# Patient Record
Sex: FEMALE | Race: WHITE | URBAN - METROPOLITAN AREA
[De-identification: names, ages, dates, MRNs, and addresses within clinical notes are randomized per-mention and may not be internally consistent; named-entity substitution may affect disease eponyms.]

---

## 2017-01-03 ENCOUNTER — ONCOLOGY VISIT (OUTPATIENT)
Dept: ONCOLOGY | Facility: CLINIC | Age: 72
End: 2017-01-03
Payer: COMMERCIAL

## 2017-01-03 ENCOUNTER — INFUSION THERAPY VISIT (OUTPATIENT)
Dept: INFUSION THERAPY | Facility: CLINIC | Age: 72
End: 2017-01-03
Payer: COMMERCIAL

## 2017-01-03 VITALS
RESPIRATION RATE: 15 BRPM | TEMPERATURE: 98 F | SYSTOLIC BLOOD PRESSURE: 132 MMHG | WEIGHT: 218 LBS | HEART RATE: 84 BPM | BODY MASS INDEX: 35.03 KG/M2 | DIASTOLIC BLOOD PRESSURE: 78 MMHG | OXYGEN SATURATION: 98 % | HEIGHT: 66 IN

## 2017-01-03 DIAGNOSIS — C18.9 METASTASIS FROM COLON CANCER (H): Primary | ICD-10-CM

## 2017-01-03 DIAGNOSIS — F41.9 ANXIETY: ICD-10-CM

## 2017-01-03 DIAGNOSIS — C79.9 METASTASIS FROM COLON CANCER (H): Primary | ICD-10-CM

## 2017-01-03 DIAGNOSIS — D69.6 THROMBOCYTOPENIA (H): ICD-10-CM

## 2017-01-03 DIAGNOSIS — R11.0 NAUSEA: ICD-10-CM

## 2017-01-03 DIAGNOSIS — R19.5 LOOSE STOOLS: ICD-10-CM

## 2017-01-03 LAB
BASOPHILS # BLD AUTO: 0 10E9/L (ref 0–0.2)
BASOPHILS NFR BLD AUTO: 0.5 %
BILIRUB SERPL-MCNC: 0.4 MG/DL (ref 0.2–1.3)
CEA SERPL-MCNC: 7 UG/L (ref 0–2.5)
DIFFERENTIAL METHOD BLD: ABNORMAL
EOSINOPHIL # BLD AUTO: 0.2 10E9/L (ref 0–0.7)
EOSINOPHIL NFR BLD AUTO: 4 %
ERYTHROCYTE [DISTWIDTH] IN BLOOD BY AUTOMATED COUNT: 14.2 % (ref 10–15)
HCT VFR BLD AUTO: 43.6 % (ref 35–47)
HGB BLD-MCNC: 14.8 G/DL (ref 11.7–15.7)
LYMPHOCYTES # BLD AUTO: 2.6 10E9/L (ref 0.8–5.3)
LYMPHOCYTES NFR BLD AUTO: 47 %
MCH RBC QN AUTO: 30.2 PG (ref 26.5–33)
MCHC RBC AUTO-ENTMCNC: 33.9 G/DL (ref 31.5–36.5)
MCV RBC AUTO: 89 FL (ref 78–100)
MONOCYTES # BLD AUTO: 0.4 10E9/L (ref 0–1.3)
MONOCYTES NFR BLD AUTO: 7.8 %
NEUTROPHILS # BLD AUTO: 2.3 10E9/L (ref 1.6–8.3)
NEUTROPHILS NFR BLD AUTO: 40.7 %
PLATELET # BLD AUTO: 130 10E9/L (ref 150–450)
RBC # BLD AUTO: 4.9 10E12/L (ref 3.8–5.2)
WBC # BLD AUTO: 5.5 10E9/L (ref 4–11)

## 2017-01-03 PROCEDURE — 96413 CHEMO IV INFUSION 1 HR: CPT | Performed by: INTERNAL MEDICINE

## 2017-01-03 PROCEDURE — 96417 CHEMO IV INFUS EACH ADDL SEQ: CPT | Performed by: INTERNAL MEDICINE

## 2017-01-03 PROCEDURE — 85025 COMPLETE CBC W/AUTO DIFF WBC: CPT | Performed by: INTERNAL MEDICINE

## 2017-01-03 PROCEDURE — 99000 SPECIMEN HANDLING OFFICE-LAB: CPT | Performed by: INTERNAL MEDICINE

## 2017-01-03 PROCEDURE — 82378 CARCINOEMBRYONIC ANTIGEN: CPT | Mod: 90 | Performed by: INTERNAL MEDICINE

## 2017-01-03 PROCEDURE — 82247 BILIRUBIN TOTAL: CPT | Performed by: INTERNAL MEDICINE

## 2017-01-03 PROCEDURE — 99214 OFFICE O/P EST MOD 30 MIN: CPT | Mod: 25 | Performed by: NURSE PRACTITIONER

## 2017-01-03 PROCEDURE — 81002 URINALYSIS NONAUTO W/O SCOPE: CPT | Performed by: INTERNAL MEDICINE

## 2017-01-03 PROCEDURE — 97802 MEDICAL NUTRITION INDIV IN: CPT | Performed by: DIETITIAN, REGISTERED

## 2017-01-03 PROCEDURE — 96416 CHEMO PROLONG INFUSE W/PUMP: CPT | Performed by: INTERNAL MEDICINE

## 2017-01-03 PROCEDURE — 96375 TX/PRO/DX INJ NEW DRUG ADDON: CPT | Performed by: INTERNAL MEDICINE

## 2017-01-03 PROCEDURE — 99207 ZZC NO CHARGE NURSE ONLY: CPT

## 2017-01-03 RX ORDER — ONDANSETRON 8 MG/1
8 TABLET, FILM COATED ORAL EVERY 8 HOURS PRN
Qty: 10 TABLET | Refills: 2 | Status: SHIPPED | OUTPATIENT
Start: 2017-01-03 | End: 2017-01-31

## 2017-01-03 RX ORDER — PROCHLORPERAZINE MALEATE 10 MG
10 TABLET ORAL EVERY 6 HOURS PRN
Qty: 30 TABLET | Refills: 2 | Status: SHIPPED | OUTPATIENT
Start: 2017-01-03 | End: 2017-05-30

## 2017-01-03 RX ORDER — LOPERAMIDE HCL 2 MG
CAPSULE ORAL
Qty: 30 CAPSULE | Refills: 0 | Status: SHIPPED | OUTPATIENT
Start: 2017-01-03 | End: 2017-02-13

## 2017-01-03 RX ORDER — HEPARIN SODIUM (PORCINE) LOCK FLUSH IV SOLN 100 UNIT/ML 100 UNIT/ML
5 SOLUTION INTRAVENOUS
Status: DISCONTINUED | OUTPATIENT
Start: 2017-01-03 | End: 2017-01-03 | Stop reason: HOSPADM

## 2017-01-03 RX ADMIN — Medication 250 ML: at 09:10

## 2017-01-03 RX ADMIN — HEPARIN SODIUM (PORCINE) LOCK FLUSH IV SOLN 100 UNIT/ML 5 ML: 100 SOLUTION at 08:25

## 2017-01-03 ASSESSMENT — PAIN SCALES - GENERAL: PAINLEVEL: NO PAIN (0)

## 2017-01-03 NOTE — PROGRESS NOTES
Infusion Nursing Note:  Damari Bryant presents today for C1D1 avastin/folfiri.    Patient seen by provider today: Yes: Chrystal Morel NP    Note: Irinotecan stopped at 10:45 for cramping. Atropine given with good relief and irinotecan restarted. Dose completed without further incident.    Intravenous Access:  Implanted Port - see IV flowsheet    Treatment Conditions:  HGB     14.8   1/3/2017  WBC      5.5   1/3/2017   ANEU      2.3   1/3/2017  PLT      130   1/3/2017     Results reviewed, labs MET treatment parameters, ok to proceed with treatment.  TBili wnl.      Post Infusion Assessment:  Patient tolerated infusion without incident.  Blood return noted pre and post infusion.  Site patent and intact, free from redness, edema or discomfort.  No evidence of extravasations.  IVAD left accessed for 46 hours of chemotherapy.    Discharge Plan:   Patient discharged in stable condition accompanied by:  and daughter.  Departure Mode: Ambulatory.  Schedulers working on return appointments for 1/17/17. Patient to return on 1/5 for pump disconnect.    Kailee Stallworth RN

## 2017-01-03 NOTE — PROGRESS NOTES
"CLINICAL NUTRITION SERVICES - ASSESSMENT NOTE    REASON FOR ASSESSMENT  Damari Bryant is a 71 year old female seen by the dietitian for Medical Nutrition Therapy related to colon cancer referred by Chrystal Morel CNP  Pt accompanied by her , Ross and daughter, Katie.       NUTRITION HISTORY  Factors affecting nutrition intake include:decreased appetite    Damari reports that her appetite and intake have been slightly diminished d/t anxiety anticipating chemotherapy.  She starts her chemotherapy today - Folfiri D1C1.  She reports that she had treatment in the past (~3 years ago) and had minimal side effects to treatment.  She reports that with adequate anti-emetic medications, she did not have any nausea.    She reports that she currently eats 3 meals/day including a snack.  She admits that her protein intake is low as she is not a big meat eater and prefers carbs.     Diet recall:  Breakfast:  Bagel with butter    Lunch:  2 Activa yogurts    Dinner:  3-4 oz lean pork, beef, chicken or salmon, 1/2-1 cup pasta or rice, vegetables or 1 cup casserole     Snack:  Cottage cheese with lettuce, fruit    Beverages:   Flavored water (Crystal light)     ANTHROPOMETRICS  Height: 66\"  Weight: 218 lbs/98kg  BMI: 35.1  Weight Status:  Obesity Grade II BMI 35-39.9  IBW: 130 lbs  % IBW: 154%  Weight History: down 7 lbs x past 6 weeks (4% wt loss)  Wt Readings from Last 10 Encounters:   01/03/17 98.884 kg (218 lb)   12/01/16 100.245 kg (221 lb)   11/21/16 102.15 kg (225 lb 3.2 oz)   11/08/16 100.109 kg (220 lb 11.2 oz)   09/06/16 99.791 kg (220 lb)   03/01/16 96.253 kg (212 lb 3.2 oz)   09/02/15 94.484 kg (208 lb 4.8 oz)   03/03/15 92.035 kg (202 lb 14.4 oz)   12/03/14 89.676 kg (197 lb 11.2 oz)   08/28/14 85.911 kg (189 lb 6.4 oz)       Dosing Weight: 152 lbs/69kg    LABS  Labs reviewed    MEDICATIONS/VITAMINS/MINERALS  Medications reviewed  Probiotic ([facility])  Imodium prn (not currently taking)    PROCEDURES WITH " NUTRITIONAL IMPLICATIONS  Chemotherapy - Folfiri C1D1    Radiation - no     ASSESSED NUTRITION NEEDS:  Estimated Energy Needs: 0644-3708 kcals (25-30 Kcal/Kg)  Justification: maintenance  Estimated Protein Needs: 70-85 grams protein (1-1.2 g pro/Kg)  Justification: maintenance  Estimated Fluid Needs: 2000  mL (1 mL/Kcal)  Justification: maintenance    MALNUTRITION:  % Weight Loss:  Weight loss does not meet criteria for malnutrition   % Intake:  Decreased intake does not meet criteria for malnutrition   Subcutaneous Fat Loss:  None observed  Muscle Loss:  None observed  Fluid Retention:  None noted    Malnutrition Diagnosis: Patient does not meet two of the above criteria necessary for diagnosing malnutrition    NUTRITION DIAGNOSIS:  Inadequate oral intake related to decreased appetite with anxiety as evidenced by 7 lb wt loss x past 6 weeks.     INTERVENTIONS  Implementation/Nutrition education provided:  -Discussed ways to maximize kcal and protein intake. Discussed calorie and protein needs for maintenance of weight and nutrition status.  Advised pt to aim for 70-85g protein via 5-6 small frequent meals.  Suggested she try 1/2 cup Kefir in addition to her probiotic supplement to provide additional nutrition (protein, calcium).   -Discussed that as chemotherapy progresses, eating may become more difficult and discussed ways to cope with this.      Provided pt with corresponding education materials -  Tips to Increase the Protein in Your Diet and Protein Sources.    Suggested ONS (Ensure Plus/Boost Plus, CIB, Benecalorie, Scandi shake etc) to have daily. Suggested ways to incorporate these supplements to avoid flavor fatigue. Encouraged pt to start consuming 2 high protein snacks daily.     Pt verbalize understanding of materials provided during consult.   Patient Understanding: Excellent  Expected Compliance: Excellent      Goals  1.  Aim for 5-6 small frequent meals, including protein source with each meal.   2.   Aim for 1700-2000kcal and 70-85g protein/day  3. Weight maintenance through chemo treatment    Follow-Up Plans: Pt has RD contact information for questions.    Pt encouraged to follow up with RD in 2 weeks at the time of infusion or radiation treatment.     MONITORING AND EVALUATION:  -Food intake, adequacy   -Weight trends    Time spent with patient: 30 minutes.  Tejal Guzman RD, LD

## 2017-01-03 NOTE — PROGRESS NOTES
Oncology Follow Up Visit: January 3, 2017    Oncologist: Dr Esther Greene  PCP: Samuel Cobian    Diagnosis: Mestastatic colon cancer  Damari Bryant is a 70 yo  female diagnosed July 2013 with Stage IIIB pT3N1b MX colon cancer.  Final pathology showed a mildly differentiated colonic adenocarcinoma, tumor extended through the muscularis propria into the pericolonic adipose tissue, lymphovascular invasion present.  Margins negative.  Three out of 30 lymph nodes were positive with 0.5 cm in greatest dimension and extracapsular extension identified.  DNA mismatch repair enzymes were intact.  Two pulmonary nodules noted on staging CT, indeterminate.   Treatment:  7/21/2013.  Sigmoid colectomy.   8/2013 to 1/2014  Adjuvant FOLFOX x 12 cycles.   2/2014 to 12/2016 observation  1/3/2017 begin FOLFIRI with new site to lung    INTERVAL HISTORY:  Ms Bryant comes to clinic with , Ross, and daughter for review prior to start of FOLFIRI to treat her colon cancer now found in lung. Pt is aware of new site to the lung and 12/30/2016 PET scan results were reviewed:   IMPRESSION:    1. In this patient with biopsy-proven metastatic colon adenocarcinoma,  there is progressive disease as evidenced by enlarging hypermetabolic  pulmonary nodules bilaterally and a hypermetabolic retroperitoneal  lymph node although similar size to the previous CT is  metastatic  disease until proven otherwise.  2. Diffuse FDG uptake in the thyroid gland is consistent with  nonspecific thyroiditis.  Pt shares she is feeling well with just some mild weakness with exertion. She has been eating well and not loosing weight. She has not been ill and denies SOB, cough or chest pain. She shares she was sent to [facility] with exhaustion on jess stewart after tooth extraction and several outings - was found to be weak and noting changes in consciousness but no specific findings were noted even after CT imaging of the brain completed( see  "results abstracted to chart).  She was released and after rest feels well. She had port placed 12/28/2016 with some brusing noted but working well today. 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 Consitues with insomnia and is aware this is affected by her anxiety- using lexapro and buspar. Denies any blood in stools.   Remainder of her comprehensive review of systems is negative.   Current Outpatient Prescriptions   Medication     Omega-3 Fatty Acids (OMEGA-3 FISH OIL PO)     tetrahydrozoline 0.05 % ophthalmic solution     UNABLE TO FIND     VITAMIN D, CHOLECALCIFEROL, PO     triamcinolone (KENALOG) 0.1 % cream     busPIRone (BUSPAR) 15 MG tablet     amLODIPine (NORVASC) 5 MG tablet     aspirin 81 MG tablet     escitalopram (LEXAPRO) 20 MG tablet     levothyroxine (SYNTHROID, LEVOTHROID) 150 MCG tablet     topiramate (TOPAMAX) 50 MG tablet     triamterene-hydrochlorothiazide (MAXZIDE-25) 37.5-25 MG per tablet     No current facility-administered medications for this visit.     Facility-Administered Medications Ordered in Other Visits   Medication     heparin 100 UNIT/ML injection 5 mL     sodium chloride (PF) 0.9% PF flush 10-20 mL        Allergies   Allergen Reactions     Ativan Other (See Comments)     hallucinations     Avocado Nausea and Vomiting     Hmg-Coa-R Inhibitors Nausea     Iodine-131 Nausea and Vomiting     Shellfish Allergy GI Disturbance     Sulfa Drugs Nausea and Vomiting     Tegaderm Transparent Dressing (Informational Only) Rash      FAMILY HISTORY:  Father had colon cancer at age 70, brother had lung cancer who was a smoker.      PHYSICAL EXAMINATION:   VITAL SIGNS: /78 mmHg  Pulse 84  Temp(Src) 98  F (36.7  C)  Resp 15  Ht 1.676 m (5' 5.98\")  Wt 98.884 kg (218 lb)  BMI 35.20 kg/m2  SpO2 98%   Constitutional: Alert, overweight, and in no distress.   ENT: Eyes bright- glasses , No mouth " sores  Neck: Supple, No adenopathy.Thyroid symmetric- non tender to palpation.   Right port site with colorful bruising but no redness or sign of infection. Patent.  Cardiac: Heart rate and rhythm is regular and strong without murmur  Respiratory: Breathing easy. Lung sounds clear to auscultation  Abdomen: Soft, non-tender, BS normal.  No organomegaly  MS: Muscle tone normal- able step up to exam table without problems. Extremities normal with no edema.   Skin: No suspicious lesions or rashes  Neuro: Sensory grossly WNL, gait normal.   Lymph: Normal ant/post cervical, axillary, supraclavicular nodes  Psych: Mentation appears normal and affect normal/bright and smiling- joking with staff.      LABORATORY DATA:  Results for orders placed or performed in visit on 01/03/17   CBC with platelets differential   Result Value Ref Range    WBC 5.5 4.0 - 11.0 10e9/L    RBC Count 4.90 3.8 - 5.2 10e12/L    Hemoglobin 14.8 11.7 - 15.7 g/dL    Hematocrit 43.6 35.0 - 47.0 %    MCV 89 78 - 100 fl    MCH 30.2 26.5 - 33.0 pg    MCHC 33.9 31.5 - 36.5 g/dL    RDW 14.2 10.0 - 15.0 %    Platelet Count 130 (L) 150 - 450 10e9/L    Diff Method Automated Method     % Neutrophils 40.7 %    % Lymphocytes 47.0 %    % Monocytes 7.8 %    % Eosinophils 4.0 %    % Basophils 0.5 %    Absolute Neutrophil 2.3 1.6 - 8.3 10e9/L    Absolute Lymphocytes 2.6 0.8 - 5.3 10e9/L    Absolute Monocytes 0.4 0.0 - 1.3 10e9/L    Absolute Eosinophils 0.2 0.0 - 0.7 10e9/L    Absolute Basophils 0.0 0.0 - 0.2 10e9/L   Bilirubin  total   Result Value Ref Range    Bilirubin Total 0.4 0.2 - 1.3 mg/dL   CEA   Result Value Ref Range    CEA 7.0 (H) 0 - 2.5 ug/L     ASSESSMENT AND PLAN:   Metastatic colon cancer- Pt is to begin treatment with FOLFIRI/Avastin. Reviewed administration and side effects of the plan which are somewhat similar to previous treatment.   Reminded her to get permission prior to any further surgery or dental procedure( has cancelled January knee  replacement and had tooth pulled just before jess).   Pt will continue with plan today and return for review in 2 weeks for review of symptoms prior to next cycle of treatment with FOLFIRI/Avastin.  Nausea- Pt very worried about nausea. Pharmacy to review compazine and zofran for use at first sign of nausea, Will have premed with zofran today with treatment. Allergic to Ativan- caused hallucinations  Thrombocytopenia-ongoing from previous with mild thrombocytopenia seen prior to start of this treatment.   Loose stools- Suggested to trial of daily fiber to help with bulking. Can continue imodium as needed.   Anxiety- pt using lexapro and buspar presently but known to have increased stress with change. Family is very supportive. Encouraged lite exercise to help with ths and improved stamina as well.   At end of visit Damari and  state understanding and agreement with plan.  TT=30 min with >50% in education and coordination of cares.    Chrystal Morel,CNP

## 2017-01-03 NOTE — MR AVS SNAPSHOT
After Visit Summary   1/3/2017    Damari Bryant    MRN: [id number]           Patient Information     Date Of Birth          1945        Visit Information        Provider Department      1/3/2017 8:15 AM Chrystal Morel APRN CNP [facility]        Today's Diagnoses     Metastasis from colon cancer (H)    -  1     Nausea         Anxiety         Loose stools         Thrombocytopenia (H)            Follow-ups after your visit        Your next 10 appointments already scheduled     Jan 17, 2017  7:30 AM   [facility]   [facility]    [address] [facility] [address]   [phone]            Jan 17, 2017  7:45 AM   Return Visit with SANJEEV Camarillo CNP   [facility]    [address] [facility] [address]   [phone]            Jan 17, 2017  8:30 AM   Level 5 with BAY 4 INFUSION   [facility]    [address] [facility] [address]   [phone]            Jan 19, 2017  9:00 AM   Level O with BAY 9 INFUSION   [facility]    [address] [facility] [address]   [phone]            Jan 31, 2017  9:15 AM   Return Visit with [facility]   [facility]    [address] [facility] [address]   [phone]            Jan 31, 2017 10:00 AM   Return Visit with Esther Greene MD   [facility]    [address] [facility] [address]   [phone]            Jan 31, 2017 10:30 AM   Level 5 with BAY 6 INFUSION   [facility]    [address] [facility] [address]   [phone]            Feb 02, 2017 11:00 AM   Level O  with [facility] INFUSION   [facility] ([facility])    [address] [place] [address] [phone]              Who to contact     If you have questions or need follow up information about today's clinic visit or your schedule please contact [facility] directly at [phone].  Normal or non-critical lab and imaging results will be communicated to you by MyChart, letter or phone within 4 business days after the clinic has received the results. If you do not hear from us within 7 days, please contact the clinic through MyChart or phone. If you have a critical or abnormal lab result, we will notify you by phone as soon as possible.  Submit refill requests through [employer] or call your pharmacy and they will forward the refill request to us. Please allow 3 business days for your refill to be completed.          Additional Information About Your Visit        [employer] Information     [employer] is an electronic gateway that provides easy, online access to your medical records. With [employer], you can request a clinic appointment, read your test results, renew a prescription or communicate with your care team.     To sign up for [employer] visit the website at [email or web address]   You will be asked to enter the access code listed below, as well as some personal information. Please follow the directions to create your username and password.     Your access code is: [email or web address]  Expires: 2017  9:27 AM     Your access code will  in 90 days. If you need help or a new code, please contact your [facility] or call [phone] for assistance.        Care EveryWhere ID     This is your Care EveryWhere ID. This could be used by other organizations to access your [facility] medical records  [id number]        Your Vitals Were     Pulse Temperature Respirations Height BMI (Body Mass Index) Pulse Oximetry    84 98  F (36.7  C)  "15 1.676 m (5' 5.98\") 35.20 kg/m2 98%       Blood Pressure from Last 3 Encounters:   01/03/17 132/78   12/01/16 146/86   11/28/16 135/67    Weight from Last 3 Encounters:   01/03/17 98.884 kg (218 lb)   12/01/16 100.245 kg (221 lb)   11/21/16 102.15 kg (225 lb 3.2 oz)              Today, you had the following     No orders found for display         Today's Medication Changes          These changes are accurate as of: 1/3/17 11:59 PM.  If you have any questions, ask your nurse or doctor.               Start taking these medicines.        Dose/Directions    loperamide 2 MG capsule   Commonly known as:  IMODIUM   Used for:  Metastasis from colon cancer (H)   Started by:  Tara Long RPH        Start with 2 caps (4 mg), then take one cap (2 mg) after each diarrheal stool as needed. Do not use more than 8 caps (16 mg) per day.   Quantity:  30 capsule   Refills:  0       ondansetron 8 MG tablet   Commonly known as:  ZOFRAN   Used for:  Metastasis from colon cancer (H)   Started by:  Tara Long RPH        Dose:  8 mg   Take 1 tablet (8 mg) by mouth every 8 hours as needed (Nausea/Vomiting)   Quantity:  10 tablet   Refills:  2       prochlorperazine 10 MG tablet   Commonly known as:  COMPAZINE   Used for:  Metastasis from colon cancer (H)   Started by:  Tara Long RPH        Dose:  10 mg   Take 1 tablet (10 mg) by mouth every 6 hours as needed (Nausea/Vomiting)   Quantity:  30 tablet   Refills:  2            Where to get your medicines      These medications were sent to [facility] - [place], [place] - [address]  [address], [facility] [address]     Phone:  [phone]    - loperamide 2 MG capsule  - ondansetron 8 MG tablet  - prochlorperazine 10 MG tablet             Primary Care Provider Office Phone # Fax #    Samuel ELZBIETA Cobian [phone]       [facility] [address] [facility] [place] [address]        Thank you!     Thank you for " [facility]  for your care. Our goal is always to provide you with excellent care. Hearing back from our patients is one way we can continue to improve our services. Please take a few minutes to complete the written survey that you may receive in the mail after your visit with us. Thank you!             Your Updated Medication List - Protect others around you: Learn how to safely use, store and throw away your medicines at www.disposemymeds.org.          This list is accurate as of: 1/3/17 11:59 PM.  Always use your most recent med list.                   Brand Name Dispense Instructions for use    aspirin 81 MG tablet      Take  by mouth daily.       busPIRone 15 MG tablet    BUSPAR     Take 15 mg by mouth 2 times daily       levothyroxine 150 MCG tablet    SYNTHROID/LEVOTHROID     Take 150 mcg by mouth daily       LEXAPRO 20 MG tablet   Generic drug:  escitalopram      Take 20 mg by mouth daily.       loperamide 2 MG capsule    IMODIUM    30 capsule    Start with 2 caps (4 mg), then take one cap (2 mg) after each diarrheal stool as needed. Do not use more than 8 caps (16 mg) per day.       NORVASC 5 MG tablet   Generic drug:  amLODIPine      Take 5 mg by mouth daily       OMEGA-3 FISH OIL PO          ondansetron 8 MG tablet    ZOFRAN    10 tablet    Take 1 tablet (8 mg) by mouth every 8 hours as needed (Nausea/Vomiting)       prochlorperazine 10 MG tablet    COMPAZINE    30 tablet    Take 1 tablet (10 mg) by mouth every 6 hours as needed (Nausea/Vomiting)       tetrahydrozoline 0.05 % ophthalmic solution      1 drop 3 times daily       TOPAMAX 50 MG tablet   Generic drug:  topiramate      Take  by mouth 2 times daily. 1 tab q am 2 tabs q PM       triamcinolone 0.1 % cream    KENALOG     Apply topically 2 times daily Vaginal       triamterene-hydrochlorothiazide 37.5-25 MG per tablet    MAXZIDE-25     Take 1 tablet by mouth daily.       UNABLE TO FIND      MEDICATION NAME: Nature made  digestive probiotics       VITAMIN D (CHOLECALCIFEROL) PO      Take 2,000 Units by mouth daily

## 2017-01-03 NOTE — NURSING NOTE
"Damari Bryant is a 71 year old female who presents for:  Chief Complaint   Patient presents with     Oncology Clinic Visit     follow up        Initial Vitals:  /78 mmHg  Pulse 84  Temp(Src) 98  F (36.7  C)  Resp 15  Ht 1.676 m (5' 5.98\")  Wt 98.884 kg (218 lb)  BMI 35.20 kg/m2  SpO2 98% Estimated body mass index is 35.2 kg/(m^2) as calculated from the following:    Height as of this encounter: 1.676 m (5' 5.98\").    Weight as of this encounter: 98.884 kg (218 lb).. Body surface area is 2.15 meters squared. BP completed using cuff size: regular  No Pain (0) No LMP recorded. Patient is postmenopausal. Allergies and medications reviewed.     Medications: Medication refills not needed today.  Pharmacy name entered into [employer]:    [id number] - [place], [place] - [address] [facility] [address] [facility] [address] [facility] [address] [facility]    Comments:     5 minutes for nursing intake (face to face time)   Elmira Gerardo LPN          "

## 2017-01-04 ENCOUNTER — TELEPHONE (OUTPATIENT)
Dept: ONCOLOGY | Facility: CLINIC | Age: 72
End: 2017-01-04

## 2017-01-04 DIAGNOSIS — K59.00 CONSTIPATION: Primary | ICD-10-CM

## 2017-01-04 RX ORDER — AMOXICILLIN 250 MG
CAPSULE ORAL
Qty: 60 TABLET | Refills: 1 | Status: SHIPPED | OUTPATIENT
Start: 2017-01-04

## 2017-01-04 NOTE — TELEPHONE ENCOUNTER
Pt calling and reporting constipation.  She has loose stools daily, but now has not had BM x 3 days.  She would like an Rx for constipation.

## 2017-01-05 ENCOUNTER — INFUSION THERAPY VISIT (OUTPATIENT)
Dept: INFUSION THERAPY | Facility: CLINIC | Age: 72
End: 2017-01-05
Payer: COMMERCIAL

## 2017-01-05 ENCOUNTER — CARE COORDINATION (OUTPATIENT)
Dept: ONCOLOGY | Facility: CLINIC | Age: 72
End: 2017-01-05

## 2017-01-05 VITALS
OXYGEN SATURATION: 95 % | SYSTOLIC BLOOD PRESSURE: 141 MMHG | HEART RATE: 92 BPM | DIASTOLIC BLOOD PRESSURE: 89 MMHG | RESPIRATION RATE: 20 BRPM | TEMPERATURE: 96.7 F

## 2017-01-05 DIAGNOSIS — C18.9 METASTASIS FROM COLON CANCER (H): Primary | ICD-10-CM

## 2017-01-05 DIAGNOSIS — C79.9 METASTASIS FROM COLON CANCER (H): Primary | ICD-10-CM

## 2017-01-05 PROCEDURE — 99207 ZZC NO CHARGE LOS: CPT

## 2017-01-05 PROCEDURE — 99207 ZZC NO CHARGE LOS: CPT | Performed by: NURSE PRACTITIONER

## 2017-01-05 RX ORDER — HEPARIN SODIUM (PORCINE) LOCK FLUSH IV SOLN 100 UNIT/ML 100 UNIT/ML
5 SOLUTION INTRAVENOUS
Status: DISCONTINUED | OUTPATIENT
Start: 2017-01-05 | End: 2017-01-05 | Stop reason: HOSPADM

## 2017-01-05 RX ADMIN — HEPARIN SODIUM (PORCINE) LOCK FLUSH IV SOLN 100 UNIT/ML 5 ML: 100 SOLUTION at 12:02

## 2017-01-05 NOTE — PROGRESS NOTES
"RN spoke with patient's spouse, Ross, via telephone this afternoon to see how patient has been doing and feeling since receiving chemotherapy earlier this week.  Spouse states that patient had a \"rough morning\" this morning, with a few bouts of diarrhea and some nausea/dry heaves.  She has been taking Imodium for her loose stools, and has taken 4 tablets so far today.  Reviewed high-dose Imodium protocol with spouse.  Patient has not had any vomiting, but has experienced dry heaves this morning.  She took Zofran, which didn't seem to help much.  She then took a Compazine tablet, and her nausea improved.  No dry heaves or vomiting this afternoon.  Per spouse, patient had been doing excellent with drinking oral fluids until today.  Her appetite is decreased today.  She is currently napping, but when she wakes up, her spouse plans to encourage her to push fluids.  Instructed spouse to aim at having patient drink at least 64 ounces of oral fluids per day, excluding caffeine-containing beverages.  Also educated spouse to call if patient develops any signs/symptoms of dehydration, including dizziness/lightheadedness, dark urine, decreased urination, or mental status changes.  Reviewed after-hours contact information with spouse.  Spouse will continue encouraging patient to push oral fluids, continue using high-dose Imodium protocol, and continue with antiemetics prn.  RN will plan to follow-up with patient early next week to see how she is doing.  Encouraged spouse to call into clinic anytime with any questions/concerns that he may have.    Evans Archer RN, BSN, OCN  Care Coordinator  [facility]  [phone]    "

## 2017-01-05 NOTE — PROGRESS NOTES
Infusion Nursing Note:  Damari Bryant presents today for pump disconnect.    Patient seen by provider today: No    Note: Pt reported diarrhea and nausea. Concerns of dehydration. VSS and nausea is controlled this am. Pt encouraged to call if she is not able to keep fluids down and diarrhea continues. Chrystal Morel NP notified of concerns.    Intravenous Access:  Implanted Port.    Treatment Conditions:  Not Applicable.      Post Infusion Assessment:  Access discontinued per protocol.    Discharge Plan:   AVS to patient via MYCHART.  Patient will return 1/17/17 for next appointment.   Patient discharged in stable condition accompanied by: self and .  Departure Mode: Ambulatory.    Nga Olmos RN

## 2017-01-11 ENCOUNTER — CARE COORDINATION (OUTPATIENT)
Dept: ONCOLOGY | Facility: CLINIC | Age: 72
End: 2017-01-11

## 2017-01-11 NOTE — PROGRESS NOTES
"Call placed to spouse today to see how patient is doing and feeling this week.  Per spouse, patient is doing a little better each day.  She has had no dry heaves this week, and no vomiting.  She had some mild nausea last evening, took a Zofran pill, and the nausea improved.  Her appetite seems better this week.  She has been able to eat a wide variety of foods, including ice cream, scrambled eggs with cheese, cottage cheese, fruit, tater tot hot dish, Malt O Meal, oatmeal, toast with jelly, string, cheese, yogurt, and chicken.  Ice water seems to really appeal to patient, and she has been drinking a lot of that this week.  Spouse has been pushing her to drink plenty of water and to eat high-protein foods to keep her strength up.  Patient was able to go grocery shopping with her spouse yesterday, and it put her in really good spirits to get out of the house and get some fresh air.  Patient's diarrhea is under better control as well - she is actually constipated at times this week, and has been taking Senna prn, which works well.  She does continue to feel fatigued this week, and has been getting 12 hours of sleep at night plus a short nap during the day.  Patient has not complained of any abdominal pain or chills, and has not been febrile at all.      Overall, spouse states that he feels patient's symptoms are \"under control\" this week, and are getting better each day.  RN offered praise to spouse with the great care that he has been providing to patient, and encouraged him to continue with current cares.  Patient is scheduled to follow-up in clinic on Tuesday next week 1/17/17.  RN encouraged spouse to call into clinic in the interim with any questions or concerns.      Evans Archer RN, BSN, OCN  Care Coordinator  [facility]  [phone]    "

## 2017-01-17 ENCOUNTER — ONCOLOGY VISIT (OUTPATIENT)
Dept: ONCOLOGY | Facility: CLINIC | Age: 72
End: 2017-01-17
Payer: COMMERCIAL

## 2017-01-17 ENCOUNTER — INFUSION THERAPY VISIT (OUTPATIENT)
Dept: INFUSION THERAPY | Facility: CLINIC | Age: 72
End: 2017-01-17
Payer: COMMERCIAL

## 2017-01-17 VITALS
HEART RATE: 94 BPM | SYSTOLIC BLOOD PRESSURE: 137 MMHG | BODY MASS INDEX: 35.67 KG/M2 | TEMPERATURE: 97.1 F | DIASTOLIC BLOOD PRESSURE: 94 MMHG | RESPIRATION RATE: 22 BRPM | WEIGHT: 220.9 LBS

## 2017-01-17 VITALS
OXYGEN SATURATION: 96 % | DIASTOLIC BLOOD PRESSURE: 79 MMHG | HEART RATE: 95 BPM | TEMPERATURE: 97.4 F | RESPIRATION RATE: 16 BRPM | SYSTOLIC BLOOD PRESSURE: 132 MMHG

## 2017-01-17 DIAGNOSIS — C18.9 METASTASIS FROM COLON CANCER (H): Primary | ICD-10-CM

## 2017-01-17 DIAGNOSIS — C79.9 METASTASIS FROM COLON CANCER (H): Primary | ICD-10-CM

## 2017-01-17 DIAGNOSIS — F41.9 ANXIETY: ICD-10-CM

## 2017-01-17 DIAGNOSIS — D69.6 THROMBOCYTOPENIA (H): ICD-10-CM

## 2017-01-17 DIAGNOSIS — C79.9 METASTASIS FROM COLON CANCER (H): ICD-10-CM

## 2017-01-17 DIAGNOSIS — K59.00 CONSTIPATION, UNSPECIFIED CONSTIPATION TYPE: ICD-10-CM

## 2017-01-17 DIAGNOSIS — B37.0 THRUSH: Primary | ICD-10-CM

## 2017-01-17 DIAGNOSIS — C18.9 METASTASIS FROM COLON CANCER (H): ICD-10-CM

## 2017-01-17 DIAGNOSIS — M79.10 MUSCLE ACHE: ICD-10-CM

## 2017-01-17 LAB
BASOPHILS # BLD AUTO: 0 10E9/L (ref 0–0.2)
BASOPHILS NFR BLD AUTO: 0.4 %
BILIRUB SERPL-MCNC: 0.3 MG/DL (ref 0.2–1.3)
DIFFERENTIAL METHOD BLD: ABNORMAL
EOSINOPHIL # BLD AUTO: 0.2 10E9/L (ref 0–0.7)
EOSINOPHIL NFR BLD AUTO: 4.9 %
ERYTHROCYTE [DISTWIDTH] IN BLOOD BY AUTOMATED COUNT: 14.3 % (ref 10–15)
HCT VFR BLD AUTO: 42.2 % (ref 35–47)
HGB BLD-MCNC: 14.5 G/DL (ref 11.7–15.7)
LYMPHOCYTES # BLD AUTO: 2 10E9/L (ref 0.8–5.3)
LYMPHOCYTES NFR BLD AUTO: 45.6 %
MCH RBC QN AUTO: 30.4 PG (ref 26.5–33)
MCHC RBC AUTO-ENTMCNC: 34.4 G/DL (ref 31.5–36.5)
MCV RBC AUTO: 89 FL (ref 78–100)
MONOCYTES # BLD AUTO: 0.3 10E9/L (ref 0–1.3)
MONOCYTES NFR BLD AUTO: 6.5 %
NEUTROPHILS # BLD AUTO: 1.9 10E9/L (ref 1.6–8.3)
NEUTROPHILS NFR BLD AUTO: 42.6 %
PLATELET # BLD AUTO: 126 10E9/L (ref 150–450)
RBC # BLD AUTO: 4.77 10E12/L (ref 3.8–5.2)
WBC # BLD AUTO: 4.5 10E9/L (ref 4–11)

## 2017-01-17 PROCEDURE — 99207 ZZC NO CHARGE LOS: CPT | Performed by: DIETITIAN, REGISTERED

## 2017-01-17 PROCEDURE — 96415 CHEMO IV INFUSION ADDL HR: CPT | Performed by: NURSE PRACTITIONER

## 2017-01-17 PROCEDURE — 85025 COMPLETE CBC W/AUTO DIFF WBC: CPT | Performed by: INTERNAL MEDICINE

## 2017-01-17 PROCEDURE — 96375 TX/PRO/DX INJ NEW DRUG ADDON: CPT | Performed by: NURSE PRACTITIONER

## 2017-01-17 PROCEDURE — 82247 BILIRUBIN TOTAL: CPT | Performed by: INTERNAL MEDICINE

## 2017-01-17 PROCEDURE — 96416 CHEMO PROLONG INFUSE W/PUMP: CPT | Performed by: NURSE PRACTITIONER

## 2017-01-17 PROCEDURE — 96417 CHEMO IV INFUS EACH ADDL SEQ: CPT | Performed by: NURSE PRACTITIONER

## 2017-01-17 PROCEDURE — 96367 TX/PROPH/DG ADDL SEQ IV INF: CPT | Performed by: NURSE PRACTITIONER

## 2017-01-17 PROCEDURE — 99214 OFFICE O/P EST MOD 30 MIN: CPT | Mod: 25 | Performed by: NURSE PRACTITIONER

## 2017-01-17 PROCEDURE — 99207 ZZC NO CHARGE NURSE ONLY: CPT

## 2017-01-17 PROCEDURE — 96413 CHEMO IV INFUSION 1 HR: CPT | Performed by: NURSE PRACTITIONER

## 2017-01-17 RX ORDER — NYSTATIN 100000/ML
500000 SUSPENSION, ORAL (FINAL DOSE FORM) ORAL 4 TIMES DAILY
Qty: 280 ML | Refills: 1 | Status: SHIPPED | OUTPATIENT
Start: 2017-01-17 | End: 2017-02-07

## 2017-01-17 RX ORDER — HEPARIN SODIUM (PORCINE) LOCK FLUSH IV SOLN 100 UNIT/ML 100 UNIT/ML
5 SOLUTION INTRAVENOUS
Status: DISCONTINUED | OUTPATIENT
Start: 2017-01-17 | End: 2017-01-17 | Stop reason: HOSPADM

## 2017-01-17 RX ADMIN — Medication 250 ML: at 08:48

## 2017-01-17 RX ADMIN — HEPARIN SODIUM (PORCINE) LOCK FLUSH IV SOLN 100 UNIT/ML 5 ML: 100 SOLUTION at 07:52

## 2017-01-17 NOTE — Clinical Note
Had muscle cramping again with treatment- first abdominal and then chest- use atropine x 2 in plan and heat was found to be very helpful. SG

## 2017-01-17 NOTE — PROGRESS NOTES
"Patient's name and  were verified.  See Doc Flowsheet - IV assess for details.  IVAD accessed with 20G 3/4\" ashby gripper plus needle  blood return positive: YES  Site without redness, tenderness or swelling: YES  flushed with 30cc NS and 5cc 100u/ml heparin  Needle: Left accessed for infusion  Comments: Labs drawn.  Patient tolerated procedure without incident.    Carlota Johns RN  BSN    "

## 2017-01-17 NOTE — PROGRESS NOTES
Nutrition Services:    Met with Damari today to check in on her intake post C1D1 Folfiri.  She reports that her intake was poor for 2 days post infusion, however, the 3rd day she was able to resume normal intake.  She reports that she has been cognizant of taking a protein source with each meal to ensure adequate protein as she was previously not consuming much protein.  She feels as though she has more energy and less fatigue with increased protein.      She has also been taking Kefir and really likes it as it's 'different'.   Her weight has been stable.  She denies any barriers to eating at this time.      RD will check in with patient during infusion in the next 2 weeks.      Tejal Guzman RD, LD

## 2017-01-17 NOTE — PROGRESS NOTES
Oncology Follow Up Visit: January 17, 2017    Oncologist: Dr Esther Greene  PCP: Samuel Cobian    Diagnosis: Mestastatic colon cancer  Damari Bryant is a 70 yo  female diagnosed July 2013 with Stage IIIB pT3N1b MX colon cancer.  Final pathology showed a mildly differentiated colonic adenocarcinoma, tumor extended through the muscularis propria into the pericolonic adipose tissue, lymphovascular invasion present.  Margins negative.  Three out of 30 lymph nodes were positive with 0.5 cm in greatest dimension and extracapsular extension identified.  DNA mismatch repair enzymes were intact.  Two pulmonary nodules noted on staging CT, indeterminate.   Treatment:  7/21/2013.  Sigmoid colectomy.   8/2013 to 1/2014  Adjuvant FOLFOX x 12 cycles.   2/2014 to 12/2016 observation  1/3/2017 began FOLFIRI/Avastin with new site to lung    INTERVAL HISTORY:  Ms Bryant comes to clinic with , Ross And daughter for review prior to cycle 2 of FOLFIRI/ Avastin. Patient reports cycle 1 went quite well other than bowel issues noting to swing her from loose stools to constipation- She is currently trying to decide how to treat the constipation. Patient also reports that she had some nausea right after treatment for the first few days- she did learn to use the Compazine which did work for her and she did not lose any weight is her nutritious intake was good and adequate oral fluids. Patient reports being weak  And feels this is an ongoing issue however and is started to do some exercises in the home. She also reports dry skin but no open wounds. She feels she has less concentration in the week after treatment but also now she is very anxious in the health concerns related to treatment. She is also mentioning she feel she has thrush is her taste is change in her throat is sore. Using Tylenol PM nightly if needed for sleep.Denies any mouth sores shortness of breath fevers or neuropathy. Patient did have muscle cramping  especially in the abdomen during first cycle and was given atropine- this is still in her plan for today.  Remainder of her comprehensive review of systems is negative.   Current Outpatient Prescriptions   Medication     senna-docusate (SENNA S) 8.6-50 MG per tablet     prochlorperazine (COMPAZINE) 10 MG tablet     ondansetron (ZOFRAN) 8 MG tablet     loperamide (IMODIUM) 2 MG capsule     Omega-3 Fatty Acids (OMEGA-3 FISH OIL PO)     tetrahydrozoline 0.05 % ophthalmic solution     UNABLE TO FIND     VITAMIN D, CHOLECALCIFEROL, PO     triamcinolone (KENALOG) 0.1 % cream     busPIRone (BUSPAR) 15 MG tablet     amLODIPine (NORVASC) 5 MG tablet     aspirin 81 MG tablet     escitalopram (LEXAPRO) 20 MG tablet     levothyroxine (SYNTHROID, LEVOTHROID) 150 MCG tablet     topiramate (TOPAMAX) 50 MG tablet     triamterene-hydrochlorothiazide (MAXZIDE-25) 37.5-25 MG per tablet     No current facility-administered medications for this visit.        Allergies   Allergen Reactions     Ativan Other (See Comments)     hallucinations     Avocado Nausea and Vomiting     Hmg-Coa-R Inhibitors Nausea     Iodine-131 Nausea and Vomiting     Shellfish Allergy GI Disturbance     Sulfa Drugs Nausea and Vomiting     Tegaderm Transparent Dressing (Informational Only) Rash      FAMILY HISTORY:  Father had colon cancer at age 70, brother had lung cancer who was a smoker.      PHYSICAL EXAMINATION:   VITAL SIGNS: /94 mmHg  Pulse 94  Temp(Src) 97.1  F (36.2  C) (Oral)  Resp 22  Wt 100.2 kg (220 lb 14.4 oz)   Constitutional: Alert, overweight, in good mood but appears anxious   ENT: Eyes bright- glasses , No mouth sores- does have patchy whiteness to tongue consistent with thrush  Neck: Supple, No adenopathy.Thyroid symmetric   Right port site without redness or swelling  Cardiac: Heart rate and rhythm is regular and strong without murmur  Respiratory: Breathing easy. Lung sounds clear to auscultation  Abdomen: Soft, non-tender, BS  normal.  No organomegaly  MS: Muscle tone normal- able step up to exam table without problems. Extremities normal with no edema.   Skin: No suspicious lesions or rashes  Neuro: Sensory grossly WNL, gait normal.   Lymph: Normal ant/post cervical, axillary, supraclavicular nodes  Psych: Mentation appears normal and affect normal/bright with easy smile.     LABORATORY DATA:  Results for orders placed or performed in visit on 01/17/17   CBC with platelets differential   Result Value Ref Range    WBC 4.5 4.0 - 11.0 10e9/L    RBC Count 4.77 3.8 - 5.2 10e12/L    Hemoglobin 14.5 11.7 - 15.7 g/dL    Hematocrit 42.2 35.0 - 47.0 %    MCV 89 78 - 100 fl    MCH 30.4 26.5 - 33.0 pg    MCHC 34.4 31.5 - 36.5 g/dL    RDW 14.3 10.0 - 15.0 %    Platelet Count 126 (L) 150 - 450 10e9/L    Diff Method Automated Method     % Neutrophils 42.6 %    % Lymphocytes 45.6 %    % Monocytes 6.5 %    % Eosinophils 4.9 %    % Basophils 0.4 %    Absolute Neutrophil 1.9 1.6 - 8.3 10e9/L    Absolute Lymphocytes 2.0 0.8 - 5.3 10e9/L    Absolute Monocytes 0.3 0.0 - 1.3 10e9/L    Absolute Eosinophils 0.2 0.0 - 0.7 10e9/L    Absolute Basophils 0.0 0.0 - 0.2 10e9/L   Bilirubin  total   Result Value Ref Range    Bilirubin Total 0.3 0.2 - 1.3 mg/dL       ASSESSMENT AND PLAN:   Metastatic colon cancer- Pt is eligible to start cycle 2 FOLFIRI/Avastin today after reporting mild abdominal cramping with first cycle- we will now have aptropine inplan for use as needed. She also mentions nausea shortly after treatment and constipation.   Pt will continue with plan today and return for review in 2 weeks for review of symptoms prior to next cycle of treatment with FOLFIRI/Avastin.  Nausea- Pt Was able to start using the Compazine with good results- Suggested she could start tomorrow morning with the Compazine when she gets up to prevent any further problems with nausea early in cycle. Also has Zofran available to her if needed.  Thrombocytopenia-ongoing from previous  with mild thrombocytopenia seen prior to start of this treatment.Dxwo=902,000.  Constipation-change from loose stools to constipation with this treatment is noted by patient. Would like her to try some fiber product would be helpful or possibly a senna 1-2 tabs in the morning especially early in the cycle to see if this would be helpful.  Reminded adequate fluids is also very important for constipation treatment.   Thrush- will order nystatin swish and swallow to be used 4 times daily until healed  Anxiety- pt using lexapro and buspar presently.  Muscle aches with treatment- patient was given age. Today prior to the infusion did have recurrence of the muscle cramping only it was in her chest before and if treatment she was given a repeat of the atropine which appear to make her dizzy but did not help the chest pain-she had no arm pain no shortness of breath but increased anxiety with the pain.Heart rate and sounds of the heart were normal as well as stable vital signs. Patient was given heat to the chest and then had some movement and states she felt much better. Will ask next visit we did not give the atropine until she starts having some muscle cramping to prevent needing to have secondary dose.  TT=30 min with >50% in education and coordination of cares.    Chrystal Morel,CNP

## 2017-01-17 NOTE — NURSING NOTE
"Dmaari Bryant is a 71 year old female who presents for:  Chief Complaint   Patient presents with     Oncology Clinic Visit     Prior to tx.        Initial Vitals:  /94 mmHg  Pulse 94  Temp(Src) 97.1  F (36.2  C) (Oral)  Resp 22  Wt 100.2 kg (220 lb 14.4 oz) Estimated body mass index is 35.67 kg/(m^2) as calculated from the following:    Height as of 1/3/17: 1.676 m (5' 5.98\").    Weight as of this encounter: 100.2 kg (220 lb 14.4 oz).. There is no height on file to calculate BSA. BP completed using cuff size: small regular; wrist  Data Unavailable No LMP recorded. Patient is postmenopausal. Allergies and medications reviewed.     Medications: MEDICATION REFILLS NEEDED TODAY. Compazine  Pharmacy name entered into [facility]:    [facility], [place]    Comments: thinks she has thrush    10 minutes for nursing intake (face to face time)   Leeann Lopez LPN          "

## 2017-01-17 NOTE — MR AVS SNAPSHOT
After Visit Summary   1/17/2017    Damari Bryant    MRN: [id number]           Patient Information     Date Of Birth          1945        Visit Information        Provider Department      1/17/2017 7:45 AM Chrystal Morel APRN CNP [facility]        Today's Diagnoses     Thrush    -  1     Metastasis from colon cancer (H)         Thrombocytopenia (H)         Anxiety         Constipation, unspecified constipation type         Muscle ache            Follow-ups after your visit        Your next 10 appointments already scheduled     Jan 31, 2017  9:15 AM   [facility]   [facility]    [address] [facility] [address]   [phone]            Jan 31, 2017 10:00 AM   [facility] Visit with Esther Greene MD   [facility]    [address] [facility] [address]   [phone]            Jan 31, 2017 10:30 AM   Level 5 with BAY 6 INFUSION   [facility])    [address] [facility] [address]   [phone]            Feb 02, 2017 11:00 AM   Level O with BAY 7 INFUSION   [facility]    [address] [facility] [address]   [phone]            Feb 14, 2017  8:45 AM   [facility]   [facility]    [address] [facility] [address]   [phone]            Feb 14, 2017  9:15 AM   Return Visit with SANJEEV Camarillo CNP   [facility]    [address] [facility] [address]   [phone]            Feb 14, 2017 10:30 AM   Level 5 with BAY 8 INFUSION   [facility]    [address] [facility] [address]    [phone]            2017 11:00 AM   Level O with BAY 1 INFUSION   [facility]    [address] [place] [address] [phone]              Who to contact     If you have questions or need follow up information about today's clinic visit or your schedule please contact [facility] directly at [phone].  Normal or non-critical lab and imaging results will be communicated to you by [employer], letter or phone within 4 business days after the clinic has received the results. If you do not hear from us within 7 days, please contact the clinic through [employer] or phone. If you have a critical or abnormal lab result, we will notify you by phone as soon as possible.  Submit refill requests through [employer] or call your pharmacy and they will forward the refill request to us. Please allow 3 business days for your refill to be completed.          Additional Information About Your Visit        [employer] Information     [employer] is an electronic gateway that provides easy, online access to your medical records. With [employer], you can request a clinic appointment, read your test results, renew a prescription or communicate with your care team.     To sign up for [employer] visit the website at [email or web address]   You will be asked to enter the access code listed below, as well as some personal information. Please follow the directions to create your username and password.     Your access code is: [email or web address]  Expires: 2017  9:27 AM     Your access code will  in 90 days. If you need help or a new code, please contact your [facility] or call [phone] for assistance.        Care EveryWhere ID     This is your Care EveryWhere ID. This could be used by other organizations to access your [place] medical records  [id number]        Your Vitals Were     Pulse Temperature Respirations              94 97.1  F (36.2  C) (Oral) 22          Blood Pressure from Last 3 Encounters:   01/17/17 132/79   01/17/17 137/94   01/05/17 141/89    Weight from Last 3 Encounters:   01/17/17 100.2 kg (220 lb 14.4 oz)   01/03/17 98.884 kg (218 lb)   12/01/16 100.245 kg (221 lb)              Today, you had the following     No orders found for display         Today's Medication Changes          These changes are accurate as of: 1/17/17 11:59 PM.  If you have any questions, ask your nurse or doctor.               Start taking these medicines.        Dose/Directions    nystatin 233474 UNIT/ML suspension   Commonly known as:  MYCOSTATIN   Used for:  Thrush   Started by:  Chrystal Morel APRN CNP        Dose:  370520 Units   Take 5 mLs (500,000 Units) by mouth 4 times daily   Quantity:  280 mL   Refills:  1            Where to get your medicines      These medications were sent to [facility] - [place], [place] - [address]  [address], [facility] [address]     Phone:  [phone]    - nystatin [id number] UNIT/ML suspension             Primary Care Provider Office Phone # Fax #    Samuel RUFF Aranza [phone]       [facility] [address] [facility] [address]        Thank you!     Thank you for choosing [facility]  for your care. Our goal is always to provide you with excellent care. Hearing back from our patients is one way we can continue to improve our services. Please take a few minutes to complete the written survey that you may receive in the mail after your visit with us. Thank you!             Your Updated Medication List - Protect others around you: Learn how to safely use, store and throw away your medicines at www.disposemymeds.org.          This list is accurate as of: 1/17/17 11:59 PM.  Always use your most recent med list.                   Brand Name Dispense Instructions for use    aspirin 81 MG tablet      Take  by  mouth daily.       busPIRone 15 MG tablet    BUSPAR     Take 15 mg by mouth 2 times daily       levothyroxine 150 MCG tablet    SYNTHROID/LEVOTHROID     Take 150 mcg by mouth daily       LEXAPRO 20 MG tablet   Generic drug:  escitalopram      Take 20 mg by mouth daily.       loperamide 2 MG capsule    IMODIUM    30 capsule    Start with 2 caps (4 mg), then take one cap (2 mg) after each diarrheal stool as needed. Do not use more than 8 caps (16 mg) per day.       NORVASC 5 MG tablet   Generic drug:  amLODIPine      Take 5 mg by mouth daily       nystatin [id number] UNIT/ML suspension    MYCOSTATIN    280 mL    Take 5 mLs (500,000 Units) by mouth 4 times daily       OMEGA-3 FISH OIL PO          ondansetron 8 MG tablet    ZOFRAN    10 tablet    Take 1 tablet (8 mg) by mouth every 8 hours as needed (Nausea/Vomiting)       prochlorperazine 10 MG tablet    COMPAZINE    30 tablet    Take 1 tablet (10 mg) by mouth every 6 hours as needed (Nausea/Vomiting)       senna-docusate 8.6-50 MG per tablet    SENNA S    60 tablet    1-2 tabs daily for constipation       tetrahydrozoline 0.05 % ophthalmic solution      1 drop 3 times daily       TOPAMAX 50 MG tablet   Generic drug:  topiramate      Take  by mouth 2 times daily. 1 tab q am 2 tabs q PM       triamcinolone 0.1 % cream    KENALOG     Apply topically 2 times daily Vaginal       triamterene-hydrochlorothiazide 37.5-25 MG per tablet    MAXZIDE-25     Take 1 tablet by mouth daily.       UNABLE TO FIND      MEDICATION NAME: Nature made digestive probiotics       VITAMIN D (CHOLECALCIFEROL) PO      Take 2,000 Units by mouth daily

## 2017-01-17 NOTE — PROGRESS NOTES
Infusion Nursing Note:  Damari Bryant presents today for C2 folfiri.    Patient seen by provider today: Yes: Chrystal Morel NP    Note: 10:56 patient reporting cramping & nausea. VSS. No other symptoms. Irinotecan stopped.  11:02 atropine dose repeated.  11:20 patient reporting some chest discomfort. VSS with blood pressure increased a bit but wnl for her. Symptom discussed with Chrystal Morel NP. Heat applied and VS to be monitored.  11:25 Chest discomfort less, but patient reporting lightheadedness. Lying in chair in stable position.  11:45 Chest discomfort is gone. Patient still a bit lightheaded & nausea. Irinotecan restarted with Chrystal Morel NP OK. Patient took home dose zofran. Irinotecan completed without further incident. At discharge all symptoms resolved except the lightheadness which had lessened.    Intravenous Access:  Implanted Port - see IV flowsheet    Treatment Conditions:  HGB     14.5   1/17/2017  WBC      4.5   1/17/2017   ANEU      1.9   1/17/2017  PLT      126   1/17/2017     Results reviewed, labs MET treatment parameters, ok to proceed with treatment.        Post Infusion Assessment:  Patient tolerated infusion - see note above  Blood return noted pre infusion.  Site patent and intact, free from redness, edema or discomfort.  No evidence of extravasations.  Access discontinued per protocol.    Discharge Plan:   Copy of AVS reviewed with patient and/or family.  Patient will return 1/19  for pump disconnect appointment.  Patient discharged in stable condition accompanied by:  and daughter.  Departure Mode: Wheelchair.    Kailee Stallworth RN

## 2017-01-18 NOTE — PROGRESS NOTES
Recommendation for future doses after checking with Chrystal Morel, NP is to give atropine prn only.    Kailee Stallworth RN

## 2017-01-19 ENCOUNTER — INFUSION THERAPY VISIT (OUTPATIENT)
Dept: INFUSION THERAPY | Facility: CLINIC | Age: 72
End: 2017-01-19
Payer: COMMERCIAL

## 2017-01-19 VITALS
HEART RATE: 87 BPM | TEMPERATURE: 97.9 F | OXYGEN SATURATION: 96 % | SYSTOLIC BLOOD PRESSURE: 130 MMHG | DIASTOLIC BLOOD PRESSURE: 79 MMHG | RESPIRATION RATE: 20 BRPM

## 2017-01-19 DIAGNOSIS — C18.9 METASTASIS FROM COLON CANCER (H): Primary | ICD-10-CM

## 2017-01-19 DIAGNOSIS — C79.9 METASTASIS FROM COLON CANCER (H): Primary | ICD-10-CM

## 2017-01-19 PROCEDURE — 99207 ZZC NO CHARGE LOS: CPT | Performed by: NURSE PRACTITIONER

## 2017-01-19 PROCEDURE — 99207 ZZC NO CHARGE LOS: CPT

## 2017-01-19 RX ORDER — HEPARIN SODIUM (PORCINE) LOCK FLUSH IV SOLN 100 UNIT/ML 100 UNIT/ML
5 SOLUTION INTRAVENOUS ONCE
Status: COMPLETED | OUTPATIENT
Start: 2017-01-19 | End: 2017-01-19

## 2017-01-19 RX ADMIN — HEPARIN SODIUM (PORCINE) LOCK FLUSH IV SOLN 100 UNIT/ML 5 ML: 100 SOLUTION at 10:26

## 2017-01-19 ASSESSMENT — PAIN SCALES - GENERAL: PAINLEVEL: NO PAIN (0)

## 2017-01-19 NOTE — PROGRESS NOTES
Infusion Nursing Note:  Damari Bryant presents today for Fluorouracil CADD pump disconnect.    Patient seen by provider today: No   present during visit today: Not Applicable.    Note: Pt reports she work up this morning with dry heaves (no emesis) and she took zofran with relief. Pt was able to eat breakfast and drank fluids. She is able to drink 8-9 glasses of fluids per day (which is mostly water). Pt will take zofran PRN today. Reviewed that she can take #2 anti-nausea medication if no relief in 45 mins. Pt is using imodium for diarrhea prevention.     Intravenous Access:  Implanted Port.    Treatment Conditions:  Not Applicable.      Post Infusion Assessment:  Patient tolerated infusion without incident.    Discharge Plan:   Patient and/or family verbalized understanding of discharge instructions and all questions answered.  Copy of AVS reviewed with patient and/or family.  Patient will return 1/31/17 for next appointment.  Patient discharged in stable condition accompanied by: self and .  Departure Mode: Ambulatory.    Ramona Torres RN

## 2017-01-26 ENCOUNTER — CARE COORDINATION (OUTPATIENT)
Dept: ONCOLOGY | Facility: CLINIC | Age: 72
End: 2017-01-26

## 2017-01-26 DIAGNOSIS — C18.9 METASTASIS FROM COLON CANCER (H): Primary | ICD-10-CM

## 2017-01-26 DIAGNOSIS — Z95.828 PORT CATHETER IN PLACE: ICD-10-CM

## 2017-01-26 DIAGNOSIS — C79.9 METASTASIS FROM COLON CANCER (H): Primary | ICD-10-CM

## 2017-01-26 NOTE — PROGRESS NOTES
"Patient calling into clinic this morning stating \"I'm not feeling so good.\"  RN returned call to patient to assess symptoms.  Patient states that about 4 days ago, she developed an achy pain in her port site, especially when pressing on the area.  The pain spreads upward into her right neck and right jaw (the same side as the port).  Patient also reports that 2 days ago, she developed an achy pain in the right side of her head, and the back of her neck.  Her neck does not feel stiff.  Patient states that her family members feel that her right neck and jaw are swollen.  She has not noticed any redness or increased warmth in these areas, and has not noticed any redness, swelling, increased warmth, or drainage/bleeding from her port site.  She reports feeling \"chilled\" yesterday, but did not have any shaking chills.  Her temperature today has been near 98.2 degrees, and she has not had any fevers that she knows of.  She denies complaints of chest pain, but does endorse some shortness of breath with exertion.  She does not feel short of breath at rest.  She does report having a sore throat as well.    RN spoke with provider Chrystal Morel CNP, regarding patient's status and symptoms.  Chrystal recommends having patient come into clinic tomorrow with CBC/p/d and CXR prior to appointment    Patient was notified via telephone regarding above recommendations and plan.  Appointments scheduled for Friday 1/27/17: CXR at 8:15 am, port lab (CBC/p/d) at 8:45 am, and visit with Chrystal at 9:15 am.  Appointment details were communicated to patient.  RN also educated patient to seek urgent medical attention in her closest ER if she develops any fevers of 100.4 degrees or above, shaking chills, severe pain, chest pain, or shortness of breath with rest.  Patient verbalizes understanding of all recommendations and instructions/education, and is in agreement with this plan.  She will call into clinic or on-call Oncology MD in the interim " for any additional questions or concerns.    Evans Archer, RN, BSN, OCN  Care Coordinator  [facility]

## 2017-01-27 ENCOUNTER — RADIANT APPOINTMENT (OUTPATIENT)
Dept: GENERAL RADIOLOGY | Facility: CLINIC | Age: 72
End: 2017-01-27
Attending: NURSE PRACTITIONER
Payer: COMMERCIAL

## 2017-01-27 ENCOUNTER — ONCOLOGY VISIT (OUTPATIENT)
Dept: ONCOLOGY | Facility: CLINIC | Age: 72
End: 2017-01-27
Payer: COMMERCIAL

## 2017-01-27 VITALS
DIASTOLIC BLOOD PRESSURE: 65 MMHG | HEART RATE: 91 BPM | OXYGEN SATURATION: 95 % | BODY MASS INDEX: 34.87 KG/M2 | RESPIRATION RATE: 15 BRPM | HEIGHT: 66 IN | SYSTOLIC BLOOD PRESSURE: 128 MMHG | TEMPERATURE: 97.6 F | WEIGHT: 217 LBS

## 2017-01-27 DIAGNOSIS — C18.9 METASTASIS FROM COLON CANCER (H): ICD-10-CM

## 2017-01-27 DIAGNOSIS — Z95.828 PORT CATHETER IN PLACE: ICD-10-CM

## 2017-01-27 DIAGNOSIS — C79.9 METASTASIS FROM COLON CANCER (H): ICD-10-CM

## 2017-01-27 DIAGNOSIS — R59.1 LA (LYMPHADENOPATHY): Primary | ICD-10-CM

## 2017-01-27 DIAGNOSIS — K04.7 DENTAL INFECTION: ICD-10-CM

## 2017-01-27 LAB
BASOPHILS # BLD AUTO: 0 10E9/L (ref 0–0.2)
BASOPHILS NFR BLD AUTO: 0.1 %
DIFFERENTIAL METHOD BLD: ABNORMAL
EOSINOPHIL # BLD AUTO: 0.1 10E9/L (ref 0–0.7)
EOSINOPHIL NFR BLD AUTO: 2.1 %
ERYTHROCYTE [DISTWIDTH] IN BLOOD BY AUTOMATED COUNT: 14.6 % (ref 10–15)
HCT VFR BLD AUTO: 41 % (ref 35–47)
HGB BLD-MCNC: 14.2 G/DL (ref 11.7–15.7)
LYMPHOCYTES # BLD AUTO: 2.2 10E9/L (ref 0.8–5.3)
LYMPHOCYTES NFR BLD AUTO: 32.3 %
MCH RBC QN AUTO: 30.7 PG (ref 26.5–33)
MCHC RBC AUTO-ENTMCNC: 34.6 G/DL (ref 31.5–36.5)
MCV RBC AUTO: 89 FL (ref 78–100)
MONOCYTES # BLD AUTO: 0.6 10E9/L (ref 0–1.3)
MONOCYTES NFR BLD AUTO: 9.4 %
NEUTROPHILS # BLD AUTO: 3.8 10E9/L (ref 1.6–8.3)
NEUTROPHILS NFR BLD AUTO: 56.1 %
PLATELET # BLD AUTO: 115 10E9/L (ref 150–450)
RBC # BLD AUTO: 4.62 10E12/L (ref 3.8–5.2)
WBC # BLD AUTO: 6.7 10E9/L (ref 4–11)

## 2017-01-27 PROCEDURE — 99207 ZZC NO CHARGE NURSE ONLY: CPT

## 2017-01-27 PROCEDURE — 85025 COMPLETE CBC W/AUTO DIFF WBC: CPT | Performed by: NURSE PRACTITIONER

## 2017-01-27 PROCEDURE — 71020 XR CHEST 2 VW: CPT | Performed by: RADIOLOGY

## 2017-01-27 PROCEDURE — 99214 OFFICE O/P EST MOD 30 MIN: CPT | Performed by: NURSE PRACTITIONER

## 2017-01-27 RX ORDER — HEPARIN SODIUM (PORCINE) LOCK FLUSH IV SOLN 100 UNIT/ML 100 UNIT/ML
5 SOLUTION INTRAVENOUS
Status: DISCONTINUED | OUTPATIENT
Start: 2017-01-27 | End: 2017-01-27 | Stop reason: HOSPADM

## 2017-01-27 RX ORDER — AMOXICILLIN 500 MG/1
500 CAPSULE ORAL 3 TIMES DAILY
Qty: 30 CAPSULE | Refills: 0 | Status: SHIPPED | OUTPATIENT
Start: 2017-01-27 | End: 2017-02-06

## 2017-01-27 RX ADMIN — HEPARIN SODIUM (PORCINE) LOCK FLUSH IV SOLN 100 UNIT/ML 5 ML: 100 SOLUTION at 08:52

## 2017-01-27 ASSESSMENT — PAIN SCALES - GENERAL: PAINLEVEL: EXTREME PAIN (8)

## 2017-01-27 NOTE — NURSING NOTE
"Damari Bryant is a 71 year old female who presents for:  Chief Complaint   Patient presents with     Oncology Clinic Visit     symptom assessment        Initial Vitals:  /65 mmHg  Pulse 91  Temp(Src) 97.6  F (36.4  C)  Resp 15  Ht 1.676 m (5' 5.98\")  Wt 98.431 kg (217 lb)  BMI 35.04 kg/m2  SpO2 95% Estimated body mass index is 35.04 kg/(m^2) as calculated from the following:    Height as of this encounter: 1.676 m (5' 5.98\").    Weight as of this encounter: 98.431 kg (217 lb).. Body surface area is 2.14 meters squared. BP completed using cuff size: regular  Extreme Pain (8) No LMP recorded. Patient is postmenopausal. Allergies and medications reviewed.     Medications: Medication refills not needed today.  Pharmacy name entered into Hologic:    [id number] - [place], [place] - [address] [facility] [address]  [facility] [address] [facility] [address] [facility] [place]  [place] - [address] [facility]    Comments:     4 minutes for nursing intake (face to face time)   Elmira Gerardo LPN          "

## 2017-01-27 NOTE — PROGRESS NOTES
"January 27, 2017    S:Damari Bryant is a 71 year old female who presents with   Chief Complaint   Patient presents with     Oncology Clinic Visit     symptom assessment   Patient called clinic yesterday and shared that she is noticing new swelling to the right side of her neck that is concerning to her and feels it may be related to the port which is never been a problem for her previously. This is more painful when she moves her head from side to side or brings her chin down. She denies any fevers but does relate that she has a history of tooth concerns to the lower jaw to the right side as well from prior to starting of her recent treatment- this this was put off to complete some of the treatment prior to getting this repaired since it appeared stable though needing repair. Patient shares he is having a harder time sleeping, harder pillow tends to be healthy for her to support her and so doesn't move her neck she has been using some Tylenol to help with this which has been helpful.Tthis has been going on for approximately 5 days.  Patient is known to be anxious person but did just start treatment after recurrence of her colon cancer and is now taking FOLFOX Avastin with last infusion on 1/17/17.    is with her today and feels that this is been a problem for at least the last 2-3 days for her but she continues to eat and has not lost any weight. She is also currently being treated with thrush with nystatin swish and swallow since her last infusion date.        Allergies   Allergen Reactions     Ativan Other (See Comments)     hallucinations     Avocado Nausea and Vomiting     Hmg-Coa-R Inhibitors Nausea     Iodine-131 Nausea and Vomiting     Shellfish Allergy GI Disturbance     Sulfa Drugs Nausea and Vomiting     Tegaderm Transparent Dressing (Informational Only) Rash     EXAM:  /65 mmHg  Pulse 91  Temp(Src) 97.6  F (36.4  C)  Resp 15  Ht 1.676 m (5' 5.98\")  Wt 98.431 kg (217 lb)  BMI 35.04 kg/m2 "  SpO2 95%  Patient is alert and oriented and helpful with any questions.  TMs clearof any cold. No sign of sinus pressure with palpation.  Throat has mild redness but she is being treated for the thrush. Gumline to the lower right jaw does not show any redness and it is mildly tender with palpation and noting she's having more pain when she puts her chin down to her chest she does get pain up into the jaw and into the joint of the jaw.   Fullness is noted to right anterior area of the neck with much tenderness With palpation and patient is not willing to move the neck around due to the pain. But her rotation when she is not thinking about is a little bit better. Parotid glands are not swollen.  Port site without any signs of warmth swelling. Port is patent and working well.  Skin is not warm reddened or diaphoretic.  Heart rate and rhythm is regular and she is breathing easy at this time with lung sounds clear to auscultation.  Results for orders placed or performed in visit on 01/27/17   *CBC with platelets differential   Result Value Ref Range    WBC 6.7 4.0 - 11.0 10e9/L    RBC Count 4.62 3.8 - 5.2 10e12/L    Hemoglobin 14.2 11.7 - 15.7 g/dL    Hematocrit 41.0 35.0 - 47.0 %    MCV 89 78 - 100 fl    MCH 30.7 26.5 - 33.0 pg    MCHC 34.6 31.5 - 36.5 g/dL    RDW 14.6 10.0 - 15.0 %    Platelet Count 115 (L) 150 - 450 10e9/L    Diff Method Automated Method     % Neutrophils 56.1 %    % Lymphocytes 32.3 %    % Monocytes 9.4 %    % Eosinophils 2.1 %    % Basophils 0.1 %    Absolute Neutrophil 3.8 1.6 - 8.3 10e9/L    Absolute Lymphocytes 2.2 0.8 - 5.3 10e9/L    Absolute Monocytes 0.6 0.0 - 1.3 10e9/L    Absolute Eosinophils 0.1 0.0 - 0.7 10e9/L    Absolute Basophils 0.0 0.0 - 0.2 10e9/L       A:Right neck adenopathy with known(K04.7) Dental infection  (primary encounter diagnosis)  Comment: Really ruled out that this had anything to do with the port today since that is functioning well and no signs of infection seen with  the port itself also no pain at site of the clavicle where it goes down interiorly. This far is another infection or concern to the neck since she has a known dental issue to that side admitted make sense that this would be a recurrence or drainage from that area.   Plan:  I will agree with antibiotics to be started and asked that she see her dentist within the next few days for follow-up today of possible. CBC was taken and results shows that she is eligible for dental work if necessary with an adequate white count as well as ANC- type event was sent with her for the dental visit.  amoxicillin (AMOXIL) 500 MG capsule        Patient has known metastatic colon cancer and is in current treatment with FOLFOX Avastin- She will be returning next week for next cycle of chemotherapy with this prior for review.  Medication administration and side effects discussed with patient.   TT=20 min. With >50% in education and management of concerns.   Chrystal Morel,Cnp

## 2017-01-27 NOTE — Clinical Note
New adenopathy - very tender but pt had known dental issue prior to start of tx. - antibiotic and sent to dentist.

## 2017-01-31 ENCOUNTER — ONCOLOGY VISIT (OUTPATIENT)
Dept: ONCOLOGY | Facility: CLINIC | Age: 72
End: 2017-01-31
Payer: COMMERCIAL

## 2017-01-31 ENCOUNTER — CARE COORDINATION (OUTPATIENT)
Dept: ONCOLOGY | Facility: CLINIC | Age: 72
End: 2017-01-31

## 2017-01-31 ENCOUNTER — INFUSION THERAPY VISIT (OUTPATIENT)
Dept: INFUSION THERAPY | Facility: CLINIC | Age: 72
End: 2017-01-31
Payer: COMMERCIAL

## 2017-01-31 ENCOUNTER — RADIANT APPOINTMENT (OUTPATIENT)
Dept: ULTRASOUND IMAGING | Facility: CLINIC | Age: 72
End: 2017-01-31
Attending: INTERNAL MEDICINE
Payer: COMMERCIAL

## 2017-01-31 ENCOUNTER — TELEPHONE (OUTPATIENT)
Dept: ONCOLOGY | Facility: CLINIC | Age: 72
End: 2017-01-31

## 2017-01-31 VITALS — OXYGEN SATURATION: 94 % | SYSTOLIC BLOOD PRESSURE: 103 MMHG | HEART RATE: 71 BPM | DIASTOLIC BLOOD PRESSURE: 66 MMHG

## 2017-01-31 VITALS
SYSTOLIC BLOOD PRESSURE: 131 MMHG | BODY MASS INDEX: 34.87 KG/M2 | HEART RATE: 100 BPM | WEIGHT: 217 LBS | RESPIRATION RATE: 22 BRPM | TEMPERATURE: 99.3 F | DIASTOLIC BLOOD PRESSURE: 99 MMHG | HEIGHT: 66 IN | OXYGEN SATURATION: 95 %

## 2017-01-31 DIAGNOSIS — C18.9 METASTASIS FROM COLON CANCER (H): Primary | ICD-10-CM

## 2017-01-31 DIAGNOSIS — M54.2 NECK PAIN: ICD-10-CM

## 2017-01-31 DIAGNOSIS — I82.90 OCCLUSIVE THROMBUS: ICD-10-CM

## 2017-01-31 DIAGNOSIS — C79.9 METASTASIS FROM COLON CANCER (H): Primary | ICD-10-CM

## 2017-01-31 DIAGNOSIS — I82.90 OCCLUSIVE THROMBUS: Primary | ICD-10-CM

## 2017-01-31 DIAGNOSIS — C18.9 COLON CANCER (H): Primary | ICD-10-CM

## 2017-01-31 PROBLEM — R42 DIZZINESS: Status: ACTIVE | Noted: 2017-01-31

## 2017-01-31 PROBLEM — R63.8 POOR FLUID INTAKE: Status: ACTIVE | Noted: 2017-01-31

## 2017-01-31 LAB
BASOPHILS # BLD AUTO: 0 10E9/L (ref 0–0.2)
BASOPHILS NFR BLD AUTO: 0.4 %
BILIRUB SERPL-MCNC: 0.4 MG/DL (ref 0.2–1.3)
CEA SERPL-MCNC: 4.2 UG/L (ref 0–2.5)
DIFFERENTIAL METHOD BLD: ABNORMAL
EOSINOPHIL # BLD AUTO: 0.2 10E9/L (ref 0–0.7)
EOSINOPHIL NFR BLD AUTO: 3.2 %
ERYTHROCYTE [DISTWIDTH] IN BLOOD BY AUTOMATED COUNT: 15.2 % (ref 10–15)
HCT VFR BLD AUTO: 39.7 % (ref 35–47)
HGB BLD-MCNC: 13.6 G/DL (ref 11.7–15.7)
LYMPHOCYTES # BLD AUTO: 2 10E9/L (ref 0.8–5.3)
LYMPHOCYTES NFR BLD AUTO: 37 %
MCH RBC QN AUTO: 30.9 PG (ref 26.5–33)
MCHC RBC AUTO-ENTMCNC: 34.3 G/DL (ref 31.5–36.5)
MCV RBC AUTO: 90 FL (ref 78–100)
MONOCYTES # BLD AUTO: 0.5 10E9/L (ref 0–1.3)
MONOCYTES NFR BLD AUTO: 8.4 %
NEUTROPHILS # BLD AUTO: 2.7 10E9/L (ref 1.6–8.3)
NEUTROPHILS NFR BLD AUTO: 51 %
PLATELET # BLD AUTO: 129 10E9/L (ref 150–450)
RADIOLOGIST FLAGS: ABNORMAL
RBC # BLD AUTO: 4.4 10E12/L (ref 3.8–5.2)
WBC # BLD AUTO: 5.4 10E9/L (ref 4–11)

## 2017-01-31 PROCEDURE — 96360 HYDRATION IV INFUSION INIT: CPT | Performed by: NURSE PRACTITIONER

## 2017-01-31 PROCEDURE — 82378 CARCINOEMBRYONIC ANTIGEN: CPT | Performed by: INTERNAL MEDICINE

## 2017-01-31 PROCEDURE — 85025 COMPLETE CBC W/AUTO DIFF WBC: CPT | Performed by: INTERNAL MEDICINE

## 2017-01-31 PROCEDURE — 99207 ZZC NO CHARGE NURSE ONLY: CPT

## 2017-01-31 PROCEDURE — 82247 BILIRUBIN TOTAL: CPT | Performed by: INTERNAL MEDICINE

## 2017-01-31 PROCEDURE — 93971 EXTREMITY STUDY: CPT | Mod: RT | Performed by: RADIOLOGY

## 2017-01-31 PROCEDURE — 87040 BLOOD CULTURE FOR BACTERIA: CPT | Mod: 59 | Performed by: INTERNAL MEDICINE

## 2017-01-31 PROCEDURE — 99215 OFFICE O/P EST HI 40 MIN: CPT | Mod: 25 | Performed by: INTERNAL MEDICINE

## 2017-01-31 RX ORDER — HEPARIN SODIUM (PORCINE) LOCK FLUSH IV SOLN 100 UNIT/ML 100 UNIT/ML
5 SOLUTION INTRAVENOUS
Status: DISCONTINUED | OUTPATIENT
Start: 2017-01-31 | End: 2017-01-31 | Stop reason: HOSPADM

## 2017-01-31 RX ORDER — ONDANSETRON 8 MG/1
8 TABLET, FILM COATED ORAL EVERY 8 HOURS PRN
Qty: 10 TABLET | Refills: 2 | Status: SHIPPED | OUTPATIENT
Start: 2017-01-31 | End: 2017-04-04

## 2017-01-31 RX ORDER — SODIUM CHLORIDE 9 MG/ML
1000 INJECTION, SOLUTION INTRAVENOUS CONTINUOUS
Status: DISCONTINUED | OUTPATIENT
Start: 2017-01-31 | End: 2017-01-31 | Stop reason: HOSPADM

## 2017-01-31 RX ADMIN — HEPARIN SODIUM (PORCINE) LOCK FLUSH IV SOLN 100 UNIT/ML 5 ML: 100 SOLUTION at 09:42

## 2017-01-31 RX ADMIN — SODIUM CHLORIDE 1000 ML: 9 INJECTION, SOLUTION INTRAVENOUS at 11:38

## 2017-01-31 ASSESSMENT — PAIN SCALES - GENERAL: PAINLEVEL: SEVERE PAIN (6)

## 2017-01-31 NOTE — PROGRESS NOTES
Infusion Nursing Note:  Damari Bryant presents today for IVF.    Patient seen by provider today: Yes: Dr Greene   present during visit today: Not Applicable.    Note: Reschedule chemotherapy to 2/9 with pump disconnect at [facility].    Intravenous Access:  Peripheral IV placed.    Treatment Conditions:  HGB     13.6   1/31/2017  WBC      5.4   1/31/2017   ANEU      2.7   1/31/2017  PLT      129   1/31/2017     NA      139   1/14/2014                POTASSIUM      3.1   5/20/2014        MAG      2.1   8/23/2013         CR     1.01   11/8/2016                KADIE      9.6   1/14/2014             BILITOTAL      0.4   1/31/2017        ALBUMIN      3.8   11/8/2016                 ALT       25   11/8/2016        AST       21   11/8/2016  Chemotherapy held and IVF given.      Post Infusion Assessment:  Patient tolerated infusion without incident.  Blood return noted pre and post infusion.  Site patent and intact, free from redness, edema or discomfort.  No evidence of extravasations.  Access discontinued per protocol.    Discharge Plan:   Copy of AVS reviewed with patient and/or family.  Patient will return 2/9/17 for next appointment.  Patient discharged in stable condition accompanied by:  and daughter.  Departure Mode: Ambulatory.    Kailee Stallworth RN

## 2017-01-31 NOTE — MR AVS SNAPSHOT
After Visit Summary   1/31/2017    Damari Bryant    MRN: [id number]           Patient Information     Date Of Birth          1945        Visit Information        Provider Department      1/31/2017 10:00 AM Esther Greene MD [facility]        Today's Diagnoses     Metastasis from colon cancer (H)    -  1     Neck pain            Follow-ups after your visit        Your next 10 appointments already scheduled     Jan 31, 2017  4:00 PM   US HEAD NECK SOFT TISSUE with MGUS1, MG US TECH   [facility])    [address] [place] [id number]   [phone]           Please bring a list of your medicines (including vitamins, minerals and over-the-counter drugs). Also, tell your doctor about any allergies you may have. Wear comfortable clothes and leave your valuables at home.  You do not need to do anything special to prepare for your exam.  Please call the Imaging Department at your exam site with any questions.            Feb 09, 2017  8:30 AM   Return Visit with NURSE ONLY CANCER CENTER   [facility])    [address] [place] [id number]   [phone]            Feb 09, 2017  9:00 AM   Return Visit with Esther Greene MD   [facility])    [address] [place] [id number]   [phone]            Feb 09, 2017  9:30 AM   Level 5 with BAY 9 INFUSION   [facility])    [address] [place] [id number] [phone]              Future tests that were ordered for you today     Open Future Orders        Priority Expected Expires Ordered    US Head Neck Soft Tissue Routine  1/31/2018 1/31/2017            Who to contact     If you have questions or need follow up information about today's clinic visit or your schedule please contact [facility] directly at [phone].  Normal or non-critical lab and imaging results will be communicated to you by MyChart, letter or phone within 4 business days after the clinic has received the results. If you do not hear from us within 7 days, please contact the clinic through [employer] or phone. If you have a critical or abnormal lab result, we will notify you by phone as soon as possible.  Submit refill requests through [employer] or call your pharmacy and they will forward the refill request to us. Please allow 3 business days for your refill to be completed.          Additional Information About Your Visit        [employer] Information     [employer] is an electronic gateway that provides easy, online access to your medical records. With [employer], you can request a clinic appointment, read your test results, renew a prescription or communicate with your care team.     To sign up for [employer] visit the website at [email or web address]   You will be asked to enter the access code listed below, as well as some personal information. Please follow the directions to create your username and password.     Your access code is: [email or web address]  Expires: 2017  9:27 AM     Your access code will  in 90 days. If you need help or a new code, please contact your [facility] or call [phone] for assistance.        Care EveryWhere ID     This is your Care EveryWhere ID. This could be used by other organizations to access your [facility] medical records  [id number]        Your Vitals Were     Pulse Temperature Respirations Height BMI (Body Mass Index) Pulse Oximetry    100 99.3  F (37.4  C) (Tympanic) 22 1.676 m (5' 5.98\") 35.04 kg/m2 95%       Blood Pressure from Last 3 Encounters:   17 131/99   17 128/65   17 130/79    Weight from Last 3 Encounters:   17 98.431 kg (217 lb)   17 98.431 kg (217 lb)   17 100.2 kg (220 lb 14.4 oz)              We " Performed the Following     Blood culture     Blood culture          Where to get your medicines      These medications were sent to [facility] - [place], [place] - [address], [address]  [address], [facility] [address]     Phone:  [phone]    - ondansetron 8 MG tablet       Primary Care Provider Office Phone # Fax #    Samuel Cobian [phone]       [facility] [address] [facility] [address]        Thank you!     Thank you for choosing [facility]  for your care. Our goal is always to provide you with excellent care. Hearing back from our patients is one way we can continue to improve our services. Please take a few minutes to complete the written survey that you may receive in the mail after your visit with us. Thank you!             Your Updated Medication List - Protect others around you: Learn how to safely use, store and throw away your medicines at www.disposemymeds.org.          This list is accurate as of: 1/31/17 12:06 PM.  Always use your most recent med list.                   Brand Name Dispense Instructions for use    amoxicillin 500 MG capsule    AMOXIL    30 capsule    Take 1 capsule (500 mg) by mouth 3 times daily for 10 days       aspirin 81 MG tablet      Take  by mouth daily.       busPIRone 15 MG tablet    BUSPAR     Take 15 mg by mouth 2 times daily       levothyroxine 150 MCG tablet    SYNTHROID/LEVOTHROID     Take 150 mcg by mouth daily       LEXAPRO 20 MG tablet   Generic drug:  escitalopram      Take 20 mg by mouth daily.       loperamide 2 MG capsule    IMODIUM    30 capsule    Start with 2 caps (4 mg), then take one cap (2 mg) after each diarrheal stool as needed. Do not use more than 8 caps (16 mg) per day.       NORVASC 5 MG tablet   Generic drug:  amLODIPine      Take 5 mg by mouth daily       nystatin 309386 UNIT/ML suspension    MYCOSTATIN    280 mL    Take 5 mLs (500,000 Units) by mouth  4 times daily       OMEGA-3 FISH OIL PO          ondansetron 8 MG tablet    ZOFRAN    10 tablet    Take 1 tablet (8 mg) by mouth every 8 hours as needed (Nausea/Vomiting)       prochlorperazine 10 MG tablet    COMPAZINE    30 tablet    Take 1 tablet (10 mg) by mouth every 6 hours as needed (Nausea/Vomiting)       senna-docusate 8.6-50 MG per tablet    SENNA S    60 tablet    1-2 tabs daily for constipation       tetrahydrozoline 0.05 % ophthalmic solution      1 drop 3 times daily       TOPAMAX 50 MG tablet   Generic drug:  topiramate      Take  by mouth 2 times daily. 1 tab q am 2 tabs q PM       triamcinolone 0.1 % cream    KENALOG     Apply topically 2 times daily Vaginal       triamterene-hydrochlorothiazide 37.5-25 MG per tablet    MAXZIDE-25     Take 1 tablet by mouth daily.       UNABLE TO FIND      MEDICATION NAME: Nature made digestive probiotics       VITAMIN D (CHOLECALCIFEROL) PO      Take 2,000 Units by mouth daily

## 2017-01-31 NOTE — NURSING NOTE
"Damari Bryant is a 71 year old female who presents for:  Chief Complaint   Patient presents with     Oncology Clinic Visit     f/u prior to tx        Initial Vitals:  /86 mmHg  Pulse 86  Resp 20  Ht 1.676 m (5' 5.98\")  Wt 98.431 kg (217 lb)  BMI 35.04 kg/m2  SpO2 95% Estimated body mass index is 35.04 kg/(m^2) as calculated from the following:    Height as of this encounter: 1.676 m (5' 5.98\").    Weight as of this encounter: 98.431 kg (217 lb).. Body surface area is 2.14 meters squared. BP completed using cuff size: regular  Severe Pain (6) No LMP recorded. Patient is postmenopausal. Allergies and medications reviewed.     Medications: MEDICATION REFILLS NEEDED TODAY.  Pharmacy name entered into GoNog[employer]:    [id number] - [place], [place] - [address] [facility] [address] [facility] [address] [facility] [address] [facility] [place], [place] - [address] [facility]    Comments:     8 minutes for nursing intake (face to face time)   JULES DIAL LPN          "

## 2017-01-31 NOTE — PROGRESS NOTES
RN spoke with provider Chrystal Morel CNP, regarding patient recent finding of occlusive thrombus in the right internal jugular vein.  Per Chrystal, since patient will be starting on Lovenox injections, her Aspirin should be held.  Chrystal and Dr. Greene discussed that patient's port-a-cath should not be replaced at this time, as it may cause more harm than good.  Per Dr. Greene, since patient's port seems to be functioning well with good blood return, and does not show any signs of infection, she feels that we can still use her current port.  Dr. Greene would also like to see if we can get patient's chemotherapy infusion moved to either Monday or Tuesday of next week with labs prior, instead of having her wait until Thursday 2/9/17.  Scheduling staff tasked to reschedule chemotherapy infusion and notify patient/spouse of new appointment details.  Patient's spouse, Ross, was given these updates via telephone and is very happy to hear this.  He will await a phone call from our scheduling staff.    Evans Archer RN, BSN, OCN  Care Coordinator  [facility]  [phone]

## 2017-01-31 NOTE — PROGRESS NOTES
"Met with patient and spouse in clinic for Lovenox teaching.  Discussed reasons for using Lovenox injections, dosing, frequency, and methods/steps for injecting.  Demonstrated subcutaneous injections with both patient and spouse.  Reviewed with patient and spouse that the subcutaneous tissue in the abdomen is the preferred site for injections, rotating sites at least 2 inches away from the navel.  Reviewed common side effects including minor bruising and tenderness at the injection sites.  Discussed signs/symptoms that need to be reported to care team or on-call Oncology MD immediately, including blood in urine/stool, hemoptysis, unexplained bruising, uncontrolled bleeding, pale skin, shortness of breath, chest pain, black stools, or sudden onset of dizziness or severe headache.  RN counseled patient to avoid NSAID use while taking the Lovenox injections, including ibuprofen and Aleve.  Will discuss Aspirin use with provider Chrystal Morel CNP, and get back to patient with recommendations (patient reports taking one baby Aspirin daily, and did take a dose this morning).  Patient and spouse verbalize understanding of all instructions and education provided.  Provided patient with handout \"Enoxaparin Sodium Solution for injection\" from Epic Reference Guide.    Total Face to Face time spent with patient: 25 minutes  Evans Archer RN, BSN, OCN      "

## 2017-01-31 NOTE — TELEPHONE ENCOUNTER
Hello,    Please start PA ASAP for this patient. Insurance is only allowing 1.3ml per day and dosage is 1ml BID    Prior Authorization Retail Medication Request  Medication/Dose: PA Lovenox  Diagnosis and ICD code:   New/Renewal/Insurance Change PA: NEW      Insurance ID (if provided): [id number]  Insurance Phone (if provided): [phone]  Any additional info from fax request:     If you received a fax notification from an outside Pharmacy:  Pharmacy Name:[facility]  Pharmacy #:[phone]  Pharmacy Fax:[phone]    Thank you,    Shayy Luna-Torin  [facility]   [phone]

## 2017-01-31 NOTE — NURSING NOTE
Received notification from lab staff that patient became very dizzy after having her blood culture drawn.  RN presented to exam room with patient and family.  Patient was given juice to drink and an ice pack for the back of her neck.  She was assisted to lie down on the exam table with nurses at exam table bedside.  Vital signs were checked: P - 82, BP - 155/85, O2 Sat - 96% on Room Air (taken while lying at 11:00 am); P - 100, BP - 131/99 (taken while standing at 11:07 am).  After several minutes of resting on exam table, patient states that she was starting to feel better.  She states that she had some toast and coffee for breakfast, but admits that she probably has not been drinking as much oral fluids as she should be.  Noted patient's mouth and lips are dry-appearing.  Patient's status was discussed with Dr. Greene, who recommends giving patient NS IV x 1 liter now and reassessing.  Patient was brought to the infusion center via wheelchair for her IV fluids, family with patient.  Patient is alert and oriented.    Evans Archer RN, BSN, OCN  Care Coordinator  [facility]  [phone]

## 2017-01-31 NOTE — PROGRESS NOTES
After treatment US results were shared:   Impression: Occlusive deep venous thrombus in the right internal  jugular and right innominate veins. No extension of thrombus into the  right upper extremity.     [Urgent Result: Occlusive thrombus in the right internal jugular and  right innominate veins]    Pt was ordered to start Lovenox. Pt was okay with bid dosing herself since gave injections to son when younger. Pharmacy shared that pt needed prior authorization. This provider called [employer] to expedite PA and was told that determination was made that quickest they could respond was within 19 hours. Asked  to ask supervisor but was told they have to take them in order received. Pt education was completed for Lovenox injections by RN. Options are to pay for up to 4 syringes of medication and wait for response from insurance company or wait for insurance to approve. Do not want her to start on coumadin until decided if needs port changed to left chest. Pt will hold ASA however in plan if needs tohave port changed. SGermscheid,CNP

## 2017-01-31 NOTE — PROGRESS NOTES
Patient's name and  were verified.  See Doc Flowsheet - IV assess for details.  Leeann Lopez LPN

## 2017-01-31 NOTE — PROGRESS NOTES
ONCOLOGY DIAGNOSES:   1. July 2013: Diagnosed with stage IIIB, [id number] colon cancer. Final pathology showed a mildly differentiated colonic adenocarcinoma. Tumor extended through the muscularis propria into the pericolonic adipose tissue. Lymphovascular invasion present. Margins negative. Three out of 30 lymph nodes were positive, 0.5 cm in greatest dimension. Extracapsular extension identified. DNA mismatch repair enzymes were intact.   2. November 2016: Metastatic colorectal cancer to the lung confirmed by a lung biopsy.     THERAPY TO DATE:   1. July 21, 2013: Sigmoid colectomy.   2. August 2013 to January 2014: FOLFOX x12 cycles.   3. February 2014 to November 2016: Observation.   4. January 2017 to Present: FOLFIRI/Avastin.       INTERVAL HISTORY:  Damari Bryant is a 71-year-old female diagnosed with stage IIIB, pT3 N1 MX colon cancer in 2013 after presenting with worsening diarrhea.  Unfortunately, in 11/2016 she was found to have growing lung lesions, which were biopsy proven to be metastatic colon cancer.  The patient presents to the clinic for toxicity evaluation prior to cycle #3 of FOLFIRI  Avastin.  With the patient's second cycle, she had issues with the second dose of atropine, having chest discomfort.  She also came to the clinic last Friday reporting right neck swelling.  She was seen by our nurse practitioner.  There is question whether this may be related to dental issue versus the port.  The patient was started on amoxicillin.  She did also follow up with her dentist, who said there were no concerns for dental infection.  She does tell me the right neck swelling has gone down, though she has minimal tenderness.  The pain has improved.  Previously with just touching her right neck she was having discomfort.  Nausea is controlled with antiemetic regimen, and she is requesting a refill of her Zofran.  No mouth sores.  Her dyspnea is stable.  No fevers or chills.  She does have constipation for which  she uses laxatives.  She is not having any problems with diarrhea.  She has generalized weakness, but this has been long-standing.  No current neuropathy.  The remainder of comprehensive review of systems is negative.      PMH: Reviewed in EPIC.    MEDICATIONS:  Reviewed in Epic.      ALLERGIES:     1. Ativan causes hallucinations.   2. Avocado causes nausea and vomiting.     3. Iodine-131 causes nausea and vomiting.   4. Milk protein causes diarrhea.   5. Shellfish causes GI disturbance.   6. HMG-CoA reductase inhibitor causes nausea.   7. Sulfa drugs cause nausea and vomiting.   8. Tegaderm causes a rash.      SOCIAL HISTORY:  Damari Bryant comes in with her  and daughter today.  She is currently living in [place].  No current tobacco use.      PHYSICAL EXAMINATION:   VITAL SIGNS:  Blood pressure is 130/86, pulse 86, respirations 20, temperature 99.3 and pulse ox 95% on room air.  Weight 217 pounds.   GENERAL:  No acute distress.  She is uncomfortable moving her neck.   HEENT:  Atraumatic and normocephalic.  Pupils are equal, round and reactive.  Sclerae are anicteric.  Oropharynx is clear.   NECK:  Right side swelling with tenderness on deep palpation.  On the left side, there is no adenopathy or swelling.   HEART:  Regular rate and rhythm.  Normal S1 and S2.  No murmurs or gallops.  No edema.   LUNGS:  Clear to auscultation bilaterally.  No crackles or wheezes.  Normal respiratory effort.   GASTROINTESTINAL:  Positive bowel sounds.  Abdomen is soft, nontender and nondistended.  No hepatosplenomegaly.   EXTREMITIES:  No cyanosis.  Extremities are warm.   MUSCULOSKELETAL:  No point tenderness.   LYMPHATICS:  No supraclavicular nodes.  Axillary nodes are palpable on right side.  Cervical is difficult to palpate since the patient has had quite a bit of tenderness.   SKIN:  No petechiae or rashes.   NEUROLOGIC:  Alert and oriented.      LABORATORY DATA:  WBC is 5.4, hemoglobin 13.6, hematocrit 39.7 and  platelets 129.  ANC is 2.7.  From 01/03/2017, CEA is 7.0.      IMAGING:  X-ray from 01/27/2017 showed no acute airspace opacities.  Port-A-Cath was positioned normally.    PET scan from 12/30/2016 showed enlarging hypermetabolic pulmonary nodules bilaterally and hypermetabolic retroperitoneal lymph nodes, although similar size compared to prior CT.  Diffuse FTG was obtained of the thyroid gland consistent with nonspecific thyroiditis.      ASSESSMENT/PLAN:   1.  Metastatic colorectal cancer, currently on FOLFIRI Avastin.  The patient appears to be tolerating therapy, though I do have concerns regarding her port.  Therefore, we will delay treatment for 1 week until this issue has resolved.   2.  Right-sided neck pain.  The patient was evaluated by her dentist, who did not feel this was a dental etiology.  There is still some concern this be related to her port.  We will check a right neck ultrasound and get blood cultures from port and periphery.  Continue course of amoxicillin.   3.  History of diarrhea.  This was not an issue with this last cycle.   4.  Constipation.  Continue laxatives as needed.   5.  Neuropathy.  Per the patient's review of systems, this is nonexistent at today's visit.        SUSANA MENARD MD       Addendum: UE Ultrasound (1/31/17):  Occlusive deep venous thrombus in the right internal jugular and right innominate veins. No extension of thrombus into the right upper extremity.    Discussed case with Radiology. Since port is functioning and no evidence of infection, may keep port in place. May be more traumatic to take this port out and place new one.   Started on anticoagulation with lovenox with plans to switch over to coumadin. Cost of lovenox to patient prohibitive to continue on lovenox alone. Referral to INR clinic to follow.  Results and recommendations conveyed to patient.        D: 01/31/2017 10:38   T: 01/31/2017 14:40   MT: [id number]      Name:     MARY SHI   MRN:       [id number]        Account:      [id number]   :      1945           Visit Date:   2017      Document: [id number]       cc: Samuel Cobian MD

## 2017-02-01 ENCOUNTER — TELEPHONE (OUTPATIENT)
Dept: ONCOLOGY | Facility: CLINIC | Age: 72
End: 2017-02-01

## 2017-02-01 RX ORDER — WARFARIN SODIUM 5 MG/1
5 TABLET ORAL DAILY
Qty: 30 TABLET | Refills: 0 | Status: SHIPPED | OUTPATIENT
Start: 2017-02-01 | End: 2017-03-01

## 2017-02-01 NOTE — PROGRESS NOTES
Call placed to patient's spouse, Ross, with anticoagulation recommendations from Dr. Greene.  Dr. Greene would like to give patient a 2-week supply of the Lovenox (prescription has been sent to her local pharmacy).  Per Dr. Greene, patient should begin the Lovenox injections today, and should start the Coumdin (at a dose of 5 mg daily) 2-3 days after starting the Lovenox.  Patient should then get scheduled to establish care in the INR clinic early next week, who will then manage/adjust her Coumadin dosing going forward.  Spouse verbalizes understanding of plan and recommendations.  Scheduling staff tasked to schedule appointment for patient with the INR clinic early next week, and to notify spouse via telephone of appointment details.      Evans Archer, RN, BSN, OCN  Care Coordinator  [facility]  [phone]

## 2017-02-01 NOTE — PROGRESS NOTES
Received Prior Authorization approval for patient's Lovenox injections (quantity #28 syringes/14 days).  Prescription sent to patient's local pharmacy ([facility] in [place], [place]) for spouse to .  Spouse, Ross, notified via telephone.    RN then spoke with the above pharmacy to make sure that the prior authorization does approve when they run the prescription through patient's insurance.  Per pharmacy staff, the prior authorization is approved - however, even with the prior authorization approval, the prescription will cost patient ~$400 for a 14-day supply (~$800 for a month supply).  Message was sent to Dr. Greene with this update.    Evans Archer RN, BSN, OCN  Care Coordinator  [facility]  [phone]

## 2017-02-01 NOTE — TELEPHONE ENCOUNTER
Two incoming INR referrals. Please review and contact patient if needs to be scheduled.  Order for Damari Bryant [[id number]] Order #: [id number] Procedure: INR CLINIC REFERRAL Order Date: 2/1/2017 Proc Category: Referral Priority: Routine Class: INR - referral Standing Status: Normal Status: Sent [2] Ordering User: SUSANA GREENE MD [[email or web address]] Department:  Med Onc Auth Provider: SUSANA GREENE Provider: Susana Greene MD Diagnosis: Occlusive thrombus  AND  Order for JatinderZach enriqueidi [[id number]] Order #: [id number] Procedure: INR CLINIC REFERRAL Order Date: 2/1/2017 Proc Category: Referral Priority: ASAP Class: INR - referral Standing Status: Normal Status: Sent [2] Ordering User: SALMA CHOW, RN [[email or web address]] Department:  Med Onc Auth Provider: SUSANA GREENE Provider: Salma Chow, RN Diagnosis: Occlusive thrombus    Routing to INR P [id number] to schedule  Sera H  Referral Scheduling

## 2017-02-01 NOTE — TELEPHONE ENCOUNTER
Noted INR referral:  Indication for Anticoagulation: DVT (first time)  If nonstandard INR is desired, indicate goal range and explanation:   Expected Duration of Therapy: 6 Months    Chrystal Morel APRN CNP at 1/31/2017  4:12 PM      Status: Sign at close encounter           After treatment US results were shared:   Impression: Occlusive deep venous thrombus in the right internal  jugular and right innominate veins. No extension of thrombus into the  right upper extremity.     [Urgent Result: Occlusive thrombus in the right internal jugular and  right innominate veins]    Pt was ordered to start Lovenox. Pt was okay with bid dosing herself since gave injections to son when younger. Pharmacy shared that pt needed prior authorization. This provider called [employer] to expedite PA and was told that determination was made that quickest they could respond was within 19 hours. Asked  to ask supervisor but was told they have to take them in order received. Pt education was completed for Lovenox injections by RN. Options are to pay for up to 4 syringes of medication and wait for response from insurance company or wait for insurance to approve. Do not want her to start on coumadin until decided if needs port changed to left chest. Pt will hold ASA however in plan if needs tohave port changed. PALMA Gee          INR clinic will hold off on scheduling patient for INR consult until it is determined when patient is starting coumadin.    Mary Berry RN,   [facility]

## 2017-02-02 ENCOUNTER — CARE COORDINATION (OUTPATIENT)
Dept: ONCOLOGY | Facility: CLINIC | Age: 72
End: 2017-02-02

## 2017-02-02 NOTE — PROGRESS NOTES
Patient calls into clinic with an update on how her Lovenox injections have been going.  Patient states that so far, her injections have been going well.  She does have some slight bruising at injection sites, but no explained bruising or bleeding symptoms.  Patient was able to  her Coumadin prescription and is planning to start taking that on Saturday this week, in conjunction with her Lovenox.  She is then scheduled to establish care with our INR clinic on Tuesday next week 2/3/17.  Patient verbalizes concerns regarding the Coumadin, stating that she has had friends in the past who have had bad experiences with it.  She asks if she will be on it for the rest of her life, or if it will be temporary.  RN advised this would be for Dr. Greene to decide, and encouraged patient to discuss this with Dr. Greene at her next visit.  Patient verbalizes understanding, and states she has trust in Dr. Greene and her care team, and is agreeable with taking the Coumadin as recommended by Dr. Greene.  She does not have any further questions or concerns at this time.  RN encouraged patient to call with any further questions or concerns.  Patient has direct phone number for this RN if needed.      Evans Archer, RN, BSN, OCN  Care Coordinator  [facility]  [phone]

## 2017-02-06 LAB
BACTERIA SPEC CULT: NO GROWTH
BACTERIA SPEC CULT: NO GROWTH
Lab: NORMAL
Lab: NORMAL
MICRO REPORT STATUS: NORMAL
MICRO REPORT STATUS: NORMAL
SPECIMEN SOURCE: NORMAL
SPECIMEN SOURCE: NORMAL

## 2017-02-07 ENCOUNTER — TELEPHONE (OUTPATIENT)
Dept: PHARMACY | Facility: CLINIC | Age: 72
End: 2017-02-07

## 2017-02-07 ENCOUNTER — INFUSION THERAPY VISIT (OUTPATIENT)
Dept: INFUSION THERAPY | Facility: CLINIC | Age: 72
End: 2017-02-07
Payer: COMMERCIAL

## 2017-02-07 ENCOUNTER — ANTICOAGULATION THERAPY VISIT (OUTPATIENT)
Dept: PHARMACY | Facility: CLINIC | Age: 72
End: 2017-02-07
Attending: INTERNAL MEDICINE
Payer: COMMERCIAL

## 2017-02-07 ENCOUNTER — ONCOLOGY VISIT (OUTPATIENT)
Dept: ONCOLOGY | Facility: CLINIC | Age: 72
End: 2017-02-07
Payer: COMMERCIAL

## 2017-02-07 VITALS
DIASTOLIC BLOOD PRESSURE: 82 MMHG | SYSTOLIC BLOOD PRESSURE: 136 MMHG | BODY MASS INDEX: 35.41 KG/M2 | TEMPERATURE: 98 F | OXYGEN SATURATION: 95 % | WEIGHT: 219.3 LBS | HEART RATE: 78 BPM | RESPIRATION RATE: 20 BRPM

## 2017-02-07 DIAGNOSIS — C79.9 METASTASIS FROM COLON CANCER (H): Primary | ICD-10-CM

## 2017-02-07 DIAGNOSIS — I82.409 DEEP VEIN THROMBOSIS (DVT) (H): Primary | ICD-10-CM

## 2017-02-07 DIAGNOSIS — C18.9 METASTASIS FROM COLON CANCER (H): Primary | ICD-10-CM

## 2017-02-07 DIAGNOSIS — Z79.01 LONG-TERM (CURRENT) USE OF ANTICOAGULANTS: ICD-10-CM

## 2017-02-07 LAB
BASOPHILS # BLD AUTO: 0.1 10E9/L (ref 0–0.2)
BASOPHILS NFR BLD AUTO: 1 %
BILIRUB SERPL-MCNC: 0.2 MG/DL (ref 0.2–1.3)
CEA SERPL-MCNC: 3.6 UG/L (ref 0–2.5)
DIFFERENTIAL METHOD BLD: ABNORMAL
EOSINOPHIL # BLD AUTO: 0.2 10E9/L (ref 0–0.7)
EOSINOPHIL NFR BLD AUTO: 3 %
ERYTHROCYTE [DISTWIDTH] IN BLOOD BY AUTOMATED COUNT: 15.4 % (ref 10–15)
HCT VFR BLD AUTO: 41.3 % (ref 35–47)
HGB BLD-MCNC: 13.7 G/DL (ref 11.7–15.7)
INR POINT OF CARE: 2 (ref 0.86–1.14)
LYMPHOCYTES # BLD AUTO: 2.7 10E9/L (ref 0.8–5.3)
LYMPHOCYTES NFR BLD AUTO: 53 %
MCH RBC QN AUTO: 30.2 PG (ref 26.5–33)
MCHC RBC AUTO-ENTMCNC: 33.2 G/DL (ref 31.5–36.5)
MCV RBC AUTO: 91 FL (ref 78–100)
MONOCYTES # BLD AUTO: 0.4 10E9/L (ref 0–1.3)
MONOCYTES NFR BLD AUTO: 8 %
NEUTROPHILS # BLD AUTO: 1.8 10E9/L (ref 1.6–8.3)
NEUTROPHILS NFR BLD AUTO: 35 %
PLATELET # BLD AUTO: 208 10E9/L (ref 150–450)
PLATELET # BLD EST: NORMAL 10*3/UL
RBC # BLD AUTO: 4.54 10E12/L (ref 3.8–5.2)
RBC MORPH BLD: NORMAL
WBC # BLD AUTO: 5.2 10E9/L (ref 4–11)

## 2017-02-07 PROCEDURE — 82247 BILIRUBIN TOTAL: CPT | Performed by: INTERNAL MEDICINE

## 2017-02-07 PROCEDURE — 96413 CHEMO IV INFUSION 1 HR: CPT | Performed by: INTERNAL MEDICINE

## 2017-02-07 PROCEDURE — 96375 TX/PRO/DX INJ NEW DRUG ADDON: CPT | Performed by: INTERNAL MEDICINE

## 2017-02-07 PROCEDURE — 99207 ZZC NO CHARGE NURSE ONLY: CPT

## 2017-02-07 PROCEDURE — 36416 COLLJ CAPILLARY BLOOD SPEC: CPT

## 2017-02-07 PROCEDURE — 96417 CHEMO IV INFUS EACH ADDL SEQ: CPT | Performed by: INTERNAL MEDICINE

## 2017-02-07 PROCEDURE — 85610 PROTHROMBIN TIME: CPT | Mod: QW

## 2017-02-07 PROCEDURE — 96415 CHEMO IV INFUSION ADDL HR: CPT | Performed by: INTERNAL MEDICINE

## 2017-02-07 PROCEDURE — 96416 CHEMO PROLONG INFUSE W/PUMP: CPT | Performed by: INTERNAL MEDICINE

## 2017-02-07 PROCEDURE — 85025 COMPLETE CBC W/AUTO DIFF WBC: CPT | Performed by: INTERNAL MEDICINE

## 2017-02-07 PROCEDURE — 82378 CARCINOEMBRYONIC ANTIGEN: CPT | Performed by: INTERNAL MEDICINE

## 2017-02-07 RX ORDER — HEPARIN SODIUM (PORCINE) LOCK FLUSH IV SOLN 100 UNIT/ML 100 UNIT/ML
5 SOLUTION INTRAVENOUS
Status: DISCONTINUED | OUTPATIENT
Start: 2017-02-07 | End: 2017-02-07 | Stop reason: HOSPADM

## 2017-02-07 RX ADMIN — HEPARIN SODIUM (PORCINE) LOCK FLUSH IV SOLN 100 UNIT/ML 5 ML: 100 SOLUTION at 08:25

## 2017-02-07 RX ADMIN — Medication 250 ML: at 09:02

## 2017-02-07 ASSESSMENT — PAIN SCALES - GENERAL: PAINLEVEL: MILD PAIN (2)

## 2017-02-07 NOTE — PROGRESS NOTES
Infusion Nursing Note:  Damari Bryant presents today for C3 avastin/folfiri.    Patient seen by provider today: No   present during visit today: Not Applicable.    Intravenous Access:  Implanted Port.    Treatment Conditions:  HGB     13.7   2/7/2017  WBC      5.2   2/7/2017   ANEU      1.8   2/7/2017  PLT      208   2/7/2017     Results reviewed, labs MET treatment parameters, ok to proceed with treatment.  TBili WNL.      Post Infusion Assessment:  Patient tolerated infusion - atropine given once during infusion with good relief of abdominal cramping   Blood return noted pre and post infusion.  Site patent and intact, free from redness, edema or discomfort.  No evidence of extravasations.  IVAD needle intact for 46 hours of chemotherapy.    Discharge Plan:   Copy of AVS reviewed with patient and/or family.  Patient will return 2/9 for chemo pump disconnect for next appointment.  Patient discharged in stable condition accompanied by: .  Departure Mode: Ambulatory.    Kailee Stallworth RN

## 2017-02-07 NOTE — PROGRESS NOTES
ANTICOAGULATION INITIAL CLINIC VISIT    Patient Name:  Damari Bryant  Date:  2/7/2017  Referred by: Dr. Esther Greene  Contact Type:  Face to Face    SUBJECTIVE:  Coumadin education was completed today.  Topics covered include:  -Introduction to coumadin  -Proper Administration  -INR Testing  -Sign/Symptoms of Bleeding  -Signs/Symptoms of Clot Formation or Stroke  -Dietary Intake of Vitamin K  -Drug Interactions  -Anticoagulation Identification (bracelet, necklace or wallet card)  -Future Surgery  -Effects of Alcohol, Tobacco, and Exercise on Coumadin    Coumadin Education Booklet and Coumadin Identification Wallet Card were given to the patient.       Patient Findings     Positives Initiation of therapy          OBJECTIVE    INR PROTIME   Date Value Ref Range Status   02/07/2017 2.0* 0.86 - 1.14 Final       ASSESSMENT / PLAN  No question data found.  Anticoagulation Summary as of 2/7/2017     INR goal 2.0-3.0   Selected INR 2.0 (2/7/2017)   Maintenance plan No maintenance plan   Full instructions 2/7: 5 mg; Otherwise No maintenance plan   Next INR check 2/8/2017   Target end date     Indications   Deep vein thrombosis (DVT) (H) [I82.409] [I82.409]  Long-term (current) use of anticoagulants [Z79.01] [Z79.01]         Anticoagulation Episode Summary     INR check location     Preferred lab     Send INR reminders to [facility] INR    Comments             See the Encounter Report to view Anticoagulation Flowsheet and Dosing Calendar (Go to Encounters tab in chart review, and find the Anticoagulation Therapy Visit)    INR Therapeutic  2.0    Nina Silva RN

## 2017-02-07 NOTE — MR AVS SNAPSHOT
After Visit Summary   2/7/2017    Damari Bryant    MRN: [id number]           Patient Information     Date Of Birth          1945        Visit Information        Provider Department      2/7/2017 2:00 PM INR MG [facility]        Today's Diagnoses     Deep vein thrombosis (DVT) (H)    -  1     Long-term (current) use of anticoagulants            Follow-ups after your visit        Your next 10 appointments already scheduled     Feb 07, 2017  2:00 PM   Anticoagulation Visit with INR MG   [facility]    [address] [address] [place] [address]   [phone]            Feb 09, 2017 10:00 AM   Level O with BAY 1 INFUSION   [facility]    [address] [address] [place] [address] [phone]            Feb 21, 2017  8:15 AM   Return Visit with NURSE [facility]   [facility]    [address] [facility] [address] [phone]            Feb 21, 2017  9:00 AM   Return Visit with Esther Greene MD   [facility]    [address] [facility] [address] [phone]            Feb 21, 2017  9:30 AM   Level 5 with BAY 3 INFUSION   [facility]    [address] [place] [address] [phone]            Feb 23, 2017 11:00 AM   Level O with BAY 6 INFUSION   [facility]    [address] [place] [address]   [phone]              Who to contact     If you have questions or need follow up information about today's clinic visit or your schedule please contact [facility] directly at [phone].  Normal or non-critical lab and imaging results will be communicated to you by MyChart, letter or phone within 4 business  days after the clinic has received the results. If you do not hear from us within 7 days, please contact the clinic through "[employer] or phone. If you have a critical or abnormal lab result, we will notify you by phone as soon as possible.  Submit refill requests through "[employer] or call your pharmacy and they will forward the refill request to us. Please allow 3 business days for your refill to be completed.          Additional Information About Your Visit        "[employer] Information     "[employer] is an electronic gateway that provides easy, online access to your medical records. With "[employer] you can request a clinic appointment, read your test results, renew a prescription or communicate with your care team.     To sign up for "[employer] visit the website at [email or web address]   You will be asked to enter the access code listed below, as well as some personal information. Please follow the directions to create your username and password.     Your access code is: [email or web address]  Expires: 2017  1:26 PM     Your access code will  in 90 days. If you need help or a new code, please contact your [facility] or call [phone] for assistance.        Care EveryWhere ID     This is your Care EveryWhere ID. This could be used by other organizations to access your [place] medical records  [id number]         Blood Pressure from Last 3 Encounters:   17 136/82   17 103/66   17 131/99    Weight from Last 3 Encounters:   17 219 lb 4.8 oz (99.474 kg)   17 217 lb (98.431 kg)   17 217 lb (98.431 kg)              We Performed the Following     INR point of care        Primary Care Provider Office Phone # Fax #    Samuel Cobian [phone]       [facility] [address] [facility] [address]        Thank you!     Thank you for choosing [facility]  for your care. Our goal is always to provide you with excellent  care. Hearing back from our patients is one way we can continue to improve our services. Please take a few minutes to complete the written survey that you may receive in the mail after your visit with us. Thank you!             Your Updated Medication List - Protect others around you: Learn how to safely use, store and throw away your medicines at www.disposemymeds.org.          This list is accurate as of: 2/7/17  1:26 PM.  Always use your most recent med list.                   Brand Name Dispense Instructions for use    busPIRone 15 MG tablet    BUSPAR     Take 15 mg by mouth 2 times daily       enoxaparin 100 MG/ML injection    LOVENOX    28 mL    Inject 1 mL (100 mg) Subcutaneous 2 times daily for 14 days       levothyroxine 150 MCG tablet    SYNTHROID/LEVOTHROID     Take 150 mcg by mouth daily       LEXAPRO 20 MG tablet   Generic drug:  escitalopram      Take 20 mg by mouth daily.       loperamide 2 MG capsule    IMODIUM    30 capsule    Start with 2 caps (4 mg), then take one cap (2 mg) after each diarrheal stool as needed. Do not use more than 8 caps (16 mg) per day.       NORVASC 5 MG tablet   Generic drug:  amLODIPine      Take 5 mg by mouth daily       OMEGA-3 FISH OIL PO          ondansetron 8 MG tablet    ZOFRAN    10 tablet    Take 1 tablet (8 mg) by mouth every 8 hours as needed (Nausea/Vomiting)       prochlorperazine 10 MG tablet    COMPAZINE    30 tablet    Take 1 tablet (10 mg) by mouth every 6 hours as needed (Nausea/Vomiting)       senna-docusate 8.6-50 MG per tablet    SENNA S    60 tablet    1-2 tabs daily for constipation       tetrahydrozoline 0.05 % ophthalmic solution      1 drop 3 times daily       TOPAMAX 50 MG tablet   Generic drug:  topiramate      Take  by mouth 2 times daily. 1 tab q am 2 tabs q PM       triamterene-hydrochlorothiazide 37.5-25 MG per tablet    MAXZIDE-25     Take 1 tablet by mouth daily.       UNABLE TO FIND      MEDICATION NAME: Nature made digestive probiotics        VITAMIN D (CHOLECALCIFEROL) PO      Take 2,000 Units by mouth daily       warfarin 5 MG tablet    COUMADIN    30 tablet    Take 1 tablet (5 mg) by mouth daily

## 2017-02-07 NOTE — NURSING NOTE
"RN met with patient and spouse in clinic this morning following patient's lab draw.  Patient wanted to report that she has been experiencing a mild, intermittent, \"pinching\" discomfort in the LLQ of her abdomen since last week.  She denies having any changes with her stools, and is still having some loose stools.  No issues with constipation, and she is passing gas without difficulty.  Patient also reports that she has been eating and drinking without difficulty.  Her abdomen has been soft without any firmness or distention.  Noted several small, scattered bruises across her abdomen from her Lovenox injections.  Patient has not noticed any unexplained bruising, bleeding symptoms, or black stools.  RN encouraged patient to continue monitoring this discomfort, and to let us know right away if the discomfort worsens or begins to become more continuous, or if she develops any other new/concenring symptoms.  Patient verbalizes understanding, and is in agreement with this plan.  Will update Dr. Greene.    Evans Archer RN, BSN, OCN  Care Coordinator  [facility]  [phone]    "

## 2017-02-07 NOTE — TELEPHONE ENCOUNTER
Routing to provider to please advise on INR goal range for this patient.      Nina Silva RN, [facility]

## 2017-02-08 ENCOUNTER — ANTICOAGULATION THERAPY VISIT (OUTPATIENT)
Dept: PHARMACY | Facility: CLINIC | Age: 72
End: 2017-02-08
Payer: COMMERCIAL

## 2017-02-08 DIAGNOSIS — Z79.01 LONG-TERM (CURRENT) USE OF ANTICOAGULANTS: ICD-10-CM

## 2017-02-08 DIAGNOSIS — I82.409 DEEP VEIN THROMBOSIS (DVT) (H): Primary | ICD-10-CM

## 2017-02-08 LAB — INR POINT OF CARE: 2.3 (ref 2–3)

## 2017-02-08 PROCEDURE — 99207 ZZC NO CHARGE NURSE ONLY: CPT

## 2017-02-08 PROCEDURE — 36416 COLLJ CAPILLARY BLOOD SPEC: CPT

## 2017-02-08 PROCEDURE — 85610 PROTHROMBIN TIME: CPT | Mod: QW

## 2017-02-08 NOTE — MR AVS SNAPSHOT
Damari Bryant   2/8/2017 10:20 AM   Anticoagulation Therapy Visit    Description:  71 year old female   Provider:  INR MG   Department:  Mg Med Monitoring           INR as of 2/8/2017     Selected INR 2.3 (2/8/2017)      Anticoagulation Summary as of 2/8/2017     INR goal 2.0-3.0   Selected INR 2.3 (2/8/2017)   Full instructions 2/8: 2.5 mg; Otherwise No maintenance plan   Next INR check 2/9/2017    Indications   Deep vein thrombosis (DVT) (H) [I82.409] [I82.409]  Long-term (current) use of anticoagulants [Z79.01] [Z79.01]         Your next Anticoagulation Clinic appointment(s)     Feb 09, 2017  9:40 AM   Anticoagulation Visit with INR MG   [facility]    [address] [place] [address] [phone]              Contact Numbers     [facility]  Please call the anticoagulation nurse at [phone] to cancel and/or reschedule your appointment, or with any problems or questions regarding your therapy.  You may call the main clinic number at [phone] if the nurse is not available.           February 2017 Details    Sun Mon Tue Wed Thu Fri Sat        1               2               3               4                 5               6               7               8      2.5 mg   See details      9            10               11                 12               13               14               15               16               17               18                 19               20               21               22               23               24               25                 26               27               28                    Date Details   02/08 This INR check       Date of next INR:  2/9/2017         How to take your warfarin dose     To take:  2.5 mg Take 0.5 of a 5 mg tablet.

## 2017-02-08 NOTE — PROGRESS NOTES
ANTICOAGULATION FOLLOW-UP CLINIC VISIT    Patient Name:  Damari Bryant  Date:  2/8/2017  Contact Type:  Face to Face    SUBJECTIVE:     Patient Findings     Positives Initiation of therapy, No Problem Findings    Comments Patient therapeutic INR 2.3.  Stopped Lovenox injections.  Since patient will be back in clinic tomorrow will see her at 0940 prior to her port removal in the cancer center at 10am.  This way no need to come back until next Monday.  She states her cough is coming back has had it for 2-3 days, thinks its from all the drainage.  She will mention it tomorrow at her oncology visit.  Her PCP -Dr. Interiano is from the [email or web address] in [place] and if worsens will contact them.           OBJECTIVE    INR PROTIME   Date Value Ref Range Status   02/08/2017 2.3 2.0 - 3.0 Final       ASSESSMENT / PLAN  INR assessment THER    Recheck INR In: 1 DAY    INR Location Clinic      Anticoagulation Summary as of 2/8/2017     INR goal 2.0-3.0   Selected INR 2.3 (2/8/2017)   Maintenance plan No maintenance plan   Full instructions 2/8: 2.5 mg; Otherwise No maintenance plan   Next INR check 2/9/2017   Priority INR   Target end date 8/14/2017    Indications   Deep vein thrombosis (DVT) (H) [I82.409] [I82.409]  Long-term (current) use of anticoagulants [Z79.01] [Z79.01]         Anticoagulation Episode Summary     INR check location     Preferred lab     Send INR reminders to MG  INR    Comments       Anticoagulation Care Providers     Provider Role Specialty Phone number    Esther Greene MD Responsible Hematology [phone]            See the Encounter Report to view Anticoagulation Flowsheet and Dosing Calendar (Go to Encounters tab in chart review, and find the Anticoagulation Therapy Visit)    Patient therapeutic INR 2.3.  Stopped Lovenox injections.  Since patient will be back in clinic tomorrow will see her at 0940 prior to her port removal in the cancer center at 10am.  This way no need to come back  until next Monday.  She states her cough is coming back has had it for 2-3 days, thinks its from all the drainage.  She will mention it tomorrow at her oncology visit.  Her PCP -Dr. Interiano is from the [facility] in [place] and if worsens will contact them.    Mary Berry RN

## 2017-02-09 ENCOUNTER — INFUSION THERAPY VISIT (OUTPATIENT)
Dept: INFUSION THERAPY | Facility: CLINIC | Age: 72
End: 2017-02-09
Payer: COMMERCIAL

## 2017-02-09 ENCOUNTER — ANTICOAGULATION THERAPY VISIT (OUTPATIENT)
Dept: PHARMACY | Facility: CLINIC | Age: 72
End: 2017-02-09
Payer: COMMERCIAL

## 2017-02-09 VITALS
RESPIRATION RATE: 16 BRPM | SYSTOLIC BLOOD PRESSURE: 122 MMHG | OXYGEN SATURATION: 94 % | HEART RATE: 91 BPM | DIASTOLIC BLOOD PRESSURE: 74 MMHG | TEMPERATURE: 96.9 F

## 2017-02-09 DIAGNOSIS — I82.409 DEEP VEIN THROMBOSIS (DVT) (H): Primary | ICD-10-CM

## 2017-02-09 DIAGNOSIS — Z79.01 LONG-TERM (CURRENT) USE OF ANTICOAGULANTS: ICD-10-CM

## 2017-02-09 DIAGNOSIS — C18.9 METASTASIS FROM COLON CANCER (H): Primary | ICD-10-CM

## 2017-02-09 DIAGNOSIS — C79.9 METASTASIS FROM COLON CANCER (H): Primary | ICD-10-CM

## 2017-02-09 LAB — INR POINT OF CARE: 1.7 (ref 0.86–1.14)

## 2017-02-09 PROCEDURE — 36416 COLLJ CAPILLARY BLOOD SPEC: CPT

## 2017-02-09 PROCEDURE — 99207 ZZC NO CHARGE LOS: CPT

## 2017-02-09 PROCEDURE — 85610 PROTHROMBIN TIME: CPT | Mod: QW

## 2017-02-09 PROCEDURE — 99207 ZZC NO CHARGE NURSE ONLY: CPT

## 2017-02-09 NOTE — PROGRESS NOTES
Infusion Nursing Note:  Damari Bryant presents today for pump disconnect.    Patient seen by provider today: No   present during visit today: Not Applicable.    Note: N/A.    Intravenous Access:  Implanted Port.    Treatment Conditions:  Not Applicable.      Post Infusion Assessment:  Site patent and intact, free from redness, edema or discomfort.  No evidence of extravasations.  Access discontinued per protocol.    Discharge Plan:   Discharge instructions reviewed with: Patient and Family.  Patient and/or family verbalized understanding of discharge instructions and all questions answered.  Copy of AVS reviewed with patient and/or family.  Patient will return 2/21/2017 for next appointment.  Patient discharged in stable condition accompanied by: .  Departure Mode: Ambulatory.    Nga Cotton RN

## 2017-02-09 NOTE — PROGRESS NOTES
ANTICOAGULATION FOLLOW-UP CLINIC VISIT    Patient Name:  Damair Bryant  Date:  2/9/2017  Contact Type:  Face to Face    SUBJECTIVE:     Patient Findings     Positives Medication Changes    Comments Patient has been having chem infusion for past 48 hours.  This pump is being removed this morning.  She will get her chemo infusion in another 2 weeks, or 2/21/17.   Message from Dr. Greene regarding restarting Lovenox today and tomorrow.  Called patient and informed.  Esther Greene MD Pelant, Jennifer W, RN            Yes, that sounds like a good plan. Hopefully, this was just a one time and she will be able to go off lovenox tomorrow.             Previous Messages      ----- Message -----      From: Nina Silva RN      Sent: 2/9/2017  10:12 AM        To: Esther Greene MD   Subject: INR sub therapeutic                               Hi Dr. Greene,     This patient stopped Lovenox yesterday because her INR was therapeutic on Tuesday, 2.0 and Wednesday 2.3.     Today, her INR is 1.7.  I will have her take 5 mg today of coumadin she will come back tomorrow to recheck but I am wondering if you think she should go back on the Lovenox tonight and tomorrow morning, perhaps through the weekend if not therapeutic tomorrow.     Thank you,   Nina Silva RN                   OBJECTIVE    INR PROTIME   Date Value Ref Range Status   02/09/2017 1.7* 0.86 - 1.14 Final       ASSESSMENT / PLAN  INR assessment SUB    Recheck INR In: 1 DAY    INR Location Clinic      Anticoagulation Summary as of 2/9/2017     INR goal 2.0-3.0   Selected INR 1.7! (2/9/2017)   Maintenance plan No maintenance plan   Full instructions 2/9: 5 mg; Otherwise No maintenance plan   Next INR check 2/10/2017   Priority INR   Target end date 8/14/2017    Indications   Deep vein thrombosis (DVT) (H) [I82.409] [I82.409]  Long-term (current) use of anticoagulants [Z79.01] [Z79.01]         Anticoagulation Episode Summary     INR check location      Preferred lab     Send INR reminders to [facility] INR    Comments Pateint has chemo infusion pump for 48 hours every 2 weeks.  Her INR lowers during this infusion. Her next scheduled infusion is scheduled 2/21/17 and 3/7/17.       Anticoagulation Care Providers     Provider Role Specialty Phone number    Esther Greene MD Responsible Hematology [phone]            See the Encounter Report to view Anticoagulation Flowsheet and Dosing Calendar (Go to Encounters tab in chart review, and find the Anticoagulation Therapy Visit)    INR Sub therapeutic  1.7    Nina Silva RN

## 2017-02-09 NOTE — MR AVS SNAPSHOT
Damari Bryant   2/9/2017 9:40 AM   Anticoagulation Therapy Visit    Description:  71 year old female   Provider:  INR MG   Department:  Mg Med Monitoring           INR as of 2/9/2017     Selected INR 1.7! (2/9/2017)      Anticoagulation Summary as of 2/9/2017     INR goal 2.0-3.0   Selected INR 1.7! (2/9/2017)   Full instructions 2/9: 5 mg; Otherwise No maintenance plan   Next INR check 2/10/2017    Indications   Deep vein thrombosis (DVT) (H) [I82.409] [I82.409]  Long-term (current) use of anticoagulants [Z79.01] [Z79.01]         Your next Anticoagulation Clinic appointment(s)     Feb 10, 2017 12:40 PM   Anticoagulation Visit with INR [facility]   [facility]    [address] [place] [id number] [phone]              Contact Numbers     [facility]  Please call the anticoagulation nurse at [phone] to cancel and/or reschedule your appointment, or with any problems or questions regarding your therapy.  You may call the main clinic number at [phone] if the nurse is not available.           February 2017 Details    Sun Mon Tue Wed Thu Fri Sat        1               2               3               4                 5               6               7               8               9      5 mg   See details      10            11                 12               13               14               15               16               17               18                 19               20               21               22               23               24               25                 26               27               28                    Date Details   02/09 This INR check       Date of next INR:  2/10/2017         How to take your warfarin dose     To take:  5 mg Take 1 of the 5 mg tablets.

## 2017-02-10 ENCOUNTER — ANTICOAGULATION THERAPY VISIT (OUTPATIENT)
Dept: PHARMACY | Facility: CLINIC | Age: 72
End: 2017-02-10
Payer: COMMERCIAL

## 2017-02-10 DIAGNOSIS — Z79.01 LONG-TERM (CURRENT) USE OF ANTICOAGULANTS: ICD-10-CM

## 2017-02-10 DIAGNOSIS — I82.409 DEEP VEIN THROMBOSIS (DVT) (H): Primary | ICD-10-CM

## 2017-02-10 LAB — INR POINT OF CARE: 1.8 (ref 0.86–1.14)

## 2017-02-10 PROCEDURE — 99207 ZZC NO CHARGE NURSE ONLY: CPT

## 2017-02-10 PROCEDURE — 36416 COLLJ CAPILLARY BLOOD SPEC: CPT

## 2017-02-10 PROCEDURE — 85610 PROTHROMBIN TIME: CPT | Mod: QW

## 2017-02-10 NOTE — PROGRESS NOTES
ANTICOAGULATION FOLLOW-UP CLINIC VISIT    Patient Name:  Damari Bryant  Date:  2/10/2017  Contact Type:  Face to Face    SUBJECTIVE:     Patient Findings     Positives Nose Bleeds    Comments Diarrhea last couple nights, patient states this is not new.  Also had bloody nose a couple times this morning.  She states it did stop within 10 min or so.   She is advised to continue bridging with Lovenox through the weekend due to sub therapeutic today.  Patient asked about alternative to coumadin.  Discussed this and also sent  message to Dr. Greene regarding this question and information.  She is encouraged to go to the ED for any nose bleed that lasts over 30 min. Patient agrees to plan.              OBJECTIVE    INR PROTIME   Date Value Ref Range Status   02/10/2017 1.8* 0.86 - 1.14 Final       ASSESSMENT / PLAN  INR assessment SUB    Recheck INR In: 3 DAYS    INR Location Clinic      Anticoagulation Summary as of 2/10/2017     INR goal 2.0-3.0   Selected INR 1.8! (2/10/2017)   Maintenance plan No maintenance plan   Full instructions 2/10: 5 mg; 2/11: 5 mg; 2/12: 5 mg; Otherwise No maintenance plan   Next INR check 2/13/2017   Priority INR   Target end date 8/14/2017    Indications   Deep vein thrombosis (DVT) (H) [I82.409] [I82.409]  Long-term (current) use of anticoagulants [Z79.01] [Z79.01]         Anticoagulation Episode Summary     INR check location     Preferred lab     Send INR reminders to [facility] INR    Comments Pateint has chemo infusion pump for 48 hours every 2 weeks.  Her INR lowers during this infusion. Her next scheduled infusion is scheduled 2/21/17 and 3/7/17.       Anticoagulation Care Providers     Provider Role Specialty Phone number    Esther Greene MD Responsible Hematology [phone]            See the Encounter Report to view Anticoagulation Flowsheet and Dosing Calendar (Go to Encounters tab in chart review, and find the Anticoagulation Therapy Visit)    INR Sub therapeutic   1.8    Nina Silva RN

## 2017-02-10 NOTE — MR AVS SNAPSHOT
Damari Bryant   2/10/2017 12:40 PM   Anticoagulation Therapy Visit    Description:  71 year old female   Provider:  INR MG   Department:  Mg Med Monitoring           INR as of 2/10/2017     Selected INR 1.8! (2/10/2017)      Anticoagulation Summary as of 2/10/2017     INR goal 2.0-3.0   Selected INR 1.8! (2/10/2017)   Full instructions 2/10: 5 mg; 2/11: 5 mg; 2/12: 5 mg; Otherwise No maintenance plan   Next INR check 2/13/2017    Indications   Deep vein thrombosis (DVT) (H) [I82.409] [I82.409]  Long-term (current) use of anticoagulants [Z79.01] [Z79.01]         Your next Anticoagulation Clinic appointment(s)     Feb 13, 2017 10:20 AM   Anticoagulation Visit with INR MG   [facility]    [address] [place] [id number] [phone]              Contact Numbers     [facility]  Please call the anticoagulation nurse at [phone] to cancel and/or reschedule your appointment, or with any problems or questions regarding your therapy.  You may call the main clinic number at [phone] if the nurse is not available.           February 2017 Details    Sun Mon Tue Wed Thu Fri Sat        1               2               3               4                 5               6               7               8               9               10      5 mg   See details      11      5 mg           12      5 mg         13            14               15               16               17               18                 19               20               21               22               23               24               25                 26               27               28                    Date Details   02/10 This INR check       Date of next INR:  2/13/2017         How to take your warfarin dose     To take:  5 mg Take 1 of the 5 mg tablets.

## 2017-02-13 ENCOUNTER — CARE COORDINATION (OUTPATIENT)
Dept: ONCOLOGY | Facility: CLINIC | Age: 72
End: 2017-02-13

## 2017-02-13 ENCOUNTER — ANTICOAGULATION THERAPY VISIT (OUTPATIENT)
Dept: PHARMACY | Facility: CLINIC | Age: 72
End: 2017-02-13
Payer: COMMERCIAL

## 2017-02-13 DIAGNOSIS — C79.9 METASTASIS FROM COLON CANCER (H): ICD-10-CM

## 2017-02-13 DIAGNOSIS — C18.9 METASTASIS FROM COLON CANCER (H): Primary | ICD-10-CM

## 2017-02-13 DIAGNOSIS — Z79.01 LONG-TERM (CURRENT) USE OF ANTICOAGULANTS: ICD-10-CM

## 2017-02-13 DIAGNOSIS — R06.02 SHORTNESS OF BREATH: Primary | ICD-10-CM

## 2017-02-13 DIAGNOSIS — C18.9 METASTASIS FROM COLON CANCER (H): ICD-10-CM

## 2017-02-13 DIAGNOSIS — I82.409 DEEP VEIN THROMBOSIS (DVT) (H): ICD-10-CM

## 2017-02-13 DIAGNOSIS — C79.9 METASTASIS FROM COLON CANCER (H): Primary | ICD-10-CM

## 2017-02-13 LAB — INR POINT OF CARE: 1.3 (ref 0.86–1.14)

## 2017-02-13 PROCEDURE — 85610 PROTHROMBIN TIME: CPT | Mod: QW

## 2017-02-13 PROCEDURE — 36416 COLLJ CAPILLARY BLOOD SPEC: CPT

## 2017-02-13 PROCEDURE — 99207 ZZC NO CHARGE NURSE ONLY: CPT

## 2017-02-13 RX ORDER — LOPERAMIDE HCL 2 MG
CAPSULE ORAL
Qty: 30 CAPSULE | Refills: 1 | Status: SHIPPED | OUTPATIENT
Start: 2017-02-13 | End: 2017-02-15

## 2017-02-13 NOTE — MR AVS SNAPSHOT
Damari Bryant   2/13/2017 10:20 AM   Anticoagulation Therapy Visit    Description:  71 year old female   Provider:  INR MG   Department:  Mg Med Monitoring           INR as of 2/13/2017     Today's INR 1.3!      Anticoagulation Summary as of 2/13/2017     INR goal 2.0-3.0   Today's INR 1.3!   Full instructions 2/13: 10 mg; 2/14: 5 mg; 2/15: 5 mg; Otherwise No maintenance plan   Next INR check 2/16/2017    Indications   Deep vein thrombosis (DVT) (H) [I82.409] [I82.409]  Long-term (current) use of anticoagulants [Z79.01] [Z79.01]         Your next Anticoagulation Clinic appointment(s)     Feb 16, 2017 10:20 AM CST   Anticoagulation Visit with INR MG   [facility]    [address] [place] [address] [phone]              Contact Numbers     [facility]  Please call the anticoagulation nurse at [phone] to cancel and/or reschedule your appointment, or with any problems or questions regarding your therapy.  You may call the main clinic number at [phone] if the nurse is not available.           February 2017 Details    Sun Mon Tue Wed Thu Fri Sat        1               2               3               4                 5               6               7               8               9               10               11                 12               13      10 mg   See details      14      5 mg         15      5 mg         16            17               18                 19               20               21               22               23               24               25                 26               27               28                    Date Details   02/13 This INR check       Date of next INR:  2/16/2017         How to take your warfarin dose     To take:  5 mg Take 1 of the 5 mg tablets.    To take:  10 mg Take 2 of the 5 mg tablets.

## 2017-02-13 NOTE — PROGRESS NOTES
ANTICOAGULATION FOLLOW-UP CLINIC VISIT    Patient Name:  Damari Bryant  Date:  2/13/2017  Contact Type:  Face to Face    SUBJECTIVE:     Patient Findings     Positives Bruising    Comments 1.5 inch bruise under R breast from elastic band.  Bruising on her abdomen from Lovenox injections.  She feels more exhausted, prefers to sit in recliner for meals verses sit up at the table, she is asking for homecare services, possibly home health aid.  Routed message to Dr. Greene.  She tearfully states she is unsure she will be willing to purchase more Lovenox when her shots run out Thursday morning.           OBJECTIVE    INR Protime   Date Value Ref Range Status   02/10/2017 1.8 (A) 0.86 - 1.14 Final       ASSESSMENT / PLAN  INR assessment SUB    Recheck INR In: 3 DAYS    INR Location Clinic      Anticoagulation Summary as of 2/13/2017     INR goal 2.0-3.0   Today's INR    Next INR check    Target end date 8/14/2017    Indications   Deep vein thrombosis (DVT) (H) [I82.409] [I82.409]  Long-term (current) use of anticoagulants [Z79.01] [Z79.01]         Anticoagulation Episode Summary     INR check location     Preferred lab     Send INR reminders to [facility] INR    Comments Pateint has chemo infusion pump for 48 hours every 2 weeks.  Her INR lowers during this infusion. Her next scheduled infusion is scheduled 2/21/17 and 3/7/17.       Anticoagulation Care Providers     Provider Role Specialty Phone number    Esther Greene MD Responsible Hematology [phone]            See the Encounter Report to view Anticoagulation Flowsheet and Dosing Calendar (Go to Encounters tab in chart review, and find the Anticoagulation Therapy Visit)    INR Sub therapeutic 1.3       Nina Silva RN

## 2017-02-13 NOTE — PROGRESS NOTES
"Received notification from scheduling staff that patient's spouse, Ross, had called to request an appointment with Dr. Greene this week for patient.  Noted the appointment has already been scheduled by scheduling staff.      RN contacted spouse via telephone to obtain more information regarding the appointment request.  Spouse states that patient is feeling very fatigued and has low energy.  She is also feeling short of breath with activity on occasion, but is not short of breath at rest.  Per spouse, patient has been eating well and pushing oral fluids.  She did have several loose stools this past Saturday, but they are now well-controlled with the use of her Imodium.  She felt \"queasy\" yesterday but her antiemetics are helping, and she has not had any vomiting.  Spouse voices concern with these symptoms, and states that he and patient would also like to meet with Dr. Greene in person to discuss her plan for continued anticoagulation.  Spouse voices concern that they are \"struggling\" to keep patient's INR levels in a therapeutic range, and also that the Lovenox injections are causing patient to feel very \"worn out.\"      Message was sent to Dr. Greene with update and above information pertaining to patient.  Dr. Greene would like to obtain repeat labs on patient (CBC/p/d and CMP) prior to her appointment in clinic later this week.  Per Dr. Greene, if patient is truly feeling short of breath, she would recommend having patient undergo CT angio prior to her appointment - this could be done locally.  Voicemail message was left with spouse, Ross, with Dr. Greene' recommendations.  Requested that Ross call back to clinic to discuss.    Evans Archer, RN, BSN, OCN  Care Coordinator  [facility]  [phone]    "

## 2017-02-13 NOTE — TELEPHONE ENCOUNTER
Hello,  last fill date:01-  Last quantity:30    Thank You,  Gianna Mcdaniel  Pharmacy Technician  [facility]  [phone]

## 2017-02-14 ENCOUNTER — TRANSFERRED RECORDS (OUTPATIENT)
Dept: HEALTH INFORMATION MANAGEMENT | Facility: CLINIC | Age: 72
End: 2017-02-14

## 2017-02-14 NOTE — PROGRESS NOTES
Received callback from patient's spouse, Ross.  Ross states that they would like to proceed with the CT angio, as recommended by Dr. Greene.  They are also agreeable with arriving early on Thursday 2/16 for lab draw.  CT angio scheduled at the [facility] today at 3:15 pm.  CT order faxed to [facility] Scheduling at [phone].  Will watch for CT report to arrive.  Spouse was notified via telephone of CT appointment details and prep/check-in instructions.    Evans Archer, RN, BSN, OCN  Care Coordinator  [facility]  [phone]

## 2017-02-15 ENCOUNTER — CARE COORDINATION (OUTPATIENT)
Dept: ONCOLOGY | Facility: CLINIC | Age: 72
End: 2017-02-15

## 2017-02-15 DIAGNOSIS — C18.9 METASTASIS FROM COLON CANCER (H): ICD-10-CM

## 2017-02-15 DIAGNOSIS — C79.9 METASTASIS FROM COLON CANCER (H): ICD-10-CM

## 2017-02-15 RX ORDER — LOPERAMIDE HCL 2 MG
CAPSULE ORAL
Qty: 30 CAPSULE | Refills: 1 | Status: SHIPPED | OUTPATIENT
Start: 2017-02-15 | End: 2017-04-04

## 2017-02-15 NOTE — PROGRESS NOTES
Received CT Chest Angiogram report via fax from [facility].  Images pushed to PACS.  Faxed archiving request to [facility].  Report labeled for EMR scanning, copy given to Dr. Greene.  Patient scheduled in clinic tomorrow 2/16/17 for repeat labs, visit with Dr. Greene, and to review CT results.    Evans Archer, RN, BSN, OCN  Care Coordinator  [facility]  [phone]

## 2017-02-15 NOTE — TELEPHONE ENCOUNTER
Pending Prescriptions:                       Disp   Refills    loperamide (IMODIUM) 2 MG capsule         30 cap*1            Sig: Start with 2 caps (4 mg), then take one cap (2           mg) after each diarrheal stool as needed. Do not           use more than 8 caps (16 mg) per day.    Last filled 1/3/17    Elmira Gerardo LPN

## 2017-02-16 ENCOUNTER — ONCOLOGY VISIT (OUTPATIENT)
Dept: ONCOLOGY | Facility: CLINIC | Age: 72
End: 2017-02-16
Payer: COMMERCIAL

## 2017-02-16 ENCOUNTER — ANTICOAGULATION THERAPY VISIT (OUTPATIENT)
Dept: PHARMACY | Facility: CLINIC | Age: 72
End: 2017-02-16
Payer: COMMERCIAL

## 2017-02-16 VITALS
RESPIRATION RATE: 20 BRPM | HEIGHT: 66 IN | DIASTOLIC BLOOD PRESSURE: 93 MMHG | WEIGHT: 215 LBS | BODY MASS INDEX: 34.55 KG/M2 | HEART RATE: 100 BPM | SYSTOLIC BLOOD PRESSURE: 144 MMHG | OXYGEN SATURATION: 95 %

## 2017-02-16 DIAGNOSIS — Z79.01 LONG TERM CURRENT USE OF ANTICOAGULANT THERAPY: ICD-10-CM

## 2017-02-16 DIAGNOSIS — I82.409 DEEP VEIN THROMBOSIS (DVT) (H): ICD-10-CM

## 2017-02-16 DIAGNOSIS — Z79.01 LONG-TERM (CURRENT) USE OF ANTICOAGULANTS: ICD-10-CM

## 2017-02-16 DIAGNOSIS — I82.90 ACUTE DEEP VEIN THROMBOSIS (DVT) OF NON-EXTREMITY VEIN: Primary | ICD-10-CM

## 2017-02-16 DIAGNOSIS — C79.9 METASTASIS FROM COLON CANCER (H): ICD-10-CM

## 2017-02-16 DIAGNOSIS — I82.90 OCCLUSIVE THROMBUS: ICD-10-CM

## 2017-02-16 DIAGNOSIS — C18.9 METASTASIS FROM COLON CANCER (H): ICD-10-CM

## 2017-02-16 LAB
ALBUMIN SERPL-MCNC: 3.7 G/DL (ref 3.4–5)
ALP SERPL-CCNC: 102 U/L (ref 40–150)
ALT SERPL W P-5'-P-CCNC: 38 U/L (ref 0–50)
ANION GAP SERPL CALCULATED.3IONS-SCNC: 13 MMOL/L (ref 3–14)
AST SERPL W P-5'-P-CCNC: 29 U/L (ref 0–45)
BASOPHILS # BLD AUTO: 0 10E9/L (ref 0–0.2)
BASOPHILS NFR BLD AUTO: 0.2 %
BILIRUB SERPL-MCNC: 0.2 MG/DL (ref 0.2–1.3)
BUN SERPL-MCNC: 16 MG/DL (ref 7–30)
CALCIUM SERPL-MCNC: 9.3 MG/DL (ref 8.5–10.1)
CHLORIDE SERPL-SCNC: 100 MMOL/L (ref 94–109)
CO2 SERPL-SCNC: 23 MMOL/L (ref 20–32)
CREAT SERPL-MCNC: 0.92 MG/DL (ref 0.52–1.04)
DIFFERENTIAL METHOD BLD: ABNORMAL
EOSINOPHIL # BLD AUTO: 0.1 10E9/L (ref 0–0.7)
EOSINOPHIL NFR BLD AUTO: 2.4 %
ERYTHROCYTE [DISTWIDTH] IN BLOOD BY AUTOMATED COUNT: 15.7 % (ref 10–15)
GFR SERPL CREATININE-BSD FRML MDRD: 59 ML/MIN/1.7M2
GLUCOSE SERPL-MCNC: 251 MG/DL (ref 70–99)
HCT VFR BLD AUTO: 41.5 % (ref 35–47)
HGB BLD-MCNC: 14.1 G/DL (ref 11.7–15.7)
INR POINT OF CARE: 1.9 (ref 0.86–1.14)
LYMPHOCYTES # BLD AUTO: 2.8 10E9/L (ref 0.8–5.3)
LYMPHOCYTES NFR BLD AUTO: 46.9 %
MCH RBC QN AUTO: 30.5 PG (ref 26.5–33)
MCHC RBC AUTO-ENTMCNC: 34 G/DL (ref 31.5–36.5)
MCV RBC AUTO: 90 FL (ref 78–100)
MONOCYTES # BLD AUTO: 0.4 10E9/L (ref 0–1.3)
MONOCYTES NFR BLD AUTO: 7.5 %
NEUTROPHILS # BLD AUTO: 2.5 10E9/L (ref 1.6–8.3)
NEUTROPHILS NFR BLD AUTO: 43 %
PLATELET # BLD AUTO: 210 10E9/L (ref 150–450)
POTASSIUM SERPL-SCNC: 3.1 MMOL/L (ref 3.4–5.3)
PROT SERPL-MCNC: 8 G/DL (ref 6.8–8.8)
RBC # BLD AUTO: 4.62 10E12/L (ref 3.8–5.2)
SODIUM SERPL-SCNC: 136 MMOL/L (ref 133–144)
WBC # BLD AUTO: 5.9 10E9/L (ref 4–11)

## 2017-02-16 PROCEDURE — 36416 COLLJ CAPILLARY BLOOD SPEC: CPT

## 2017-02-16 PROCEDURE — 99207 ZZC NO CHARGE NURSE ONLY: CPT

## 2017-02-16 PROCEDURE — 80053 COMPREHEN METABOLIC PANEL: CPT | Performed by: INTERNAL MEDICINE

## 2017-02-16 PROCEDURE — 36415 COLL VENOUS BLD VENIPUNCTURE: CPT | Performed by: INTERNAL MEDICINE

## 2017-02-16 PROCEDURE — 99214 OFFICE O/P EST MOD 30 MIN: CPT | Performed by: INTERNAL MEDICINE

## 2017-02-16 PROCEDURE — 85610 PROTHROMBIN TIME: CPT | Mod: QW

## 2017-02-16 PROCEDURE — 85025 COMPLETE CBC W/AUTO DIFF WBC: CPT | Performed by: INTERNAL MEDICINE

## 2017-02-16 RX ORDER — HEPARIN SODIUM (PORCINE) LOCK FLUSH IV SOLN 100 UNIT/ML 100 UNIT/ML
5 SOLUTION INTRAVENOUS
Status: DISCONTINUED | OUTPATIENT
Start: 2017-02-16 | End: 2017-02-16 | Stop reason: HOSPADM

## 2017-02-16 ASSESSMENT — PAIN SCALES - GENERAL: PAINLEVEL: NO PAIN (0)

## 2017-02-16 NOTE — MR AVS SNAPSHOT
After Visit Summary   2/16/2017    Damari Bryant    MRN: [id number]           Patient Information     Date Of Birth          1945        Visit Information        Provider Department      2/16/2017 9:00 AM NURSE ONLY CANCER CENTER [facility]        Today's Diagnoses     Metastasis from colon cancer (H)           Follow-ups after your visit        Your next 10 appointments already scheduled     Feb 16, 2017  9:30 AM CST   [facility] Visit with Esther Greene MD   [facility]    [address] [facility] [address]   [phone]            Feb 16, 2017 10:20 AM CST   Anticoagulation Visit with INR MG   [facility] ([facility])    [address] [facility] [address] [phone]            Feb 21, 2017  8:15 AM CST   Return Visit with NURSE ONLY CANCER CENTER   [facility] ([facility])    [address] [facility] [address] [phone]            Feb 21, 2017  9:00 AM CST   Return Visit with Esther Greene MD   [facility] ([facility])    [address] [facility] [address] [phone]            Feb 21, 2017  9:30 AM CST   Level 5 with BAY 3 INFUSION   [facility]    [address] [facility] [address] [phone]            Feb 23, 2017 11:00 AM CST   Level O with BAY 6 INFUSION   [facility]    [id number] [place] [address] [phone]              Who to contact     If you have questions or need follow up information about today's clinic visit or your schedule please contact [facility] directly at [phone].  Normal or non-critical lab and imaging results will be communicated to you by MyChart, letter or phone within 4  business days after the clinic has received the results. If you do not hear from us within 7 days, please contact the clinic through ISC8 or phone. If you have a critical or abnormal lab result, we will notify you by phone as soon as possible.  Submit refill requests through ISC8 or call your pharmacy and they will forward the refill request to us. Please allow 3 business days for your refill to be completed.          Additional Information About Your Visit        ISC8 Information     ISC8 is an electronic gateway that provides easy, online access to your medical records. With ISC8, you can request a clinic appointment, read your test results, renew a prescription or communicate with your care team.     To sign up for ISC8 visit the website at [email or web address]   You will be asked to enter the access code listed below, as well as some personal information. Please follow the directions to create your username and password.     Your access code is: [email or web address]  Expires: 2017  1:26 PM     Your access code will  in 90 days. If you need help or a new code, please contact your [facility] or call [phone] for assistance.        Care EveryWhere ID     This is your Care EveryWhere ID. This could be used by other organizations to access your [facility] medical records  [id number]         Blood Pressure from Last 3 Encounters:   17 122/74   17 136/82   17 103/66    Weight from Last 3 Encounters:   17 99.5 kg (219 lb 4.8 oz)   17 98.4 kg (217 lb)   17 98.4 kg (217 lb)              We Performed the Following     *CBC with platelets differential     Central Venous Catheter: implanted port (Port-A-Cath, Power Port)     Comprehensive metabolic panel        Primary Care Provider Office Phone # Fax #    Samuel Cobian [phone]       [facility] [address] [facility] [place] [address]        Thank you!      Thank you for choosing [facility]  for your care. Our goal is always to provide you with excellent care. Hearing back from our patients is one way we can continue to improve our services. Please take a few minutes to complete the written survey that you may receive in the mail after your visit with us. Thank you!             Your Updated Medication List - Protect others around you: Learn how to safely use, store and throw away your medicines at www.disposemymeds.org.          This list is accurate as of: 2/16/17  9:01 AM.  Always use your most recent med list.                   Brand Name Dispense Instructions for use    busPIRone 15 MG tablet    BUSPAR     Take 15 mg by mouth 2 times daily       levothyroxine 150 MCG tablet    SYNTHROID/LEVOTHROID     Take 150 mcg by mouth daily       LEXAPRO 20 MG tablet   Generic drug:  escitalopram      Take 20 mg by mouth daily.       loperamide 2 MG capsule    IMODIUM    30 capsule    Start with 2 caps (4 mg), then take one cap (2 mg) after each diarrheal stool as needed. Do not use more than 8 caps (16 mg) per day.       NORVASC 5 MG tablet   Generic drug:  amLODIPine      Take 5 mg by mouth daily       OMEGA-3 FISH OIL PO          ondansetron 8 MG tablet    ZOFRAN    10 tablet    Take 1 tablet (8 mg) by mouth every 8 hours as needed (Nausea/Vomiting)       prochlorperazine 10 MG tablet    COMPAZINE    30 tablet    Take 1 tablet (10 mg) by mouth every 6 hours as needed (Nausea/Vomiting)       senna-docusate 8.6-50 MG per tablet    SENNA S    60 tablet    1-2 tabs daily for constipation       tetrahydrozoline 0.05 % ophthalmic solution      1 drop 3 times daily       TOPAMAX 50 MG tablet   Generic drug:  topiramate      Take  by mouth 2 times daily. 1 tab q am 2 tabs q PM       triamterene-hydrochlorothiazide 37.5-25 MG per tablet    MAXZIDE-25     Take 1 tablet by mouth daily.       UNABLE TO FIND      MEDICATION NAME: Nature made digestive probiotics        VITAMIN D (CHOLECALCIFEROL) PO      Take 2,000 Units by mouth daily       warfarin 5 MG tablet    COUMADIN    30 tablet    Take 1 tablet (5 mg) by mouth daily

## 2017-02-16 NOTE — PROGRESS NOTES
"  ANTICOAGULATION FOLLOW-UP CLINIC VISIT    Patient Name:  Damari Bryant  Date:  2/16/2017  Contact Type:  Face to Face, here with her  and her daughter    SUBJECTIVE:     Patient Findings     Comments Patient complains of a runny nose, she had a bloody nose yesterday that lasted under 1 minute.   Discussed she is taking a break from chemo according to office visit today with Dr. Greene.  Her next date for Chemo to start is 3/7/17.    She is in a wheelchair today due to weakness.  She has no problem sitting up.  Her advisement from Dr. Greene is to stay on Lovenox per protocol until therapeutic 2 consecutive days, but did say \"let's see how it is today\"  Will have patient continue Lovenox as per protocol.   2 more syringes are now ordered and will have patient take her injection tonight and tomorrow morning.   Rechecking her INR tomorrow as she is in range today.    They are wondering if they can go to the [facility] in [place] for the INR lab.  Called [facility] in [place], [phone] and left a message on the triage line asking if they do Point of care INR or do they do lab draw.  Patient is not interested in arm draw, she would rather drive to [facility] here to do the finger stick.      Patient wants to start Zinc tablets, she will wait until she is therapeutic and in range.             OBJECTIVE    INR Protime   Date Value Ref Range Status   02/16/2017 1.9 (A) 0.86 - 1.14 Final       ASSESSMENT / PLAN  INR assessment THER    Recheck INR In: 1 DAY    INR Location Clinic      Anticoagulation Summary as of 2/16/2017     INR goal 2.0-3.0   Today's INR 1.9!   Maintenance plan No maintenance plan   Full instructions 2/16: 10 mg; Otherwise No maintenance plan   Next INR check 2/17/2017   Priority INR   Target end date 8/14/2017    Indications   Deep vein thrombosis (DVT) (H) [I82.409] [I82.409]  Long-term (current) use of anticoagulants [Z79.01] [Z79.01]         Anticoagulation Episode Summary  "    INR check location     Preferred lab     Send INR reminders to [facility] INR    Comments Pateint has chemo infusion pump for 48 hours every 2 weeks.  Her INR rises during this infusion. Her next scheduled infusion is scheduled 2/21/17 and 3/7/17.       Anticoagulation Care Providers     Provider Role Specialty Phone number    Esther Greene MD Responsible Hematology [phone]            See the Encounter Report to view Anticoagulation Flowsheet and Dosing Calendar (Go to Encounters tab in chart review, and find the Anticoagulation Therapy Visit)    INR Therapeutic  1.9    Nina Silva RN

## 2017-02-16 NOTE — NURSING NOTE
"Damari Bryant is a 71 year old female who presents for:  Chief Complaint   Patient presents with     Oncology Clinic Visit     f/u CT results        Initial Vitals:  BP (!) 144/93 (BP Location: Right arm, Patient Position: Chair, Cuff Size: Adult Large)  Pulse 100  Resp 20  Ht 1.676 m (5' 5.98\")  Wt 97.5 kg (215 lb)  SpO2 95%  BMI 34.72 kg/m2 Estimated body mass index is 34.72 kg/(m^2) as calculated from the following:    Height as of this encounter: 1.676 m (5' 5.98\").    Weight as of this encounter: 97.5 kg (215 lb).. Body surface area is 2.13 meters squared. BP completed using cuff size: regular  No Pain (0) No LMP recorded. Patient is postmenopausal. Allergies and medications reviewed.     Medications: Medication refills not needed today.  Pharmacy name entered into Inspherion:    [id number] - [place], [place] - [address] [facility] [address]  [id number] - [facility] [address] [facility] [address]  [place], [place] - [address] [facility]    Comments:     8 minutes for nursing intake (face to face time)   JULES DIAL LPN        "

## 2017-02-16 NOTE — PROGRESS NOTES
ONCOLOGY DIAGNOSES:   1. July 2013: Diagnosed with stage IIIB, [id number] colon cancer. Final pathology showed a mildly differentiated colonic adenocarcinoma. Tumor extended through the muscularis propria into the pericolonic adipose tissue. Lymphovascular invasion present. Margins negative. Three out of 30 lymph nodes were positive, 0.5 cm in greatest dimension. Extracapsular extension identified. DNA mismatch repair enzymes were intact.   2. November 2016: Metastatic colorectal cancer to the lung confirmed by a lung biopsy.   3. 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 No extension of thrombus into the right upper extremity.     THERAPY TO DATE:   1. July 21, 2013: Sigmoid colectomy.   2. August 2013 to January 2014: FOLFOX x12 cycles.   3. February 2014 to November 2016: Observation.   4. January 2017 to Present: FOLFIRI/Avastin.  5. January 2017 to Present: Anticoagulation.       INTERVAL HISTORY:  Damari Bryant is a 71-year-old female diagnosed with stage IIB, pT3 N1 MX colon cancer in 2013 after presenting with worsening diarrhea.  Unfortunately, in 11/2016 she was found to have growing lung lesion which were biopsy-proven metastatic colon cancer.  She was initiated on FOLFIRI and Avastin with her cycle 3 being 01/31.  Unfortunately, at that time she had reported neck swelling.  Ultrasound was performed which showed venous thrombosis.  She was initiated on Lovenox and Coumadin.  The patient has been calling the clinic reporting increasing fatigue and tired.  She does not like Lovenox injections.  Her  was quite concerned with how poorly the patient was doing.  She also reports she was having shortness of breath.  The patient was brought into clinic.  She also had an outside CT angiogram to rule out pulmonary embolus.  At today's visit the patient stated that she is extremely fatigued and has absolutely no energy, just walking across the exam  room she states she will become short of breath and has difficulty standing.   states she is mostly in a recliner chair, does report having increasing diarrhea.  She fluctuates between diarrhea and constipation but the diarrhea has been worse and used up to 8 Imodium a few days ago.  Overall, decrease in performance status.  Patient was also somewhat teary during the visit.  Denies any pain.  No mouth sores.  She denies neuropathy and does have anxiety, depression, using Tylenol PM for sleep.  The remainder of comprehensive review of systems is negative.      PMH: Reviewed in EPIC.     MEDICATIONS: Reviewed in Epic.       ALLERGIES:   1. Ativan causes hallucinations.   2. Avocado causes nausea and vomiting.   3. Iodine-131 causes nausea and vomiting.   4. Milk protein causes diarrhea.   5. Shellfish causes GI disturbance.   6. HMG-CoA reductase inhibitor causes nausea.   7. Sulfa drugs cause nausea and vomiting.   8. Tegaderm causes a rash.       SOCIAL HISTORY: Damari Bryant comes in with her  and daughter today. She is currently living in [place]. No current tobacco use. The patient sees Dr. Cobian in the [facility], they live in [place].  It is quite difficult for them to come all the way down to [place] and states that this may be part of her exhaustion.      PHYSICAL EXAMINATION:   VITAL SIGNS:  Blood pressure 144/93, pulse 100, respirations 20, temperature 95% on room air, weight 215 pounds.   GENERAL:  Tearful, tired appearing.   HEENT:  Atraumatic, normocephalic.  Pupils equal.      LABORATORY DATA:  Alkaline phosphatase 102, ALT 38, AST 29, WBC 5.9, hemoglobin 14.1, hematocrit 41.5, platelet 210,000.  ANC is 2.5.  From 2/13/2017 INR is 1.3.      IMAGING:    PET scan from 12/30/2016 showed enlarging hypermetabolic pulmonary nodules bilaterally and hypermetabolic retroperitoneal lymph nodes, although similar size compared to prior CT. Diffuse FTG was obtained of the thyroid gland  consistent with nonspecific thyroiditis.   CT angiogram from 2/14/2017 showed no CT evidence for pulmonary emboli.  Bilateral pulmonary nodules are either stable or decreased in size.  There are also at least 3 pulmonary nodules which have resolved.  This could be due to response to treatment for metastatic disease versus resolving infectious/inflammatory process.      ASSESSMENT AND PLAN:   1.  Metastatic colorectal cancer, currently on FOLFIRI, Avastin.  The patient's performance status has declined.  We had a long and lengthy discussion about this.  Again reviewed with the patient that she has stage IV disease which is not curable and the goal of therapy is to prolong her life with a good quality of life.  The patient's  and daughter repeatedly stated that her quality of life is significantly decreased, she is not as active in playing with her grandchildren, she mostly sits in a recliner.  Explained to the patient my concerns that if the chemotherapy is worsening her quality of life we are not helping her, especially knowing we cannot cure her disease.  This difficulty here is that she has already shown a beautiful response to just 3 cycles of treatment.  This was noted on imaging as well as declining CEA from 7.0 down to 3.6.  If we were to stop treatment the other option is to consider regorafenib but my concern is bleeding especially being on anticoagulation which has been difficult to optimize. Other options is Erbitux but the patient states that she has significant problems with rash and sensitive skin and is not interested in this option.  Lonsurf is also a possibility. At this point, I will have the patient return to see us in 3 weeks and reassess.  2.  Right internal jugular/innominate deep venous thrombosis.  Currently on Lovenox and Coumadin, difficulty obtaining therapeutic INR.  Discussed other options such as oral anticoagulation; however, the concern is cost as well as we would not be able to  monitor.  The patient tells me she is fatigued, tired, and has the potential to fall. Would not be able to reverse the effective agent such as Eliquis or Xarelto compared to Coumadin.  The patient at this point is agreeable to continue with the Lovenox and Coumadin.  I explained to the patient that ultimately it is her wishes.  My concern being that if she were to stop anticoagulation her risk of potentially fatal event.  Patient verbalized understanding of my concerns.   3.  Diarrhea.  Continue Imodium and Lomotil as needed.   4.  Neuropathy.  Stable.  Not an issue on this visit.   5.  Fatigue.  In part related to chemotherapy as well as the toll in driving all of the way down to [place].  We discussed about receiving care closer to home.  At least her INR checks can possibly be performed at her primary office at [facility], I do not think this should be an issue.  Another thing to consider is receiving chemotherapy closer to home at the [facility].  The patient is concerned about rumors of [facility].  I do not know the oncologists that are at [facility] currently but I have explained in the past [facility] did deliver cancer care in the [facility].  The patient will think about this and we can review again at her next visit.   6. Psychology. Patient came into clinic stating she was experiencing increasing fatigue, teary-eyed and difficulty with treatment. However, once she was informed imaging showed positive response she stated the treatment was not that difficulty, diarrhea was not problematic. However, her  and daughter still had concerns regarding the treatment. Emotionally patient appears to be struggling. PS may have been declining because patient felt her disease was growing but once she heard the good news, her attitude changed. May be helpful for her to meet with out Cancer Psychologist in the future.     TOTAL TIME SPENT:  Total time spent 30 minutes,  greater than 50% spent in counseling and coordinating care.         SUSANA MENARD MD             D: 2017 10:23   T: 2017 16:01   MT: TEOFILO#[id number]      Name:     MARY SHI   MRN:      [id number]        Account:      [id number]   :      1945           Visit Date:   2017      Document: [id number]       cc: Samuel Cobian MD

## 2017-02-16 NOTE — MR AVS SNAPSHOT
Damari Bryant   2/16/2017 10:20 AM   Anticoagulation Therapy Visit    Description:  71 year old female   Provider:  INR MG   Department:  Mg Med Monitoring           INR as of 2/16/2017     Today's INR 1.9!      Anticoagulation Summary as of 2/16/2017     INR goal 2.0-3.0   Today's INR 1.9!   Full instructions 2/16: 10 mg; Otherwise No maintenance plan   Next INR check 2/17/2017    Indications   Deep vein thrombosis (DVT) (H) [I82.409] [I82.409]  Long-term (current) use of anticoagulants [Z79.01] [Z79.01]         Your next Anticoagulation Clinic appointment(s)     Feb 17, 2017 10:40 AM CST   Anticoagulation Visit with INR MG   [facility]    [address] [place] [address] [phone]              Contact Numbers     [facility]  Please call the anticoagulation nurse at [phone] to cancel and/or reschedule your appointment, or with any problems or questions regarding your therapy.  You may call the main clinic number at [phone] if the nurse is not available.           February 2017 Details    Sun Mon Tue Wed Thu Fri Sat        1               2               3               4                 5               6               7               8               9               10               11                 12               13               14               15               16      10 mg   See details      17            18                 19               20               21               22               23               24               25                 26               27               28                    Date Details   02/16 This INR check       Date of next INR:  2/17/2017         How to take your warfarin dose     To take:  10 mg Take 2 of the 5 mg tablets.

## 2017-02-16 NOTE — MR AVS SNAPSHOT
After Visit Summary   2/16/2017    Damari Bryant    MRN: [id number]           Patient Information     Date Of Birth          1945        Visit Information        Provider Department      2/16/2017 9:30 AM Esther Greene MD [facility]         Follow-ups after your visit        Your next 10 appointments already scheduled     Feb 17, 2017 10:40 AM CST   Anticoagulation Visit with INR MG   [facility])    [id number] [place] [id number] [phone]            Mar 08, 2017 10:15 AM CST   Return Visit with NURSE ONLY CANCER CENTER   [facility] ([facility])    [address] [place] [id number] [phone]            Mar 08, 2017 11:00 AM CST   Return Visit with Esther Greene MD   [facility])    [id number] [place] [id number] [phone]            Mar 08, 2017 11:30 AM CST   Level 5 with BAY 4 INFUSION   [facility])    [id number] [place] [id number] [phone]              Who to contact     If you have questions or need follow up information about today's clinic visit or your schedule please contact [facility] directly at [phone].  Normal or non-critical lab and imaging results will be communicated to you by MyChart, letter or phone within 4 business days after the clinic has received the results. If you do not hear from us within 7 days, please contact the clinic through MyChart or phone. If you have a critical or abnormal lab result, we will notify you by phone as soon as possible.  Submit refill requests through "[employer] or call your pharmacy and they will forward the refill request to us. Please allow 3 business days for your refill to be completed.          Additional Information About Your Visit    "     MyChart Information     [employer] is an electronic gateway that provides easy, online access to your medical records. With [employer], you can request a clinic appointment, read your test results, renew a prescription or communicate with your care team.     To sign up for [employer] visit the website at [email or web address]   You will be asked to enter the access code listed below, as well as some personal information. Please follow the directions to create your username and password.     Your access code is: [email or web address]  Expires: 2017  1:26 PM     Your access code will  in 90 days. If you need help or a new code, please contact your [facility] or call [phone] for assistance.        Care EveryWhere ID     This is your Care EveryWhere ID. This could be used by other organizations to access your [place] medical records  [id number]        Your Vitals Were     Pulse Respirations Height Pulse Oximetry BMI (Body Mass Index)       100 20 1.676 m (5' 5.98\") 95% 34.72 kg/m2        Blood Pressure from Last 3 Encounters:   17 (!) 144/93   17 122/74   17 136/82    Weight from Last 3 Encounters:   17 97.5 kg (215 lb)   17 99.5 kg (219 lb 4.8 oz)   17 98.4 kg (217 lb)              Today, you had the following     No orders found for display       Primary Care Provider Office Phone # Fax #    Samuel Cobian [phone]       [facility] [address] [facility] [address]        Thank you!     Thank you for choosing [facility]  for your care. Our goal is always to provide you with excellent care. Hearing back from our patients is one way we can continue to improve our services. Please take a few minutes to complete the written survey that you may receive in the mail after your visit with us. Thank you!             Your Updated Medication List - Protect others around you: Learn how to safely use, store " and throw away your medicines at www.disposemymeds.org.          This list is accurate as of: 2/16/17 11:02 AM.  Always use your most recent med list.                   Brand Name Dispense Instructions for use    busPIRone 15 MG tablet    BUSPAR     Take 15 mg by mouth 2 times daily       levothyroxine 150 MCG tablet    SYNTHROID/LEVOTHROID     Take 150 mcg by mouth daily       LEXAPRO 20 MG tablet   Generic drug:  escitalopram      Take 20 mg by mouth daily.       loperamide 2 MG capsule    IMODIUM    30 capsule    Start with 2 caps (4 mg), then take one cap (2 mg) after each diarrheal stool as needed. Do not use more than 8 caps (16 mg) per day.       NORVASC 5 MG tablet   Generic drug:  amLODIPine      Take 5 mg by mouth daily       OMEGA-3 FISH OIL PO          ondansetron 8 MG tablet    ZOFRAN    10 tablet    Take 1 tablet (8 mg) by mouth every 8 hours as needed (Nausea/Vomiting)       prochlorperazine 10 MG tablet    COMPAZINE    30 tablet    Take 1 tablet (10 mg) by mouth every 6 hours as needed (Nausea/Vomiting)       senna-docusate 8.6-50 MG per tablet    SENNA S    60 tablet    1-2 tabs daily for constipation       tetrahydrozoline 0.05 % ophthalmic solution      1 drop 3 times daily       TOPAMAX 50 MG tablet   Generic drug:  topiramate      Take  by mouth 2 times daily. 1 tab q am 2 tabs q PM       triamterene-hydrochlorothiazide 37.5-25 MG per tablet    MAXZIDE-25     Take 1 tablet by mouth daily.       UNABLE TO FIND      MEDICATION NAME: [employer] made digestive probiotics       VITAMIN D (CHOLECALCIFEROL) PO      Take 2,000 Units by mouth daily       warfarin 5 MG tablet    COUMADIN    30 tablet    Take 1 tablet (5 mg) by mouth daily

## 2017-02-16 NOTE — PROGRESS NOTES
Patient refused to LPN access port. Lab was called for a peripheral arm draw.    Elmira Gerardo LPN

## 2017-02-16 NOTE — NURSING NOTE
Received message from Nina Silva RN with  Anticoagulation Clinic, regarding patient's INR checks.  Patient has requested to have her INR checks done locally going forward.  Standing order for INR faxed to the [facility] at fax number [phone].    Evans Archer RN

## 2017-02-17 ENCOUNTER — ANTICOAGULATION THERAPY VISIT (OUTPATIENT)
Dept: PHARMACY | Facility: CLINIC | Age: 72
End: 2017-02-17
Payer: COMMERCIAL

## 2017-02-17 DIAGNOSIS — Z79.01 LONG-TERM (CURRENT) USE OF ANTICOAGULANTS: ICD-10-CM

## 2017-02-17 DIAGNOSIS — I82.409 DEEP VEIN THROMBOSIS (DVT) (H): ICD-10-CM

## 2017-02-17 LAB — INR POINT OF CARE: 2.6 (ref 2–3)

## 2017-02-17 PROCEDURE — 36416 COLLJ CAPILLARY BLOOD SPEC: CPT

## 2017-02-17 PROCEDURE — 85610 PROTHROMBIN TIME: CPT | Mod: QW

## 2017-02-17 PROCEDURE — 99207 ZZC NO CHARGE NURSE ONLY: CPT

## 2017-02-17 NOTE — MR AVS SNAPSHOT
Damari Bryant   2/17/2017 10:40 AM   Anticoagulation Therapy Visit    Description:  71 year old female   Provider:  INR MG   Department:  Mg Med Monitoring           INR as of 2/17/2017     Today's INR 2.6      Anticoagulation Summary as of 2/17/2017     INR goal 2.0-3.0   Today's INR 2.6   Full instructions 10 mg on Mon, Thu; 5 mg all other days   Next INR check 2/21/2017    Indications   Deep vein thrombosis (DVT) (H) [I82.409] [I82.409]  Long-term (current) use of anticoagulants [Z79.01] [Z79.01]         Contact Numbers     [facility]  Please call the anticoagulation nurse at [phone] to cancel and/or reschedule your appointment, or with any problems or questions regarding your therapy.  You may call the main clinic number at [phone] if the nurse is not available.           February 2017 Details    Sun Mon Tue Wed Thu Fri Sat        1               2               3               4                 5               6               7               8               9               10               11                 12               13               14               15               16               17      5 mg   See details      18      5 mg           19      5 mg         20      10 mg         21            22               23               24               25                 26               27               28                    Date Details   02/17 This INR check       Date of next INR:  2/21/2017         How to take your warfarin dose     To take:  5 mg Take 1 of the 5 mg tablets.    To take:  10 mg Take 2 of the 5 mg tablets.

## 2017-02-17 NOTE — PROGRESS NOTES
ANTICOAGULATION FOLLOW-UP CLINIC VISIT    Patient Name:  Damari Bryant  Date:  2/17/2017  Contact Type:  Face to Face    SUBJECTIVE:     Patient Findings     Positives No Problem Findings    Comments Patient will stop Lovenox injections.  Patient feeling good today, in good spirits, walking looking well.  Patient will have her INR checked Tuesday at the [facility] lab.  Patient will call us with results and will have them fax results to us.  I gave them our INR card with fax and phone number to give to the lab.             OBJECTIVE    INR Protime   Date Value Ref Range Status   02/17/2017 2.6 2.0 - 3.0 Final       ASSESSMENT / PLAN  INR assessment THER    Recheck INR In: 4 DAYS    INR Location Clinic      Anticoagulation Summary as of 2/17/2017     INR goal 2.0-3.0   Today's INR 2.6   Maintenance plan 10 mg (5 mg x 2) on Mon, Thu; 5 mg (5 mg x 1) all other days   Full instructions 10 mg on Mon, Thu; 5 mg all other days   Weekly total 45 mg   Plan last modified Mary Berry RN (2/17/2017)   Next INR check 2/21/2017   Priority INR   Target end date 8/14/2017    Indications   Deep vein thrombosis (DVT) (H) [I82.409] [I82.409]  Long-term (current) use of anticoagulants [Z79.01] [Z79.01]         Anticoagulation Episode Summary     INR check location     Preferred lab     Send INR reminders to [facility] INR    Comments Pateint has chemo infusion pump for 48 hours every 2 weeks.  Her INR rises during this infusion. Her next scheduled infusion is scheduled 2/21/17 and 3/7/17.   Per Esther Greene MD- Goal of 2-3.      Anticoagulation Care Providers     Provider Role Specialty Phone number    Esther Greene MD Responsible Hematology [phone]            See the Encounter Report to view Anticoagulation Flowsheet and Dosing Calendar (Go to Encounters tab in chart review, and find the Anticoagulation Therapy Visit)    Patient will stop Lovenox injections.  Patient feeling good today, in good spirits, walking  looking well.  Patient will have her INR checked Tuesday at the [facility] lab.  Patient will call us with results and will have them fax results to us.  I gave them our INR card with fax and phone number to give to the lab.    Mary Berry RN

## 2017-02-21 ENCOUNTER — ANTICOAGULATION THERAPY VISIT (OUTPATIENT)
Dept: PHARMACY | Facility: CLINIC | Age: 72
End: 2017-02-21
Payer: COMMERCIAL

## 2017-02-21 ENCOUNTER — TRANSFERRED RECORDS (OUTPATIENT)
Dept: HEALTH INFORMATION MANAGEMENT | Facility: CLINIC | Age: 72
End: 2017-02-21

## 2017-02-21 DIAGNOSIS — I82.90 ACUTE DEEP VEIN THROMBOSIS (DVT) OF NON-EXTREMITY VEIN: ICD-10-CM

## 2017-02-21 DIAGNOSIS — Z79.01 LONG-TERM (CURRENT) USE OF ANTICOAGULANTS: ICD-10-CM

## 2017-02-21 LAB — INR PPP: 3.2

## 2017-02-21 PROCEDURE — 99207 ZZC NO CHARGE NURSE ONLY: CPT | Performed by: FAMILY MEDICINE

## 2017-02-21 NOTE — PROGRESS NOTES
ANTICOAGULATION FOLLOW-UP CLINIC VISIT    Patient Name:  Damari Bryant  Date:  2/21/2017  Contact Type:  Telephone/ Ross, patient's     SUBJECTIVE:     Patient Findings     Positives No Problem Findings    Comments Sleeps a lot according to patient's , Ross.  She gets winded with activity,  thinks more deconditioning.  He will call the cancer center if any changes.             OBJECTIVE    INR   Date Value Ref Range Status   02/21/2017 3.2  Final       ASSESSMENT / PLAN  INR assessment THER    Recheck INR In: 2 DAYS    INR Location Outside lab      Anticoagulation Summary as of 2/21/2017     INR goal 2.0-3.0   Today's INR 3.2!   Maintenance plan 10 mg (5 mg x 2) on Mon, Thu; 5 mg (5 mg x 1) all other days   Full instructions 2/21: 2.5 mg; Otherwise 10 mg on Mon, Thu; 5 mg all other days   Weekly total 45 mg   Plan last modified Mary Berry RN (2/17/2017)   Next INR check 2/23/2017   Priority INR   Target end date 8/14/2017    Indications   Deep vein thrombosis (DVT) (H) [I82.409] [I82.409]  Long-term (current) use of anticoagulants [Z79.01] [Z79.01]         Anticoagulation Episode Summary     INR check location     Preferred lab [facility]    Send INR reminders to [facility] INR    Comments Pateint has chemo infusion pump for 48 hours every 2 weeks.  Her INR rises during this infusion. Her next scheduled infusion is scheduled 2/21/17 and 3/7/17.   Per Esther Greene MD- Goal of 2-3.      Anticoagulation Care Providers     Provider Role Specialty Phone number    Esther Greene MD Responsible Hematology [phone]            See the Encounter Report to view Anticoagulation Flowsheet and Dosing Calendar (Go to Encounters tab in chart review, and find the Anticoagulation Therapy Visit)        Nina Silva, MELONIE

## 2017-02-21 NOTE — MR AVS SNAPSHOT
Damari Manny   2/21/2017   Anticoagulation Therapy Visit    Description:  71 year old female   Provider:  Esther Greene MD   Department:  Mg Med Monitoring           INR as of 2/21/2017     Today's INR 3.2!      Anticoagulation Summary as of 2/21/2017     INR goal 2.0-3.0   Today's INR 3.2!   Full instructions 2/21: 2.5 mg; Otherwise 10 mg on Mon, Thu; 5 mg all other days   Next INR check 2/23/2017    Indications   Deep vein thrombosis (DVT) (H) [I82.409] [I82.409]  Long-term (current) use of anticoagulants [Z79.01] [Z79.01]         Contact Numbers     [facility]  Please call the anticoagulation nurse at [phone] to cancel and/or reschedule your appointment, or with any problems or questions regarding your therapy.  You may call the main clinic number at [phone] if the nurse is not available.           February 2017 Details    Sun Mon Tue Wed Thu Fri Sat        1               2               3               4                 5               6               7               8               9               10               11                 12               13               14               15               16               17               18                 19               20               21      2.5 mg   See details      22      5 mg         23            24               25                 26               27               28                    Date Details   02/21 This INR check       Date of next INR:  2/23/2017         How to take your warfarin dose     To take:  2.5 mg Take 0.5 of a 5 mg tablet.    To take:  5 mg Take 1 of the 5 mg tablets.    To take:  10 mg Take 2 of the 5 mg tablets.

## 2017-02-23 ENCOUNTER — TRANSFERRED RECORDS (OUTPATIENT)
Dept: HEALTH INFORMATION MANAGEMENT | Facility: CLINIC | Age: 72
End: 2017-02-23

## 2017-02-23 ENCOUNTER — TELEPHONE (OUTPATIENT)
Dept: PHARMACY | Facility: CLINIC | Age: 72
End: 2017-02-23

## 2017-02-23 LAB
INR PPP: 3.3
INR PPP: 3.3 (ref 0.9–1.1)

## 2017-02-23 NOTE — TELEPHONE ENCOUNTER
----- Message -----      From: Evans Archer, RN      Sent: 2/23/2017  11:43 AM        To: Nina Silva RN   Subject: INR                                               Hi Nina,     We received patient's INR results via fax this morning from [facility].  Are you guys still managing, or are they managing now?     Her INR today was 3.3.     Thank you,   Evans Archer, RN, BSN, OCN   Care Coordinator   [facility]   [phone]

## 2017-02-23 NOTE — TELEPHONE ENCOUNTER
INR Today:   3.3   Current Warfarin Dose:  10mg M,TH, 2.5mg Tue, 5mg all other days.  Range: Supratherapeutic INR Goal: 2-3          INR tues 3.2  Dose decreased tues from 5mg to 2.5mg       Left message for patient or  to return call to clinic and ask to speak with RN.    Mary Berry RN,   [facility], [facility]

## 2017-02-24 ENCOUNTER — ANTICOAGULATION THERAPY VISIT (OUTPATIENT)
Dept: PHARMACY | Facility: CLINIC | Age: 72
End: 2017-02-24
Payer: COMMERCIAL

## 2017-02-24 DIAGNOSIS — I82.90 ACUTE DEEP VEIN THROMBOSIS (DVT) OF NON-EXTREMITY VEIN: ICD-10-CM

## 2017-02-24 DIAGNOSIS — Z79.01 LONG-TERM (CURRENT) USE OF ANTICOAGULANTS: ICD-10-CM

## 2017-02-24 PROCEDURE — 99207 ZZC NO CHARGE NURSE ONLY: CPT | Performed by: INTERNAL MEDICINE

## 2017-02-24 NOTE — MR AVS SNAPSHOT
Damaribashir Bryant   2/24/2017   Anticoagulation Therapy Visit    Description:  71 year old female   Provider:  Esther Greene MD   Department:  Mg Med Monitoring           INR as of 2/24/2017     Today's INR 3.3! (2/23/2017)      Anticoagulation Summary as of 2/24/2017     INR goal 2.0-3.0   Today's INR 3.3! (2/23/2017)   Full instructions 2/24: 2.5 mg; Otherwise 10 mg on Mon, Thu; 5 mg all other days   Next INR check 2/27/2017    Indications   Deep vein thrombosis (DVT) (H) [I82.409] [I82.409]  Long-term (current) use of anticoagulants [Z79.01] [Z79.01]         Description     Patient has sinus infection.  Started ceftin yesterday for 10 days.      Contact Numbers     [facility]  Please call the anticoagulation nurse at [phone] to cancel and/or reschedule your appointment, or with any problems or questions regarding your therapy.  You may call the main clinic number at [phone] if the nurse is not available.           February 2017 Details    Sun Mon Tue Wed Thu Fri Sat        1               2               3               4                 5               6               7               8               9               10               11                 12               13               14               15               16               17               18                 19               20               21               22               23               24      2.5 mg   See details      25      5 mg           26      5 mg         27            28                    Date Details   02/24 This INR check       Date of next INR:  2/27/2017         How to take your warfarin dose     To take:  2.5 mg Take 0.5 of a 5 mg tablet.    To take:  5 mg Take 1 of the 5 mg tablets.    To take:  10 mg Take 2 of the 5 mg tablets.

## 2017-02-24 NOTE — TELEPHONE ENCOUNTER
Patient has sinus infection.  Started ceftin yesterday for 10 days.    PLAN:  New Warfarin Dose: 2.5mg fri, 5mg sat, 5mg sun   Next INR: Monday 2/27/17      See anticoag encounter.2/24/2017      Mary Berry RN,   [facility], [facility]

## 2017-02-24 NOTE — PROGRESS NOTES
ANTICOAGULATION FOLLOW-UP CLINIC VISIT    Patient Name:  Damari Bryant  Date:  2/24/2017  Contact Type:  Telephone/ with walter Hawkins    SUBJECTIVE:     Patient Findings     Positives Antibiotic use or infection    Comments Patient has sinus infection.  Started ceftin yesterday for 10 days.           OBJECTIVE    INR   Date Value Ref Range Status   02/23/2017 3.3  Final       ASSESSMENT / PLAN  INR assessment SUPRA    Recheck INR In: 3 DAYS    INR Location Clinic      Anticoagulation Summary as of 2/24/2017     INR goal 2.0-3.0   Today's INR 3.3! (2/23/2017)   Maintenance plan 10 mg (5 mg x 2) on Mon, Thu; 5 mg (5 mg x 1) all other days   Full instructions 2/24: 2.5 mg; Otherwise 10 mg on Mon, Thu; 5 mg all other days   Weekly total 45 mg   Plan last modified Mary Berry RN (2/17/2017)   Next INR check 2/27/2017   Priority INR   Target end date 8/14/2017    Indications   Deep vein thrombosis (DVT) (H) [I82.409] [I82.409]  Long-term (current) use of anticoagulants [Z79.01] [Z79.01]         Anticoagulation Episode Summary     INR check location     Preferred lab [facility]    Send INR reminders to [facility] INR    Comments Pateint has chemo infusion pump for 48 hours every 2 weeks.  Her INR rises during this infusion. Her next scheduled infusion is scheduled 2/21/17 and 3/7/17.   Per Esther Greene MD- Goal of 2-3.      Anticoagulation Care Providers     Provider Role Specialty Phone number    Esther Greene MD Responsible Hematology [phone]            See the Encounter Report to view Anticoagulation Flowsheet and Dosing Calendar (Go to Encounters tab in chart review, and find the Anticoagulation Therapy Visit)    Patient has sinus infection.  Started ceftin yesterday for 10 days.    Mary Berry, MELONIE

## 2017-02-24 NOTE — TELEPHONE ENCOUNTER
Ross is returning call, read not regarding INR, no dose change indicatted,  She  Usually has an  Increase during her infusion days .

## 2017-02-27 ENCOUNTER — TELEPHONE (OUTPATIENT)
Dept: NURSING | Facility: CLINIC | Age: 72
End: 2017-02-27

## 2017-02-27 ENCOUNTER — ANTICOAGULATION THERAPY VISIT (OUTPATIENT)
Dept: PHARMACY | Facility: CLINIC | Age: 72
End: 2017-02-27
Payer: COMMERCIAL

## 2017-02-27 ENCOUNTER — TRANSFERRED RECORDS (OUTPATIENT)
Dept: HEALTH INFORMATION MANAGEMENT | Facility: CLINIC | Age: 72
End: 2017-02-27

## 2017-02-27 DIAGNOSIS — Z79.01 LONG-TERM (CURRENT) USE OF ANTICOAGULANTS: ICD-10-CM

## 2017-02-27 DIAGNOSIS — I82.90 ACUTE DEEP VEIN THROMBOSIS (DVT) OF NON-EXTREMITY VEIN: ICD-10-CM

## 2017-02-27 LAB — INR PPP: 3.8

## 2017-02-27 PROCEDURE — 99207 ZZC NO CHARGE NURSE ONLY: CPT | Performed by: INTERNAL MEDICINE

## 2017-02-27 NOTE — MR AVS SNAPSHOT
Damari Manny   2/27/2017   Anticoagulation Therapy Visit    Description:  71 year old female   Provider:  Esther Greene MD   Department:  Mg Med Monitoring           INR as of 2/27/2017     Today's INR 3.8!      Anticoagulation Summary as of 2/27/2017     INR goal 2.0-3.0   Today's INR 3.8!   Full instructions 2/27: 2.5 mg; 2/28: 2.5 mg; Otherwise 10 mg on Mon, Thu; 5 mg all other days   Next INR check 3/2/2017    Indications   Deep vein thrombosis (DVT) (H) [I82.409] [I82.409]  Long-term (current) use of anticoagulants [Z79.01] [Z79.01]         Description     Sinus infection started ceftin on 2/23/17 for 10 days (3/5/17).        Contact Numbers     [facility]  Please call the anticoagulation nurse at [phone] to cancel and/or reschedule your appointment, or with any problems or questions regarding your therapy.  You may call the main clinic number at [phone] if the nurse is not available.           February 2017 Details    Sun Mon Tue Wed Thu Fri Sat        1               2               3               4                 5               6               7               8               9               10               11                 12               13               14               15               16               17               18                 19               20               21               22               23               24               25                 26               27      2.5 mg   See details      28      2.5 mg              Date Details   02/27 This INR check               How to take your warfarin dose     To take:  2.5 mg Take 0.5 of a 5 mg tablet.           March 2017 Details    Sun Mon Tue Wed Thu Fri Sat        1      5 mg         2            3               4                 5               6               7               8               9               10               11                 12               13               14               15                16               17               18                 19               20               21               22               23               24               25                 26               27               28               29               30               31                 Date Details   No additional details    Date of next INR:  3/2/2017         How to take your warfarin dose     To take:  5 mg Take 1 of the 5 mg tablets.    To take:  10 mg Take 2 of the 5 mg tablets.

## 2017-02-28 NOTE — TELEPHONE ENCOUNTER
"Call Type: Triage Call    Presenting Problem: Caller is requesting new  coumadin order basedon  todays's INR order, stating he missed a phone call;  EPIC EMR  reveals : Instructions for dosage in  Progress note  of 2017;  \"full instructions:    : 2.5 mg; : 2.5 mg; Otherwise 10 mg  on Mon, Thu; 5 mg all other days\"    Results given to  and  repeated correctly with good understanding; will have reapeat INR on  17 as instructed.  Triage Note:  Guideline Title: Medication Questions - Adult  Recommended Disposition: Provide Health Information  Original Inclination: Would have called clinic  Override Disposition:  Intended Action: Follow advice given  Physician Contacted: No  Caller has medication question(s) that was answered with available resources ?  YES  Pregnant and has medication questions regarding prescribed and/or nonprescribed  medication(s) not covered by available resources ? NO  Breastfeeding and has medication questions regarding prescribed and/or  nonprescribed medication(s) not covered by available resources ? NO  Requested information on how to safely dispose of  or unused medications ?  NO  Sign(s) or symptom(s) associated with a diagnosed condition or with a new illness  ? NO  Prescription ordered today and not available at pharmacy putting patient at  clinical risk ? NO  Recurrence of a symptom(s) or illness post prescribed medication treatment AND  provider instructed patient to call if symptom(s) returned. ? NO  Unable to obtain prescribed medication related to available resources AND  situation poses immediate clinical risk ? NO  Pharmacy calling to clarify prescription order. ? NO  Requests refill of prescribed medication that does NOT have a valid refill; lack  of medication may cause clinical risk to patient if not available. ? NO  Has questions about prescribed and/or nonprescribed medications not covered by  available resources ? NO  Pharmacy calling with " prescription question; answered per department policy. ? NO  Requests refill of prescribed medication without valid refills OR requests refill  of prescribed medication with valid refills but does not have prescription number  (no RX container); lack of medication does not put patient at clinical risk ? NO  Physician Instructions:  Care Advice:

## 2017-03-01 DIAGNOSIS — I82.90 OCCLUSIVE THROMBUS: ICD-10-CM

## 2017-03-01 RX ORDER — WARFARIN SODIUM 5 MG/1
5 TABLET ORAL DAILY
Qty: 60 TABLET | Refills: 1 | Status: SHIPPED | OUTPATIENT
Start: 2017-03-01 | End: 2017-06-26

## 2017-03-01 NOTE — TELEPHONE ENCOUNTER
warfarin    Last Written Prescription Date: 2/1/17  Last Fill Qty: 30, # refills: 0  Last Office Visit with G, P or [facility] prescribing provider: 2/27/17  Next 5 appointments (look out 90 days)     Mar 08, 2017 10:15 AM CST   Return Visit with [facility] ONLY [facility]CER CENTER   [facility]    [address] [place] [address]   [phone]            Mar 08, 2017 11:00 AM CST   Return Visit with Esther Greene MD   [facility])    [address] [place] [address] [phone]                   Date and Result of Last PT/INR:   Lab Results   Component Value Date    INR 3.8 02/27/2017    INR 3.3 02/23/2017    INR 2.6 02/17/2017    INR 1.9 02/16/2017          Medication filled for 3 months per protocol.      Mary Berry RN, [facility]

## 2017-03-02 ENCOUNTER — TRANSFERRED RECORDS (OUTPATIENT)
Dept: HEALTH INFORMATION MANAGEMENT | Facility: CLINIC | Age: 72
End: 2017-03-02

## 2017-03-02 ENCOUNTER — ANTICOAGULATION THERAPY VISIT (OUTPATIENT)
Dept: PHARMACY | Facility: CLINIC | Age: 72
End: 2017-03-02
Payer: COMMERCIAL

## 2017-03-02 DIAGNOSIS — Z79.01 LONG-TERM (CURRENT) USE OF ANTICOAGULANTS: ICD-10-CM

## 2017-03-02 DIAGNOSIS — I82.90 ACUTE DEEP VEIN THROMBOSIS (DVT) OF NON-EXTREMITY VEIN: ICD-10-CM

## 2017-03-02 LAB — INR PPP: 2.1

## 2017-03-02 PROCEDURE — 99207 ZZC NO CHARGE NURSE ONLY: CPT | Performed by: INTERNAL MEDICINE

## 2017-03-02 NOTE — PROGRESS NOTES
ANTICOAGULATION FOLLOW-UP CLINIC VISIT    Patient Name:  Damari Bryant  Date:  3/2/2017  Contact Type:  Telephone/ with  Ross    SUBJECTIVE:     Patient Findings     Positives No Problem Findings           OBJECTIVE    INR   Date Value Ref Range Status   03/02/2017 2.1  Final       ASSESSMENT / PLAN  INR assessment THER    Recheck INR In: 6 DAYS    INR Location Clinic      Anticoagulation Summary as of 3/2/2017     INR goal 2.0-3.0   Today's INR 2.1   Maintenance plan 10 mg (5 mg x 2) on Mon, Thu; 5 mg (5 mg x 1) all other days   Full instructions 3/2: 5 mg; 3/6: 2.5 mg; Otherwise 10 mg on Mon, Thu; 5 mg all other days   Weekly total 45 mg   Plan last modified Mary Berry RN (2/17/2017)   Next INR check 3/8/2017   Priority INR   Target end date 8/14/2017    Indications   Deep vein thrombosis (DVT) (H) [I82.409] [I82.409]  Long-term (current) use of anticoagulants [Z79.01] [Z79.01]         Anticoagulation Episode Summary     INR check location     Preferred lab [facility]    Send INR reminders to [facility] INR    Comments Pateint has chemo infusion pump for 48 hours every 2 weeks.  Her INR rises during this infusion. Her next scheduled infusion is scheduled 2/21/17 and 3/7/17. Call [employer] cell phone for INR results [phone]  Per Esther Greene MD- Goal of 2-3.      Anticoagulation Care Providers     Provider Role Specialty Phone number    Esther Greene MD Responsible Hematology [phone]            See the Encounter Report to view Anticoagulation Flowsheet and Dosing Calendar (Go to Encounters tab in chart review, and find the Anticoagulation Therapy Visit)        Mary Berry, RN

## 2017-03-08 ENCOUNTER — INFUSION THERAPY VISIT (OUTPATIENT)
Dept: INFUSION THERAPY | Facility: CLINIC | Age: 72
End: 2017-03-08
Payer: COMMERCIAL

## 2017-03-08 ENCOUNTER — ONCOLOGY VISIT (OUTPATIENT)
Dept: ONCOLOGY | Facility: CLINIC | Age: 72
End: 2017-03-08
Payer: COMMERCIAL

## 2017-03-08 ENCOUNTER — ANTICOAGULATION THERAPY VISIT (OUTPATIENT)
Dept: PHARMACY | Facility: CLINIC | Age: 72
End: 2017-03-08
Payer: COMMERCIAL

## 2017-03-08 ENCOUNTER — ONCOLOGY VISIT (OUTPATIENT)
Dept: INFUSION THERAPY | Facility: CLINIC | Age: 72
End: 2017-03-08

## 2017-03-08 VITALS
OXYGEN SATURATION: 97 % | HEART RATE: 92 BPM | WEIGHT: 218 LBS | SYSTOLIC BLOOD PRESSURE: 158 MMHG | HEIGHT: 66 IN | BODY MASS INDEX: 35.03 KG/M2 | RESPIRATION RATE: 20 BRPM | TEMPERATURE: 97.7 F | DIASTOLIC BLOOD PRESSURE: 102 MMHG

## 2017-03-08 VITALS — DIASTOLIC BLOOD PRESSURE: 76 MMHG | SYSTOLIC BLOOD PRESSURE: 140 MMHG | HEART RATE: 72 BPM

## 2017-03-08 DIAGNOSIS — C18.9 METASTASIS FROM COLON CANCER (H): Primary | ICD-10-CM

## 2017-03-08 DIAGNOSIS — C79.9 METASTASIS FROM COLON CANCER (H): Primary | ICD-10-CM

## 2017-03-08 DIAGNOSIS — C79.9 METASTASIS FROM COLON CANCER (H): ICD-10-CM

## 2017-03-08 DIAGNOSIS — Z79.01 LONG-TERM (CURRENT) USE OF ANTICOAGULANTS: ICD-10-CM

## 2017-03-08 DIAGNOSIS — Z71.81 SPIRITUAL OR RELIGIOUS COUNSELING: Primary | ICD-10-CM

## 2017-03-08 DIAGNOSIS — I82.90 ACUTE DEEP VEIN THROMBOSIS (DVT) OF NON-EXTREMITY VEIN: ICD-10-CM

## 2017-03-08 DIAGNOSIS — C18.9 METASTASIS FROM COLON CANCER (H): ICD-10-CM

## 2017-03-08 LAB
ALBUMIN UR-MCNC: NEGATIVE MG/DL
BASOPHILS # BLD AUTO: 0.1 10E9/L (ref 0–0.2)
BASOPHILS NFR BLD AUTO: 0.8 %
BILIRUB SERPL-MCNC: 0.4 MG/DL (ref 0.2–1.3)
CEA SERPL-MCNC: 3.8 UG/L (ref 0–2.5)
DIFFERENTIAL METHOD BLD: ABNORMAL
EOSINOPHIL # BLD AUTO: 0.3 10E9/L (ref 0–0.7)
EOSINOPHIL NFR BLD AUTO: 4.7 %
ERYTHROCYTE [DISTWIDTH] IN BLOOD BY AUTOMATED COUNT: 16.6 % (ref 10–15)
HCT VFR BLD AUTO: 42.3 % (ref 35–47)
HGB BLD-MCNC: 14.1 G/DL (ref 11.7–15.7)
INR POINT OF CARE: 1.7 (ref 2–3)
LYMPHOCYTES # BLD AUTO: 2.3 10E9/L (ref 0.8–5.3)
LYMPHOCYTES NFR BLD AUTO: 35.2 %
MCH RBC QN AUTO: 30.5 PG (ref 26.5–33)
MCHC RBC AUTO-ENTMCNC: 33.3 G/DL (ref 31.5–36.5)
MCV RBC AUTO: 91 FL (ref 78–100)
MONOCYTES # BLD AUTO: 0.6 10E9/L (ref 0–1.3)
MONOCYTES NFR BLD AUTO: 8.4 %
NEUTROPHILS # BLD AUTO: 3.3 10E9/L (ref 1.6–8.3)
NEUTROPHILS NFR BLD AUTO: 50.9 %
PLATELET # BLD AUTO: 132 10E9/L (ref 150–450)
RBC # BLD AUTO: 4.63 10E12/L (ref 3.8–5.2)
WBC # BLD AUTO: 6.5 10E9/L (ref 4–11)

## 2017-03-08 PROCEDURE — 99214 OFFICE O/P EST MOD 30 MIN: CPT | Mod: 25 | Performed by: INTERNAL MEDICINE

## 2017-03-08 PROCEDURE — 82378 CARCINOEMBRYONIC ANTIGEN: CPT | Performed by: INTERNAL MEDICINE

## 2017-03-08 PROCEDURE — 96416 CHEMO PROLONG INFUSE W/PUMP: CPT

## 2017-03-08 PROCEDURE — 99207 ZZC NO CHARGE NURSE ONLY: CPT

## 2017-03-08 PROCEDURE — 96375 TX/PRO/DX INJ NEW DRUG ADDON: CPT | Performed by: INTERNAL MEDICINE

## 2017-03-08 PROCEDURE — 85610 PROTHROMBIN TIME: CPT | Mod: QW

## 2017-03-08 PROCEDURE — 36416 COLLJ CAPILLARY BLOOD SPEC: CPT

## 2017-03-08 PROCEDURE — 82247 BILIRUBIN TOTAL: CPT | Performed by: INTERNAL MEDICINE

## 2017-03-08 PROCEDURE — 96415 CHEMO IV INFUSION ADDL HR: CPT | Performed by: INTERNAL MEDICINE

## 2017-03-08 PROCEDURE — 81003 URINALYSIS AUTO W/O SCOPE: CPT | Performed by: INTERNAL MEDICINE

## 2017-03-08 PROCEDURE — 85025 COMPLETE CBC W/AUTO DIFF WBC: CPT | Performed by: INTERNAL MEDICINE

## 2017-03-08 PROCEDURE — 96417 CHEMO IV INFUS EACH ADDL SEQ: CPT | Performed by: INTERNAL MEDICINE

## 2017-03-08 PROCEDURE — 96413 CHEMO IV INFUSION 1 HR: CPT | Performed by: INTERNAL MEDICINE

## 2017-03-08 RX ORDER — MEPERIDINE HYDROCHLORIDE 50 MG/ML
25 INJECTION INTRAMUSCULAR; INTRAVENOUS; SUBCUTANEOUS EVERY 30 MIN PRN
Status: CANCELLED | OUTPATIENT
Start: 2017-03-08

## 2017-03-08 RX ORDER — EPINEPHRINE 0.3 MG/.3ML
0.3 INJECTION SUBCUTANEOUS EVERY 5 MIN PRN
Status: CANCELLED | OUTPATIENT
Start: 2017-03-08

## 2017-03-08 RX ORDER — SODIUM CHLORIDE 9 MG/ML
1000 INJECTION, SOLUTION INTRAVENOUS CONTINUOUS PRN
Status: CANCELLED
Start: 2017-03-08

## 2017-03-08 RX ORDER — HEPARIN SODIUM (PORCINE) LOCK FLUSH IV SOLN 100 UNIT/ML 100 UNIT/ML
5 SOLUTION INTRAVENOUS
Status: DISCONTINUED | OUTPATIENT
Start: 2017-03-08 | End: 2017-03-08 | Stop reason: HOSPADM

## 2017-03-08 RX ORDER — EPINEPHRINE 1 MG/ML
0.3 INJECTION INTRAMUSCULAR; INTRAVENOUS; SUBCUTANEOUS EVERY 5 MIN PRN
Status: CANCELLED | OUTPATIENT
Start: 2017-03-08

## 2017-03-08 RX ORDER — LORAZEPAM 2 MG/ML
0.5 INJECTION INTRAMUSCULAR EVERY 4 HOURS PRN
Status: CANCELLED
Start: 2017-03-08

## 2017-03-08 RX ORDER — ALBUTEROL SULFATE 0.83 MG/ML
2.5 SOLUTION RESPIRATORY (INHALATION)
Status: CANCELLED | OUTPATIENT
Start: 2017-03-08

## 2017-03-08 RX ORDER — METHYLPREDNISOLONE SODIUM SUCCINATE 125 MG/2ML
125 INJECTION, POWDER, LYOPHILIZED, FOR SOLUTION INTRAMUSCULAR; INTRAVENOUS
Status: CANCELLED
Start: 2017-03-08

## 2017-03-08 RX ORDER — DIPHENHYDRAMINE HYDROCHLORIDE 50 MG/ML
50 INJECTION INTRAMUSCULAR; INTRAVENOUS
Status: CANCELLED
Start: 2017-03-08

## 2017-03-08 RX ORDER — ALBUTEROL SULFATE 90 UG/1
1-2 AEROSOL, METERED RESPIRATORY (INHALATION)
Status: CANCELLED
Start: 2017-03-08

## 2017-03-08 RX ADMIN — HEPARIN SODIUM (PORCINE) LOCK FLUSH IV SOLN 100 UNIT/ML 5 ML: 100 SOLUTION at 10:26

## 2017-03-08 RX ADMIN — Medication 250 ML: at 11:56

## 2017-03-08 ASSESSMENT — PAIN SCALES - GENERAL: PAINLEVEL: NO PAIN (0)

## 2017-03-08 NOTE — MR AVS SNAPSHOT
After Visit Summary   3/8/2017    Damari Bryant    MRN: [id number]           Patient Information     Date Of Birth          1945        Visit Information        Provider Department      3/8/2017 11:00 AM Esther Greene MD [facility]        Today's Diagnoses     Metastasis from colon cancer (H)    -  1       Follow-ups after your visit        Your next 10 appointments already scheduled     Mar 10, 2017 12:00 PM CST   Anticoagulation Visit with INR MG   [facility] ([facility])    [address] [facility] [address] [phone]            Mar 10, 2017 12:30 PM CST   Level O with BAY 8 INFUSION   [facility] ([facility])    [address] [place] [address] [phone]            Mar 21, 2017  9:45 AM CDT   Return Visit with [facility]URSE ONLY CANCER CENTER   [facility] ([facility])    [address] [facility] [id number] [phone]            Mar 21, 2017 10:15 AM CDT   Return Visit with SANJEEV Camarillo CNP   [facility] ([facility])    [address] [facility] [address] [phone]            Mar 21, 2017 11:00 AM CDT   Level 3 with BAY 6 INFUSION   [facility] ([facility])    [address] [facility] [id number] [phone]            Mar 31, 2017 12:00 PM CDT   Return Visit with Bernardino Rodriguez PsyD   [facility])    [address] [facility] [address] [phone]              Who to contact     If you have questions or need follow up information about today's clinic visit or your schedule please contact [facility] directly at [phone].  Normal or non-critical lab and imaging results will be communicated to you by MyChart, letter or phone  "within 4 business days after the clinic has received the results. If you do not hear from us within 7 days, please contact the clinic through Pred[employer] or phone. If you have a critical or abnormal lab result, we will notify you by phone as soon as possible.  Submit refill requests through Prediculous or call your pharmacy and they will forward the refill request to us. Please allow 3 business days for your refill to be completed.          Additional Information About Your Visit        Prediculous Information     Prediculous is an electronic gateway that provides easy, online access to your medical records. With Prediculous, you can request a clinic appointment, read your test results, renew a prescription or communicate with your care team.     To sign up for Prediculous visit the website at [email or web address]   You will be asked to enter the access code listed below, as well as some personal information. Please follow the directions to create your username and password.     Your access code is: [email or web address]  Expires: 2017  1:26 PM     Your access code will  in 90 days. If you need help or a new code, please contact your [facility] or call [phone] for assistance.        Care EveryWhere ID     This is your Care EveryWhere ID. This could be used by other organizations to access your [place] medical records  [id number]        Your Vitals Were     Pulse Temperature Respirations Height Pulse Oximetry BMI (Body Mass Index)    92 97.7  F (36.5  C) (Oral) 20 1.676 m (5' 5.98\") 97% 35.2 kg/m2       Blood Pressure from Last 3 Encounters:   17 140/76   17 (!) 158/102   17 (!) 144/93    Weight from Last 3 Encounters:   17 98.9 kg (218 lb)   17 97.5 kg (215 lb)   17 99.5 kg (219 lb 4.8 oz)               Primary Care Provider Office Phone # Fax #    Samuel ELZBIETA Cobian [phone]       [facility] [place] [address] [facility] [place] [address]      "   Thank you!     Thank you for choosing [facility]  for your care. Our goal is always to provide you with excellent care. Hearing back from our patients is one way we can continue to improve our services. Please take a few minutes to complete the written survey that you may receive in the mail after your visit with us. Thank you!             Your Updated Medication List - Protect others around you: Learn how to safely use, store and throw away your medicines at www.disposemymeds.org.          This list is accurate as of: 3/8/17 11:59 PM.  Always use your most recent med list.                   Brand Name Dispense Instructions for use    busPIRone 15 MG tablet    BUSPAR     Take 15 mg by mouth 2 times daily       enoxaparin 100 MG/ML injection    LOVENOX    2 mL    Inject 1 mL (100 mg) Subcutaneous 2 times daily       levothyroxine 150 MCG tablet    SYNTHROID/LEVOTHROID     Take 150 mcg by mouth daily       LEXAPRO 20 MG tablet   Generic drug:  escitalopram      Take 20 mg by mouth daily.       loperamide 2 MG capsule    IMODIUM    30 capsule    Start with 2 caps (4 mg), then take one cap (2 mg) after each diarrheal stool as needed. Do not use more than 8 caps (16 mg) per day.       NORVASC 5 MG tablet   Generic drug:  amLODIPine      Take 5 mg by mouth daily       OMEGA-3 FISH OIL PO          ondansetron 8 MG tablet    ZOFRAN    10 tablet    Take 1 tablet (8 mg) by mouth every 8 hours as needed (Nausea/Vomiting)       prochlorperazine 10 MG tablet    COMPAZINE    30 tablet    Take 1 tablet (10 mg) by mouth every 6 hours as needed (Nausea/Vomiting)       senna-docusate 8.6-50 MG per tablet    SENNA S    60 tablet    1-2 tabs daily for constipation       tetrahydrozoline 0.05 % ophthalmic solution      1 drop 3 times daily       TOPAMAX 50 MG tablet   Generic drug:  topiramate      Take  by mouth 2 times daily. 1 tab q am 2 tabs q PM       triamterene-hydrochlorothiazide 37.5-25 MG per tablet     MAXZIDE-25     Take 1 tablet by mouth daily.       UNABLE TO FIND      MEDICATION NAME: Nature made digestive probiotics       VITAMIN D (CHOLECALCIFEROL) PO      Take 2,000 Units by mouth daily       warfarin 5 MG tablet    COUMADIN    60 tablet    Take 1 tablet (5 mg) by mouth daily

## 2017-03-08 NOTE — MR AVS SNAPSHOT
After Visit Summary   3/8/2017    Damari Bryant    MRN: [id number]           Patient Information     Date Of Birth          1945        Visit Information        Provider Department      3/8/2017 11:30 AM BAY 4 INFUSION [facility]        Today's Diagnoses     Metastasis from colon cancer (H)    -  1       Follow-ups after your visit        Your next 10 appointments already scheduled     Mar 10, 2017 12:00 PM CST   [facility]agulation [facility] with INR [facility]   [facility]    [address] [facility] [address] [phone]            Mar 10, 2017 12:30 PM CST   Level O with BAY 8 INFUSION   [facility]    [address] [place] [address] [phone]            Mar 21, 2017  9:15 AM CDT   Return Visit with SANJEEV Camarillo CNP   [facility]    [address] [place] [address] [phone]            Mar 21, 2017  9:30 AM CDT   Level 5 with BAY 6 INFUSION   [facility]    [address] [place] [address] [phone]              Who to contact     If you have questions or need follow up information about today's clinic visit or your schedule please contact [facility] directly at [phone].  Normal or non-critical lab and imaging results will be communicated to you by MyChart, letter or phone within 4 business days after the clinic has received the results. If you do not hear from us within 7 days, please contact the clinic through MyChart or phone. If you have a critical or abnormal lab result, we will notify you by phone as soon as possible.  Submit refill requests through Inktd or call your pharmacy and they will forward the refill request to us. Please allow 3 business days for your refill to be completed.           Additional Information About Your Visit        Lumiata Information     Lumiata is an electronic gateway that provides easy, online access to your medical records. With Lumiata, you can request a clinic appointment, read your test results, renew a prescription or communicate with your care team.     To sign up for Lumiata visit the website at [email or web address]   You will be asked to enter the access code listed below, as well as some personal information. Please follow the directions to create your username and password.     Your access code is: [email or web address]  Expires: 2017  1:26 PM     Your access code will  in 90 days. If you need help or a new code, please contact your [facility] or call [phone] for assistance.        Care EveryWhere ID     This is your Care EveryWhere ID. This could be used by other organizations to access your [place] medical records  [id number]        Your Vitals Were     Pulse                   72            Blood Pressure from Last 3 Encounters:   17 140/76   17 (!) 158/102   17 (!) 144/93    Weight from Last 3 Encounters:   17 98.9 kg (218 lb)   17 97.5 kg (215 lb)   17 99.5 kg (219 lb 4.8 oz)              We Performed the Following     MD Instruction for Protocol     Protein qualitative urine     Road Map Alert     Treatment Conditions        Primary Care Provider Office Phone # Fax #    Samuel Cobian [phone]       [facility] [address] [facility] [address]        Thank you!     Thank you for choosing [facility]  for your care. Our goal is always to provide you with excellent care. Hearing back from our patients is one way we can continue to improve our services. Please take a few minutes to complete the written survey that you may receive in the mail after your visit with us. Thank you!             Your Updated Medication List - Protect  others around you: Learn how to safely use, store and throw away your medicines at www.disposemymeds.org.          This list is accurate as of: 3/8/17  4:32 PM.  Always use your most recent med list.                   Brand Name Dispense Instructions for use    busPIRone 15 MG tablet    BUSPAR     Take 15 mg by mouth 2 times daily       enoxaparin 100 MG/ML injection    LOVENOX    2 mL    Inject 1 mL (100 mg) Subcutaneous 2 times daily       levothyroxine 150 MCG tablet    SYNTHROID/LEVOTHROID     Take 150 mcg by mouth daily       LEXAPRO 20 MG tablet   Generic drug:  escitalopram      Take 20 mg by mouth daily.       loperamide 2 MG capsule    IMODIUM    30 capsule    Start with 2 caps (4 mg), then take one cap (2 mg) after each diarrheal stool as needed. Do not use more than 8 caps (16 mg) per day.       NORVASC 5 MG tablet   Generic drug:  amLODIPine      Take 5 mg by mouth daily       OMEGA-3 FISH OIL PO          ondansetron 8 MG tablet    ZOFRAN    10 tablet    Take 1 tablet (8 mg) by mouth every 8 hours as needed (Nausea/Vomiting)       prochlorperazine 10 MG tablet    COMPAZINE    30 tablet    Take 1 tablet (10 mg) by mouth every 6 hours as needed (Nausea/Vomiting)       senna-docusate 8.6-50 MG per tablet    SENNA S    60 tablet    1-2 tabs daily for constipation       tetrahydrozoline 0.05 % ophthalmic solution      1 drop 3 times daily       TOPAMAX 50 MG tablet   Generic drug:  topiramate      Take  by mouth 2 times daily. 1 tab q am 2 tabs q PM       triamterene-hydrochlorothiazide 37.5-25 MG per tablet    MAXZIDE-25     Take 1 tablet by mouth daily.       UNABLE TO FIND      MEDICATION NAME: Nature made digestive probiotics       VITAMIN D (CHOLECALCIFEROL) PO      Take 2,000 Units by mouth daily       warfarin 5 MG tablet    COUMADIN    60 tablet    Take 1 tablet (5 mg) by mouth daily

## 2017-03-08 NOTE — MR AVS SNAPSHOT
After Visit Summary   3/8/2017    Damari Bryant    MRN: [id number]           Patient Information     Date Of Birth          1945        Visit Information        Provider Department      3/8/2017 10:15 AM NURSE ONLY CANCER CENTER [facility]        Today's Diagnoses     Metastasis from colon cancer (H)    -  1       Follow-ups after your visit        Your next 10 appointments already scheduled     Mar 08, 2017 11:00 AM CST   Return Visit with Esther Greene MD   [facility])    [address] [place] [address] [phone]            Mar 08, 2017 11:30 AM CST   Level 5 with BAY 4 INFUSION   [facility])    [address] [place] [address] [phone]              Who to contact     If you have questions or need follow up information about today's clinic visit or your schedule please contact [facility] directly at [phone].  Normal or non-critical lab and imaging results will be communicated to you by MyChart, letter or phone within 4 business days after the clinic has received the results. If you do not hear from us within 7 days, please contact the clinic through [employer]hart or phone. If you have a critical or abnormal lab result, we will notify you by phone as soon as possible.  Submit refill requests through Stylistpick or call your pharmacy and they will forward the refill request to us. Please allow 3 business days for your refill to be completed.          Additional Information About Your Visit        [employer] [facility] ([employer] Information     Stylistpick is an electronic gateway that provides easy, online access to your medical records. With Stylistpick, you can request a clinic appointment, read your test results, renew a prescription or communicate with your care team.     To sign up for Stylistpick visit the website at [email or web address]   You will  be asked to enter the access code listed below, as well as some personal information. Please follow the directions to create your username and password.     Your access code is: [email or web address]  Expires: 2017  1:26 PM     Your access code will  in 90 days. If you need help or a new code, please contact your [facility] or call [phone] for assistance.        Care EveryWhere ID     This is your Care EveryWhere ID. This could be used by other organizations to access your [place] medical records  [id number]         Blood Pressure from Last 3 Encounters:   17 (!) 144/93   17 122/74   17 136/82    Weight from Last 3 Encounters:   17 97.5 kg (215 lb)   17 99.5 kg (219 lb 4.8 oz)   17 98.4 kg (217 lb)              We Performed the Following     Bilirubin  total     CBC with platelets differential        Primary Care Provider Office Phone # Fax #    Samuel Cobian [phone]       [facility] [address] [facility] [address]        Thank you!     Thank you for choosing [facility]  for your care. Our goal is always to provide you with excellent care. Hearing back from our patients is one way we can continue to improve our services. Please take a few minutes to complete the written survey that you may receive in the mail after your visit with us. Thank you!             Your Updated Medication List - Protect others around you: Learn how to safely use, store and throw away your medicines at www.disposemymeds.org.          This list is accurate as of: 3/8/17 10:37 AM.  Always use your most recent med list.                   Brand Name Dispense Instructions for use    busPIRone 15 MG tablet    BUSPAR     Take 15 mg by mouth 2 times daily       enoxaparin 100 MG/ML injection    LOVENOX    2 mL    Inject 1 mL (100 mg) Subcutaneous 2 times daily       levothyroxine 150 MCG tablet    SYNTHROID/LEVOTHROID      Take 150 mcg by mouth daily       LEXAPRO 20 MG tablet   Generic drug:  escitalopram      Take 20 mg by mouth daily.       loperamide 2 MG capsule    IMODIUM    30 capsule    Start with 2 caps (4 mg), then take one cap (2 mg) after each diarrheal stool as needed. Do not use more than 8 caps (16 mg) per day.       NORVASC 5 MG tablet   Generic drug:  amLODIPine      Take 5 mg by mouth daily       OMEGA-3 FISH OIL PO          ondansetron 8 MG tablet    ZOFRAN    10 tablet    Take 1 tablet (8 mg) by mouth every 8 hours as needed (Nausea/Vomiting)       prochlorperazine 10 MG tablet    COMPAZINE    30 tablet    Take 1 tablet (10 mg) by mouth every 6 hours as needed (Nausea/Vomiting)       senna-docusate 8.6-50 MG per tablet    SENNA S    60 tablet    1-2 tabs daily for constipation       tetrahydrozoline 0.05 % ophthalmic solution      1 drop 3 times daily       TOPAMAX 50 MG tablet   Generic drug:  topiramate      Take  by mouth 2 times daily. 1 tab q am 2 tabs q PM       triamterene-hydrochlorothiazide 37.5-25 MG per tablet    MAXZIDE-25     Take 1 tablet by mouth daily.       UNABLE TO FIND      MEDICATION NAME: Nature made digestive probiotics       VITAMIN D (CHOLECALCIFEROL) PO      Take 2,000 Units by mouth daily       warfarin 5 MG tablet    COUMADIN    60 tablet    Take 1 tablet (5 mg) by mouth daily

## 2017-03-08 NOTE — MR AVS SNAPSHOT
After Visit Summary   3/8/2017    Damari Bryant    MRN: [id number]           Patient Information     Date Of Birth          1945        Visit Information        Provider Department      3/8/2017 3:04 PM Zhou Feldman [facility]        Today's Diagnoses     Spiritual or Caodaism counseling    -  1       Follow-ups after your visit        Your next 10 appointments already scheduled     Mar 10, 2017 12:00 PM CST   Anticoagulation Visit with INR MG   [facility] ([facility])    [address] [place] [id number] [phone]            Mar 10, 2017 12:30 PM CST   Level O with BAY 8 INFUSION   [facility])    [id number] [place] [id number] [phone]            Mar 21, 2017  9:15 AM CDT   Return Visit with SANJEEV Camarillo CNP   [facility] ([facility])    [address] [place] [id number] [phone]            Mar 21, 2017  9:30 AM CDT   Level 5 with BAY 6 INFUSION   [facility])    [address] [place] [id number] [phone]              Who to contact     If you have questions or need follow up information about today's clinic visit or your schedule please contact [facility] directly at [phone].  Normal or non-critical lab and imaging results will be communicated to you by MyChart, letter or phone within 4 business days after the clinic has received the results. If you do not hear from us within 7 days, please contact the clinic through MyChart or phone. If you have a critical or abnormal lab result, we will notify you by phone as soon as possible.  Submit refill requests through [employer] or call your pharmacy and they will forward the refill request to us. Please allow 3 business days for your refill to be completed.           Additional Information About Your Visit        [employer] Information     [employer] is an electronic gateway that provides easy, online access to your medical records. With [employer], you can request a clinic appointment, read your test results, renew a prescription or communicate with your care team.     To sign up for [employer] visit the website at [email or web address]   You will be asked to enter the access code listed below, as well as some personal information. Please follow the directions to create your username and password.     Your access code is: [email or web address]  Expires: 2017  1:26 PM     Your access code will  in 90 days. If you need help or a new code, please contact your [facility] or call [phone] for assistance.        Care EveryWhere ID     This is your Care EveryWhere ID. This could be used by other organizations to access your [place] medical records  [id number]         Blood Pressure from Last 3 Encounters:   17 140/76   17 (!) 158/102   17 (!) 144/93    Weight from Last 3 Encounters:   17 98.9 kg (218 lb)   17 97.5 kg (215 lb)   17 99.5 kg (219 lb 4.8 oz)              Today, you had the following     No orders found for display       Primary Care Provider Office Phone # Fax #    Samuel RUFF Aranza [phone]       [facility] [address] [facility] [address]        Thank you!     Thank you for choosing [facility]  for your care. Our goal is always to provide you with excellent care. Hearing back from our patients is one way we can continue to improve our services. Please take a few minutes to complete the written survey that you may receive in the mail after your visit with us. Thank you!             Your Updated Medication List - Protect others around you: Learn how to safely use, store and throw away your medicines at www.disposemymeds.org.          This list is  accurate as of: 3/8/17  3:07 PM.  Always use your most recent med list.                   Brand Name Dispense Instructions for use    busPIRone 15 MG tablet    BUSPAR     Take 15 mg by mouth 2 times daily       enoxaparin 100 MG/ML injection    LOVENOX    2 mL    Inject 1 mL (100 mg) Subcutaneous 2 times daily       levothyroxine 150 MCG tablet    SYNTHROID/LEVOTHROID     Take 150 mcg by mouth daily       LEXAPRO 20 MG tablet   Generic drug:  escitalopram      Take 20 mg by mouth daily.       loperamide 2 MG capsule    IMODIUM    30 capsule    Start with 2 caps (4 mg), then take one cap (2 mg) after each diarrheal stool as needed. Do not use more than 8 caps (16 mg) per day.       NORVASC 5 MG tablet   Generic drug:  amLODIPine      Take 5 mg by mouth daily       OMEGA-3 FISH OIL PO          ondansetron 8 MG tablet    ZOFRAN    10 tablet    Take 1 tablet (8 mg) by mouth every 8 hours as needed (Nausea/Vomiting)       prochlorperazine 10 MG tablet    COMPAZINE    30 tablet    Take 1 tablet (10 mg) by mouth every 6 hours as needed (Nausea/Vomiting)       senna-docusate 8.6-50 MG per tablet    SENNA S    60 tablet    1-2 tabs daily for constipation       tetrahydrozoline 0.05 % ophthalmic solution      1 drop 3 times daily       TOPAMAX 50 MG tablet   Generic drug:  topiramate      Take  by mouth 2 times daily. 1 tab q am 2 tabs q PM       triamterene-hydrochlorothiazide 37.5-25 MG per tablet    MAXZIDE-25     Take 1 tablet by mouth daily.       UNABLE TO FIND      MEDICATION NAME: Nature made digestive probiotics       VITAMIN D (CHOLECALCIFEROL) PO      Take 2,000 Units by mouth daily       warfarin 5 MG tablet    COUMADIN    60 tablet    Take 1 tablet (5 mg) by mouth daily

## 2017-03-08 NOTE — PROGRESS NOTES
SPIRITUAL HEALTH SERVICES  SPIRITUAL ASSESSMENT Progress Note  [facility]    (Follow up)  Pt informed  her treatments were only for quality of life.  Her , Ross, and daughter, Katie, were present.   provided a prayer.   is available for pt/family needs.    Zhou Feldman M.Div., [facility]  Staff   Office tel: [phone]

## 2017-03-08 NOTE — PROGRESS NOTES
ANTICOAGULATION FOLLOW-UP CLINIC VISIT    Patient Name:  Damari Bryant  Date:  3/8/2017  Contact Type:  Face to Face    SUBJECTIVE:     Patient Findings     Positives Change in diet/appetite, Antibiotic use or infection    Comments Patient is starting to eat salads, actually feels like eating.  Informed patient that is great and reminded her to keep it consistent from week to week.  Patient finished her antbiotics on Sunday from a sinus infection.  Patient is going to meet with Dr. Greene for her cancer infusion which may or may not happen today.  Will recheck her INR Friday either here in clinic or patient will go to [facility] for labs.           OBJECTIVE    INR Protime   Date Value Ref Range Status   03/08/2017 1.7 (A) 2.0 - 3.0 Final       ASSESSMENT / PLAN  INR assessment SUB    Recheck INR In: 2 DAYS    INR Location Clinic      Anticoagulation Summary as of 3/8/2017     INR goal 2.0-3.0   Today's INR 1.7!   Maintenance plan 10 mg (5 mg x 2) on Mon, Thu; 5 mg (5 mg x 1) all other days   Full instructions 3/8: 7.5 mg; Otherwise 10 mg on Mon, Thu; 5 mg all other days   Weekly total 45 mg   Plan last modified Mary Berry, RN (2/17/2017)   Next INR check 3/10/2017   Priority INR   Target end date 8/14/2017    Indications   Deep vein thrombosis (DVT) (H) [I82.409] [I82.409]  Long-term (current) use of anticoagulants [Z79.01] [Z79.01]         Anticoagulation Episode Summary     INR check location     Preferred lab [facility]    Send INR reminders to [facility] INR    Comments Pateint has chemo infusion pump for 48 hours every 2 weeks.  Her INR rises during this infusion. Her next scheduled infusion is scheduled 2/21/17 and 3/7/17. Call [facility] cell phone for INR results [phone]  Per Esther Greene MD- Goal of 2-3.      Anticoagulation Care Providers     Provider Role Specialty Phone number    Esther Greene MD Responsible Hematology [phone]            See the Encounter Report to  view Anticoagulation Flowsheet and Dosing Calendar (Go to Encounters tab in chart review, and find the Anticoagulation Therapy Visit)    Patient is starting to eat salads, actually feels like eating.  Informed patient that is great and reminded her to keep it consistent from week to week.  Patient finished her antbiotics on Sunday from a sinus infection.  Patient is going to meet with Dr. Greene for her cancer infusion which may or may not happen today.  Will recheck her INR Friday either here in clinic or patient will go to [facility] for labs.    Mary Berry RN

## 2017-03-08 NOTE — PROGRESS NOTES
"Patient's name and  were verified.  See Doc Flowsheet - IV assess for details.  IVAD accessed with 20G 3/4\" ashby gripper plus needle  blood return positive: YES  Site without redness, tenderness or swelling: YES  flushed with 30cc NS and 5cc 100u/ml heparin  Needle: left accessed for infusion  Comments: Labs drawn.  Patient tolerated procedure without incident.    Carlota Johns  RN, BSN, OCN      "

## 2017-03-08 NOTE — MR AVS SNAPSHOT
Damaribashir Bryant   3/8/2017 10:00 AM   Anticoagulation Therapy Visit    Description:  71 year old female   Provider:  INR MG   Department:  Mg Med Monitoring           INR as of 3/8/2017     Today's INR 1.7!      Anticoagulation Summary as of 3/8/2017     INR goal 2.0-3.0   Today's INR 1.7!   Full instructions 3/8: 7.5 mg; Otherwise 10 mg on Mon, Thu; 5 mg all other days   Next INR check 3/10/2017    Indications   Deep vein thrombosis (DVT) (H) [I82.409] [I82.409]  Long-term (current) use of anticoagulants [Z79.01] [Z79.01]         Description     Patient is starting to eat salads, actually feels like eating.  Informed patient that is great and reminded her to keep it consistent from week to week.  Patient finished her antbiotics on Sunday from a sinus infection.  Patient is going to meet with Dr. Greene for her cancer infusion which may or may not happen today.  Will recheck her INR Friday either here in clinic or patient will go to [facility] for labs.      Contact Numbers     [facility]  Please call the anticoagulation nurse at [phone] to cancel and/or reschedule your appointment, or with any problems or questions regarding your therapy.  You may call the main clinic number at [phone] if the nurse is not available.           March 2017 Details    Sun Mon Tue Wed Thu Fri Sat        1               2               3               4                 5               6               7               8      7.5 mg   See details      9      10 mg         10            11                 12               13               14               15               16               17               18                 19               20               21               22               23               24               25                 26               27               28               29               30               31                 Date Details   03/08 This INR check       Date of next INR:  3/10/2017          How to take your warfarin dose     To take:  5 mg Take 1 of the 5 mg tablets.    To take:  7.5 mg Take 1.5 of the 5 mg tablets.    To take:  10 mg Take 2 of the 5 mg tablets.

## 2017-03-08 NOTE — NURSING NOTE
"Damari Bryant is a 71 year old female who presents for:  Chief Complaint   Patient presents with     Oncology Clinic Visit     f/u prior to tx        Initial Vitals:  BP (!) 158/102 (BP Location: Left arm, Patient Position: Chair, Cuff Size: Adult Large)  Pulse 92  Temp 97.7  F (36.5  C) (Oral)  Resp 20  Ht 1.676 m (5' 5.98\")  Wt 98.9 kg (218 lb)  SpO2 97%  BMI 35.2 kg/m2 Estimated body mass index is 35.2 kg/(m^2) as calculated from the following:    Height as of this encounter: 1.676 m (5' 5.98\").    Weight as of this encounter: 98.9 kg (218 lb).. Body surface area is 2.15 meters squared. BP completed using cuff size: large  No Pain (0) No LMP recorded. Patient is postmenopausal. Allergies and medications reviewed.     Medications: Medication refills not needed today.  Pharmacy name entered into [facility]:    [id number] - [place], [place] - [address]  [id number] - [place], [place] - [address] [place], [place] - [address] [address]    Comments:     8 minutes for nursing intake (face to face time)   JULES DIAL LPN        "

## 2017-03-08 NOTE — PROGRESS NOTES
Infusion Nursing Note:  Damari Bryant presents today for C4 avastin/folfiri.    Patient seen by provider today: Yes: Dr Greene   present during visit today: Not Applicable.    Note: Irinotecan stopped at 13:20 for cramping. Atropine given with good relief of stymptoms. Irinotecan restarted.  14:00 irinotecan stopped again for cramping. Patient has had poor tolerence of two doses of atropine. Patient encouraged to get up and use the bathroom. She was able to pass gas and cramping resolved. Irinotecan restarted and completed without further incident.    Intravenous Access:  Implanted Port.    Treatment Conditions:  Lab Results   Component Value Date    HGB 14.1 03/08/2017     Lab Results   Component Value Date    WBC 6.5 03/08/2017      Lab Results   Component Value Date    ANEU 3.3 03/08/2017     Lab Results   Component Value Date     03/08/2017      Results reviewed, labs MET treatment parameters, ok to proceed with treatment.  tbili 0.4 and cea pending.      Post Infusion Assessment:  Patient tolerated infusion without incident.  Blood return noted pre and post infusion.  Site patent and intact, free from redness, edema or discomfort.  No evidence of extravasations.  Port left accessed for 46 hours chemotherapy.    Discharge Plan:   Copy of AVS reviewed with patient and/or family.  Patient will return 3/10 for next appointment.  Patient discharged in stable condition accompanied by:  and daughter.  Departure Mode: Ambulatory.    Kailee Stallworth RN

## 2017-03-09 NOTE — PROGRESS NOTES
ONCOLOGY DIAGNOSES:   1. July 2013: Diagnosed with stage IIIB, [id number] colon cancer. Final pathology showed a mildly differentiated colonic adenocarcinoma. Tumor extended through the muscularis propria into the pericolonic adipose tissue. Lymphovascular invasion present. Margins negative. Three out of 30 lymph nodes were positive, 0.5 cm in greatest dimension. Extracapsular extension identified. DNA mismatch repair enzymes were intact.   2. November 2016: Metastatic colorectal cancer to the lung confirmed by a lung biopsy.   3. 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 No extension of thrombus into the right upper extremity.      THERAPY TO DATE:   1. July 21, 2013: Sigmoid colectomy.   2. August 2013 to January 2014: FOLFOX x12 cycles.   3. February 2014 to November 2016: Observation.   4. January 2017 to Present: FOLFIRI/Avastin.  5. January 2017 to Present: Anticoagulation.        INTERVAL HISTORY:  Damari Bryant is a 71-year-old female diagnosed with stage IIB, oM3G8Qs, colon cancer in 2013 after presenting with worsening diarrhea.  In November 2016, the patient was found to have a growing lung lesion, and it was biopsy-proven as metastatic colon cancer.  She was initiated on FOLFIRI and Avastin.  The patient presents to clinic to discuss continuing therapy and next steps.  I last saw the patient in February 2017.  At that time, she had reported feeling terrible.  She had multiple issues and complaints, increasing fatigue, weakness, difficulty walking.  We decided to give the patient a few weeks off and reassess her.  Interestingly, the patient's imaging has shown a dramatic response.  On today's visit, the patient tells me that she is doing significantly better.  Denies any pain.  She states that she is up and about walking, and is able to have coffee with her friends, she is more engaged with the people around her.   She feels that one of the  biggest reasons is that she is now on Coumadin, and no longer on Lovenox.  She has her blood tests done close to home, and the results sent here to [place].  She feels that going back and forth was quite tiring for her, as well as being on the Lovenox.  Her diarrhea, she feels, is controllable, and uses Imodium if needed.  Her neuropathy has improved, and overall she feels she has done well with having a few weeks off.  The remainder of the comprehensive review of systems is negative.     ALLERGIES:   1. Ativan causes hallucinations.   2. Avocado causes nausea and vomiting.   3. Iodine-131 causes nausea and vomiting.   4. Milk protein causes diarrhea.   5. Shellfish causes GI disturbance.   6. HMG-CoA reductase inhibitor causes nausea.   7. Sulfa drugs cause nausea and vomiting.   8. Tegaderm causes a rash.      SOCIAL HISTORY:  .  Comes in with her  and daughter today.  No current tobacco use.  Lives in [place].      PHYSICAL EXAM:   VITAL SIGNS:  Blood pressure 158/102, pulse 92, respirations 20, sats 97% on room air.  Weight is 218 pounds.   GENERAL:  Comfortable, in no acute distress, pleasant.   HEENT:  Atraumatic, normocephalic.  Pupils equal.  Sclerae anicteric.  Oropharynx clear.   NECK:  Supple.   HEART:  Regular rate and rhythm, normal S1-S2.  No murmurs or gallop.  No edema.   LUNGS:  Clear to auscultation bilaterally.  No crackles or wheezes.  Normal respiratory effort.   ABDOMEN:  Positive bowel sounds.  Soft and nontender.   EXTREMITIES:  No cyanosis.  Warm.   MUSCULOSKELETAL:  No point tenderness.   LYMPH:  No cervical, supraclavicular, or axillary nodes palpable.   SKIN:  No petechiae or rashes.   NEURO:  Alert and oriented.   PSYCH: Teary-eyed at times, anxious     LABS:  WBC 6.5, hemoglobin 14.1, hematocrit 42.3, platelet 132,000; ANC 3.3.      IMAGING:   PET scan from 12/30/2016 showed enlarging hypermetabolic pulmonary nodules bilaterally and hypermetabolic retroperitoneal lymph  nodes, although similar size compared to prior CT. Diffuse FTG was obtained of the thyroid gland consistent with nonspecific thyroiditis.   CT angiogram from 2/14/2017 showed no CT evidence for pulmonary emboli. Bilateral pulmonary nodules are either stable or decreased in size. There are also at least 3 pulmonary nodules which have resolved. This could be due to response to treatment for metastatic disease versus resolving infectious/inflammatory process.      ASSESSMENT AND PLAN:   1.  Metastatic colorectal cancer:  Currently on FOLFIRI and Avastin.  Most recent imaging showed a nice response to therapy.  On today's visit, the patient's performance status appears to have improved and she is feeling much better with a few weeks off.  We had a lengthy discussion about how to continue with therapy.  Obviously, this current regimen is working, and ideally we would like to continue it.  However, if it affects her quality of life, we would have to reconsider.  I again reviewed with the patient that her cancer is not curable, and the goal of therapy is to prolong her life with a good quality of life.  If our treatment is hurting her, or taking away from the quality of life, it is not in her benefit to continue with this treatment.  Damari was very clear that she is feeling much better and would like to continue with this current therapy.  There are no contraindications by labs.  Return to clinic in two weeks to see NP and have treatment.   I have also asked my care coordinator, Evans Archer to follow up with the patient later this week.   2.  Right internal jugular/innominate deep vein thrombosis:  Currently on Coumadin, and followed by INR Clinic.   3.  Diarrhea:  Per patient, controlled on Imodium.   4.  Neuropathy:  Stable and improving.  No intervention at this time.   5.  Psychosocial:  The patient during our visit at times became extremely teary-eyed inappropriately.  She clearly has quite a bit of anxiety and  depression.  On our last visit, it was interesting that the patient was feeling awful when she first came in, but once she heard that her imaging was improving, her affect changed.  I discussed having her meet with our cancer psychologist, which the patient and family members thought would be helpful.         SUSANA MENARD MD             D: 2017 13:05   T: 2017 06:22   MT: EM#[id number]      Name:     MARY SHI   MRN:      [id number]        Account:      [id number]   :      1945           Visit Date:   2017      Document: [id number]       cc: Samuel Cobian MD

## 2017-03-10 ENCOUNTER — INFUSION THERAPY VISIT (OUTPATIENT)
Dept: INFUSION THERAPY | Facility: CLINIC | Age: 72
End: 2017-03-10
Payer: COMMERCIAL

## 2017-03-10 ENCOUNTER — ANTICOAGULATION THERAPY VISIT (OUTPATIENT)
Dept: PHARMACY | Facility: CLINIC | Age: 72
End: 2017-03-10
Payer: COMMERCIAL

## 2017-03-10 VITALS
SYSTOLIC BLOOD PRESSURE: 128 MMHG | RESPIRATION RATE: 20 BRPM | TEMPERATURE: 97 F | HEART RATE: 92 BPM | DIASTOLIC BLOOD PRESSURE: 87 MMHG | OXYGEN SATURATION: 96 %

## 2017-03-10 DIAGNOSIS — I82.90 ACUTE DEEP VEIN THROMBOSIS (DVT) OF NON-EXTREMITY VEIN: ICD-10-CM

## 2017-03-10 DIAGNOSIS — Z79.01 LONG-TERM (CURRENT) USE OF ANTICOAGULANTS: ICD-10-CM

## 2017-03-10 DIAGNOSIS — C18.9 COLON CANCER (H): Primary | ICD-10-CM

## 2017-03-10 LAB — INR POINT OF CARE: 2.5 (ref 2–3)

## 2017-03-10 PROCEDURE — 85610 PROTHROMBIN TIME: CPT | Mod: QW

## 2017-03-10 PROCEDURE — 99207 ZZC NO CHARGE LOS: CPT

## 2017-03-10 PROCEDURE — 36416 COLLJ CAPILLARY BLOOD SPEC: CPT

## 2017-03-10 PROCEDURE — 99207 ZZC NO CHARGE LOS: CPT | Performed by: INTERNAL MEDICINE

## 2017-03-10 PROCEDURE — 99207 ZZC NO CHARGE NURSE ONLY: CPT

## 2017-03-10 RX ORDER — SODIUM CHLORIDE 9 MG/ML
1000 INJECTION, SOLUTION INTRAVENOUS CONTINUOUS
Status: CANCELLED
Start: 2017-03-10

## 2017-03-10 RX ORDER — HEPARIN SODIUM (PORCINE) LOCK FLUSH IV SOLN 100 UNIT/ML 100 UNIT/ML
5 SOLUTION INTRAVENOUS ONCE
Status: COMPLETED | OUTPATIENT
Start: 2017-03-10 | End: 2017-03-10

## 2017-03-10 RX ADMIN — HEPARIN SODIUM (PORCINE) LOCK FLUSH IV SOLN 100 UNIT/ML 5 ML: 100 SOLUTION at 13:08

## 2017-03-10 ASSESSMENT — PAIN SCALES - GENERAL: PAINLEVEL: NO PAIN (0)

## 2017-03-10 NOTE — MR AVS SNAPSHOT
Damari Bryant   3/10/2017 12:00 PM   Anticoagulation Therapy Visit    Description:  71 year old female   Provider:  INR MG   Department:  Mg Med Monitoring           INR as of 3/10/2017     Today's INR 2.5      Anticoagulation Summary as of 3/10/2017     INR goal 2.0-3.0   Today's INR 2.5   Full instructions 3/13: 2.5 mg; Otherwise 10 mg on Mon, Thu; 5 mg all other days   Next INR check 3/14/2017    Indications   Deep vein thrombosis (DVT) (H) [I82.409] [I82.409]  Long-term (current) use of anticoagulants [Z79.01] [Z79.01]         Description     Patient is getting her chemo pumped removed today.  Last round of chemo her INR elevated.        Contact Numbers     [facility]  Please call the anticoagulation nurse at [phone] to cancel and/or reschedule your appointment, or with any problems or questions regarding your therapy.  You may call the main clinic number at [phone] if the nurse is not available.           March 2017 Details    Sun Mon Tue Wed Thu Fri Sat        1               2               3               4                 5               6               7               8               9               10      5 mg   See details      11      5 mg           12      5 mg         13      2.5 mg         14            15               16               17               18                 19               20               21               22               23               24               25                 26               27               28               29               30               31                 Date Details   03/10 This INR check       Date of next INR:  3/14/2017         How to take your warfarin dose     To take:  2.5 mg Take 0.5 of a 5 mg tablet.    To take:  5 mg Take 1 of the 5 mg tablets.

## 2017-03-10 NOTE — PROGRESS NOTES
ANTICOAGULATION FOLLOW-UP CLINIC VISIT    Patient Name:  Damari Bryant  Date:  3/10/2017  Contact Type:  Face to Face    SUBJECTIVE:     Patient Findings     Positives Change in medications    Comments Patient is getting her chemo pumped removed today.  Last round of chemo her INR elevated.             OBJECTIVE    INR Protime   Date Value Ref Range Status   03/10/2017 2.5 2.0 - 3.0 Final       ASSESSMENT / PLAN  INR assessment THER    Recheck INR In: 4 DAYS    INR Location Clinic      Anticoagulation Summary as of 3/10/2017     INR goal 2.0-3.0   Today's INR 2.5   Maintenance plan 10 mg (5 mg x 2) on Mon, Thu; 5 mg (5 mg x 1) all other days   Full instructions 3/13: 2.5 mg; Otherwise 10 mg on Mon, Thu; 5 mg all other days   Weekly total 45 mg   Plan last modified Mary Berry RN (2/17/2017)   Next INR check 3/14/2017   Priority INR   Target end date 8/14/2017    Indications   Deep vein thrombosis (DVT) (H) [I82.409] [I82.409]  Long-term (current) use of anticoagulants [Z79.01] [Z79.01]         Anticoagulation Episode Summary     INR check location     Preferred lab [facility]    Send INR reminders to [facility] INR    Comments Pateint has chemo infusion pump for 48 hours every 2 weeks.  Her INR rises during this infusion. Her next scheduled infusion is scheduled 2/21/17 and 3/7/17. Call [facility] cell phone for INR results [phone]  Per Esther Greene MD- Goal of 2-3.      Anticoagulation Care Providers     Provider Role Specialty Phone number    Esther Greene MD Responsible Hematology [phone]            See the Encounter Report to view Anticoagulation Flowsheet and Dosing Calendar (Go to Encounters tab in chart review, and find the Anticoagulation Therapy Visit)        Mary Berry RN

## 2017-03-10 NOTE — PROGRESS NOTES
Infusion Nursing Note:  Damari Bryant presents today for Fluorouracil home CADD pump    Patient seen by provider today: No   present during visit today: Not Applicable.    Note: Pump infused fully without incident. Pt reports diarrhea; she is using imodium as directed. She continues to have intermittent dry heaves; she is using compazine/zofran as directed. She is able to drink 8 glasses of water/fluids per day. She feels these are manageable with her current available medications. Damari will call if these symptoms are unrelieved or worsening. Pt is teary about disease prognosis; currently scheduled with JOANN Rodriguez MD on 3/31. Writer talked with him and was able to get her in next week for support.     Intravenous Access:  Implanted Port.    Treatment Conditions:  Not Applicable.      Post Infusion Assessment:  Patient tolerated infusion without incident.  No evidence of extravasations.    Discharge Plan:   Discharge instructions reviewed with: Patient.  Schedule reviewed with patient and/or family.  Patient will return 3/21 for next infusion appointment.  Patient discharged in stable condition accompanied by: self.  Departure Mode: Ambulatory.    Ramona Torres RN

## 2017-03-10 NOTE — MR AVS SNAPSHOT
After Visit Summary   3/10/2017    Damari Bryant    MRN: [id number]           Patient Information     Date Of Birth          1945        Visit Information        Provider Department      3/10/2017 12:30 PM BAY 8 INFUSION [facility]        Today's Diagnoses     Colon cancer (H)    -  1       Follow-ups after your visit        Your next 10 appointments already scheduled     Mar 17, 2017 11:00 AM CDT   Return Visit with Bernardino Rodriguez PsyD   [facility])    [id number] [place] [id number] [phone]            Mar 21, 2017  9:45 AM CDT   Return Visit with [facility]   [facility])    [address] [place] [id number] [phone]            Mar 21, 2017 10:15 AM CDT   Return Visit with SANJEEV Camarillo CNP   [facility])    [address] [place] [id number] [phone]            Mar 21, 2017 11:00 AM CDT   Level 3 with [place] [facility])    [address] [place] [address] [phone]              Who to contact     If you have questions or need follow up information about today's clinic visit or your schedule please contact [facility] directly at [phone].  Normal or non-critical lab and imaging results will be communicated to you by MyChart, letter or phone within 4 business days after the clinic has received the results. If you do not hear from us within 7 days, please contact the clinic through MyChart or phone. If you have a critical or abnormal lab result, we will notify you by phone as soon as possible.  Submit refill requests through manetch or call your pharmacy and they will forward the refill request to us. Please allow 3 business days for your refill to be  completed.          Additional Information About Your Visit        [employer] Information     [employer] is an electronic gateway that provides easy, online access to your medical records. With [employer], you can request a clinic appointment, read your test results, renew a prescription or communicate with your care team.     To sign up for [employer] visit the website at [email or web address]   You will be asked to enter the access code listed below, as well as some personal information. Please follow the directions to create your username and password.     Your access code is: [email or web address]  Expires: 2017  1:26 PM     Your access code will  in 90 days. If you need help or a new code, please contact your [facility] or call [phone] for assistance.        Care EveryWhere ID     This is your Care EveryWhere ID. This could be used by other organizations to access your [place] medical records  [id number]        Your Vitals Were     Pulse Temperature Respirations Pulse Oximetry          92 97  F (36.1  C) (Oral) 20 96%         Blood Pressure from Last 3 Encounters:   03/10/17 128/87   17 140/76   17 (!) 158/102    Weight from Last 3 Encounters:   17 98.9 kg (218 lb)   17 97.5 kg (215 lb)   17 99.5 kg (219 lb 4.8 oz)              Today, you had the following     No orders found for display       Primary Care Provider Office Phone # Fax #    Samuel Cobian [phone]       [facility] [address] [facility] [address]        Thank you!     Thank you for choosing [facility]  for your care. Our goal is always to provide you with excellent care. Hearing back from our patients is one way we can continue to improve our services. Please take a few minutes to complete the written survey that you may receive in the mail after your visit with us. Thank you!             Your Updated Medication List - Protect others  around you: Learn how to safely use, store and throw away your medicines at www.disposemymeds.org.          This list is accurate as of: 3/10/17  4:19 PM.  Always use your most recent med list.                   Brand Name Dispense Instructions for use    busPIRone 15 MG tablet    BUSPAR     Take 15 mg by mouth 2 times daily       enoxaparin 100 MG/ML injection    LOVENOX    2 mL    Inject 1 mL (100 mg) Subcutaneous 2 times daily       levothyroxine 150 MCG tablet    SYNTHROID/LEVOTHROID     Take 150 mcg by mouth daily       LEXAPRO 20 MG tablet   Generic drug:  escitalopram      Take 20 mg by mouth daily.       loperamide 2 MG capsule    IMODIUM    30 capsule    Start with 2 caps (4 mg), then take one cap (2 mg) after each diarrheal stool as needed. Do not use more than 8 caps (16 mg) per day.       NORVASC 5 MG tablet   Generic drug:  amLODIPine      Take 5 mg by mouth daily       OMEGA-3 FISH OIL PO          ondansetron 8 MG tablet    ZOFRAN    10 tablet    Take 1 tablet (8 mg) by mouth every 8 hours as needed (Nausea/Vomiting)       prochlorperazine 10 MG tablet    COMPAZINE    30 tablet    Take 1 tablet (10 mg) by mouth every 6 hours as needed (Nausea/Vomiting)       senna-docusate 8.6-50 MG per tablet    SENNA S    60 tablet    1-2 tabs daily for constipation       tetrahydrozoline 0.05 % ophthalmic solution      1 drop 3 times daily       TOPAMAX 50 MG tablet   Generic drug:  topiramate      Take  by mouth 2 times daily. 1 tab q am 2 tabs q PM       triamterene-hydrochlorothiazide 37.5-25 MG per tablet    MAXZIDE-25     Take 1 tablet by mouth daily.       UNABLE TO FIND      MEDICATION NAME: Nature made digestive probiotics       VITAMIN D (CHOLECALCIFEROL) PO      Take 2,000 Units by mouth daily       warfarin 5 MG tablet    COUMADIN    60 tablet    Take 1 tablet (5 mg) by mouth daily

## 2017-03-14 ENCOUNTER — ANTICOAGULATION THERAPY VISIT (OUTPATIENT)
Dept: PHARMACY | Facility: CLINIC | Age: 72
End: 2017-03-14
Payer: COMMERCIAL

## 2017-03-14 ENCOUNTER — TRANSFERRED RECORDS (OUTPATIENT)
Dept: HEALTH INFORMATION MANAGEMENT | Facility: CLINIC | Age: 72
End: 2017-03-14

## 2017-03-14 DIAGNOSIS — I82.90 ACUTE DEEP VEIN THROMBOSIS (DVT) OF NON-EXTREMITY VEIN: ICD-10-CM

## 2017-03-14 DIAGNOSIS — Z79.01 LONG-TERM (CURRENT) USE OF ANTICOAGULANTS: ICD-10-CM

## 2017-03-14 LAB — INR PPP: 2.1

## 2017-03-14 PROCEDURE — 99207 ZZC NO CHARGE NURSE ONLY: CPT | Performed by: FAMILY MEDICINE

## 2017-03-14 NOTE — MR AVS SNAPSHOT
Damari Manny   3/14/2017   Anticoagulation Therapy Visit    Description:  71 year old female   Provider:  Esther Greene MD   Department:  Mg Med Monitoring           INR as of 3/14/2017     Today's INR 2.1      Anticoagulation Summary as of 3/14/2017     INR goal 2.0-3.0   Today's INR 2.1   Full instructions 3/14: 7.5 mg; 3/15: 7.5 mg; 3/16: 7.5 mg; Otherwise 10 mg on Mon, Thu; 5 mg all other days   Next INR check 3/17/2017    Indications   Deep vein thrombosis (DVT) (H) [I82.409] [I82.409]  Long-term (current) use of anticoagulants [Z79.01] [Z79.01]         Your next Anticoagulation Clinic appointment(s)     Mar 17, 2017 12:40 PM CDT   [facility] with INR MG   [facility]    [address] [place] [address] [phone]              Contact Numbers     [facility]  Please call the anticoagulation nurse at [phone] to cancel and/or reschedule your appointment, or with any problems or questions regarding your therapy.  You may call the main clinic number at [phone] if the nurse is not available.           March 2017 Details    Sun Mon Tue Wed Thu Fri Sat        1               2               3               4                 5               6               7               8               9               10               11                 12               13               14      7.5 mg   See details      15      7.5 mg         16      7.5 mg         17            18                 19               20               21               22               23               24               25                 26               27               28               29               30               31                 Date Details   03/14 This INR check       Date of next INR:  3/17/2017         How to take your warfarin dose     To take:  5 mg Take 1 of the 5 mg tablets.    To take:  7.5 mg Take 1.5 of the 5 mg tablets.

## 2017-03-14 NOTE — PROGRESS NOTES
ANTICOAGULATION FOLLOW-UP CLINIC VISIT    Patient Name:  Damari Bryant  Date:  3/14/2017  Contact Type:  Telephone/ Ross, patient's     SUBJECTIVE:     Patient Findings     Positives No Problem Findings    Comments Patient finished a 10 day course of an antibiotic on Sunday (cannot remember the name) for sinusitis. From her PCP Dr. Cobian.  She is feeling better.           OBJECTIVE    INR   Date Value Ref Range Status   03/14/2017 2.1  Final       ASSESSMENT / PLAN  INR assessment THER    Recheck INR In: 3 DAYS    INR Location Outside lab      Anticoagulation Summary as of 3/14/2017     INR goal 2.0-3.0   Today's INR 2.1   Maintenance plan 10 mg (5 mg x 2) on Mon, Thu; 5 mg (5 mg x 1) all other days   Full instructions 3/14: 7.5 mg; 3/15: 7.5 mg; 3/16: 7.5 mg; Otherwise 10 mg on Mon, Thu; 5 mg all other days   Weekly total 45 mg   Plan last modified Mary Berry RN (2/17/2017)   Next INR check 3/17/2017   Priority INR   Target end date 8/14/2017    Indications   Deep vein thrombosis (DVT) (H) [I82.409] [I82.409]  Long-term (current) use of anticoagulants [Z79.01] [Z79.01]         Anticoagulation Episode Summary     INR check location     Preferred lab [facility]    Send INR reminders to [facility] INR    Comments Pateint has chemo infusion pump for 48 hours every 2 weeks.  Her INR rises during this infusion. Her next scheduled infusion is scheduled 2/21/17 and 3/7/17. Call [employer] cell phone for INR results [phone]  Per Esther Greene MD- Goal of 2-3.      Anticoagulation Care Providers     Provider Role Specialty Phone number    Esther Greene MD Responsible Hematology [phone]            See the Encounter Report to view Anticoagulation Flowsheet and Dosing Calendar (Go to Encounters tab in chart review, and find the Anticoagulation Therapy Visit)        Nina Silva, MELONIE

## 2017-03-17 ENCOUNTER — ANTICOAGULATION THERAPY VISIT (OUTPATIENT)
Dept: PHARMACY | Facility: CLINIC | Age: 72
End: 2017-03-17
Payer: COMMERCIAL

## 2017-03-17 ENCOUNTER — ONCOLOGY VISIT (OUTPATIENT)
Dept: ONCOLOGY | Facility: CLINIC | Age: 72
End: 2017-03-17
Payer: COMMERCIAL

## 2017-03-17 DIAGNOSIS — Z79.01 LONG-TERM (CURRENT) USE OF ANTICOAGULANTS: ICD-10-CM

## 2017-03-17 DIAGNOSIS — I82.90 ACUTE DEEP VEIN THROMBOSIS (DVT) OF NON-EXTREMITY VEIN: ICD-10-CM

## 2017-03-17 DIAGNOSIS — F41.9 ANXIETY: Primary | ICD-10-CM

## 2017-03-17 LAB — INR POINT OF CARE: 2.6 (ref 2–3)

## 2017-03-17 PROCEDURE — 99207 ZZC NO CHARGE NURSE ONLY: CPT

## 2017-03-17 PROCEDURE — 85610 PROTHROMBIN TIME: CPT | Mod: QW

## 2017-03-17 PROCEDURE — 90791 PSYCH DIAGNOSTIC EVALUATION: CPT | Performed by: PSYCHOLOGIST

## 2017-03-17 PROCEDURE — 36416 COLLJ CAPILLARY BLOOD SPEC: CPT

## 2017-03-17 NOTE — MR AVS SNAPSHOT
After Visit Summary   3/17/2017    Damari Bryant    MRN: [id number]           Patient Information     Date Of Birth          1945        Visit Information        Provider Department      3/17/2017 11:00 AM Bernardino Rodriguez PsyD [facility]        Today's Diagnoses     Anxiety    -  1       Follow-ups after your visit        Follow-up notes from your care team     Return in about 2 weeks (around 3/31/2017).      Your next 10 appointments already scheduled     Mar 21, 2017  9:45 AM CDT   Return Visit with [facility]SE [facility]   [facility] ([facility])    [address] [place] [address]   [phone]            Mar 21, 2017 10:15 AM CDT   Return Visit with SANJEEV Camarillo CNP   [facility] ([facility])    [address] [place] [id number] [phone]            Mar 21, 2017 11:00 AM CDT   Level 3 with [address] ([facility])    [address] [place] [id number] [phone]              Who to contact     If you have questions or need follow up information about today's clinic visit or your schedule please contact [facility] directly at [phone].  Normal or non-critical lab and imaging results will be communicated to you by "[employer]hart, letter or phone within 4 business days after the clinic has received the results. If you do not hear from us within 7 days, please contact the clinic through "[employer]hart or phone. If you have a critical or abnormal lab result, we will notify you by phone as soon as possible.  Submit refill requests through TVTY or call your pharmacy and they will forward the refill request to us. Please allow 3 business days for your refill to be completed.          Additional Information About Your Visit        "[employer]opher Information     [employer] is an electronic gateway  that provides easy, online access to your medical records. With [employer], you can request a clinic appointment, read your test results, renew a prescription or communicate with your care team.     To sign up for [employer] visit the website at [email or web address]   You will be asked to enter the access code listed below, as well as some personal information. Please follow the directions to create your username and password.     Your access code is: [email or web address]  Expires: 2017  2:26 PM     Your access code will  in 90 days. If you need help or a new code, please contact your [facility] or call [phone] for assistance.        Care EveryWhere ID     This is your Care EveryWhere ID. This could be used by other organizations to access your [place] medical records  [id number]         Blood Pressure from Last 3 Encounters:   03/10/17 128/87   17 140/76   17 (!) 158/102    Weight from Last 3 Encounters:   17 98.9 kg (218 lb)   17 97.5 kg (215 lb)   17 99.5 kg (219 lb 4.8 oz)              Today, you had the following     No orders found for display       Primary Care Provider Office Phone # Fax #    Samuel RUFF Aranza [phone]       [facility] [place] [address] [facility] [address]        Thank you!     Thank you for choosing [facility]  for your care. Our goal is always to provide you with excellent care. Hearing back from our patients is one way we can continue to improve our services. Please take a few minutes to complete the written survey that you may receive in the mail after your visit with us. Thank you!             Your Updated Medication List - Protect others around you: Learn how to safely use, store and throw away your medicines at www.disposemymeds.org.          This list is accurate as of: 3/17/17 11:59 PM.  Always use your most recent med list.                   Brand Name Dispense  Instructions for use    busPIRone 15 MG tablet    BUSPAR     Take 15 mg by mouth 2 times daily       enoxaparin 100 MG/ML injection    LOVENOX    2 mL    Inject 1 mL (100 mg) Subcutaneous 2 times daily       levothyroxine 150 MCG tablet    SYNTHROID/LEVOTHROID     Take 150 mcg by mouth daily       LEXAPRO 20 MG tablet   Generic drug:  escitalopram      Take 20 mg by mouth daily.       loperamide 2 MG capsule    IMODIUM    30 capsule    Start with 2 caps (4 mg), then take one cap (2 mg) after each diarrheal stool as needed. Do not use more than 8 caps (16 mg) per day.       NORVASC 5 MG tablet   Generic drug:  amLODIPine      Take 5 mg by mouth daily       OMEGA-3 FISH OIL PO          ondansetron 8 MG tablet    ZOFRAN    10 tablet    Take 1 tablet (8 mg) by mouth every 8 hours as needed (Nausea/Vomiting)       prochlorperazine 10 MG tablet    COMPAZINE    30 tablet    Take 1 tablet (10 mg) by mouth every 6 hours as needed (Nausea/Vomiting)       senna-docusate 8.6-50 MG per tablet    SENNA S    60 tablet    1-2 tabs daily for constipation       tetrahydrozoline 0.05 % ophthalmic solution      1 drop 3 times daily       TOPAMAX 50 MG tablet   Generic drug:  topiramate      Take  by mouth 2 times daily. 1 tab q am 2 tabs q PM       triamterene-hydrochlorothiazide 37.5-25 MG per tablet    MAXZIDE-25     Take 1 tablet by mouth daily.       UNABLE TO FIND      MEDICATION NAME: Nature made digestive probiotics       VITAMIN D (CHOLECALCIFEROL) PO      Take 2,000 Units by mouth daily       warfarin 5 MG tablet    COUMADIN    60 tablet    Take 1 tablet (5 mg) by mouth daily

## 2017-03-17 NOTE — MR AVS SNAPSHOT
Damari Bryant   3/17/2017 12:40 PM   Anticoagulation Therapy Visit    Description:  71 year old female   Provider:  INR MG   Department:  Mg Med Monitoring           INR as of 3/17/2017     Today's INR 2.6      Anticoagulation Summary as of 3/17/2017     INR goal 2.0-3.0   Today's INR 2.6   Full instructions 3/20: 2.5 mg; Otherwise 10 mg on Mon, Thu; 5 mg all other days   Next INR check 3/21/2017    Indications   Deep vein thrombosis (DVT) (H) [I82.409] [I82.409]  Long-term (current) use of anticoagulants [Z79.01] [Z79.01]         Your next Anticoagulation Clinic appointment(s)     Mar 17, 2017 12:40 PM CDT   Anticoagulation Visit with INR MG   [facility]    [address] [place] [address] [phone]              Contact Numbers     [facility]  Please call the anticoagulation nurse at [phone] to cancel and/or reschedule your appointment, or with any problems or questions regarding your therapy.  You may call the main clinic number at [phone] if the nurse is not available.           March 2017 Details    Sun Mon Tue Wed Thu Fri Sat        1               2               3               4                 5               6               7               8               9               10               11                 12               13               14               15               16               17      5 mg   See details      18      5 mg           19      5 mg         20      2.5 mg         21            22               23               24               25                 26               27               28               29               30               31                 Date Details   03/17 This INR check       Date of next INR:  3/21/2017         How to take your warfarin dose     To take:  2.5 mg Take 0.5 of a 5 mg tablet.    To take:  5 mg Take 1 of the 5 mg tablets.

## 2017-03-17 NOTE — PROGRESS NOTES
ANTICOAGULATION FOLLOW-UP CLINIC VISIT    Patient Name:  Damari Bryant  Date:  3/17/2017  Contact Type:  Face to Face    SUBJECTIVE:     Patient Findings     Positives Antibiotic use or infection    Comments Patient started Zpak for sinus infection.  Will get INR labs at chemo Tuesday 3/21 will call with results.           OBJECTIVE    INR Protime   Date Value Ref Range Status   03/17/2017 2.6 2.0 - 3.0 Final       ASSESSMENT / PLAN  INR assessment THER    Recheck INR In: 4 DAYS    INR Location Clinic      Anticoagulation Summary as of 3/17/2017     INR goal 2.0-3.0   Today's INR 2.6   Maintenance plan 10 mg (5 mg x 2) on Mon, Thu; 5 mg (5 mg x 1) all other days   Full instructions 3/20: 2.5 mg; Otherwise 10 mg on Mon, Thu; 5 mg all other days   Weekly total 45 mg   Plan last modified Mary Berry RN (2/17/2017)   Next INR check 3/21/2017   Priority INR   Target end date 8/14/2017    Indications   Deep vein thrombosis (DVT) (H) [I82.409] [I82.409]  Long-term (current) use of anticoagulants [Z79.01] [Z79.01]         Anticoagulation Episode Summary     INR check location     Preferred lab [facility]    Send INR reminders to [facility] INR    Comments Pateint has chemo infusion pump for 48 hours every 2 weeks.  Her INR rises during this infusion. Her next scheduled infusion is scheduled 2/21/17 and 3/7/17. Call [employer] cell phone for INR results [phone]  Per Esther Greene MD- Goal of 2-3.      Anticoagulation Care Providers     Provider Role Specialty Phone number    Esther Greene MD Responsible Hematology [phone]            See the Encounter Report to view Anticoagulation Flowsheet and Dosing Calendar (Go to Encounters tab in chart review, and find the Anticoagulation Therapy Visit)        Mary Berry, RN

## 2017-03-19 NOTE — PROGRESS NOTES
"Confidential Summary of Oncology Psychology Initial Visit    Damari Bryant is a 71 year old female who was referred by Dr. Greene for anxiety.   The patient was seen for an initial 40 minute evaluation on 3/17/2017.    Presenting Concerns: Mrs. Damari Bryant reported being very anxious secondary to her cancer recurrence and the side-effects she experiences from her cancer treatment. She reported experiencing worry, intrusive thoughts about her illness, anxiousness, trouble sleeping, difficulty concentrating, tearfulness, and agitated irritability. She reported always thinking about her cancer and feeling emotionally exhausted by not \"shutting it down.\" All physical sensations remind her of her cancer and she slows down her functioning when she feels \"bad.\"     Mental Status/Interview:   Orientation: Damari Bryant was alert, attentive, and oriented to time, place, and person  Affect: Affect was appropriate to the situation and showed normal range and stability. Her anxiousness was evident in her voice quality.   Speech: Speech was clear with normal fluency, rate, tone, and volume.  Memory: Although not formally assessed, immediate, recent, and remote memory appeared to be intact.   Insight and Judgement: Damari Bryant demonstrates adequate awareness of the issues discussed and was able to come to reasonable conclusions.  Thought: Thought patterns were coherent and logical. Hallucinations were denied and delusions were not evident.   Lethality: Damari Bryant denied suicidal or homicidal ideation or intention.        Impression: Mrs. Damari Bryant is focusing all of her thoughts on her cancer and prognosis. Every physical sensation she has remind her of her cancer and trigger her constant ruminations. When she feels bad physically, she stops functioning and sits in her recliner. She was surprised by the idea of being able to control your own thoughts and focus her attention on gratitude and happiness. She easily listed " six aspects of her life she is happy about and understood the concept of focusing your attention.     Diagnosis:   Encounter Diagnosis   Name Primary?     Anxiety Yes     Recommendations/Plan:  1. Use the cognitive techniques discussed during this appointment  2. Return for follow-up in 2-3 weeks.     Thank you for this opportunity to participate in your care of this patient.    Bernardino Rodriguez Psy.D., L.P.  Director, Oncology Supportive Care

## 2017-03-21 ENCOUNTER — ONCOLOGY VISIT (OUTPATIENT)
Dept: ONCOLOGY | Facility: CLINIC | Age: 72
End: 2017-03-21
Payer: COMMERCIAL

## 2017-03-21 ENCOUNTER — ANTICOAGULATION THERAPY VISIT (OUTPATIENT)
Dept: PHARMACY | Facility: CLINIC | Age: 72
End: 2017-03-21
Payer: COMMERCIAL

## 2017-03-21 ENCOUNTER — INFUSION THERAPY VISIT (OUTPATIENT)
Dept: INFUSION THERAPY | Facility: CLINIC | Age: 72
End: 2017-03-21
Payer: COMMERCIAL

## 2017-03-21 VITALS — SYSTOLIC BLOOD PRESSURE: 134 MMHG | DIASTOLIC BLOOD PRESSURE: 87 MMHG | HEART RATE: 64 BPM

## 2017-03-21 VITALS
HEART RATE: 96 BPM | TEMPERATURE: 97.7 F | OXYGEN SATURATION: 94 % | SYSTOLIC BLOOD PRESSURE: 146 MMHG | DIASTOLIC BLOOD PRESSURE: 94 MMHG | HEIGHT: 66 IN | BODY MASS INDEX: 34.72 KG/M2 | RESPIRATION RATE: 20 BRPM | WEIGHT: 216 LBS

## 2017-03-21 DIAGNOSIS — J32.9 SINUSITIS, UNSPECIFIED CHRONICITY, UNSPECIFIED LOCATION: ICD-10-CM

## 2017-03-21 DIAGNOSIS — C79.9 METASTASIS FROM COLON CANCER (H): Primary | ICD-10-CM

## 2017-03-21 DIAGNOSIS — C18.9 METASTASIS FROM COLON CANCER (H): Primary | ICD-10-CM

## 2017-03-21 DIAGNOSIS — I82.90 ACUTE DEEP VEIN THROMBOSIS (DVT) OF NON-EXTREMITY VEIN: ICD-10-CM

## 2017-03-21 DIAGNOSIS — F41.9 ANXIETY: ICD-10-CM

## 2017-03-21 DIAGNOSIS — R19.5 LOOSE STOOLS: ICD-10-CM

## 2017-03-21 DIAGNOSIS — R06.02 SOB (SHORTNESS OF BREATH): ICD-10-CM

## 2017-03-21 DIAGNOSIS — Z79.01 LONG-TERM (CURRENT) USE OF ANTICOAGULANTS: ICD-10-CM

## 2017-03-21 DIAGNOSIS — Z79.01 LONG TERM CURRENT USE OF ANTICOAGULANT THERAPY: ICD-10-CM

## 2017-03-21 DIAGNOSIS — G62.9 NEUROPATHY: ICD-10-CM

## 2017-03-21 LAB
ALBUMIN UR-MCNC: 10 MG/DL
BASOPHILS # BLD AUTO: 0 10E9/L (ref 0–0.2)
BASOPHILS NFR BLD AUTO: 0.4 %
BILIRUB SERPL-MCNC: 0.4 MG/DL (ref 0.2–1.3)
CEA SERPL-MCNC: 3.5 UG/L (ref 0–2.5)
DIFFERENTIAL METHOD BLD: ABNORMAL
EOSINOPHIL # BLD AUTO: 0.3 10E9/L (ref 0–0.7)
EOSINOPHIL NFR BLD AUTO: 6.2 %
ERYTHROCYTE [DISTWIDTH] IN BLOOD BY AUTOMATED COUNT: 16.5 % (ref 10–15)
HCT VFR BLD AUTO: 41.3 % (ref 35–47)
HGB BLD-MCNC: 13.7 G/DL (ref 11.7–15.7)
INR BLD: 2.5 (ref 0.86–1.14)
LYMPHOCYTES # BLD AUTO: 2 10E9/L (ref 0.8–5.3)
LYMPHOCYTES NFR BLD AUTO: 40.9 %
MCH RBC QN AUTO: 30.3 PG (ref 26.5–33)
MCHC RBC AUTO-ENTMCNC: 33.2 G/DL (ref 31.5–36.5)
MCV RBC AUTO: 91 FL (ref 78–100)
MONOCYTES # BLD AUTO: 0.3 10E9/L (ref 0–1.3)
MONOCYTES NFR BLD AUTO: 5.4 %
NEUTROPHILS # BLD AUTO: 2.3 10E9/L (ref 1.6–8.3)
NEUTROPHILS NFR BLD AUTO: 47.1 %
PLATELET # BLD AUTO: 140 10E9/L (ref 150–450)
RBC # BLD AUTO: 4.52 10E12/L (ref 3.8–5.2)
WBC # BLD AUTO: 4.8 10E9/L (ref 4–11)

## 2017-03-21 PROCEDURE — 82247 BILIRUBIN TOTAL: CPT | Performed by: NURSE PRACTITIONER

## 2017-03-21 PROCEDURE — 85025 COMPLETE CBC W/AUTO DIFF WBC: CPT | Performed by: NURSE PRACTITIONER

## 2017-03-21 PROCEDURE — 81002 URINALYSIS NONAUTO W/O SCOPE: CPT | Performed by: NURSE PRACTITIONER

## 2017-03-21 PROCEDURE — 96416 CHEMO PROLONG INFUSE W/PUMP: CPT

## 2017-03-21 PROCEDURE — 85610 PROTHROMBIN TIME: CPT | Mod: QW | Performed by: INTERNAL MEDICINE

## 2017-03-21 PROCEDURE — 99207 ZZC NO CHARGE NURSE ONLY: CPT | Performed by: INTERNAL MEDICINE

## 2017-03-21 PROCEDURE — 99214 OFFICE O/P EST MOD 30 MIN: CPT | Mod: 25 | Performed by: NURSE PRACTITIONER

## 2017-03-21 PROCEDURE — 96415 CHEMO IV INFUSION ADDL HR: CPT | Performed by: NURSE PRACTITIONER

## 2017-03-21 PROCEDURE — 36416 COLLJ CAPILLARY BLOOD SPEC: CPT | Performed by: INTERNAL MEDICINE

## 2017-03-21 PROCEDURE — 96375 TX/PRO/DX INJ NEW DRUG ADDON: CPT | Performed by: NURSE PRACTITIONER

## 2017-03-21 PROCEDURE — 81003 URINALYSIS AUTO W/O SCOPE: CPT | Performed by: NURSE PRACTITIONER

## 2017-03-21 PROCEDURE — 96413 CHEMO IV INFUSION 1 HR: CPT | Performed by: NURSE PRACTITIONER

## 2017-03-21 PROCEDURE — 82378 CARCINOEMBRYONIC ANTIGEN: CPT | Performed by: NURSE PRACTITIONER

## 2017-03-21 PROCEDURE — 99207 ZZC NO CHARGE NURSE ONLY: CPT

## 2017-03-21 PROCEDURE — 96417 CHEMO IV INFUS EACH ADDL SEQ: CPT | Performed by: NURSE PRACTITIONER

## 2017-03-21 RX ORDER — EPINEPHRINE 0.3 MG/.3ML
0.3 INJECTION SUBCUTANEOUS EVERY 5 MIN PRN
Status: CANCELLED | OUTPATIENT
Start: 2017-03-21

## 2017-03-21 RX ORDER — ALBUTEROL SULFATE 90 UG/1
1-2 AEROSOL, METERED RESPIRATORY (INHALATION)
Status: CANCELLED
Start: 2017-03-21

## 2017-03-21 RX ORDER — ALBUTEROL SULFATE 0.83 MG/ML
2.5 SOLUTION RESPIRATORY (INHALATION)
Status: CANCELLED | OUTPATIENT
Start: 2017-03-21

## 2017-03-21 RX ORDER — SODIUM CHLORIDE 9 MG/ML
1000 INJECTION, SOLUTION INTRAVENOUS CONTINUOUS PRN
Status: CANCELLED
Start: 2017-03-21

## 2017-03-21 RX ORDER — EPINEPHRINE 1 MG/ML
0.3 INJECTION INTRAMUSCULAR; INTRAVENOUS; SUBCUTANEOUS EVERY 5 MIN PRN
Status: CANCELLED | OUTPATIENT
Start: 2017-03-21

## 2017-03-21 RX ORDER — HEPARIN SODIUM (PORCINE) LOCK FLUSH IV SOLN 100 UNIT/ML 100 UNIT/ML
5 SOLUTION INTRAVENOUS
Status: DISCONTINUED | OUTPATIENT
Start: 2017-03-21 | End: 2017-03-21 | Stop reason: HOSPADM

## 2017-03-21 RX ORDER — LORAZEPAM 2 MG/ML
0.5 INJECTION INTRAMUSCULAR EVERY 4 HOURS PRN
Status: CANCELLED
Start: 2017-03-21

## 2017-03-21 RX ORDER — DIPHENHYDRAMINE HYDROCHLORIDE 50 MG/ML
50 INJECTION INTRAMUSCULAR; INTRAVENOUS
Status: CANCELLED
Start: 2017-03-21

## 2017-03-21 RX ORDER — METHYLPREDNISOLONE SODIUM SUCCINATE 125 MG/2ML
125 INJECTION, POWDER, LYOPHILIZED, FOR SOLUTION INTRAMUSCULAR; INTRAVENOUS
Status: CANCELLED
Start: 2017-03-21

## 2017-03-21 RX ORDER — MEPERIDINE HYDROCHLORIDE 50 MG/ML
25 INJECTION INTRAMUSCULAR; INTRAVENOUS; SUBCUTANEOUS EVERY 30 MIN PRN
Status: CANCELLED | OUTPATIENT
Start: 2017-03-21

## 2017-03-21 RX ADMIN — HEPARIN SODIUM (PORCINE) LOCK FLUSH IV SOLN 100 UNIT/ML 5 ML: 100 SOLUTION at 10:08

## 2017-03-21 RX ADMIN — Medication 250 ML: at 11:44

## 2017-03-21 ASSESSMENT — PAIN SCALES - GENERAL: PAINLEVEL: NO PAIN (0)

## 2017-03-21 NOTE — PROGRESS NOTES
ANTICOAGULATION FOLLOW-UP CLINIC VISIT    Patient Name:  Damari Bryant  Date:  3/21/2017  Contact Type:  Telephone/ Ross, patient's     SUBJECTIVE:     Patient Findings     Comments Some blood in mucous when blowing nose, no nose bleeds or other bleeding concerns.   Had infusion pump placed today for chemo, will be taken off on 3/23/17 and rechecked.           OBJECTIVE    INR Point of Care   Date Value Ref Range Status   03/21/2017 2.5 (H) 0.86 - 1.14 Final     Comment:     This test is intended for monitoring Coumadin therapy.  Results are not   accurate   in patients with prolonged INR due to factor deficiency.         ASSESSMENT / PLAN  INR assessment THER    Recheck INR In: 2 DAYS    INR Location Outside lab      Anticoagulation Summary as of 3/21/2017     INR goal 2.0-3.0   Today's INR    Maintenance plan 10 mg (5 mg x 2) on Mon, Thu; 5 mg (5 mg x 1) all other days   Full instructions 3/22: 7.5 mg; Otherwise 10 mg on Mon, Thu; 5 mg all other days   Weekly total 45 mg   Plan last modified Mary Berry RN (2/17/2017)   Next INR check 3/23/2017   Priority INR   Target end date 8/14/2017    Indications   Deep vein thrombosis (DVT) (H) [I82.409] [I82.409]  Long-term (current) use of anticoagulants [Z79.01] [Z79.01]         Anticoagulation Episode Summary     INR check location     Preferred lab [facility]    Send INR reminders to [facility] INR    Comments Pateint has chemo infusion pump for 48 hours every 2 weeks.  Her INR rises during this infusion. Her most recent scheduled infusion was  3/21/17.   Call [employer] cell phone for INR results [phone]  Per Esther Greene MD- Goal of 2-3.      Anticoagulation Care Providers     Provider Role Specialty Phone number    Esther Greene MD Responsible Hematology [phone]            See the Encounter Report to view Anticoagulation Flowsheet and Dosing Calendar (Go to Encounters tab in chart review, and find the Anticoagulation Therapy  Visit)        Nina Silva RN

## 2017-03-21 NOTE — MR AVS SNAPSHOT
Damari Manny   3/21/2017   Anticoagulation Therapy Visit    Description:  71 year old female   Provider:  Esther Greene MD   Department:  Mg Med Monitoring           INR as of 3/21/2017     Today's INR 2.5      Anticoagulation Summary as of 3/21/2017     INR goal 2.0-3.0   Today's INR 2.5   Full instructions 3/22: 7.5 mg; Otherwise 10 mg on Mon, Thu; 5 mg all other days   Next INR check 3/23/2017    Indications   Deep vein thrombosis (DVT) (H) [I82.409] [I82.409]  Long-term (current) use of anticoagulants [Z79.01] [Z79.01]         Your next Anticoagulation Clinic appointment(s)     Mar 23, 2017  1:00 PM CDT   Anticoagulation Visit with INR MG   [facility]    [address] [place] [address] [phone]              Contact Numbers     [facility]  Please call the anticoagulation nurse at [phone] to cancel and/or reschedule your appointment, or with any problems or questions regarding your therapy.  You may call the main clinic number at [phone] if the nurse is not available.           March 2017 Details    Sun Mon Tue Wed Thu Fri Sat        1               2               3               4                 5               6               7               8               9               10               11                 12               13               14               15               16               17               18                 19               20               21      5 mg   See details      22      7.5 mg         23            24               25                 26               27               28               29               30               31                 Date Details   03/21 This INR check       Date of next INR:  3/23/2017         How to take your warfarin dose     To take:  5 mg Take 1 of the 5 mg tablets.    To take:  7.5 mg Take 1.5 of the 5 mg tablets.    To take:  10 mg Take 2 of the 5 mg  tablets.

## 2017-03-21 NOTE — MR AVS SNAPSHOT
After Visit Summary   3/21/2017    Damari Bryant    MRN: [id number]           Patient Information     Date Of Birth          1945        Visit Information        Provider Department      3/21/2017 9:45 AM NURSE ONLY CANCER CENTER [facility]        Today's Diagnoses     Metastasis from colon cancer (H)    -  1       Follow-ups after your visit        Your next 10 appointments already scheduled     Mar 21, 2017 10:15 AM CDT   Return Visit with SANJEEV Camarillo CNP   [facility] ([facility]    [address] [place] [address] [phone]            Mar 21, 2017 11:00 AM CDT   Level 3 with BAY 6 INFUSION   [facility])    [address] [place] [address] [phone]              Who to contact     If you have questions or need follow up information about today's clinic visit or your schedule please contact [facility] directly at [phone].  Normal or non-critical lab and imaging results will be communicated to you by Vint Traininghart, letter or phone within 4 business days after the clinic has received the results. If you do not hear from us within 7 days, please contact the clinic through Vint Traininghart or phone. If you have a critical or abnormal lab result, we will notify you by phone as soon as possible.  Submit refill requests through Dreampod or call your pharmacy and they will forward the refill request to us. Please allow 3 business days for your refill to be completed.          Additional Information About Your Visit        [facility] Information     Dreampod is an electronic gateway that provides easy, online access to your medical records. With Dreampod, you can request a clinic appointment, read your test results, renew a prescription or communicate with your care team.     To sign up for Dreampod visit the website at [email or web address]    You will be asked to enter the access code listed below, as well as some personal information. Please follow the directions to create your username and password.     Your access code is: [email or web address]  Expires: 2017  2:26 PM     Your access code will  in 90 days. If you need help or a new code, please contact your [facility] or call [phone] for assistance.        Care EveryWhere ID     This is your Care EveryWhere ID. This could be used by other organizations to access your [place] medical records  [id number]         Blood Pressure from Last 3 Encounters:   03/10/17 128/87   17 140/76   17 (!) 158/102    Weight from Last 3 Encounters:   17 98.9 kg (218 lb)   17 97.5 kg (215 lb)   17 99.5 kg (219 lb 4.8 oz)              We Performed the Following     Bilirubin  total     CBC with platelets differential     CEA     Central Venous Catheter: implanted port (Port-A-Cath, Power Port)     Protein qualitative urine        Primary Care Provider Office Phone # Fax #    Samuel RUFF Dotevelia [phone]       [facility] [address] [facility] [address]        Thank you!     Thank you for choosing [facility]  for your care. Our goal is always to provide you with excellent care. Hearing back from our patients is one way we can continue to improve our services. Please take a few minutes to complete the written survey that you may receive in the mail after your visit with us. Thank you!             Your Updated Medication List - Protect others around you: Learn how to safely use, store and throw away your medicines at www.disposemymeds.org.          This list is accurate as of: 3/21/17 10:09 AM.  Always use your most recent med list.                   Brand Name Dispense Instructions for use    busPIRone 15 MG tablet    BUSPAR     Take 15 mg by mouth 2 times daily       enoxaparin 100 MG/ML injection    LOVENOX    2  mL    Inject 1 mL (100 mg) Subcutaneous 2 times daily       levothyroxine 150 MCG tablet    SYNTHROID/LEVOTHROID     Take 150 mcg by mouth daily       LEXAPRO 20 MG tablet   Generic drug:  escitalopram      Take 20 mg by mouth daily.       loperamide 2 MG capsule    IMODIUM    30 capsule    Start with 2 caps (4 mg), then take one cap (2 mg) after each diarrheal stool as needed. Do not use more than 8 caps (16 mg) per day.       NORVASC 5 MG tablet   Generic drug:  amLODIPine      Take 5 mg by mouth daily       OMEGA-3 FISH OIL PO          ondansetron 8 MG tablet    ZOFRAN    10 tablet    Take 1 tablet (8 mg) by mouth every 8 hours as needed (Nausea/Vomiting)       prochlorperazine 10 MG tablet    COMPAZINE    30 tablet    Take 1 tablet (10 mg) by mouth every 6 hours as needed (Nausea/Vomiting)       senna-docusate 8.6-50 MG per tablet    SENNA S    60 tablet    1-2 tabs daily for constipation       tetrahydrozoline 0.05 % ophthalmic solution      1 drop 3 times daily       TOPAMAX 50 MG tablet   Generic drug:  topiramate      Take  by mouth 2 times daily. 1 tab q am 2 tabs q PM       triamterene-hydrochlorothiazide 37.5-25 MG per tablet    MAXZIDE-25     Take 1 tablet by mouth daily.       UNABLE TO FIND      MEDICATION NAME: Nature made digestive probiotics       VITAMIN D (CHOLECALCIFEROL) PO      Take 2,000 Units by mouth daily       warfarin 5 MG tablet    COUMADIN    60 tablet    Take 1 tablet (5 mg) by mouth daily

## 2017-03-21 NOTE — MR AVS SNAPSHOT
After Visit Summary   3/21/2017    Damari Bryant    MRN: [id number]           Patient Information     Date Of Birth          1945        Visit Information        Provider Department      3/21/2017 11:00 AM BAY 6 INFUSION [facility]        Today's Diagnoses     Metastasis from colon cancer (H)    -  1       Follow-ups after your visit        Your next 10 appointments already scheduled     Mar 23, 2017 11:20 AM CDT   Anticoagulation Visit with INR MG   [facility])    [address] [facility] [address]   [phone]            Mar 23, 2017 12:30 PM CDT   Level O with BAY 9 INFUSION   [facility] ([facility])    [address] [facility] [address]   [phone]            Apr 04, 2017  7:45 AM CDT   Return Visit with NURSE ONLY CANCER CENTER   [facility] ([facility])    [address] [facility] [address]   [phone]            Apr 04, 2017  8:15 AM CDT   Return Visit with SANJEEV Camarillo CNP   [facility] ([facility])    [address] [facility] [address]   [phone]            Apr 04, 2017  9:15 AM CDT   Level 3 with BAY 7 INFUSION   [facility] ([facility])    [address] [facility] [address]   [phone]            Apr 18, 2017 10:15 AM CDT   Return Visit with NURSE ONLY CANCER CENTER   [facility] ([facility])    [address] [facility] [address]   [phone]            Apr 18, 2017 11:00 AM CDT   Return Visit with Esther Greene MD   [facility] ([facility])    [address] [facility] [address]   [phone]            Apr 18, 2017 11:30 AM CDT   Level 3 with BAY 2 INFUSION   [facility] (  [facility]    [address] [place] [address] [phone]              Who to contact     If you have questions or need follow up information about today's clinic visit or your schedule please contact [facility] directly at [phone].  Normal or non-critical lab and imaging results will be communicated to you by MyChart, letter or phone within 4 business days after the clinic has received the results. If you do not hear from us within 7 days, please contact the clinic through MyChart or phone. If you have a critical or abnormal lab result, we will notify you by phone as soon as possible.  Submit refill requests through Dibspace or call your pharmacy and they will forward the refill request to us. Please allow 3 business days for your refill to be completed.          Additional Information About Your Visit        Event Farmhart Information     Dibspace is an electronic gateway that provides easy, online access to your medical records. With Dibspace, you can request a clinic appointment, read your test results, renew a prescription or communicate with your care team.     To sign up for Dibspace visit the website at [email or web address]   You will be asked to enter the access code listed below, as well as some personal information. Please follow the directions to create your username and password.     Your access code is: [email or web address]  Expires: 2017  2:26 PM     Your access code will  in 90 days. If you need help or a new code, please contact your [facility] or call [phone] for assistance.        Care EveryWhere ID     This is your Care EveryWhere ID. This could be used by other organizations to access your [place] medical records  [id number]        Your Vitals Were     Pulse                   64            Blood Pressure from Last 3 Encounters:   17 134/87   17 (!) 146/94   03/10/17 128/87    Weight from  Last 3 Encounters:   03/21/17 98 kg (216 lb)   03/08/17 98.9 kg (218 lb)   02/16/17 97.5 kg (215 lb)              We Performed the Following     MD Instruction for Protocol     Road Map Alert     Treatment Conditions        Primary Care Provider Office Phone # Fax #    Samuel Cobian [phone]       [facility] [address] [facility] [place] [address]        Thank you!     Thank you for choosing [facility]  for your care. Our goal is always to provide you with excellent care. Hearing back from our patients is one way we can continue to improve our services. Please take a few minutes to complete the written survey that you may receive in the mail after your visit with us. Thank you!             Your Updated Medication List - Protect others around you: Learn how to safely use, store and throw away your medicines at www.disposemymeds.org.          This list is accurate as of: 3/21/17  3:14 PM.  Always use your most recent med list.                   Brand Name Dispense Instructions for use    AZITHROMYCIN PO      Take 250 mg by mouth daily       busPIRone 15 MG tablet    BUSPAR     Take 15 mg by mouth 2 times daily       enoxaparin 100 MG/ML injection    LOVENOX    2 mL    Inject 1 mL (100 mg) Subcutaneous 2 times daily       levothyroxine 150 MCG tablet    SYNTHROID/LEVOTHROID     Take 150 mcg by mouth daily       LEXAPRO 20 MG tablet   Generic drug:  escitalopram      Take 20 mg by mouth daily.       loperamide 2 MG capsule    IMODIUM    30 capsule    Start with 2 caps (4 mg), then take one cap (2 mg) after each diarrheal stool as needed. Do not use more than 8 caps (16 mg) per day.       NORVASC 5 MG tablet   Generic drug:  amLODIPine      Take 5 mg by mouth daily       OMEGA-3 FISH OIL PO          ondansetron 8 MG tablet    ZOFRAN    10 tablet    Take 1 tablet (8 mg) by mouth every 8 hours as needed (Nausea/Vomiting)       prochlorperazine 10 MG tablet    COMPAZINE    30 tablet     Take 1 tablet (10 mg) by mouth every 6 hours as needed (Nausea/Vomiting)       senna-docusate 8.6-50 MG per tablet    SENNA S    60 tablet    1-2 tabs daily for constipation       tetrahydrozoline 0.05 % ophthalmic solution      1 drop 3 times daily       TOPAMAX 50 MG tablet   Generic drug:  topiramate      Take  by mouth 2 times daily. 1 tab q am 2 tabs q PM       triamterene-hydrochlorothiazide 37.5-25 MG per tablet    MAXZIDE-25     Take 1 tablet by mouth daily.       UNABLE TO FIND      MEDICATION NAME: Nature made digestive probiotics       VITAMIN D (CHOLECALCIFEROL) PO      Take 2,000 Units by mouth daily       warfarin 5 MG tablet    COUMADIN    60 tablet    Take 1 tablet (5 mg) by mouth daily

## 2017-03-21 NOTE — MR AVS SNAPSHOT
After Visit Summary   3/21/2017    Damari Bryant    MRN: [id number]           Patient Information     Date Of Birth          1945        Visit Information        Provider Department      3/21/2017 10:15 AM Chrystal Morel APRN CNP [facility]        Today's Diagnoses     Acute deep vein thrombosis (DVT) of non-extremity vein        Long term current use of anticoagulant therapy           Follow-ups after your visit        Your next 10 appointments already scheduled     Apr 04, 2017  8:15 AM CDT   Return Visit with SANJEEV Camarillo CNP   [facility] ([facility])    [address] [place] [address]   [phone]            Apr 04, 2017  9:15 AM CDT   Level 3 with BAY 7 INFUSION   [facility] ([facility])    [address] [place] [address] [phone]            Apr 18, 2017 10:15 AM CDT   Return Visit with NURSE ONLY CANCER CENTER   [facility] ([facility])    [address] [place] [address] [phone]            Apr 18, 2017 11:00 AM CDT   Return Visit with Esther Greene MD   [facility]    [address] [place] [address]   [phone]            Apr 18, 2017 11:30 AM CDT   Level 3 with BAY 2 INFUSION   [facility]note 3[facility]    [address] [place] [address] [phone]              Who to contact     If you have questions or need follow up information about today's clinic visit or your schedule please contact [facility] directly at [phone].  Normal or non-critical lab and imaging results will be communicated to you by MyChart, letter or phone within 4 business days after the clinic has received the results. If you do not hear from us within 7 days, please contact the  "clinic through ZeePearlhart or phone. If you have a critical or abnormal lab result, we will notify you by phone as soon as possible.  Submit refill requests through Scoop.it or call your pharmacy and they will forward the refill request to us. Please allow 3 business days for your refill to be completed.          Additional Information About Your Visit        Scoop.it Information     Scoop.it is an electronic gateway that provides easy, online access to your medical records. With Scoop.it, you can request a clinic appointment, read your test results, renew a prescription or communicate with your care team.     To sign up for [email or web address] visit the website at [email or web address]   You will be asked to enter the access code listed below, as well as some personal information. Please follow the directions to create your username and password.     Your access code is: [email or web address]  Expires: 2017  2:26 PM     Your access code will  in 90 days. If you need help or a new code, please contact your [facility] or call [phone] for assistance.        Care EveryWhere ID     This is your Care EveryWhere ID. This could be used by other organizations to access your [place] medical records  [id number]        Your Vitals Were     Pulse Temperature Respirations Height Pulse Oximetry BMI (Body Mass Index)    96 97.7  F (36.5  C) (Oral) 20 1.676 m (5' 5.98\") 94% 34.88 kg/m2       Blood Pressure from Last 3 Encounters:   17 (!) 146/94   03/10/17 128/87   17 140/76    Weight from Last 3 Encounters:   17 98 kg (216 lb)   17 98.9 kg (218 lb)   17 97.5 kg (215 lb)              We Performed the Following     INR point of care        Primary Care Provider Office Phone # Fax #    Samuel Cobian [phone]       [facility] [address] [facility] [place] [address]        Thank you!     Thank you for choosing [facility]  for your care. " Our goal is always to provide you with excellent care. Hearing back from our patients is one way we can continue to improve our services. Please take a few minutes to complete the written survey that you may receive in the mail after your visit with us. Thank you!             Your Updated Medication List - Protect others around you: Learn how to safely use, store and throw away your medicines at www.disposemymeds.org.          This list is accurate as of: 3/21/17 11:07 AM.  Always use your most recent med list.                   Brand Name Dispense Instructions for use    AZITHROMYCIN PO      Take 250 mg by mouth daily       busPIRone 15 MG tablet    BUSPAR     Take 15 mg by mouth 2 times daily       enoxaparin 100 MG/ML injection    LOVENOX    2 mL    Inject 1 mL (100 mg) Subcutaneous 2 times daily       levothyroxine 150 MCG tablet    SYNTHROID/LEVOTHROID     Take 150 mcg by mouth daily       LEXAPRO 20 MG tablet   Generic drug:  escitalopram      Take 20 mg by mouth daily.       loperamide 2 MG capsule    IMODIUM    30 capsule    Start with 2 caps (4 mg), then take one cap (2 mg) after each diarrheal stool as needed. Do not use more than 8 caps (16 mg) per day.       NORVASC 5 MG tablet   Generic drug:  amLODIPine      Take 5 mg by mouth daily       OMEGA-3 FISH OIL PO          ondansetron 8 MG tablet    ZOFRAN    10 tablet    Take 1 tablet (8 mg) by mouth every 8 hours as needed (Nausea/Vomiting)       prochlorperazine 10 MG tablet    COMPAZINE    30 tablet    Take 1 tablet (10 mg) by mouth every 6 hours as needed (Nausea/Vomiting)       senna-docusate 8.6-50 MG per tablet    SENNA S    60 tablet    1-2 tabs daily for constipation       tetrahydrozoline 0.05 % ophthalmic solution      1 drop 3 times daily       TOPAMAX 50 MG tablet   Generic drug:  topiramate      Take  by mouth 2 times daily. 1 tab q am 2 tabs q PM       triamterene-hydrochlorothiazide 37.5-25 MG per tablet    MAXZIDE-25     Take 1 tablet by  mouth daily.       UNABLE TO FIND      MEDICATION NAME: Nature made digestive probiotics       VITAMIN D (CHOLECALCIFEROL) PO      Take 2,000 Units by mouth daily       warfarin 5 MG tablet    COUMADIN    60 tablet    Take 1 tablet (5 mg) by mouth daily

## 2017-03-21 NOTE — PROGRESS NOTES
Oncology Follow Up Visit: March 21, 2017     Oncologist: Dr Esther Greene  PCP: Samuel Cobian    Diagnosis: Mestastatic colon cancer  Damari Bryant is a 72 yo  female diagnosed July 2013 with Stage IIIB pT3N1b MX colon cancer.  Final pathology showed a mildly differentiated colonic adenocarcinoma, tumor extended through the muscularis propria into the pericolonic adipose tissue, lymphovascular invasion present.  Margins negative.  Three out of 30 lymph nodes were positive with 0.5 cm in greatest dimension and extracapsular extension identified.  DNA mismatch repair enzymes were intact.  Two pulmonary nodules noted on staging CT, indeterminate.   Treatment:  7/21/2013.  Sigmoid colectomy.   8/2013 to 1/2014  Adjuvant FOLFOX x 12 cycles.   2/2014 to 12/2016 observation  1/3/2017 began FOLFIRI/Avastin with new site to lung    INTERVAL HISTORY:  Ms Bryant comes to clinic with , Ross And daughter for review prior to continuation of therapy to treat her colon cancer. Pt shares she felt well with last cycle of the FOLFIRI/ Avastin. She continues with loose stools and is using imodium as needed with good response. She does feel she is getting more SOB at times though not sure if this is anxiety or disease. She has had sinus infection being treated with second antibiotic by PCP- now on Zpak and feels this is mildly better with less congestion. Neuropathy continues with feet hurting more some days. Pt did see Dr Rodriguez last week and really liked his care. Denies pain, nausea, weight loss, mouth sores, or chest pain, can feel weak at times but does not last.   Remainder of her comprehensive review of systems is negative.   Current Outpatient Prescriptions   Medication     AZITHROMYCIN PO     warfarin (COUMADIN) 5 MG tablet     enoxaparin (LOVENOX) 100 MG/ML injection     loperamide (IMODIUM) 2 MG capsule     ondansetron (ZOFRAN) 8 MG tablet     senna-docusate (SENNA S) 8.6-50 MG per tablet      "prochlorperazine (COMPAZINE) 10 MG tablet     Omega-3 Fatty Acids (OMEGA-3 FISH OIL PO)     tetrahydrozoline 0.05 % ophthalmic solution     UNABLE TO FIND     VITAMIN D, CHOLECALCIFEROL, PO     busPIRone (BUSPAR) 15 MG tablet     amLODIPine (NORVASC) 5 MG tablet     escitalopram (LEXAPRO) 20 MG tablet     levothyroxine (SYNTHROID, LEVOTHROID) 150 MCG tablet     topiramate (TOPAMAX) 50 MG tablet     triamterene-hydrochlorothiazide (MAXZIDE-25) 37.5-25 MG per tablet     No current facility-administered medications for this visit.         Allergies   Allergen Reactions     Ativan Other (See Comments)     hallucinations     Avocado Nausea and Vomiting     Hmg-Coa-R Inhibitors Nausea     Iodine-131 Nausea and Vomiting     Shellfish Allergy GI Disturbance     Sulfa Drugs Nausea and Vomiting     Tegaderm Transparent Dressing (Informational Only) Rash      FAMILY HISTORY:  Father had colon cancer at age 70. Brother had lung cancer who was a smoker.      PHYSICAL EXAMINATION:   VITAL SIGNS: BP (!) 146/94 (BP Location: Right arm, Patient Position: Chair, Cuff Size: Adult Large)  Pulse 96  Temp 97.7  F (36.5  C) (Oral)  Resp 20  Ht 1.676 m (5' 5.98\")  Wt 98 kg (216 lb)  SpO2 94%  BMI 34.88 kg/m2   Constitutional: Alert, overweight, in good mood but appears anxious   ENT: Eyes bright- glasses , No mouth sores. No PND. TM's clear. No nasal drainage.   Neck: Supple, No adenopathy.Thyroid symmetric   Right port site without redness, swelling  Cardiac: Heart rate and rhythm is regular and strong without murmur  Respiratory: Breathing easy. Lung sounds clear to auscultation  Abdomen: Soft, non-tender, BS normal.  No organomegaly  MS: Muscle tone normal- able step up to exam table without problems. Extremities normal with no edema.   Skin: No suspicious lesions or rashes  Neuro: Sensory grossly WNL, gait normal.   Lymph: Normal ant/post cervical, axillary, supraclavicular nodes  Psych: Mentation appears normal and affect " normal/bright with easy smile.     LABORATORY DATA:.this  Results for orders placed or performed in visit on 03/21/17   INR point of care   Result Value Ref Range    INR Point of Care 2.5 (H) 0.86 - 1.14      Ref. Range 3/17/2017 00:00 3/21/2017 10:00   Bilirubin Total Latest Ref Range: 0.2 - 1.3 mg/dL  0.4   WBC Latest Ref Range: 4.0 - 11.0 10e9/L  4.8   Hemoglobin Latest Ref Range: 11.7 - 15.7 g/dL  13.7   Hematocrit Latest Ref Range: 35.0 - 47.0 %  41.3   Platelet Count Latest Ref Range: 150 - 450 10e9/L  140 (L)   RBC Count Latest Ref Range: 3.8 - 5.2 10e12/L  4.52   MCV Latest Ref Range: 78 - 100 fl  91   MCH Latest Ref Range: 26.5 - 33.0 pg  30.3   MCHC Latest Ref Range: 31.5 - 36.5 g/dL  33.2   RDW Latest Ref Range: 10.0 - 15.0 %  16.5 (H)   Diff Method Unknown  Automated Method   % Neutrophils Latest Units: %  47.1   % Lymphocytes Latest Units: %  40.9   % Monocytes Latest Units: %  5.4   % Eosinophils Latest Units: %  6.2   % Basophils Latest Units: %  0.4   Absolute Neutrophil Latest Ref Range: 1.6 - 8.3 10e9/L  2.3   Absolute Lymphocytes Latest Ref Range: 0.8 - 5.3 10e9/L  2.0   Absolute Monocytes Latest Ref Range: 0.0 - 1.3 10e9/L  0.3   Absolute Eosinophils Latest Ref Range: 0.0 - 0.7 10e9/L  0.3   Absolute Basophils Latest Ref Range: 0.0 - 0.2 10e9/L  0.0     ASSESSMENT AND PLAN:   Metastatic colon cancer- Pt is meeting goals to start cycle 6 FOLFIRI/Avastin today though having loose stools, and neuropathy of the feet.   Pt will continue with plan today and return for review with Dr Greene in 2 weeks for review of symptoms prior to next cycle of treatment with FOLFIRI/Avastin.  Sinus infection- diagnosed by PCP - was on Augmentin and now on Zpak and pt states she is feeling better but not clear. Suggest if this does not clear she should be seen by ENT for follow up.   Loose stools- Pt aware to use imodium and controlled at this time. Reminded to push fluids.   Anxiety- pt using lexapro and buspar  presently.Pt was also seen by Dr Rodriguez and felt this was productive. She will be seeing him again.   Complaint of SOB- pt was walked around in clinic and Oxygen did not drop below 94% and had no experience of SOB. Believe that anxiety may plan part in feeling of SOB at these times.   Neuropathy to feet- may be residual from previous but is seeing increase in the feet. Will continue to monitor.   History of occlusive thrombus- pt currently using coumadin and being monitored by coag clinic. Today INR well within goal. Will continue same dosing.   TT=30 min with >50% in education and coordination of cares.    Chrystal Morel,CNP

## 2017-03-21 NOTE — PROGRESS NOTES
Infusion Nursing Note:  Damari Bryant presents today for C5 avastin/folfiri.    Patient seen by provider today: Yes: Chrystal Morel NP   present during visit today: Not Applicable.    Intravenous Access:  Implanted Port.    Treatment Conditions:  Lab Results   Component Value Date    HGB 13.7 03/21/2017     Lab Results   Component Value Date    WBC 4.8 03/21/2017      Lab Results   Component Value Date    ANEU 2.3 03/21/2017     Lab Results   Component Value Date     03/21/2017      Lab Results   Component Value Date     02/16/2017                   Lab Results   Component Value Date    POTASSIUM 3.1 02/16/2017           Lab Results   Component Value Date    MAG 2.1 08/23/2013            Lab Results   Component Value Date    CR 0.92 02/16/2017                   Lab Results   Component Value Date    KADIE 9.3 02/16/2017                Lab Results   Component Value Date    BILITOTAL 0.4 03/21/2017           Lab Results   Component Value Date    ALBUMIN 3.7 02/16/2017                    Lab Results   Component Value Date    ALT 38 02/16/2017           Lab Results   Component Value Date    AST 29 02/16/2017     Results reviewed, labs MET treatment parameters, ok to proceed with treatment.  Urine protein 10.      Post Infusion Assessment:  Patient tolerated infusion with one stoppage of irinotecan for cramping & diarrhea. Atropine x1 dose given.  Blood return noted pre and post infusion.  Site patent and intact, free from redness, edema or discomfort.  No evidence of extravasations.  IVAD needle left in place for 46 hours chemotherapy.    Discharge Plan:   Copy of AVS reviewed with patient and/or family.  Patient will return 3/23 for pump disconnect.  Patient discharged in stable condition accompanied by: .  Departure Mode: Ambulatory.    Kailee Stallworth RN

## 2017-03-21 NOTE — NURSING NOTE
"Damari Bryant is a 71 year old female who presents for:  Chief Complaint   Patient presents with     Oncology Clinic Visit     f/u prior to tx        Initial Vitals:  BP (!) 146/94 (BP Location: Right arm, Patient Position: Chair, Cuff Size: Adult Large)  Pulse 96  Temp 97.7  F (36.5  C) (Oral)  Resp 20  Ht 1.676 m (5' 5.98\")  Wt 98 kg (216 lb)  SpO2 94%  BMI 34.88 kg/m2 Estimated body mass index is 34.88 kg/(m^2) as calculated from the following:    Height as of this encounter: 1.676 m (5' 5.98\").    Weight as of this encounter: 98 kg (216 lb).. Body surface area is 2.14 meters squared. BP completed using cuff size: large  No Pain (0) No LMP recorded. Patient is postmenopausal. Allergies and medications reviewed.     Medications: Medication refills not needed today.  Pharmacy name entered into [employer]:    [id number] - [place], [place] - [address] [facility] [address] [place], [place] - [address] [facility] [place] - [address] [facility]    Comments:     8 minutes for nursing intake (face to face time)   JULES DIAL LPN        "

## 2017-03-22 ENCOUNTER — CARE COORDINATION (OUTPATIENT)
Dept: ONCOLOGY | Facility: CLINIC | Age: 72
End: 2017-03-22

## 2017-03-22 NOTE — PROGRESS NOTES
Received telephone call from patient, requesting ENT referral.  Patient states that her sinuses are still bothering her.  RN spoke with provider Chrystal Morel CNP.  Chrystal would like patient to finish up her current course of antibiotics (Zpak) and wait 5 days.  If her symptoms don't improve by then, we can refer her to ENT at that point.  Patient was provided with these recommendations via telephone, and verbalizes understanding.  She states that she has 2 days of her antibiotics left to take.  She will call back in 7 days if her symptoms have not improved.    Evans Archer RN, BSN, OCN  Care Coordinator  [facility]  [phone]

## 2017-03-23 ENCOUNTER — ANTICOAGULATION THERAPY VISIT (OUTPATIENT)
Dept: PHARMACY | Facility: CLINIC | Age: 72
End: 2017-03-23
Payer: COMMERCIAL

## 2017-03-23 ENCOUNTER — INFUSION THERAPY VISIT (OUTPATIENT)
Dept: INFUSION THERAPY | Facility: CLINIC | Age: 72
End: 2017-03-23
Payer: COMMERCIAL

## 2017-03-23 VITALS
HEART RATE: 84 BPM | DIASTOLIC BLOOD PRESSURE: 78 MMHG | SYSTOLIC BLOOD PRESSURE: 129 MMHG | TEMPERATURE: 98.1 F | RESPIRATION RATE: 18 BRPM | OXYGEN SATURATION: 96 %

## 2017-03-23 DIAGNOSIS — C18.9 METASTASIS FROM COLON CANCER (H): Primary | ICD-10-CM

## 2017-03-23 DIAGNOSIS — Z79.01 LONG-TERM (CURRENT) USE OF ANTICOAGULANTS: ICD-10-CM

## 2017-03-23 DIAGNOSIS — I82.90 ACUTE DEEP VEIN THROMBOSIS (DVT) OF NON-EXTREMITY VEIN: ICD-10-CM

## 2017-03-23 DIAGNOSIS — C79.9 METASTASIS FROM COLON CANCER (H): Primary | ICD-10-CM

## 2017-03-23 LAB — INR POINT OF CARE: 2.7 (ref ?–1.14)

## 2017-03-23 PROCEDURE — 36416 COLLJ CAPILLARY BLOOD SPEC: CPT

## 2017-03-23 PROCEDURE — 99207 ZZC NO CHARGE LOS: CPT | Performed by: NURSE PRACTITIONER

## 2017-03-23 PROCEDURE — 99207 ZZC NO CHARGE NURSE ONLY: CPT

## 2017-03-23 PROCEDURE — 85610 PROTHROMBIN TIME: CPT | Mod: QW

## 2017-03-23 PROCEDURE — 99207 ZZC NO CHARGE LOS: CPT | Performed by: INTERNAL MEDICINE

## 2017-03-23 RX ORDER — HEPARIN SODIUM (PORCINE) LOCK FLUSH IV SOLN 100 UNIT/ML 100 UNIT/ML
5 SOLUTION INTRAVENOUS ONCE
Status: COMPLETED | OUTPATIENT
Start: 2017-03-23 | End: 2017-03-23

## 2017-03-23 RX ADMIN — HEPARIN SODIUM (PORCINE) LOCK FLUSH IV SOLN 100 UNIT/ML 5 ML: 100 SOLUTION at 12:50

## 2017-03-23 NOTE — MR AVS SNAPSHOT
After Visit Summary   3/23/2017    Damari Bryant    MRN: [id number]           Patient Information     Date Of Birth          1945        Visit Information        Provider Department      3/23/2017 12:30 PM BAY 9 INFUSION [facility]        Today's Diagnoses     Metastasis from colon cancer (H)    -  1       Follow-ups after your visit        Your next 10 appointments already scheduled     Apr 04, 2017  7:45 AM CDT   [facility] with [facility]   [facility]    [address] [address] [place] [address]   [phone]            Apr 04, 2017  8:15 AM CDT   Return Visit with SANJEEV Camarillo CNP   [facility]    [address] [facility] [address] [phone]            Apr 04, 2017  9:15 AM CDT   Level 3 with BAY 7 INFUSION   [facility])    [address] [facility] [address] [phone]            Apr 18, 2017 10:15 AM CDT   Return Visit with [facility]   [facility] ([facility])    [address] [facility] [address]   [phone]            Apr 18, 2017 11:00 AM CDT   Return Visit with Esther Greene MD   [facility])    [address] [facility] [address] [phone]            Apr 18, 2017 11:30 AM CDT   Level 3 with [place] [facility] ([facility])    [address] [facility] [address] [phone]              Who to contact     If you have questions or need follow up information about today's clinic visit or your schedule please contact [facility] directly at [phone].  Normal or non-critical lab and imaging results will be communicated to you by MyChart, letter or phone  within 4 business days after the clinic has received the results. If you do not hear from us within 7 days, please contact the clinic through [employer] or phone. If you have a critical or abnormal lab result, we will notify you by phone as soon as possible.  Submit refill requests through [employer] or call your pharmacy and they will forward the refill request to us. Please allow 3 business days for your refill to be completed.          Additional Information About Your Visit        [employer] Information     [employer] is an electronic gateway that provides easy, online access to your medical records. With [employer], you can request a clinic appointment, read your test results, renew a prescription or communicate with your care team.     To sign up for [employer] visit the website at [email or web address]   You will be asked to enter the access code listed below, as well as some personal information. Please follow the directions to create your username and password.     Your access code is: [email or web address]  Expires: 2017  2:26 PM     Your access code will  in 90 days. If you need help or a new code, please contact your [facility] or call [phone] for assistance.        Care EveryWhere ID     This is your Care EveryWhere ID. This could be used by other organizations to access your [facility] medical records  [id number]        Your Vitals Were     Pulse Temperature Respirations Pulse Oximetry          84 98.1  F (36.7  C) (Oral) 18 96%         Blood Pressure from Last 3 Encounters:   17 129/78   17 134/87   17 (!) 146/94    Weight from Last 3 Encounters:   17 98 kg (216 lb)   17 98.9 kg (218 lb)   17 97.5 kg (215 lb)              Today, you had the following     No orders found for display       Primary Care Provider Office Phone # Fax #    Samuel Cobian [phone]       [facility] [address] [facility] [place] [address]         Thank you!     Thank you for choosing [facility]  for your care. Our goal is always to provide you with excellent care. Hearing back from our patients is one way we can continue to improve our services. Please take a few minutes to complete the written survey that you may receive in the mail after your visit with us. Thank you!             Your Updated Medication List - Protect others around you: Learn how to safely use, store and throw away your medicines at www.disposemymeds.org.          This list is accurate as of: 3/23/17  1:14 PM.  Always use your most recent med list.                   Brand Name Dispense Instructions for use    AZITHROMYCIN PO      Take 250 mg by mouth daily       busPIRone 15 MG tablet    BUSPAR     Take 15 mg by mouth 2 times daily       enoxaparin 100 MG/ML injection    LOVENOX    2 mL    Inject 1 mL (100 mg) Subcutaneous 2 times daily       levothyroxine 150 MCG tablet    SYNTHROID/LEVOTHROID     Take 150 mcg by mouth daily       LEXAPRO 20 MG tablet   Generic drug:  escitalopram      Take 20 mg by mouth daily.       loperamide 2 MG capsule    IMODIUM    30 capsule    Start with 2 caps (4 mg), then take one cap (2 mg) after each diarrheal stool as needed. Do not use more than 8 caps (16 mg) per day.       NORVASC 5 MG tablet   Generic drug:  amLODIPine      Take 5 mg by mouth daily       OMEGA-3 FISH OIL PO          ondansetron 8 MG tablet    ZOFRAN    10 tablet    Take 1 tablet (8 mg) by mouth every 8 hours as needed (Nausea/Vomiting)       prochlorperazine 10 MG tablet    COMPAZINE    30 tablet    Take 1 tablet (10 mg) by mouth every 6 hours as needed (Nausea/Vomiting)       senna-docusate 8.6-50 MG per tablet    SENNA S    60 tablet    1-2 tabs daily for constipation       tetrahydrozoline 0.05 % ophthalmic solution      1 drop 3 times daily       TOPAMAX 50 MG tablet   Generic drug:  topiramate      Take  by mouth 2 times daily. 1 tab q am 2 tabs q PM        triamterene-hydrochlorothiazide 37.5-25 MG per tablet    MAXZIDE-25     Take 1 tablet by mouth daily.       UNABLE TO FIND      MEDICATION NAME: Nature made digestive probiotics       VITAMIN D (CHOLECALCIFEROL) PO      Take 2,000 Units by mouth daily       warfarin 5 MG tablet    COUMADIN    60 tablet    Take 1 tablet (5 mg) by mouth daily

## 2017-03-23 NOTE — PROGRESS NOTES
Pump was disconnected, port was flushed and de accessed per protocol.  Chemo bag was empty.      Pt aware of next appointments

## 2017-03-23 NOTE — MR AVS SNAPSHOT
Damari Bryant   3/23/2017 1:00 PM   Anticoagulation Therapy Visit    Description:  71 year old female   Provider:  INR MG   Department:  Mg Med Monitoring           INR as of 3/23/2017     Today's INR 2.7      Anticoagulation Summary as of 3/23/2017     INR goal 2.0-3.0   Today's INR 2.7   Full instructions 7.5 mg on Wed; 5 mg all other days   Next INR check 3/30/2017    Indications   Deep vein thrombosis (DVT) (H) [I82.409] [I82.409]  Long-term (current) use of anticoagulants [Z79.01] [Z79.01]         Contact Numbers     [facility]  Please call the anticoagulation nurse at [phone] to cancel and/or reschedule your appointment, or with any problems or questions regarding your therapy.  You may call the main clinic number at [phone] if the nurse is not available.           March 2017 Details    Sun Mon Tue Wed Thu Fri Sat        1               2               3               4                 5               6               7               8               9               10               11                 12               13               14               15               16               17               18                 19               20               21               22               23      5 mg   See details      24      5 mg         25      5 mg           26      5 mg         27      5 mg         28      5 mg         29      7.5 mg         30            31                 Date Details   03/23 This INR check       Date of next INR:  3/30/2017         How to take your warfarin dose     To take:  5 mg Take 1 of the 5 mg tablets.    To take:  7.5 mg Take 1.5 of the 5 mg tablets.

## 2017-03-23 NOTE — PROGRESS NOTES
ANTICOAGULATION FOLLOW-UP CLINIC VISIT    Patient Name:  Damari Bryant  Date:  3/23/2017  Contact Type:  Face to Face    SUBJECTIVE:     Patient Findings     Positives No Problem Findings    Comments Patient had her chemo infusion off today.  She will get her labs next Thursday at [facility] lab.           OBJECTIVE    INR Protime   Date Value Ref Range Status   03/23/2017 2.7 (A) 2.03.0 - 1.14 Final       ASSESSMENT / PLAN  INR assessment THER    Recheck INR In: 1 WEEK    INR Location Clinic      Anticoagulation Summary as of 3/23/2017     INR goal 2.0-3.0   Today's INR 2.7   Maintenance plan 7.5 mg (5 mg x 1.5) on Wed; 5 mg (5 mg x 1) all other days   Full instructions 7.5 mg on Wed; 5 mg all other days   Weekly total 37.5 mg   Plan last modified Mary Berry RN (3/23/2017)   Next INR check 3/30/2017   Priority INR   Target end date 8/14/2017    Indications   Deep vein thrombosis (DVT) (H) [I82.409] [I82.409]  Long-term (current) use of anticoagulants [Z79.01] [Z79.01]         Anticoagulation Episode Summary     INR check location     Preferred lab [facility]    Send INR reminders to [facility] INR    Comments Pateint has chemo infusion pump for 48 hours every 2 weeks.  Her INR rises during this infusion. Her most recent scheduled infusion was  3/21/17.   Call [employer] cell phone for INR results [phone]  Per Esther Greene MD- Goal of 2-3.      Anticoagulation Care Providers     Provider Role Specialty Phone number    Esther Greene MD Responsible Hematology [phone]            See the Encounter Report to view Anticoagulation Flowsheet and Dosing Calendar (Go to Encounters tab in chart review, and find the Anticoagulation Therapy Visit)        Mary Berry, RN

## 2017-03-30 ENCOUNTER — ANTICOAGULATION THERAPY VISIT (OUTPATIENT)
Dept: PHARMACY | Facility: CLINIC | Age: 72
End: 2017-03-30
Payer: COMMERCIAL

## 2017-03-30 ENCOUNTER — TRANSFERRED RECORDS (OUTPATIENT)
Dept: HEALTH INFORMATION MANAGEMENT | Facility: CLINIC | Age: 72
End: 2017-03-30

## 2017-03-30 DIAGNOSIS — I82.90 ACUTE DEEP VEIN THROMBOSIS (DVT) OF NON-EXTREMITY VEIN: ICD-10-CM

## 2017-03-30 DIAGNOSIS — Z79.01 LONG-TERM (CURRENT) USE OF ANTICOAGULANTS: ICD-10-CM

## 2017-03-30 LAB — INR PPP: 2.5

## 2017-03-30 PROCEDURE — 99207 ZZC NO CHARGE NURSE ONLY: CPT | Performed by: INTERNAL MEDICINE

## 2017-03-30 NOTE — PROGRESS NOTES
ANTICOAGULATION FOLLOW-UP CLINIC VISIT    Patient Name:  Damari Bryant  Date:  3/30/2017  Contact Type:  Telephone/ with Ross, patients     SUBJECTIVE:     Patient Findings     Positives No Problem Findings    Comments Patient will have labs drawn at chemo infusion appt 4/4/17.  Call Ross with results.           OBJECTIVE    INR   Date Value Ref Range Status   03/30/2017 2.5  Final       ASSESSMENT / PLAN  INR assessment THER    Recheck INR In: 5 DAYS    INR Location Clinic      Anticoagulation Summary as of 3/30/2017     INR goal 2.0-3.0   Today's INR    Maintenance plan 7.5 mg (5 mg x 1.5) on Wed; 5 mg (5 mg x 1) all other days   Full instructions 7.5 mg on Wed; 5 mg all other days   Weekly total 37.5 mg   No change documented Mary Berry RN   Plan last modified Mary Berry RN (3/23/2017)   Next INR check 4/4/2017   Priority INR   Target end date 8/14/2017    Indications   Deep vein thrombosis (DVT) (H) [I82.409] [I82.409]  Long-term (current) use of anticoagulants [Z79.01] [Z79.01]         Anticoagulation Episode Summary     INR check location     Preferred lab [facility]    Send INR reminders to [facility] INR    Comments Pateint has chemo infusion pump for 48 hours every 2 weeks.  Her INR rises during this infusion. Her most recent scheduled infusion was  3/21/17.   Call "[employer] cell phone for INR results [phone]  Per Esther Greene MD- Goal of 2-3.      Anticoagulation Care Providers     Provider Role Specialty Phone number    Esther Greene MD Responsible Hematology [phone]            See the Encounter Report to view Anticoagulation Flowsheet and Dosing Calendar (Go to Encounters tab in chart review, and find the Anticoagulation Therapy Visit)        Mary Berry, RN

## 2017-03-30 NOTE — MR AVS SNAPSHOT
Damari Tobiasiva   3/30/2017   Anticoagulation Therapy Visit    Description:  71 year old female   Provider:  Esther Greene MD   Department:  Mg Med Monitoring           INR as of 3/30/2017     Today's INR 2.5      Anticoagulation Summary as of 3/30/2017     INR goal 2.0-3.0   Today's INR 2.5   Full instructions 7.5 mg on Wed; 5 mg all other days   Next INR check 4/4/2017    Indications   Deep vein thrombosis (DVT) (H) [I82.409] [I82.409]  Long-term (current) use of anticoagulants [Z79.01] [Z79.01]         Contact Numbers     [facility]  Please call the anticoagulation nurse at [phone] to cancel and/or reschedule your appointment, or with any problems or questions regarding your therapy.  You may call the main clinic number at [phone] if the nurse is not available.           March 2017 Details    Sun Mon Tue Wed Thu Fri Sat        1               2               3               4                 5               6               7               8               9               10               11                 12               13               14               15               16               17               18                 19               20               21               22               23               24               25                 26               27               28               29               30      5 mg   See details      31      5 mg           Date Details   03/30 This INR check               How to take your warfarin dose     To take:  5 mg Take 1 of the 5 mg tablets.           April 2017 Details    Sun Mon Tue Wed Thu Fri Sat           1      5 mg           2      5 mg         3      5 mg         4            5               6               7               8                 9               10               11               12               13               14               15                 16               17               18               19               20                21               22                 23               24               25               26               27               28               29                 30                      Date Details   No additional details    Date of next INR:  4/4/2017         How to take your warfarin dose     To take:  5 mg Take 1 of the 5 mg tablets.

## 2017-04-04 ENCOUNTER — INFUSION THERAPY VISIT (OUTPATIENT)
Dept: INFUSION THERAPY | Facility: CLINIC | Age: 72
End: 2017-04-04
Payer: COMMERCIAL

## 2017-04-04 ENCOUNTER — ANTICOAGULATION THERAPY VISIT (OUTPATIENT)
Dept: PHARMACY | Facility: CLINIC | Age: 72
End: 2017-04-04
Payer: COMMERCIAL

## 2017-04-04 ENCOUNTER — ONCOLOGY VISIT (OUTPATIENT)
Dept: ONCOLOGY | Facility: CLINIC | Age: 72
End: 2017-04-04
Payer: COMMERCIAL

## 2017-04-04 VITALS
WEIGHT: 216.8 LBS | TEMPERATURE: 98 F | HEART RATE: 90 BPM | SYSTOLIC BLOOD PRESSURE: 125 MMHG | OXYGEN SATURATION: 96 % | BODY MASS INDEX: 35.01 KG/M2 | RESPIRATION RATE: 16 BRPM | DIASTOLIC BLOOD PRESSURE: 81 MMHG

## 2017-04-04 DIAGNOSIS — I82.90 ACUTE DEEP VEIN THROMBOSIS (DVT) OF NON-EXTREMITY VEIN: ICD-10-CM

## 2017-04-04 DIAGNOSIS — F41.9 ANXIETY: ICD-10-CM

## 2017-04-04 DIAGNOSIS — Z79.01 LONG-TERM (CURRENT) USE OF ANTICOAGULANTS: ICD-10-CM

## 2017-04-04 DIAGNOSIS — G62.0 DRUG-INDUCED POLYNEUROPATHY (H): ICD-10-CM

## 2017-04-04 DIAGNOSIS — C18.9 METASTASIS FROM COLON CANCER (H): Primary | ICD-10-CM

## 2017-04-04 DIAGNOSIS — R14.3 EXCESSIVE FLATUS: ICD-10-CM

## 2017-04-04 DIAGNOSIS — Z79.01 LONG TERM CURRENT USE OF ANTICOAGULANT THERAPY: ICD-10-CM

## 2017-04-04 DIAGNOSIS — R19.5 LOOSE STOOLS: ICD-10-CM

## 2017-04-04 DIAGNOSIS — C79.9 METASTASIS FROM COLON CANCER (H): Primary | ICD-10-CM

## 2017-04-04 LAB
ALBUMIN UR-MCNC: NEGATIVE MG/DL
BASOPHILS # BLD AUTO: 0.1 10E9/L (ref 0–0.2)
BASOPHILS NFR BLD AUTO: 3 %
BILIRUB SERPL-MCNC: 0.4 MG/DL (ref 0.2–1.3)
DIFFERENTIAL METHOD BLD: ABNORMAL
EOSINOPHIL # BLD AUTO: 0.2 10E9/L (ref 0–0.7)
EOSINOPHIL NFR BLD AUTO: 5 %
ERYTHROCYTE [DISTWIDTH] IN BLOOD BY AUTOMATED COUNT: 16.7 % (ref 10–15)
HCT VFR BLD AUTO: 40.6 % (ref 35–47)
HGB BLD-MCNC: 13.6 G/DL (ref 11.7–15.7)
INR BLD: 4.2 (ref 0.86–1.14)
LYMPHOCYTES # BLD AUTO: 2.8 10E9/L (ref 0.8–5.3)
LYMPHOCYTES NFR BLD AUTO: 59 %
MCH RBC QN AUTO: 30.6 PG (ref 26.5–33)
MCHC RBC AUTO-ENTMCNC: 33.5 G/DL (ref 31.5–36.5)
MCV RBC AUTO: 91 FL (ref 78–100)
METAMYELOCYTES # BLD: 0 10E9/L
METAMYELOCYTES NFR BLD MANUAL: 1 %
MONOCYTES # BLD AUTO: 0.3 10E9/L (ref 0–1.3)
MONOCYTES NFR BLD AUTO: 6 %
NEUTROPHILS # BLD AUTO: 1.2 10E9/L (ref 1.6–8.3)
NEUTROPHILS NFR BLD AUTO: 26 %
PLATELET # BLD AUTO: 126 10E9/L (ref 150–450)
PLATELET # BLD EST: NORMAL 10*3/UL
RBC # BLD AUTO: 4.44 10E12/L (ref 3.8–5.2)
RBC MORPH BLD: NORMAL
WBC # BLD AUTO: 4.6 10E9/L (ref 4–11)

## 2017-04-04 PROCEDURE — 96375 TX/PRO/DX INJ NEW DRUG ADDON: CPT | Performed by: NURSE PRACTITIONER

## 2017-04-04 PROCEDURE — 99207 ZZC NO CHARGE LOS: CPT

## 2017-04-04 PROCEDURE — 96415 CHEMO IV INFUSION ADDL HR: CPT | Performed by: NURSE PRACTITIONER

## 2017-04-04 PROCEDURE — 96413 CHEMO IV INFUSION 1 HR: CPT | Performed by: NURSE PRACTITIONER

## 2017-04-04 PROCEDURE — 81002 URINALYSIS NONAUTO W/O SCOPE: CPT | Performed by: NURSE PRACTITIONER

## 2017-04-04 PROCEDURE — 99207 ZZC NO CHARGE NURSE ONLY: CPT | Performed by: INTERNAL MEDICINE

## 2017-04-04 PROCEDURE — 99214 OFFICE O/P EST MOD 30 MIN: CPT | Mod: 25 | Performed by: NURSE PRACTITIONER

## 2017-04-04 PROCEDURE — 36416 COLLJ CAPILLARY BLOOD SPEC: CPT | Performed by: INTERNAL MEDICINE

## 2017-04-04 PROCEDURE — 85610 PROTHROMBIN TIME: CPT | Mod: QW | Performed by: INTERNAL MEDICINE

## 2017-04-04 PROCEDURE — 96417 CHEMO IV INFUS EACH ADDL SEQ: CPT | Performed by: NURSE PRACTITIONER

## 2017-04-04 PROCEDURE — 81003 URINALYSIS AUTO W/O SCOPE: CPT | Performed by: INTERNAL MEDICINE

## 2017-04-04 PROCEDURE — 99207 ZZC NO CHARGE NURSE ONLY: CPT

## 2017-04-04 PROCEDURE — 96416 CHEMO PROLONG INFUSE W/PUMP: CPT | Performed by: NURSE PRACTITIONER

## 2017-04-04 PROCEDURE — 85025 COMPLETE CBC W/AUTO DIFF WBC: CPT | Performed by: INTERNAL MEDICINE

## 2017-04-04 PROCEDURE — 82247 BILIRUBIN TOTAL: CPT | Performed by: INTERNAL MEDICINE

## 2017-04-04 RX ORDER — LOPERAMIDE HCL 2 MG
CAPSULE ORAL
Qty: 30 CAPSULE | Refills: 1 | Status: SHIPPED | OUTPATIENT
Start: 2017-04-04

## 2017-04-04 RX ORDER — METHYLPREDNISOLONE SODIUM SUCCINATE 125 MG/2ML
125 INJECTION, POWDER, LYOPHILIZED, FOR SOLUTION INTRAMUSCULAR; INTRAVENOUS
Status: CANCELLED
Start: 2017-04-04

## 2017-04-04 RX ORDER — ALBUTEROL SULFATE 90 UG/1
1-2 AEROSOL, METERED RESPIRATORY (INHALATION)
Status: CANCELLED
Start: 2017-04-04

## 2017-04-04 RX ORDER — SODIUM CHLORIDE 9 MG/ML
1000 INJECTION, SOLUTION INTRAVENOUS CONTINUOUS PRN
Status: CANCELLED
Start: 2017-04-04

## 2017-04-04 RX ORDER — HEPARIN SODIUM (PORCINE) LOCK FLUSH IV SOLN 100 UNIT/ML 100 UNIT/ML
5 SOLUTION INTRAVENOUS
Status: DISCONTINUED | OUTPATIENT
Start: 2017-04-04 | End: 2017-04-04 | Stop reason: HOSPADM

## 2017-04-04 RX ORDER — LORAZEPAM 2 MG/ML
0.5 INJECTION INTRAMUSCULAR EVERY 4 HOURS PRN
Status: CANCELLED
Start: 2017-04-04

## 2017-04-04 RX ORDER — EPINEPHRINE 0.3 MG/.3ML
0.3 INJECTION SUBCUTANEOUS EVERY 5 MIN PRN
Status: CANCELLED | OUTPATIENT
Start: 2017-04-04

## 2017-04-04 RX ORDER — EPINEPHRINE 1 MG/ML
0.3 INJECTION INTRAMUSCULAR; INTRAVENOUS; SUBCUTANEOUS EVERY 5 MIN PRN
Status: CANCELLED | OUTPATIENT
Start: 2017-04-04

## 2017-04-04 RX ORDER — MEPERIDINE HYDROCHLORIDE 50 MG/ML
25 INJECTION INTRAMUSCULAR; INTRAVENOUS; SUBCUTANEOUS EVERY 30 MIN PRN
Status: CANCELLED | OUTPATIENT
Start: 2017-04-04

## 2017-04-04 RX ORDER — ALBUTEROL SULFATE 0.83 MG/ML
2.5 SOLUTION RESPIRATORY (INHALATION)
Status: CANCELLED | OUTPATIENT
Start: 2017-04-04

## 2017-04-04 RX ORDER — DIPHENHYDRAMINE HYDROCHLORIDE 50 MG/ML
50 INJECTION INTRAMUSCULAR; INTRAVENOUS
Status: CANCELLED
Start: 2017-04-04

## 2017-04-04 RX ORDER — ONDANSETRON 8 MG/1
8 TABLET, FILM COATED ORAL EVERY 8 HOURS PRN
Qty: 10 TABLET | Refills: 2 | Status: SHIPPED | OUTPATIENT
Start: 2017-04-04 | End: 2017-06-19

## 2017-04-04 RX ADMIN — Medication 250 ML: at 09:31

## 2017-04-04 RX ADMIN — HEPARIN SODIUM (PORCINE) LOCK FLUSH IV SOLN 100 UNIT/ML 5 ML: 100 SOLUTION at 08:06

## 2017-04-04 NOTE — MR AVS SNAPSHOT
After Visit Summary   4/4/2017    Damari Bryant    MRN: [id number]           Patient Information     Date Of Birth          1945        Visit Information        Provider Department      4/4/2017 9:15 AM BAY 7 INFUSION [facility]        Today's Diagnoses     Metastasis from colon cancer (H)    -  1       Follow-ups after your visit        Your next 10 appointments already scheduled     Apr 06, 2017 10:15 AM CDT   Level O with BAY 2 INFUSION   [facility] ([facility])    [address] [facility] [address]   [phone]            Apr 10, 2017 10:00 AM CDT   New Visit with Nina Carlin MD   [facility] ([facility])    [address] [facility] [address]   [phone]            Apr 18, 2017 10:15 AM CDT   Return Visit with [facility]UR[facility] ONLY CANCER CENTER   [facility] ([facility])    [address] [facility] [address]   [phone]            Apr 18, 2017 11:00 AM CDT   Return Visit with Esther Greene MD   [facility] ([facility])    [address] [facility] [address]   [phone]            Apr 18, 2017 11:30 AM CDT   Level 3 with BAY 2 INFUSION   [facility] ([facility])    [address] [facility] [address]   [phone]            May 02, 2017  8:45 AM CDT   Return Visit with [facility]UR[facility] ONLY CANCER CENTER   [facility] ([facility])    [address] [facility] [address]   [phone]            May 02, 2017  9:15 AM CDT   Return Visit with SANJEEV Camarillo CNP   [facility] ([facility])    [address] [facility] [address]   [phone]            May 02, 2017 10:30 AM CDT   Level 3 with BAY 8 INFUSION   [facility] (  [facility]    [address] [place] [address] [phone]              Who to contact     If you have questions or need follow up information about today's clinic visit or your schedule please contact [facility] directly at [phone].  Normal or non-critical lab and imaging results will be communicated to you by MyChart, letter or phone within 4 business days after the clinic has received the results. If you do not hear from us within 7 days, please contact the clinic through MyChart or phone. If you have a critical or abnormal lab result, we will notify you by phone as soon as possible.  Submit refill requests through [employer] or call your pharmacy and they will forward the refill request to us. Please allow 3 business days for your refill to be completed.          Additional Information About Your Visit        MyChart Information     [employer] is an electronic gateway that provides easy, online access to your medical records. With [employer] [facility], you can request a clinic appointment, read your test results, renew a prescription or communicate with your care team.     To sign up for [employer] visit the website at [email or web address]   You will be asked to enter the access code listed below, as well as some personal information. Please follow the directions to create your username and password.     Your access code is: [email or web address]  Expires: 2017  2:26 PM     Your access code will  in 90 days. If you need help or a new code, please contact your [facility] or call [phone] for assistance.        Care EveryWhere ID     This is your Care EveryWhere ID. This could be used by other organizations to access your [facility] medical records  [id number]         Blood Pressure from Last 3 Encounters:   17 125/81   17 129/78   17 134/87    Weight from Last 3 Encounters:   17 98.3 kg (216 lb 12.8 oz)    03/21/17 98 kg (216 lb)   03/08/17 98.9 kg (218 lb)              We Performed the Following     MD Instruction for Protocol     Road Map Alert     Treatment Conditions          Today's Medication Changes          These changes are accurate as of: 4/4/17  2:19 PM.  If you have any questions, ask your nurse or doctor.               Stop taking these medicines if you haven't already. Please contact your care team if you have questions.     enoxaparin 100 MG/ML injection   Commonly known as:  LOVENOX   Stopped by:  Chrystal Morel APRN CNP                Where to get your medicines      These medications were sent to [facility] [address] [place] [address] [address] [facility]  [address] [place] [address]     Phone:  [phone]     loperamide 2 MG capsule    ondansetron 8 MG tablet                Primary Care Provider Office Phone # Fax #    Samuel RUFF Aranza [phone]       [facility] [address] [facility] [address]        Thank you!     Thank you for choosing [facility]  for your care. Our goal is always to provide you with excellent care. Hearing back from our patients is one way we can continue to improve our services. Please take a few minutes to complete the written survey that you may receive in the mail after your visit with us. Thank you!             Your Updated Medication List - Protect others around you: Learn how to safely use, store and throw away your medicines at www.disposemymeds.org.          This list is accurate as of: 4/4/17  2:19 PM.  Always use your most recent med list.                   Brand Name Dispense Instructions for use    busPIRone 15 MG tablet    BUSPAR     Take 15 mg by mouth 2 times daily       levothyroxine 150 MCG tablet    SYNTHROID/LEVOTHROID     Take 150 mcg by mouth daily       LEXAPRO 20 MG tablet   Generic drug:  escitalopram      Take 20 mg by mouth daily.       loperamide 2 MG capsule    IMODIUM    30 capsule     Start with 2 caps (4 mg), then take one cap (2 mg) after each diarrheal stool as needed. Do not use more than 8 caps (16 mg) per day.       NORVASC 5 MG tablet   Generic drug:  amLODIPine      Take 5 mg by mouth daily       OMEGA-3 FISH OIL PO          ondansetron 8 MG tablet    ZOFRAN    10 tablet    Take 1 tablet (8 mg) by mouth every 8 hours as needed (Nausea/Vomiting)       prochlorperazine 10 MG tablet    COMPAZINE    30 tablet    Take 1 tablet (10 mg) by mouth every 6 hours as needed (Nausea/Vomiting)       senna-docusate 8.6-50 MG per tablet    SENNA S    60 tablet    1-2 tabs daily for constipation       tetrahydrozoline 0.05 % ophthalmic solution      1 drop 3 times daily       TOPAMAX 50 MG tablet   Generic drug:  topiramate      Take  by mouth 2 times daily. 1 tab q am 2 tabs q PM       triamterene-hydrochlorothiazide 37.5-25 MG per tablet    MAXZIDE-25     Take 1 tablet by mouth daily.       UNABLE TO FIND      MEDICATION NAME: Nature made digestive probiotics       VITAMIN D (CHOLECALCIFEROL) PO      Take 2,000 Units by mouth daily       warfarin 5 MG tablet    COUMADIN    60 tablet    Take 1 tablet (5 mg) by mouth daily

## 2017-04-04 NOTE — PROGRESS NOTES
Oncology Follow Up Visit: April 4, 2017     Oncologist: Dr Esther Greene  PCP: Samuel Cobian    Diagnosis: Mestastatic colon cancer  Damari Bryant is a 72 yo  female diagnosed July 2013 with Stage IIIB pT3N1b MX colon cancer.  Final pathology showed a mildly differentiated colonic adenocarcinoma, tumor extended through the muscularis propria into the pericolonic adipose tissue, lymphovascular invasion present.  Margins negative.  Three out of 30 lymph nodes were positive with 0.5 cm in greatest dimension and extracapsular extension identified.  DNA mismatch repair enzymes were intact.  Two pulmonary nodules noted on staging CT, indeterminate.   Treatment:  7/21/2013.  Sigmoid colectomy.   8/2013 to 1/2014  Adjuvant FOLFOX x 12 cycles.   2/2014 to 12/2016 observation  1/3/2017 began FOLFIRI/Avastin with new site to lung    INTERVAL HISTORY:  Ms Bryant comes to clinic with , Ross and daughter for review prior to continuation of therapy to treat her colon cancer. Pt shares she felt well with last cycle of the FOLFIRI/ Avastin. Her complaint today is that she is having too much gas which is followed by diarrhea. SHe has been using gas ex but not getting relief though imodium is helpful and working for her to control the loose stools with up to 3 daily. . She feels she is eating well without weight loss. She is having no pain and denies nausea, mouth sores, fevers, SOB or chest pain. 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 She also admits to continuation of the anxiety but is sleeping well and feels this is better with good tolerance to the routine of the chemotherapy. Had one more episode of SOB at [facility] without exertion but was very short and feels she has been fine since.   Remainder of her comprehensive review of systems is negative.   Current Outpatient Prescriptions   Medication     ondansetron (ZOFRAN) 8 MG tablet     loperamide (IMODIUM) 2 MG capsule      warfarin (COUMADIN) 5 MG tablet     senna-docusate (SENNA S) 8.6-50 MG per tablet     prochlorperazine (COMPAZINE) 10 MG tablet     Omega-3 Fatty Acids (OMEGA-3 FISH OIL PO)     tetrahydrozoline 0.05 % ophthalmic solution     UNABLE TO FIND     VITAMIN D, CHOLECALCIFEROL, PO     busPIRone (BUSPAR) 15 MG tablet     amLODIPine (NORVASC) 5 MG tablet     escitalopram (LEXAPRO) 20 MG tablet     levothyroxine (SYNTHROID, LEVOTHROID) 150 MCG tablet     topiramate (TOPAMAX) 50 MG tablet     triamterene-hydrochlorothiazide (MAXZIDE-25) 37.5-25 MG per tablet     No current facility-administered medications for this visit.      Facility-Administered Medications Ordered in Other Visits   Medication     heparin 100 UNIT/ML injection 5 mL     sodium chloride (PF) 0.9% PF flush 10-20 mL        Allergies   Allergen Reactions     Ativan Other (See Comments)     hallucinations     Avocado Nausea and Vomiting     Hmg-Coa-R Inhibitors Nausea     Iodine-131 Nausea and Vomiting     Shellfish Allergy GI Disturbance     Sulfa Drugs Nausea and Vomiting     Tegaderm Transparent Dressing (Informational Only) Rash      FAMILY HISTORY:  Father had colon cancer at age 70. Brother had lung cancer who was a smoker.      PHYSICAL EXAMINATION:   VITAL SIGNS: /81  Pulse 90  Temp 98  F (36.7  C) (Oral)  Resp 16  Wt 98.3 kg (216 lb 12.8 oz)  SpO2 96%  BMI 35.01 kg/m2   Constitutional: Alert, overweight, in no distress- relaxed today.  ENT: Eyes bright- glasses , No mouth sores.  TM's clear.   Neck: Supple, No adenopathy.Thyroid symmetric   Right port site without redness or swelling  Cardiac: Heart rate and rhythm is regular and strong without murmur  Respiratory: Breathing easy. Lung sounds clear to auscultation  Abdomen: Soft, non-tender, BS normal.  No organomegaly  MS: Muscle tone normal- moving well. Extremities normal with no edema.   Skin: No suspicious lesions or rashes  Neuro: Sensory grossly WNL, gait normal.   Lymph: Normal  ant/post cervical, axillary, supraclavicular nodes  Psych: Mentation appears normal and affect normal/bright with easy smile.     LABORATORY DATA:   Ref. Range 4/4/2017 08:05   Bilirubin Total Latest Ref Range: 0.2 - 1.3 mg/dL 0.4   WBC Latest Ref Range: 4.0 - 11.0 10e9/L 4.6   Hemoglobin Latest Ref Range: 11.7 - 15.7 g/dL 13.6   Hematocrit Latest Ref Range: 35.0 - 47.0 % 40.6   Platelet Count Latest Ref Range: 150 - 450 10e9/L 126 (L)   RBC Count Latest Ref Range: 3.8 - 5.2 10e12/L 4.44   MCV Latest Ref Range: 78 - 100 fl 91   MCH Latest Ref Range: 26.5 - 33.0 pg 30.6   MCHC Latest Ref Range: 31.5 - 36.5 g/dL 33.5   RDW Latest Ref Range: 10.0 - 15.0 % 16.7 (H)   Diff Method Unknown Manual Differential   % Neutrophils Latest Units: % 26.0   % Lymphocytes Latest Units: % 59.0   % Monocytes Latest Units: % 6.0   % Eosinophils Latest Units: % 5.0   % Basophils Latest Units: % 3.0   % Metamyelocytes Latest Units: % 1.0   Absolute Neutrophil Latest Ref Range: 1.6 - 8.3 10e9/L 1.2 (L)   Absolute Lymphocytes Latest Ref Range: 0.8 - 5.3 10e9/L 2.8   Absolute Monocytes Latest Ref Range: 0.0 - 1.3 10e9/L 0.3   Absolute Eosinophils Latest Ref Range: 0.0 - 0.7 10e9/L 0.2   Absolute Basophils Latest Ref Range: 0.0 - 0.2 10e9/L 0.1   Absolute Metamyelocytes Latest Ref Range: 0 10e9/L 0.0   RBC Morphology Unknown Normal   Platelet Estimate Unknown Normal     Results for orders placed or performed in visit on 04/04/17   INR point of care   Result Value Ref Range    INR Point of Care 4.2 (H) 0.86 - 1.14     ASSESSMENT AND PLAN:   Metastatic colon cancer- Pt is meeting goals to start cycle 6 FOLFIRI/Avastin today. She does have continued symptoms with flatus, loose stools, and neuropathy of the feet.   Pt will continue with plan today and return for review with Dr Greene in 2 weeks for review of symptoms prior to next cycle of treatment with FOLFIRI/Avastin.   Excess flatus/Loose stools- Pt aware to use imodium and control of the  loose stools is good. Pt has been using GasX but is able to increase dosing and was educated by pharmacist on max dosing. Also reviewed diet for other causes of concern.   Anxiety- pt using lexapro and buspar with stable results today. Pt did see Dr Rodriguez and wants to plan future visit.      Neuropathy to feet- Appears to be residual from previous treatment- current therapy does not seem to be affecting neuropathy of the feet, Will continue to monitor.   Recurrent episodes of SOB- initially thought to be related to anxiety issues. Asked pt to diary episodes and was screened for cardiac issues but came without other symptoms. If recurrent will consider event monitoring.   History of occlusive thrombus- pt currently using coumadin and being monitored by coag clinic.  INR elevated today- coag clinic to notify of directions.   TT=30 min with >50% in education and coordination of cares.    Chrystal Morel,CNP

## 2017-04-04 NOTE — PROGRESS NOTES
Infusion Nursing Note:  Damari Bryant presents today for C6 avastin/folfiri.    Patient seen by provider today: Yes: Chrystal Morel NP   present during visit today: Not Applicable.    Note: See notation below on atropine use.    Intravenous Access:  Implanted Port - see IV flowsheet    Treatment Conditions:  Lab Results   Component Value Date    HGB 13.6 04/04/2017     Lab Results   Component Value Date    WBC 4.6 04/04/2017      Lab Results   Component Value Date    ANEU 1.2 04/04/2017     Lab Results   Component Value Date     04/04/2017      Lab Results   Component Value Date     02/16/2017                   Lab Results   Component Value Date    POTASSIUM 3.1 02/16/2017           Lab Results   Component Value Date    MAG 2.1 08/23/2013            Lab Results   Component Value Date    CR 0.92 02/16/2017                   Lab Results   Component Value Date    KADIE 9.3 02/16/2017                Lab Results   Component Value Date    BILITOTAL 0.4 04/04/2017           Lab Results   Component Value Date    ALBUMIN 3.7 02/16/2017                    Lab Results   Component Value Date    ALT 38 02/16/2017           Lab Results   Component Value Date    AST 29 02/16/2017     Results reviewed, labs MET treatment parameters, ok to proceed with treatment.  Urine negative protein.      Post Infusion Assessment:  Patient tolerated infusion with one dose of atropine given for GI symptoms  Blood return noted pre infusion.  Site patent and intact, free from redness, edema or discomfort.  No evidence of extravasations.  Port access in place for 46 hour chemotherapy.    Discharge Plan:   Copy of AVS reviewed with patient and/or family.  Patient will return 4/6/17 for pump disconnect appointment.  Patient discharged in stable condition accompanied by:   Departure Mode: Ambulatory and using her  to balance.    Kailee Stallworth RN

## 2017-04-04 NOTE — MR AVS SNAPSHOT
After Visit Summary   4/4/2017    Damari Bryant    MRN: [id number]           Patient Information     Date Of Birth          1945        Visit Information        Provider Department      4/4/2017 7:45 AM NURSE ONLY CANCER CENTER [facility]        Today's Diagnoses     Metastasis from colon cancer (H)    -  1       Follow-ups after your visit        Your next 10 appointments already scheduled     Apr 04, 2017  8:15 AM CDT   Return Visit with SANJEEV Camarillo CNP   [facility] ([facility]    [address] [place] [address]   [phone]            Apr 04, 2017  9:15 AM CDT   Level 3 with BAY 7 INFUSION   [facility] ([facility])    [address] [place] [address]   [phone]            Apr 18, 2017 10:15 AM CDT   Return Visit with NURSE ONLY CANCER CENTER   [facility] ([facility])    [address] [place] [address]   [phone]            Apr 18, 2017 11:00 AM CDT   Return Visit with Esther Greene MD   [facility] ([facility])    [address] [facility] [address]   [phone]            Apr 18, 2017 11:30 AM CDT   Level 3 with BAY 2 INFUSION   [facility] ([facility])    [address] [place] [address]   [phone]              Who to contact     If you have questions or need follow up information about today's clinic visit or your schedule please contact [facility] directly at [phone].  Normal or non-critical lab and imaging results will be communicated to you by MyChart, letter or phone within 4 business days after the clinic has received the results. If you do not hear from us within 7 days, please contact the clinic through MyChart or phone. If you have a critical or abnormal lab result,  we will notify you by phone as soon as possible.  Submit refill requests through [employer] or call your pharmacy and they will forward the refill request to us. Please allow 3 business days for your refill to be completed.          Additional Information About Your Visit        [employer] Information     [employer] is an electronic gateway that provides easy, online access to your medical records. With [employer], you can request a clinic appointment, read your test results, renew a prescription or communicate with your care team.     To sign up for [employer] visit the website at [email or web address]   You will be asked to enter the access code listed below, as well as some personal information. Please follow the directions to create your username and password.     Your access code is: [email or web address]  Expires: 2017  2:26 PM     Your access code will  in 90 days. If you need help or a new code, please contact your [facility] or call [phone] for assistance.        Care EveryWhere ID     This is your Care EveryWhere ID. This could be used by other organizations to access your [place] medical records  [id number]         Blood Pressure from Last 3 Encounters:   17 129/78   17 134/87   17 (!) 146/94    Weight from Last 3 Encounters:   17 98 kg (216 lb)   17 98.9 kg (218 lb)   17 97.5 kg (215 lb)              We Performed the Following     Bilirubin  total     CBC with platelets differential     Protein qualitative urine        Primary Care Provider Office Phone # Fax #    Samuel Cobian [phone]       [facility] [address] [facility] [address]        Thank you!     Thank you for choosing [facility]  for your care. Our goal is always to provide you with excellent care. Hearing back from our patients is one way we can continue to improve our services. Please take a few minutes to complete the written  survey that you may receive in the mail after your visit with us. Thank you!             Your Updated Medication List - Protect others around you: Learn how to safely use, store and throw away your medicines at www.disposemymeds.org.          This list is accurate as of: 4/4/17  8:12 AM.  Always use your most recent med list.                   Brand Name Dispense Instructions for use    AZITHROMYCIN PO      Take 250 mg by mouth daily       busPIRone 15 MG tablet    BUSPAR     Take 15 mg by mouth 2 times daily       enoxaparin 100 MG/ML injection    LOVENOX    2 mL    Inject 1 mL (100 mg) Subcutaneous 2 times daily       levothyroxine 150 MCG tablet    SYNTHROID/LEVOTHROID     Take 150 mcg by mouth daily       LEXAPRO 20 MG tablet   Generic drug:  escitalopram      Take 20 mg by mouth daily.       loperamide 2 MG capsule    IMODIUM    30 capsule    Start with 2 caps (4 mg), then take one cap (2 mg) after each diarrheal stool as needed. Do not use more than 8 caps (16 mg) per day.       NORVASC 5 MG tablet   Generic drug:  amLODIPine      Take 5 mg by mouth daily       OMEGA-3 FISH OIL PO          ondansetron 8 MG tablet    ZOFRAN    10 tablet    Take 1 tablet (8 mg) by mouth every 8 hours as needed (Nausea/Vomiting)       prochlorperazine 10 MG tablet    COMPAZINE    30 tablet    Take 1 tablet (10 mg) by mouth every 6 hours as needed (Nausea/Vomiting)       senna-docusate 8.6-50 MG per tablet    SENNA S    60 tablet    1-2 tabs daily for constipation       tetrahydrozoline 0.05 % ophthalmic solution      1 drop 3 times daily       TOPAMAX 50 MG tablet   Generic drug:  topiramate      Take  by mouth 2 times daily. 1 tab q am 2 tabs q PM       triamterene-hydrochlorothiazide 37.5-25 MG per tablet    MAXZIDE-25     Take 1 tablet by mouth daily.       UNABLE TO FIND      MEDICATION NAME: Nature made digestive probiotics       VITAMIN D (CHOLECALCIFEROL) PO      Take 2,000 Units by mouth daily       warfarin 5 MG tablet     COUMADIN    60 tablet    Take 1 tablet (5 mg) by mouth daily

## 2017-04-04 NOTE — NURSING NOTE
"Damari Bryant is a 71 year old female who presents for:  Chief Complaint   Patient presents with     Oncology Clinic Visit     follow up prior to chemo        Initial Vitals:  /81  Pulse 90  Temp 98  F (36.7  C) (Oral)  Resp 16  Wt 98.3 kg (216 lb 12.8 oz)  SpO2 96%  BMI 35.01 kg/m2 Estimated body mass index is 35.01 kg/(m^2) as calculated from the following:    Height as of 3/21/17: 1.676 m (5' 5.98\").    Weight as of this encounter: 98.3 kg (216 lb 12.8 oz).. Body surface area is 2.14 meters squared. BP completed using cuff size: regular  Data Unavailable No LMP recorded. Patient is postmenopausal. Allergies and medications reviewed.     Medications: MEDICATION REFILLS NEEDED TODAY.  Pharmacy name entered into [employer]:    [id number] - [place], [place] - [address] [facility] [address] [facility] [address] [facility] [address] [facility] [place], [place] - [address] [facility]         15 minutes for nursing intake (face to face time)   ANDRÉS DOVER RN        "

## 2017-04-04 NOTE — MR AVS SNAPSHOT
After Visit Summary   4/4/2017    Damari Bryant    MRN: [id number]           Patient Information     Date Of Birth          1945        Visit Information        Provider Department      4/4/2017 8:15 AM Chrystal Morel APRN CNP [facility]        Today's Diagnoses     Acute deep vein thrombosis (DVT) of non-extremity vein        Long term current use of anticoagulant therapy        Metastasis from colon cancer (H)           Follow-ups after your visit        Your next 10 appointments already scheduled     Apr 10, 2017 10:00 AM CDT   New Visit with Nina Carlin MD   [facility])    [address] [facility] [address]   [phone]            Apr 18, 2017 10:15 AM CDT   Return Visit with [facility]   [facility]    [address] [facility] [address]   [phone]            Apr 18, 2017 11:00 AM CDT   Return Visit with Esther Greene MD   [facility])    [address] [facility] [address]   [phone]            Apr 18, 2017 11:30 AM CDT   Level 3 with [place] 2 INFUSION   [facility])    [address] [facility] [address]   [phone]            May 02, 2017  8:45 AM CDT   Return Visit with [facility] ONLY [facility]   [facility]    [address] [facility] [address]   [phone]            May 02, 2017  9:15 AM CDT   Return Visit with SANJEEV Camarillo CNP   [facility]    [address] [facility] [address]   [phone]            May 02, 2017 10:30 AM CDT   Level 3 with BAY 8 INFUSION   [facility])    [address] [address]  [id number] [phone]              Who to contact     If you have questions or need follow up information about today's clinic visit or your schedule please contact [facility] directly at [phone].  Normal or non-critical lab and imaging results will be communicated to you by MyChart, letter or phone within 4 business days after the clinic has received the results. If you do not hear from us within 7 days, please contact the clinic through MyChart or phone. If you have a critical or abnormal lab result, we will notify you by phone as soon as possible.  Submit refill requests through [employer] or call your pharmacy and they will forward the refill request to us. Please allow 3 business days for your refill to be completed.          Additional Information About Your Visit        [employer] Information     [employer] is an electronic gateway that provides easy, online access to your medical records. With [employer], you can request a clinic appointment, read your test results, renew a prescription or communicate with your care team.     To sign up for [employer] visit the website at [email or web address]   You will be asked to enter the access code listed below, as well as some personal information. Please follow the directions to create your username and password.     Your access code is: [email or web address]  Expires: 2017  2:26 PM     Your access code will  in 90 days. If you need help or a new code, please contact your [facility] or call [phone] for assistance.        Care EveryWhere ID     This is your Care EveryWhere ID. This could be used by other organizations to access your [place] medical records  [id number]        Your Vitals Were     Pulse Temperature Respirations Pulse Oximetry BMI (Body Mass Index)       90 98  F (36.7  C) (Oral) 16 96% 35.01 kg/m2        Blood Pressure from Last 3 Encounters:   17 125/81   17 129/78   17 134/87     Weight from Last 3 Encounters:   04/04/17 98.3 kg (216 lb 12.8 oz)   03/21/17 98 kg (216 lb)   03/08/17 98.9 kg (218 lb)              We Performed the Following     INR point of care          Today's Medication Changes          These changes are accurate as of: 4/4/17 10:18 AM.  If you have any questions, ask your nurse or doctor.               Stop taking these medicines if you haven't already. Please contact your care team if you have questions.     enoxaparin 100 MG/ML injection   Commonly known as:  LOVENOX   Stopped by:  Chrystal Morel APRN CNP                Where to get your medicines      These medications were sent to [facility] [address] [place] [address]     Phone:  [phone]     loperamide 2 MG capsule    ondansetron 8 MG tablet                Primary Care Provider Office Phone # Fax #    Samuel RUFF Dotevelia [phone]       [facility] [address] [facility] [address]        Thank you!     Thank you for choosing [facility]  for your care. Our goal is always to provide you with excellent care. Hearing back from our patients is one way we can continue to improve our services. Please take a few minutes to complete the written survey that you may receive in the mail after your visit with us. Thank you!             Your Updated Medication List - Protect others around you: Learn how to safely use, store and throw away your medicines at www.disposemymeds.org.          This list is accurate as of: 4/4/17 10:18 AM.  Always use your most recent med list.                   Brand Name Dispense Instructions for use    busPIRone 15 MG tablet    BUSPAR     Take 15 mg by mouth 2 times daily       levothyroxine 150 MCG tablet    SYNTHROID/LEVOTHROID     Take 150 mcg by mouth daily       LEXAPRO 20 MG tablet   Generic drug:  escitalopram      Take 20 mg by mouth daily.       loperamide 2 MG capsule    IMODIUM     30 capsule    Start with 2 caps (4 mg), then take one cap (2 mg) after each diarrheal stool as needed. Do not use more than 8 caps (16 mg) per day.       NORVASC 5 MG tablet   Generic drug:  amLODIPine      Take 5 mg by mouth daily       OMEGA-3 FISH OIL PO          ondansetron 8 MG tablet    ZOFRAN    10 tablet    Take 1 tablet (8 mg) by mouth every 8 hours as needed (Nausea/Vomiting)       prochlorperazine 10 MG tablet    COMPAZINE    30 tablet    Take 1 tablet (10 mg) by mouth every 6 hours as needed (Nausea/Vomiting)       senna-docusate 8.6-50 MG per tablet    SENNA S    60 tablet    1-2 tabs daily for constipation       tetrahydrozoline 0.05 % ophthalmic solution      1 drop 3 times daily       TOPAMAX 50 MG tablet   Generic drug:  topiramate      Take  by mouth 2 times daily. 1 tab q am 2 tabs q PM       triamterene-hydrochlorothiazide 37.5-25 MG per tablet    MAXZIDE-25     Take 1 tablet by mouth daily.       UNABLE TO FIND      MEDICATION NAME: Nature made digestive probiotics       VITAMIN D (CHOLECALCIFEROL) PO      Take 2,000 Units by mouth daily       warfarin 5 MG tablet    COUMADIN    60 tablet    Take 1 tablet (5 mg) by mouth daily

## 2017-04-04 NOTE — MR AVS SNAPSHOT
Damari Manny   4/4/2017   Anticoagulation Therapy Visit    Description:  71 year old female   Provider:  Esther Greene MD   Department:  Mg Med Monitoring           INR as of 4/4/2017     Today's INR 4.2!      Anticoagulation Summary as of 4/4/2017     INR goal 2.0-3.0   Today's INR 4.2!   Full instructions 4/4: Hold; Otherwise 7.5 mg on Wed; 5 mg all other days   Next INR check 4/6/2017    Indications   Deep vein thrombosis (DVT) (H) [I82.409] [I82.409]  Long-term (current) use of anticoagulants [Z79.01] [Z79.01]         Contact Numbers     [facility]  Please call the anticoagulation nurse at [phone] to cancel and/or reschedule your appointment, or with any problems or questions regarding your therapy.  You may call the main clinic number at [phone] if the nurse is not available.           April 2017 Details    Sun Mon Tue Wed Thu Fri Sat           1                 2               3               4      Hold   See details      5      7.5 mg         6            7               8                 9               10               11               12               13               14               15                 16               17               18               19               20               21               22                 23               24               25               26               27               28               29                 30                      Date Details   04/04 This INR check       Date of next INR:  4/6/2017         How to take your warfarin dose     To take:  5 mg Take 1 of the 5 mg tablets.    To take:  7.5 mg Take 1.5 of the 5 mg tablets.    Hold Do not take your warfarin dose. See the Details table to the right for additional instructions.

## 2017-04-04 NOTE — PROGRESS NOTES
ANTICOAGULATION FOLLOW-UP CLINIC VISIT    Patient Name:  Damari Bryant  Date:  4/4/2017  Contact Type:  Telephone/  Ross    SUBJECTIVE:     Patient Findings     Positives Change in medications, No Problem Findings    Comments Having chemo infusion today through Thursday.             OBJECTIVE    INR Point of Care   Date Value Ref Range Status   04/04/2017 4.2 (H) 0.86 - 1.14 Final     Comment:     This test is intended for monitoring Coumadin therapy.  Results are not   accurate   in patients with prolonged INR due to factor deficiency.         ASSESSMENT / PLAN  INR assessment SUPRA    Recheck INR In: 2 DAYS    INR Location Outside lab      Anticoagulation Summary as of 4/4/2017     INR goal 2.0-3.0   Today's INR 4.2!   Maintenance plan 7.5 mg (5 mg x 1.5) on Wed; 5 mg (5 mg x 1) all other days   Full instructions 4/4: Hold; Otherwise 7.5 mg on Wed; 5 mg all other days   Weekly total 37.5 mg   Plan last modified Mary Berry RN (3/23/2017)   Next INR check 4/6/2017   Priority INR   Target end date 8/14/2017    Indications   Deep vein thrombosis (DVT) (H) [I82.409] [I82.409]  Long-term (current) use of anticoagulants [Z79.01] [Z79.01]         Anticoagulation Episode Summary     INR check location     Preferred lab [facility]    Send INR reminders to [facility] INR    Comments Pateint has chemo infusion pump for 48 hours every 2 weeks.  Her INR rises during this infusion. Her most recent scheduled infusion was  3/21/17.   Call [employer] cell phone for INR results [phone]  Per Esther Greene MD- Goal of 2-3.      Anticoagulation Care Providers     Provider Role Specialty Phone number    Esther Greene MD Responsible Hematology [phone]            See the Encounter Report to view Anticoagulation Flowsheet and Dosing Calendar (Go to Encounters tab in chart review, and find the Anticoagulation Therapy Visit)        Nina Silva, MELONIE

## 2017-04-06 ENCOUNTER — INFUSION THERAPY VISIT (OUTPATIENT)
Dept: INFUSION THERAPY | Facility: CLINIC | Age: 72
End: 2017-04-06
Payer: COMMERCIAL

## 2017-04-06 ENCOUNTER — ANTICOAGULATION THERAPY VISIT (OUTPATIENT)
Dept: PHARMACY | Facility: CLINIC | Age: 72
End: 2017-04-06
Payer: COMMERCIAL

## 2017-04-06 VITALS
TEMPERATURE: 98.3 F | DIASTOLIC BLOOD PRESSURE: 80 MMHG | SYSTOLIC BLOOD PRESSURE: 121 MMHG | HEART RATE: 83 BPM | OXYGEN SATURATION: 96 % | RESPIRATION RATE: 18 BRPM

## 2017-04-06 DIAGNOSIS — C18.9 METASTASIS FROM COLON CANCER (H): Primary | ICD-10-CM

## 2017-04-06 DIAGNOSIS — C79.9 METASTASIS FROM COLON CANCER (H): Primary | ICD-10-CM

## 2017-04-06 LAB — INR POINT OF CARE: 3.1 (ref 0.86–1.14)

## 2017-04-06 PROCEDURE — 99207 ZZC NO CHARGE LOS: CPT

## 2017-04-06 PROCEDURE — 36416 COLLJ CAPILLARY BLOOD SPEC: CPT

## 2017-04-06 PROCEDURE — 85610 PROTHROMBIN TIME: CPT | Mod: QW

## 2017-04-06 PROCEDURE — 99207 ZZC NO CHARGE NURSE ONLY: CPT

## 2017-04-06 PROCEDURE — 99207 ZZC NO CHARGE LOS: CPT | Performed by: INTERNAL MEDICINE

## 2017-04-06 RX ORDER — HEPARIN SODIUM (PORCINE) LOCK FLUSH IV SOLN 100 UNIT/ML 100 UNIT/ML
5 SOLUTION INTRAVENOUS ONCE
Status: COMPLETED | OUTPATIENT
Start: 2017-04-06 | End: 2017-04-06

## 2017-04-06 RX ADMIN — HEPARIN SODIUM (PORCINE) LOCK FLUSH IV SOLN 100 UNIT/ML 5 ML: 100 SOLUTION at 10:37

## 2017-04-06 NOTE — PROGRESS NOTES
Pump was discontinued.  Port heparinized after positive blood return noted.  Chemo bag was empty.

## 2017-04-06 NOTE — PROGRESS NOTES
ANTICOAGULATION FOLLOW-UP CLINIC VISIT    Patient Name:  Damari Bryant  Date:  4/6/2017  Contact Type:  Face to Face    SUBJECTIVE:     Patient Findings     Positives Nose bleeds    Comments Nose bleed this morning for 2 minutes.   Is seeing ENT Monday 4/10/17 for sinus congestion that is ongoing.  Has Chemo again 4/18/17.             OBJECTIVE    INR Protime   Date Value Ref Range Status   04/06/2017 3.1 (A) 0.86 - 1.14 Final       ASSESSMENT / PLAN  INR assessment THER    Recheck INR In: 4 DAYS    INR Location Clinic      Anticoagulation Summary as of 4/6/2017     INR goal 2.0-3.0   Today's INR 3.1!   Maintenance plan 7.5 mg (5 mg x 1.5) on Wed; 5 mg (5 mg x 1) all other days   Full instructions 7.5 mg on Wed; 5 mg all other days   Weekly total 37.5 mg   No change documented Nina Silva RN   Plan last modified Mary Berry RN (3/23/2017)   Next INR check 4/10/2017   Priority INR   Target end date 8/14/2017    Indications   Deep vein thrombosis (DVT) (H) [I82.409] [I82.409]  Long-term (current) use of anticoagulants [Z79.01] [Z79.01]         Anticoagulation Episode Summary     INR check location     Preferred lab [facility]    Send INR reminders to [facility] INR    Comments Chemo infusion pump for 48 hours every 2 weeks.  Her INR rises during this infusion. Most recent schd infusion was  4/4/17, next one is 4/18/17   [employer] cell phone for INR results [phone]. Poke lightly  Per Esther Greene MD- Goal of 2-3.      Anticoagulation Care Providers     Provider Role Specialty Phone number    Esther Greene MD Responsible Hematology [phone]            See the Encounter Report to view Anticoagulation Flowsheet and Dosing Calendar (Go to Encounters tab in chart review, and find the Anticoagulation Therapy Visit)        Nina Silva, RN

## 2017-04-06 NOTE — MR AVS SNAPSHOT
After Visit Summary   4/6/2017    Damari Bryant    MRN: [id number]           Patient Information     Date Of Birth          1945        Visit Information        Provider Department      4/6/2017 10:15 AM BAY 3 INFUSION [facility]        Today's Diagnoses     Metastasis from colon cancer (H)    -  1       Follow-ups after your visit        Your next 10 appointments already scheduled     Apr 06, 2017 10:40 AM CDT   Anticoagulation Visit with INR MG   [facility])    [address] [facility] [address]   [phone]            Apr 10, 2017 10:00 AM CDT   New Visit with Nina Carlin MD   [facility] ([facility])    [address] [facility] [address]   [phone]            Apr 18, 2017 10:15 AM CDT   Return Visit with NURSE ONLY CANCER CENTER   [facility] ([facility])    [address] [facility] [address]   [phone]            Apr 18, 2017 11:00 AM CDT   Return Visit with Esther Greene MD   [facility])    [address] [facility] [address]   [phone]            Apr 18, 2017 11:30 AM CDT   Level 3 with BAY 2 INFUSION   [facility] ([facility])    [address] [facility] [address]   [phone]            May 02, 2017  8:45 AM CDT   Return Visit with NURSE ONLY CANCER CENTER   [facility] ([facility])    [address] [facility] [address]   [phone]            May 02, 2017  9:15 AM CDT   Return Visit with SANJEEV Camarillo [facility] ([facility])    [address] [facility] [address]   [phone]            May 02, 2017 10:30 AM CDT   Level 3 with BAY 8 INFUSION   [facility] ([facility])    [address] [place] [address] [phone]              Who to contact     If you have questions or need follow up information about today's clinic visit or your schedule please contact [facility] directly at [phone].  Normal or non-critical lab and imaging results will be communicated to you by MyChart, letter or phone within 4 business days after the clinic has received the results. If you do not hear from us within 7 days, please contact the clinic through MyChart or phone. If you have a critical or abnormal lab result, we will notify you by phone as soon as possible.  Submit refill requests through [employer] or call your pharmacy and they will forward the refill request to us. Please allow 3 business days for your refill to be completed.          Additional Information About Your Visit        [employer] Information     [employer] is an electronic gateway that provides easy, online access to your medical records. With [employer], you can request a clinic appointment, read your test results, renew a prescription or communicate with your care team.     To sign up for [employer] visit the website at [email or web address]   You will be asked to enter the access code listed below, as well as some personal information. Please follow the directions to create your username and password.     Your access code is: [email or web address]  Expires: 2017  2:26 PM     Your access code will  in 90 days. If you need help or a new code, please contact your [facility] or call [phone] for assistance.        Care EveryWhere ID     This is your Care EveryWhere ID. This could be used by other organizations to access your [place] medical records  [id number]        Your Vitals Were     Pulse Temperature Respirations Pulse Oximetry          83 98.3  F (36.8  C) (Oral) 18 96%         Blood Pressure from Last 3 Encounters:    04/06/17 121/80   04/04/17 125/81   03/23/17 129/78    Weight from Last 3 Encounters:   04/04/17 98.3 kg (216 lb 12.8 oz)   03/21/17 98 kg (216 lb)   03/08/17 98.9 kg (218 lb)              Today, you had the following     No orders found for display       Primary Care Provider Office Phone # Fax #    Samuel Cobian [phone]       [facility] [address] [facility] [address]        Thank you!     Thank you for choosing [facility]  for your care. Our goal is always to provide you with excellent care. Hearing back from our patients is one way we can continue to improve our services. Please take a few minutes to complete the written survey that you may receive in the mail after your visit with us. Thank you!             Your Updated Medication List - Protect others around you: Learn how to safely use, store and throw away your medicines at www.disposemymeds.org.          This list is accurate as of: 4/6/17 10:38 AM.  Always use your most recent med list.                   Brand Name Dispense Instructions for use    busPIRone 15 MG tablet    BUSPAR     Take 15 mg by mouth 2 times daily       levothyroxine 150 MCG tablet    SYNTHROID/LEVOTHROID     Take 150 mcg by mouth daily       LEXAPRO 20 MG tablet   Generic drug:  escitalopram      Take 20 mg by mouth daily.       loperamide 2 MG capsule    IMODIUM    30 capsule    Start with 2 caps (4 mg), then take one cap (2 mg) after each diarrheal stool as needed. Do not use more than 8 caps (16 mg) per day.       NORVASC 5 MG tablet   Generic drug:  amLODIPine      Take 5 mg by mouth daily       OMEGA-3 FISH OIL PO          ondansetron 8 MG tablet    ZOFRAN    10 tablet    Take 1 tablet (8 mg) by mouth every 8 hours as needed (Nausea/Vomiting)       prochlorperazine 10 MG tablet    COMPAZINE    30 tablet    Take 1 tablet (10 mg) by mouth every 6 hours as needed (Nausea/Vomiting)       senna-docusate 8.6-50 MG per tablet    SENNA  S    60 tablet    1-2 tabs daily for constipation       tetrahydrozoline 0.05 % ophthalmic solution      1 drop 3 times daily       TOPAMAX 50 MG tablet   Generic drug:  topiramate      Take  by mouth 2 times daily. 1 tab q am 2 tabs q PM       triamterene-hydrochlorothiazide 37.5-25 MG per tablet    MAXZIDE-25     Take 1 tablet by mouth daily.       UNABLE TO FIND      MEDICATION NAME: Nature made digestive probiotics       VITAMIN D (CHOLECALCIFEROL) PO      Take 2,000 Units by mouth daily       warfarin 5 MG tablet    COUMADIN    60 tablet    Take 1 tablet (5 mg) by mouth daily

## 2017-04-06 NOTE — MR AVS SNAPSHOT
Damari Bryant   4/6/2017 10:40 AM   Anticoagulation Therapy Visit    Description:  71 year old female   Provider:  INR MG   Department:  Mg Med Monitoring           INR as of 4/6/2017     Today's INR       Anticoagulation Summary as of 4/6/2017     INR goal 2.0-3.0   Today's INR    Full instructions 7.5 mg on Wed; 5 mg all other days   Next INR check 4/10/2017    Indications   Deep vein thrombosis (DVT) (H) [I82.409] [I82.409]  Long-term (current) use of anticoagulants [Z79.01] [Z79.01]         Your next Anticoagulation Clinic appointment(s)     Apr 10, 2017  9:40 AM CDT   Anticoagulation Visit with INR MG   [facility]    [address] [place] [address] [phone]              Contact Numbers     [facility]  Please call the anticoagulation nurse at [phone] to cancel and/or reschedule your appointment, or with any problems or questions regarding your therapy.  You may call the main clinic number at [phone] if the nurse is not available.           April 2017 Details    Sun Mon Tue Wed Thu Fri Sat           1                 2               3               4               5               6      5 mg   See details      7      5 mg         8      5 mg           9      5 mg         10            11               12               13               14               15                 16               17               18               19               20               21               22                 23               24               25               26               27               28               29                 30                      Date Details   04/06 This INR check       Date of next INR:  4/10/2017         How to take your warfarin dose     To take:  5 mg Take 1 of the 5 mg tablets.

## 2017-04-10 ENCOUNTER — ANTICOAGULATION THERAPY VISIT (OUTPATIENT)
Dept: PHARMACY | Facility: CLINIC | Age: 72
End: 2017-04-10
Payer: COMMERCIAL

## 2017-04-10 ENCOUNTER — OFFICE VISIT (OUTPATIENT)
Dept: OTOLARYNGOLOGY | Facility: CLINIC | Age: 72
End: 2017-04-10
Payer: COMMERCIAL

## 2017-04-10 VITALS
BODY MASS INDEX: 34.55 KG/M2 | HEART RATE: 94 BPM | HEIGHT: 66 IN | SYSTOLIC BLOOD PRESSURE: 155 MMHG | DIASTOLIC BLOOD PRESSURE: 96 MMHG | WEIGHT: 215 LBS

## 2017-04-10 DIAGNOSIS — R04.0 EPISTAXIS, RECURRENT: Primary | ICD-10-CM

## 2017-04-10 DIAGNOSIS — Z79.01 LONG-TERM (CURRENT) USE OF ANTICOAGULANTS: ICD-10-CM

## 2017-04-10 DIAGNOSIS — I82.90 ACUTE DEEP VEIN THROMBOSIS (DVT) OF NON-EXTREMITY VEIN: ICD-10-CM

## 2017-04-10 LAB — INR POINT OF CARE: 2.9 (ref 0.86–1.14)

## 2017-04-10 PROCEDURE — 85610 PROTHROMBIN TIME: CPT | Mod: QW

## 2017-04-10 PROCEDURE — 36416 COLLJ CAPILLARY BLOOD SPEC: CPT

## 2017-04-10 PROCEDURE — 30901 CONTROL OF NOSEBLEED: CPT | Performed by: OTOLARYNGOLOGY

## 2017-04-10 PROCEDURE — 99207 ZZC NO CHARGE NURSE ONLY: CPT

## 2017-04-10 PROCEDURE — 99203 OFFICE O/P NEW LOW 30 MIN: CPT | Mod: 25 | Performed by: OTOLARYNGOLOGY

## 2017-04-10 NOTE — MR AVS SNAPSHOT
Damari Bryant   4/10/2017 9:40 AM   Anticoagulation Therapy Visit    Description:  71 year old female   Provider:  INR MG   Department:  Mg Med Monitoring           INR as of 4/10/2017     Today's INR 2.9      Anticoagulation Summary as of 4/10/2017     INR goal 2.0-3.0   Today's INR 2.9   Full instructions 4/11: 2.5 mg; Otherwise 7.5 mg on Wed; 5 mg all other days   Next INR check 4/13/2017    Indications   Deep vein thrombosis (DVT) (H) [I82.409] [I82.409]  Long-term (current) use of anticoagulants [Z79.01] [Z79.01]         Contact Numbers     [facility]  Please call the anticoagulation nurse at [phone] to cancel and/or reschedule your appointment, or with any problems or questions regarding your therapy.  You may call the main clinic number at [phone] if the nurse is not available.           April 2017 Details    Sun Mon Tue Wed Thu Fri Sat           1                 2               3               4               5               6               7               8                 9               10      5 mg   See details      11      2.5 mg         12      7.5 mg         13            14               15                 16               17               18               19               20               21               22                 23               24               25               26               27               28               29                 30                      Date Details   04/10 This INR check       Date of next INR:  4/13/2017         How to take your warfarin dose     To take:  2.5 mg Take 0.5 of a 5 mg tablet.    To take:  5 mg Take 1 of the 5 mg tablets.    To take:  7.5 mg Take 1.5 of the 5 mg tablets.

## 2017-04-10 NOTE — NURSING NOTE
"Damari Bryant's goals for this visit include:   Chief Complaint   Patient presents with     Consult     Chronic congestion       She requests these members of her care team be copied on today's visit information: yes      PCP: Samuel Cobian    Referring Provider:  No referring provider defined for this encounter.    Chief Complaint   Patient presents with     Consult     Chronic congestion       Initial BP (!) 155/96  Pulse 94  Ht 1.676 m (5' 6\")  Wt 97.5 kg (215 lb)  BMI 34.7 kg/m2 Estimated body mass index is 34.7 kg/(m^2) as calculated from the following:    Height as of this encounter: 1.676 m (5' 6\").    Weight as of this encounter: 97.5 kg (215 lb).  Medication Reconciliation: complete    "

## 2017-04-10 NOTE — PROGRESS NOTES
ANTICOAGULATION FOLLOW-UP CLINIC VISIT    Patient Name:  Damari Bryant  Date:  4/10/2017  Contact Type:  Face to Face with     SUBJECTIVE:     Patient Findings     Positives No Problem Findings           OBJECTIVE    INR Protime   Date Value Ref Range Status   04/10/2017 2.9 (A) 0.86 - 1.14 Final       ASSESSMENT / PLAN  INR assessment THER    Recheck INR In: 3 DAYS    INR Location Clinic      Anticoagulation Summary as of 4/10/2017     INR goal 2.0-3.0   Today's INR 2.9   Maintenance plan 7.5 mg (5 mg x 1.5) on Wed; 5 mg (5 mg x 1) all other days   Full instructions 4/11: 2.5 mg; Otherwise 7.5 mg on Wed; 5 mg all other days   Weekly total 37.5 mg   Plan last modified Mary Berry RN (3/23/2017)   Next INR check 4/13/2017   Priority INR   Target end date 8/14/2017    Indications   Deep vein thrombosis (DVT) (H) [I82.409] [I82.409]  Long-term (current) use of anticoagulants [Z79.01] [Z79.01]         Anticoagulation Episode Summary     INR check location     Preferred lab [facility]    Send INR reminders to [facility] INR    Comments Chemo infusion pump for 48 hours every 2 weeks.  Her INR rises during this infusion. Most recent schd infusion was  4/4/17, next one is 4/18/17   Ross's cell phone for INR results [phone]. Martin rodriguez  Per Esther Greene MD- Goal of 2-3.      Anticoagulation Care Providers     Provider Role Specialty Phone number    Esther Greene MD Responsible Hematology [phone]            See the Encounter Report to view Anticoagulation Flowsheet and Dosing Calendar (Go to Encounters tab in chart review, and find the Anticoagulation Therapy Visit)        Paulina Shaver RN

## 2017-04-10 NOTE — MR AVS SNAPSHOT
After Visit Summary   4/10/2017    Damari Bryant    MRN: [id number]           Patient Information     Date Of Birth          1945        Visit Information        Provider Department      4/10/2017 10:00 AM Nina Carlin MD [facility]         Follow-ups after your visit        Your next 10 appointments already scheduled     Apr 18, 2017 10:15 AM CDT   Return Visit with [facility]SE ONLY [facility]CER C[facility]   [facility] ([facility])    [address] [facility] [address]   [phone]            Apr 18, 2017 11:00 AM CDT   Return Visit with Esther Greene MD   [facility])    [address] [facility] [address]   [phone]            Apr 18, 2017 11:30 AM CDT   Level 3 with BAY 2 INFUSION   [facility])    [address] [facility] [address]   [phone]            Apr 24, 2017  9:00 AM CDT   Return Visit with Nina Carlin MD   [facility])    [address] [place] [address]   [phone]            May 01, 2017  9:00 AM CDT   Return Visit with Nina Carlin MD   [facility])    [address] [facility] [address]   [phone]            May 02, 2017  8:45 AM CDT   Return Visit with [facility]URSE ONLY CANCER CENTER   [facility] ([facility])    [address] [facility] [address]   [phone]            May 02, 2017  9:15 AM CDT   Return Visit with SANJEEV Camarillo CNP   [facility])    [address] [facility] [address]   [phone]            May 02, 2017 10:30 AM CDT   Level 3 with BAY 8 INFUSION   [facility] ([facility])  "   [address] [place] [address] [phone]              Who to contact     If you have questions or need follow up information about today's clinic visit or your schedule please contact [facility] directly at [phone].  Normal or non-critical lab and imaging results will be communicated to you by MyChart, letter or phone within 4 business days after the clinic has received the results. If you do not hear from us within 7 days, please contact the clinic through MyChart or phone. If you have a critical or abnormal lab result, we will notify you by phone as soon as possible.  Submit refill requests through [employer] or call your pharmacy and they will forward the refill request to us. Please allow 3 business days for your refill to be completed.          Additional Information About Your Visit        [employer] Information     [employer] is an electronic gateway that provides easy, online access to your medical records. With [employer], you can request a clinic appointment, read your test results, renew a prescription or communicate with your care team.     To sign up for [employer] visit the website at [email or web address]   You will be asked to enter the access code listed below, as well as some personal information. Please follow the directions to create your username and password.     Your access code is: [email or web address]  Expires: 2017  2:26 PM     Your access code will  in 90 days. If you need help or a new code, please contact your [facility] or call [phone] for assistance.        Care EveryWhere ID     This is your Care EveryWhere ID. This could be used by other organizations to access your [place] medical records  [id number]        Your Vitals Were     Pulse Height BMI (Body Mass Index)             94 1.676 m (5' 6\") 34.7 kg/m2          Blood Pressure from Last 3 Encounters:   04/10/17 (!) 155/96   17 121/80   17 " 125/81    Weight from Last 3 Encounters:   04/10/17 97.5 kg (215 lb)   04/04/17 98.3 kg (216 lb 12.8 oz)   03/21/17 98 kg (216 lb)              Today, you had the following     No orders found for display       Primary Care Provider Office Phone # Fax #    Samuel Cobian [phone]       [facility] [address] [facility] [address]        Thank you!     Thank you for choosing [facility]  for your care. Our goal is always to provide you with excellent care. Hearing back from our patients is one way we can continue to improve our services. Please take a few minutes to complete the written survey that you may receive in the mail after your visit with us. Thank you!             Your Updated Medication List - Protect others around you: Learn how to safely use, store and throw away your medicines at www.disposemymeds.org.          This list is accurate as of: 4/10/17 10:45 AM.  Always use your most recent med list.                   Brand Name Dispense Instructions for use    busPIRone 15 MG tablet    BUSPAR     Take 15 mg by mouth 2 times daily       levothyroxine 150 MCG tablet    SYNTHROID/LEVOTHROID     Take 150 mcg by mouth daily       LEXAPRO 20 MG tablet   Generic drug:  escitalopram      Take 20 mg by mouth daily.       loperamide 2 MG capsule    IMODIUM    30 capsule    Start with 2 caps (4 mg), then take one cap (2 mg) after each diarrheal stool as needed. Do not use more than 8 caps (16 mg) per day.       NORVASC 5 MG tablet   Generic drug:  amLODIPine      Take 5 mg by mouth daily       OMEGA-3 FISH OIL PO          ondansetron 8 MG tablet    ZOFRAN    10 tablet    Take 1 tablet (8 mg) by mouth every 8 hours as needed (Nausea/Vomiting)       prochlorperazine 10 MG tablet    COMPAZINE    30 tablet    Take 1 tablet (10 mg) by mouth every 6 hours as needed (Nausea/Vomiting)       senna-docusate 8.6-50 MG per tablet    SENNA S    60 tablet    1-2 tabs daily for  constipation       tetrahydrozoline 0.05 % ophthalmic solution      1 drop 3 times daily       TOPAMAX 50 MG tablet   Generic drug:  topiramate      Take  by mouth 2 times daily. 1 tab q am 2 tabs q PM       triamterene-hydrochlorothiazide 37.5-25 MG per tablet    MAXZIDE-25     Take 1 tablet by mouth daily.       UNABLE TO FIND      MEDICATION NAME: Nature made digestive probiotics       VITAMIN D (CHOLECALCIFEROL) PO      Take 2,000 Units by mouth daily       warfarin 5 MG tablet    COUMADIN    60 tablet    Take 1 tablet (5 mg) by mouth daily

## 2017-04-10 NOTE — PROGRESS NOTES
Otolaryngology Adult Consultation    Patient: Damari Bryant  : 1945      HPI:  Damari Bryant is a 71 year old female seen today in the Otolaryngology Clinic for nose bleeds and possible sinusitis.  Patient's history is significant for metastatic colon cancer.  She recently had a new site discovered in the lung and is currently undergoing chemotherapy treatment.  She was also started on coumadin about a month ago due to occlusive thrombus.  Her INR does increase during chemo treatments.  Starting the winter, she developed right sided nose bleeds and nasal drainage and sore throat.  Now the nose bleeds are mostly left-sided.      Medications:  Current Outpatient Rx   Medication Sig Dispense Refill     ondansetron (ZOFRAN) 8 MG tablet Take 1 tablet (8 mg) by mouth every 8 hours as needed (Nausea/Vomiting) 10 tablet 2     loperamide (IMODIUM) 2 MG capsule Start with 2 caps (4 mg), then take one cap (2 mg) after each diarrheal stool as needed. Do not use more than 8 caps (16 mg) per day. 30 capsule 1     warfarin (COUMADIN) 5 MG tablet Take 1 tablet (5 mg) by mouth daily 60 tablet 1     senna-docusate (SENNA S) 8.6-50 MG per tablet 1-2 tabs daily for constipation 60 tablet 1     prochlorperazine (COMPAZINE) 10 MG tablet Take 1 tablet (10 mg) by mouth every 6 hours as needed (Nausea/Vomiting) 30 tablet 2     Omega-3 Fatty Acids (OMEGA-3 FISH OIL PO)        tetrahydrozoline 0.05 % ophthalmic solution 1 drop 3 times daily       UNABLE TO FIND MEDICATION NAME: Nature made digestive probiotics       VITAMIN D, CHOLECALCIFEROL, PO Take 2,000 Units by mouth daily       busPIRone (BUSPAR) 15 MG tablet Take 15 mg by mouth 2 times daily       amLODIPine (NORVASC) 5 MG tablet Take 5 mg by mouth daily       escitalopram (LEXAPRO) 20 MG tablet Take 20 mg by mouth daily.       levothyroxine (SYNTHROID, LEVOTHROID) 150 MCG tablet Take 150 mcg by mouth daily        topiramate (TOPAMAX) 50 MG tablet Take  by mouth 2 times daily.  1 tab q am 2 tabs q PM       triamterene-hydrochlorothiazide (MAXZIDE-25) 37.5-25 MG per tablet Take 1 tablet by mouth daily.         Allergies: Ativan; Avocado; Hmg-coa-r inhibitors; Iodine-131; Shellfish allergy; Sulfa drugs; and Tegaderm transparent dressing (informational only)     PMH:  Past Medical History:   Diagnosis Date     Anemia      Anxiety      Colon cancer (H)      Dyslipidaemia      GERD (gastroesophageal reflux disease)      HTN (hypertension)      Hypothyroid      Lichen sclerosus of female genitalia      Macular degeneration      Meniere disease      Migraine      Palpitation     pvc     Tachycardia     supraventricular       PSH:  Past Surgical History:   Procedure Laterality Date     BLADDER SURGERY       BYPASS CARDIOPULMONARY       COLECTOMY WITHOUT COLOSTOMY  6/21/2013    lap sigmoid colectomy     FUSION LUMBAR ANTERIOR TWO LEVELS  1999    L4-5     HYSTERECTOMY  1995     NISSEN FUNDOPLICATION  9/2012    paraesophageal hernia reduction     TONSILLECTOMY & ADENOIDECTOMY         FH:  Family History   Problem Relation Age of Onset     CEREBROVASCULAR DISEASE Father      Cancer - colorectal Father 70     CEREBROVASCULAR DISEASE Mother      DIABETES Mother      CANCER Brother      Lung     DIABETES Sister      Type I     DIABETES Son      Type I     Arthritis Maternal Grandmother      CANCER Maternal Grandmother      esophaegeal     Arthritis Maternal Grandfather      Arthritis Paternal Grandmother         SH:  Social History   Substance Use Topics     Smoking status: Never Smoker     Smokeless tobacco: Never Used     Alcohol use No       Review of Systems  No flowsheet data found.    Physical Exam:    GEN:  The patient is alert, oriented and in no acute distress.  HEAD:  Head, face scalp is grossly normal.  EARS:  Ears demonstrate normal canals, auricles and tympanic membranes.  NOSE:  External nose is straight, skin is normal.                Intranasal exam (anterior rhinoscopy) reveals that  septum is deviated to the left anteriorly.  There is a large bloody scab present.  Mucosa is dry.  Scab was removed and bleeding is present.  Silver nitrated used to control bleeding.    ORAL:  Oral cavity shows healthy mucosa with out ulceration, masses or other lesions                involving the tongue, palate, buccal mucosa, floor of mouth or gingiva.                     Assessment/Plan:    1. Epistaxis - discussed pathophysiology of nose bleeds.  Patient's mucosa is very dry.  I recommend saline spray several times a day and return to see me in 2 weeks for recheck and another cauterization if needed.    2. Nasal drainage - I think that this is due to nasal dryness rather than sinusitis.  I do not see any evidence of infection today.  She will start the nasal saline spray.    I spent a total of 35 minutes face-to-face with Damari Bryant during today's office visit.  Over 50% of this time was spent counseling the patient on and/or coordinating care as documented in my assessment and plan.

## 2017-04-13 ENCOUNTER — ANTICOAGULATION THERAPY VISIT (OUTPATIENT)
Dept: PHARMACY | Facility: CLINIC | Age: 72
End: 2017-04-13
Payer: COMMERCIAL

## 2017-04-13 DIAGNOSIS — Z79.01 LONG-TERM (CURRENT) USE OF ANTICOAGULANTS: ICD-10-CM

## 2017-04-13 DIAGNOSIS — I82.90 ACUTE DEEP VEIN THROMBOSIS (DVT) OF NON-EXTREMITY VEIN: ICD-10-CM

## 2017-04-13 LAB — INR PPP: 3.3

## 2017-04-13 PROCEDURE — 99207 ZZC NO CHARGE NURSE ONLY: CPT | Performed by: INTERNAL MEDICINE

## 2017-04-13 NOTE — PROGRESS NOTES
ANTICOAGULATION FOLLOW-UP CLINIC VISIT    Patient Name:  Damari Bryant  Date:  4/13/2017  Contact Type:  Telephone/  Ross. Provided dosing instructions and next INR at lab apt on 4/18.     SUBJECTIVE:     Patient Findings     Positives No Problem Findings           OBJECTIVE    INR   Date Value Ref Range Status   04/13/2017 3.3  Final       ASSESSMENT / PLAN  INR assessment SUPRA    Recheck INR In: 6 DAYS    INR Location Outside lab      Anticoagulation Summary as of 4/13/2017     INR goal 2.0-3.0   Today's INR 3.3!   Maintenance plan 7.5 mg (5 mg x 1.5) on Wed; 5 mg (5 mg x 1) all other days   Full instructions 7.5 mg on Wed; 5 mg all other days   Weekly total 37.5 mg   Plan last modified Mary Berry RN (3/23/2017)   Next INR check 4/18/2017   Priority INR   Target end date 8/14/2017    Indications   Deep vein thrombosis (DVT) (H) [I82.409] [I82.409]  Long-term (current) use of anticoagulants [Z79.01] [Z79.01]         Anticoagulation Episode Summary     INR check location     Preferred lab [facility]    Send INR reminders to [facility] INR    Comments Chemo infusion pump for 48 hours every 2 weeks.  Her INR rises during this infusion. Most recent schd infusion was  4/4/17, next one is 4/18/17   Ross's cell phone for INR results [phone]. Poke lightly  Per Esther Greene MD- Goal of 2-3.      Anticoagulation Care Providers     Provider Role Specialty Phone number    Esther Greene MD Responsible Hematology [phone]            See the Encounter Report to view Anticoagulation Flowsheet and Dosing Calendar (Go to Encounters tab in chart review, and find the Anticoagulation Therapy Visit)        Paulina Shaver RN

## 2017-04-14 ENCOUNTER — TELEPHONE (OUTPATIENT)
Dept: OTOLARYNGOLOGY | Facility: CLINIC | Age: 72
End: 2017-04-14

## 2017-04-14 DIAGNOSIS — H90.5 SNHL (SENSORINEURAL HEARING LOSS): Primary | ICD-10-CM

## 2017-04-14 NOTE — TELEPHONE ENCOUNTER
4.14.17  Pt called and needed appt with Chip Whitehead, audiology.  States that Dr Carlin told her to see him and would give her a referral.  Please add referral to [facility] as patient has an appt on Monday April 17th, 2017.  Thanks.

## 2017-04-17 ENCOUNTER — OFFICE VISIT (OUTPATIENT)
Dept: AUDIOLOGY | Facility: CLINIC | Age: 72
End: 2017-04-17
Payer: COMMERCIAL

## 2017-04-17 DIAGNOSIS — H90.3 BILATERAL SENSORINEURAL HEARING LOSS: Primary | ICD-10-CM

## 2017-04-17 PROCEDURE — 92557 COMPREHENSIVE HEARING TEST: CPT | Performed by: AUDIOLOGIST

## 2017-04-17 PROCEDURE — 92550 TYMPANOMETRY & REFLEX THRESH: CPT | Performed by: AUDIOLOGIST

## 2017-04-17 NOTE — PROGRESS NOTES
AUDIOLOGY REPORT    SUMMARY: Audiology visit completed. See audiogram for results.    RECOMMENDATIONS: Follow-up with ENT.    [place]  Doctor of Audiology  [place] License # [id number]

## 2017-04-17 NOTE — MR AVS SNAPSHOT
After Visit Summary   4/17/2017    Damari Bryant    MRN: [id number]           Patient Information     Date Of Birth          1945        Visit Information        Provider Department      4/17/2017 11:00 AM Chip Whitehead AuD [facility]        Today's Diagnoses     Bilateral sensorineural hearing loss    -  1       Follow-ups after your visit        Your next 10 appointments already scheduled     Apr 18, 2017 10:15 AM CDT   Return Visit with NURSE ONLY CANCER CENTER   [facility])    [address] [place] [address]   [phone]            Apr 18, 2017 11:00 AM CDT   Return Visit with Esther Greene MD   [facility])    [address] [place] [address]   [phone]            Apr 18, 2017 11:30 AM CDT   Level 3 with BAY 2 INFUSION   [facility])    [address] [place] [address]   [phone]            Apr 24, 2017  9:00 AM CDT   Return Visit with Nina Carlin MD   [facility])    [address] [place] [address]   [phone]            May 01, 2017  9:00 AM CDT   Return Visit with Nina Carlin MD   [facility])    [address] [place] [address]   [phone]            May 02, 2017  8:45 AM CDT   Return Visit with NURSE ONLY CANCER CENTER   [facility]    [address] [place] [address]   [phone]            May 02, 2017  9:15 AM CDT   Return Visit with SANJEEV Camarillo CNP   [facility])    [address] [place] [address]   [phone]            May 02, 2017 10:30 AM CDT   Level 3 with BAY 8 INFUSION    [facility]    [address] [place] [address] [phone]              Who to contact     If you have questions or need follow up information about today's clinic visit or your schedule please contact [facility] directly at [phone].  Normal or non-critical lab and imaging results will be communicated to you by MyChart, letter or phone within 4 business days after the clinic has received the results. If you do not hear from us within 7 days, please contact the clinic through MyChart or phone. If you have a critical or abnormal lab result, we will notify you by phone as soon as possible.  Submit refill requests through [employer] or call your pharmacy and they will forward the refill request to us. Please allow 3 business days for your refill to be completed.          Additional Information About Your Visit        [employer] Information     [employer] is an electronic gateway that provides easy, online access to your medical records. With [employer], you can request a clinic appointment, read your test results, renew a prescription or communicate with your care team.     To sign up for [employer] visit the website at [email or web address]   You will be asked to enter the access code listed below, as well as some personal information. Please follow the directions to create your username and password.     Your access code is: [email or web address]  Expires: 2017  2:26 PM     Your access code will  in 90 days. If you need help or a new code, please contact your [facility] or call [phone] for assistance.        Care EveryWhere ID     This is your Care EveryWhere ID. This could be used by other organizations to access your [place] medical records  [id number]         Blood Pressure from Last 3 Encounters:   04/10/17 (!) 155/96   17 121/80   17 125/81    Weight from Last 3 Encounters:    04/10/17 215 lb (97.5 kg)   04/04/17 216 lb 12.8 oz (98.3 kg)   03/21/17 216 lb (98 kg)              We Performed the Following     AUDIOGRAM/TYMPANOGRAM - INTERFACE     COMPREHENSIVE HEARING TEST     TYMPANOMETRY AND REFLEX THRESHOLD MEASUREMENTS        Primary Care Provider Office Phone # Fax #    Samuel Cobian [phone]       [facility] [address] [facility]  [place] [address]        Thank you!     Thank you for choosing [facility]  for your care. Our goal is always to provide you with excellent care. Hearing back from our patients is one way we can continue to improve our services. Please take a few minutes to complete the written survey that you may receive in the mail after your visit with us. Thank you!             Your Updated Medication List - Protect others around you: Learn how to safely use, store and throw away your medicines at www.disposemymeds.org.          This list is accurate as of: 4/17/17 11:07 AM.  Always use your most recent med list.                   Brand Name Dispense Instructions for use    busPIRone 15 MG tablet    BUSPAR     Take 15 mg by mouth 2 times daily       levothyroxine 150 MCG tablet    SYNTHROID/LEVOTHROID     Take 150 mcg by mouth daily       LEXAPRO 20 MG tablet   Generic drug:  escitalopram      Take 20 mg by mouth daily.       loperamide 2 MG capsule    IMODIUM    30 capsule    Start with 2 caps (4 mg), then take one cap (2 mg) after each diarrheal stool as needed. Do not use more than 8 caps (16 mg) per day.       NORVASC 5 MG tablet   Generic drug:  amLODIPine      Take 5 mg by mouth daily       OMEGA-3 FISH OIL PO          ondansetron 8 MG tablet    ZOFRAN    10 tablet    Take 1 tablet (8 mg) by mouth every 8 hours as needed (Nausea/Vomiting)       prochlorperazine 10 MG tablet    COMPAZINE    30 tablet    Take 1 tablet (10 mg) by mouth every 6 hours as needed (Nausea/Vomiting)       senna-docusate 8.6-50 MG per tablet    SENNA  S    60 tablet    1-2 tabs daily for constipation       tetrahydrozoline 0.05 % ophthalmic solution      1 drop 3 times daily       TOPAMAX 50 MG tablet   Generic drug:  topiramate      Take  by mouth 2 times daily. 1 tab q am 2 tabs q PM       triamterene-hydrochlorothiazide 37.5-25 MG per tablet    MAXZIDE-25     Take 1 tablet by mouth daily.       UNABLE TO FIND      MEDICATION NAME: Nature made digestive probiotics       VITAMIN D (CHOLECALCIFEROL) PO      Take 2,000 Units by mouth daily       warfarin 5 MG tablet    COUMADIN    60 tablet    Take 1 tablet (5 mg) by mouth daily

## 2017-04-18 ENCOUNTER — ONCOLOGY VISIT (OUTPATIENT)
Dept: ONCOLOGY | Facility: CLINIC | Age: 72
End: 2017-04-18
Payer: COMMERCIAL

## 2017-04-18 ENCOUNTER — OFFICE VISIT (OUTPATIENT)
Dept: NURSING | Facility: CLINIC | Age: 72
End: 2017-04-18
Payer: COMMERCIAL

## 2017-04-18 ENCOUNTER — INFUSION THERAPY VISIT (OUTPATIENT)
Dept: INFUSION THERAPY | Facility: CLINIC | Age: 72
End: 2017-04-18
Payer: COMMERCIAL

## 2017-04-18 ENCOUNTER — OFFICE VISIT (OUTPATIENT)
Dept: PULMONOLOGY | Facility: CLINIC | Age: 72
End: 2017-04-18
Payer: COMMERCIAL

## 2017-04-18 ENCOUNTER — ANTICOAGULATION THERAPY VISIT (OUTPATIENT)
Dept: PHARMACY | Facility: CLINIC | Age: 72
End: 2017-04-18
Payer: COMMERCIAL

## 2017-04-18 VITALS
SYSTOLIC BLOOD PRESSURE: 140 MMHG | TEMPERATURE: 97 F | DIASTOLIC BLOOD PRESSURE: 90 MMHG | HEART RATE: 96 BPM | OXYGEN SATURATION: 95 % | RESPIRATION RATE: 20 BRPM | HEIGHT: 66 IN | WEIGHT: 216 LBS | BODY MASS INDEX: 34.72 KG/M2

## 2017-04-18 VITALS
RESPIRATION RATE: 24 BRPM | SYSTOLIC BLOOD PRESSURE: 124 MMHG | DIASTOLIC BLOOD PRESSURE: 80 MMHG | WEIGHT: 218.5 LBS | OXYGEN SATURATION: 96 % | HEIGHT: 67 IN | HEART RATE: 94 BPM | BODY MASS INDEX: 34.29 KG/M2

## 2017-04-18 VITALS — SYSTOLIC BLOOD PRESSURE: 127 MMHG | DIASTOLIC BLOOD PRESSURE: 84 MMHG

## 2017-04-18 DIAGNOSIS — C18.9 METASTASIS FROM COLON CANCER (H): Primary | ICD-10-CM

## 2017-04-18 DIAGNOSIS — C18.9 MALIGNANT NEOPLASM OF COLON (H): Primary | ICD-10-CM

## 2017-04-18 DIAGNOSIS — R06.00 ACUTE DYSPNEA: Primary | ICD-10-CM

## 2017-04-18 DIAGNOSIS — I82.90 ACUTE DEEP VEIN THROMBOSIS (DVT) OF NON-EXTREMITY VEIN: ICD-10-CM

## 2017-04-18 DIAGNOSIS — R06.02 SHORTNESS OF BREATH: ICD-10-CM

## 2017-04-18 DIAGNOSIS — R49.9 HOARSENESS OR CHANGING VOICE: ICD-10-CM

## 2017-04-18 DIAGNOSIS — C79.9 METASTASIS FROM COLON CANCER (H): Primary | ICD-10-CM

## 2017-04-18 DIAGNOSIS — Z79.01 LONG-TERM (CURRENT) USE OF ANTICOAGULANTS: ICD-10-CM

## 2017-04-18 DIAGNOSIS — Z79.01 LONG TERM CURRENT USE OF ANTICOAGULANT THERAPY: ICD-10-CM

## 2017-04-18 LAB
ALBUMIN UR-MCNC: NEGATIVE MG/DL
BASOPHILS # BLD AUTO: 0 10E9/L (ref 0–0.2)
BASOPHILS NFR BLD AUTO: 0.8 %
BILIRUB SERPL-MCNC: 0.4 MG/DL (ref 0.2–1.3)
DIFFERENTIAL METHOD BLD: ABNORMAL
EOSINOPHIL # BLD AUTO: 0.1 10E9/L (ref 0–0.7)
EOSINOPHIL NFR BLD AUTO: 3 %
ERYTHROCYTE [DISTWIDTH] IN BLOOD BY AUTOMATED COUNT: 16.9 % (ref 10–15)
HCT VFR BLD AUTO: 39.7 % (ref 35–47)
HGB BLD-MCNC: 13.2 G/DL (ref 11.7–15.7)
INR BLD: 4.1 (ref 0.86–1.14)
LYMPHOCYTES # BLD AUTO: 1.8 10E9/L (ref 0.8–5.3)
LYMPHOCYTES NFR BLD AUTO: 47.4 %
MCH RBC QN AUTO: 30.8 PG (ref 26.5–33)
MCHC RBC AUTO-ENTMCNC: 33.2 G/DL (ref 31.5–36.5)
MCV RBC AUTO: 93 FL (ref 78–100)
MONOCYTES # BLD AUTO: 0.3 10E9/L (ref 0–1.3)
MONOCYTES NFR BLD AUTO: 6.8 %
NEUTROPHILS # BLD AUTO: 1.6 10E9/L (ref 1.6–8.3)
NEUTROPHILS NFR BLD AUTO: 42 %
PLATELET # BLD AUTO: 127 10E9/L (ref 150–450)
RBC # BLD AUTO: 4.28 10E12/L (ref 3.8–5.2)
WBC # BLD AUTO: 3.7 10E9/L (ref 4–11)

## 2017-04-18 PROCEDURE — 99214 OFFICE O/P EST MOD 30 MIN: CPT | Mod: 25 | Performed by: INTERNAL MEDICINE

## 2017-04-18 PROCEDURE — 96415 CHEMO IV INFUSION ADDL HR: CPT | Performed by: INTERNAL MEDICINE

## 2017-04-18 PROCEDURE — 99204 OFFICE O/P NEW MOD 45 MIN: CPT | Mod: 25 | Performed by: INTERNAL MEDICINE

## 2017-04-18 PROCEDURE — 99207 ZZC NO CHARGE NURSE ONLY: CPT

## 2017-04-18 PROCEDURE — 36416 COLLJ CAPILLARY BLOOD SPEC: CPT | Performed by: INTERNAL MEDICINE

## 2017-04-18 PROCEDURE — 96413 CHEMO IV INFUSION 1 HR: CPT | Performed by: INTERNAL MEDICINE

## 2017-04-18 PROCEDURE — 94726 PLETHYSMOGRAPHY LUNG VOLUMES: CPT | Performed by: INTERNAL MEDICINE

## 2017-04-18 PROCEDURE — 96367 TX/PROPH/DG ADDL SEQ IV INF: CPT | Performed by: INTERNAL MEDICINE

## 2017-04-18 PROCEDURE — 81003 URINALYSIS AUTO W/O SCOPE: CPT | Performed by: INTERNAL MEDICINE

## 2017-04-18 PROCEDURE — 85025 COMPLETE CBC W/AUTO DIFF WBC: CPT | Performed by: INTERNAL MEDICINE

## 2017-04-18 PROCEDURE — 96416 CHEMO PROLONG INFUSE W/PUMP: CPT | Performed by: INTERNAL MEDICINE

## 2017-04-18 PROCEDURE — 94729 DIFFUSING CAPACITY: CPT | Performed by: INTERNAL MEDICINE

## 2017-04-18 PROCEDURE — 96417 CHEMO IV INFUS EACH ADDL SEQ: CPT | Performed by: INTERNAL MEDICINE

## 2017-04-18 PROCEDURE — 99207 ZZC NO CHARGE NURSE ONLY: CPT | Performed by: FAMILY MEDICINE

## 2017-04-18 PROCEDURE — 82247 BILIRUBIN TOTAL: CPT | Performed by: INTERNAL MEDICINE

## 2017-04-18 PROCEDURE — 99207 ZZC NO CHARGE LOS: CPT

## 2017-04-18 PROCEDURE — 82378 CARCINOEMBRYONIC ANTIGEN: CPT | Performed by: INTERNAL MEDICINE

## 2017-04-18 PROCEDURE — 96375 TX/PRO/DX INJ NEW DRUG ADDON: CPT | Performed by: INTERNAL MEDICINE

## 2017-04-18 PROCEDURE — 85610 PROTHROMBIN TIME: CPT | Mod: QW | Performed by: INTERNAL MEDICINE

## 2017-04-18 PROCEDURE — 94375 RESPIRATORY FLOW VOLUME LOOP: CPT | Performed by: INTERNAL MEDICINE

## 2017-04-18 RX ORDER — ALBUTEROL SULFATE 0.83 MG/ML
2.5 SOLUTION RESPIRATORY (INHALATION)
Status: CANCELLED | OUTPATIENT
Start: 2017-04-18

## 2017-04-18 RX ORDER — MEPERIDINE HYDROCHLORIDE 50 MG/ML
25 INJECTION INTRAMUSCULAR; INTRAVENOUS; SUBCUTANEOUS EVERY 30 MIN PRN
Status: CANCELLED | OUTPATIENT
Start: 2017-04-18

## 2017-04-18 RX ORDER — EPINEPHRINE 0.3 MG/.3ML
0.3 INJECTION SUBCUTANEOUS EVERY 5 MIN PRN
Status: CANCELLED | OUTPATIENT
Start: 2017-04-18

## 2017-04-18 RX ORDER — HEPARIN SODIUM (PORCINE) LOCK FLUSH IV SOLN 100 UNIT/ML 100 UNIT/ML
5 SOLUTION INTRAVENOUS
Status: DISCONTINUED | OUTPATIENT
Start: 2017-04-18 | End: 2017-04-18 | Stop reason: HOSPADM

## 2017-04-18 RX ORDER — DIPHENHYDRAMINE HYDROCHLORIDE 50 MG/ML
50 INJECTION INTRAMUSCULAR; INTRAVENOUS
Status: CANCELLED
Start: 2017-04-18

## 2017-04-18 RX ORDER — LORAZEPAM 2 MG/ML
0.5 INJECTION INTRAMUSCULAR EVERY 4 HOURS PRN
Status: CANCELLED
Start: 2017-04-18

## 2017-04-18 RX ORDER — SODIUM CHLORIDE 9 MG/ML
1000 INJECTION, SOLUTION INTRAVENOUS CONTINUOUS PRN
Status: CANCELLED
Start: 2017-04-18

## 2017-04-18 RX ORDER — EPINEPHRINE 1 MG/ML
0.3 INJECTION INTRAMUSCULAR; INTRAVENOUS; SUBCUTANEOUS EVERY 5 MIN PRN
Status: CANCELLED | OUTPATIENT
Start: 2017-04-18

## 2017-04-18 RX ORDER — METHYLPREDNISOLONE SODIUM SUCCINATE 125 MG/2ML
125 INJECTION, POWDER, LYOPHILIZED, FOR SOLUTION INTRAMUSCULAR; INTRAVENOUS
Status: CANCELLED
Start: 2017-04-18

## 2017-04-18 RX ORDER — ALBUTEROL SULFATE 90 UG/1
1-2 AEROSOL, METERED RESPIRATORY (INHALATION)
Status: CANCELLED
Start: 2017-04-18

## 2017-04-18 RX ADMIN — Medication 250 ML: at 11:42

## 2017-04-18 RX ADMIN — HEPARIN SODIUM (PORCINE) LOCK FLUSH IV SOLN 100 UNIT/ML 5 ML: 100 SOLUTION at 10:24

## 2017-04-18 ASSESSMENT — PAIN SCALES - GENERAL: PAINLEVEL: NO PAIN (0)

## 2017-04-18 NOTE — MR AVS SNAPSHOT
Damari Bryant   4/18/2017   Anticoagulation Therapy Visit    Description:  72 year old female   Provider:  Samuel Cobian   Department:  Mg Med Monitoring           INR as of 4/18/2017     Today's INR 4.1!      Anticoagulation Summary as of 4/18/2017     INR goal 2.0-3.0   Today's INR 4.1!   Full instructions 4/18: Hold; 4/19: 5 mg; Otherwise 7.5 mg on Wed; 5 mg all other days   Next INR check 4/24/2017    Indications   Deep vein thrombosis (DVT) (H) [I82.409] [I82.409]  Long-term (current) use of anticoagulants [Z79.01] [Z79.01]         Contact Numbers     [facility]  Please call the anticoagulation nurse at [phone] to cancel and/or reschedule your appointment, or with any problems or questions regarding your therapy.  You may call the main clinic number at [phone] if the nurse is not available.           April 2017 Details    Sun Mon Tue Wed Thu Fri Sat           1                 2               3               4               5               6               7               8                 9               10               11               12               13               14               15                 16               17               18      Hold   See details      19      5 mg         20      5 mg         21      5 mg         22      5 mg           23      5 mg         24            25               26               27               28               29                 30                      Date Details   04/18 This INR check       Date of next INR:  4/24/2017         How to take your warfarin dose     To take:  5 mg Take 1 of the 5 mg tablets.    Hold Do not take your warfarin dose. See the Details table to the right for additional instructions.

## 2017-04-18 NOTE — MR AVS SNAPSHOT
After Visit Summary   4/18/2017    Damari Bryant    MRN: [id number]           Patient Information     Date Of Birth          1945        Visit Information        Provider Department      4/18/2017 3:00 PM PFT LAB [facility]        Today's Diagnoses     Malignant neoplasm of colon (H)    -  1       Follow-ups after your visit        Your next 10 appointments already scheduled     Apr 24, 2017  8:40 AM CDT   Anticoagulation Visit with INR MG   [facility])    [address] [place] [address]   [phone]            Apr 24, 2017  9:00 AM CDT   Return Visit with Nina Carlin MD   [facility])    [address] [place] [address]   [phone]            Apr 28, 2017 11:00 AM CDT   Nurse Only with MG IMAGING NURSE   [facility])    [address] [place] [address]   [phone]            Apr 28, 2017 11:30 AM CDT   CT CHEST ABDOMEN PELVIS W/O & W CONTRAST with MGCT1   [facility])    [address] [place] [address]   [phone]           Please bring any scans or X-rays taken at other hospitals, if similar tests were done. Also bring a list of your medicines, including vitamins, minerals and over-the-counter drugs. It is safest to leave personal items at home.  Be sure to tell your doctor:   If you have any allergies.   If there s any chance you are pregnant.   If you are breastfeeding.   If you have any special needs.  You may have contrast for this exam. To prepare:   Do not eat or drink for 2 hours before your exam. If you need to take medicine, you may take it with small sips of water. (We may ask you to take liquid medicine as well.)   The day before your exam, drink extra fluids at least six 8-ounce glasses (unless your  doctor tells you to restrict your fluids).  Patients over 70 or patients with diabetes or kidney problems:   If you haven t had a blood test (creatinine test) within the last 30 days, go to your clinic or Diagnostic Imaging Department for this test.  If you have diabetes:   If your kidney function is normal, continue taking your metformin (Avandamet, Glucophage, Glucovance, Metaglip) on the day of your exam.   If your kidney function is abnormal, wait 48 hours before restarting this medicine.  You will have oral contrast for this exam:   You will drink the contrast at home. Get this from your clinic or Diagnostic Imaging Department. Please follow the directions given.  Please wear loose clothing, such as a sweat suit or jogging clothes. Avoid snaps, zippers and other metal. We may ask you to undress and put on a hospital gown.  If you have any questions, please call the Imaging Department where you will have your exam.            May 01, 2017  9:00 AM CDT   Return Visit with Nina Carlin MD   [facility])    [address] [place] [address]   [phone]            May 02, 2017  8:45 AM CDT   Return Visit with NURSE ONLY CANCER CENTER   [facility])    [address] [place] [address]   [phone]            May 02, 2017  9:15 AM CDT   Return Visit with SANJEEV Camarillo CNP   [facility])    [address] [place] [address]   [phone]            May 02, 2017 10:30 AM CDT   Level 3 with [address])    [address] [place] [address]   [phone]              Future tests that were ordered for you today     Open Future Orders        Priority Expected Expires Ordered    CT Chest abdomen pelvis w & w/o contrast Routine  4/18/2018  2017            Who to contact     If you have questions or need follow up information about today's clinic visit or your schedule please contact [facility] directly at [phone].  Normal or non-critical lab and imaging results will be communicated to you by MyChart, letter or phone within 4 business days after the clinic has received the results. If you do not hear from us within 7 days, please contact the clinic through MyChart or phone. If you have a critical or abnormal lab result, we will notify you by phone as soon as possible.  Submit refill requests through [employer] or call your pharmacy and they will forward the refill request to us. Please allow 3 business days for your refill to be completed.          Additional Information About Your Visit        Lucky[employer] Information     [employer] is an electronic gateway that provides easy, online access to your medical records. With Lucky[employer], you can request a clinic appointment, read your test results, renew a prescription or communicate with your care team.     To sign up for Lucky[employer] visit the website at [email or web address]   You will be asked to enter the access code listed below, as well as some personal information. Please follow the directions to create your username and password.     Your access code is: [email or web address]  Expires: 2017  2:26 PM     Your access code will  in 90 days. If you need help or a new code, please contact your [facility] or call [phone] for assistance.        Care EveryWhere ID     This is your Care EveryWhere ID. This could be used by other organizations to access your [place] medical records  [id number]         Blood Pressure from Last 3 Encounters:   17 124/80   17 127/84   17 140/90    Weight from Last 3 Encounters:   17 99.1 kg (218 lb 8 oz)   17 98 kg (216 lb)   04/10/17 97.5 kg (215 lb)              We Performed the Following      General PFT Lab (Please always keep checked)     HC DIFFUSING CAPACITY     Pulmonary Function Test     RESPIRATORY FLOW VOLUME LOOP        Primary Care Provider Office Phone # Fax #    Samuel Cobian [phone]       [facility] [address] [facility] [place] [address]        Thank you!     Thank you for choosing [facility]  for your care. Our goal is always to provide you with excellent care. Hearing back from our patients is one way we can continue to improve our services. Please take a few minutes to complete the written survey that you may receive in the mail after your visit with us. Thank you!             Your Updated Medication List - Protect others around you: Learn how to safely use, store and throw away your medicines at www.disposemymeds.org.          This list is accurate as of: 4/18/17  4:35 PM.  Always use your most recent med list.                   Brand Name Dispense Instructions for use    busPIRone 15 MG tablet    BUSPAR     Take 15 mg by mouth 2 times daily       levothyroxine 150 MCG tablet    SYNTHROID/LEVOTHROID     Take 150 mcg by mouth daily       LEXAPRO 20 MG tablet   Generic drug:  escitalopram      Take 20 mg by mouth daily.       loperamide 2 MG capsule    IMODIUM    30 capsule    Start with 2 caps (4 mg), then take one cap (2 mg) after each diarrheal stool as needed. Do not use more than 8 caps (16 mg) per day.       NORVASC 5 MG tablet   Generic drug:  amLODIPine      Take 5 mg by mouth daily       OMEGA-3 FISH OIL PO          ondansetron 8 MG tablet    ZOFRAN    10 tablet    Take 1 tablet (8 mg) by mouth every 8 hours as needed (Nausea/Vomiting)       prochlorperazine 10 MG tablet    COMPAZINE    30 tablet    Take 1 tablet (10 mg) by mouth every 6 hours as needed (Nausea/Vomiting)       senna-docusate 8.6-50 MG per tablet    SENNA S    60 tablet    1-2 tabs daily for constipation       tetrahydrozoline 0.05 % ophthalmic solution      1 drop 3  times daily       TOPAMAX 50 MG tablet   Generic drug:  topiramate      Take  by mouth 2 times daily. 1 tab q am 2 tabs q PM       triamterene-hydrochlorothiazide 37.5-25 MG per tablet    MAXZIDE-25     Take 1 tablet by mouth daily.       UNABLE TO FIND      MEDICATION NAME: Nature made digestive probiotics       VITAMIN D (CHOLECALCIFEROL) PO      Take 2,000 Units by mouth daily       warfarin 5 MG tablet    COUMADIN    60 tablet    Take 1 tablet (5 mg) by mouth daily

## 2017-04-18 NOTE — NURSING NOTE
"Damari Bryant's goals for this visit include: Shortness of breath   She requests these members of her care team be copied on today's visit information: yes    PCP: Samuel Cobian    Referring Provider:  Esther Greene MD  [facility]  [address]  [place] [address]    Chief Complaint   Patient presents with     Consult       Initial /80 (BP Location: Left arm, Patient Position: Chair, Cuff Size: Adult Large)  Pulse 94  Resp 24  Ht 1.702 m (5' 7\")  Wt 99.1 kg (218 lb 8 oz)  SpO2 96%  BMI 34.22 kg/m2 Estimated body mass index is 34.22 kg/(m^2) as calculated from the following:    Height as of this encounter: 1.702 m (5' 7\").    Weight as of this encounter: 99.1 kg (218 lb 8 oz).  Medication Reconciliation: complete    Do you need any medication refills at today's visit? no    "

## 2017-04-18 NOTE — MR AVS SNAPSHOT
After Visit Summary   4/18/2017    Damari Bryant    MRN: [id number]           Patient Information     Date Of Birth          1945        Visit Information        Provider Department      4/18/2017 4:00 PM Symone Elena MD [facility]        Today's Diagnoses     Acute dyspnea    -  1    Hoarseness or changing voice          Care Instructions    Monitor your symptoms  Try sniffing when it happens  Watch your diet-to minimize chance of reflux.        Follow-ups after your visit        Your next 10 appointments already scheduled     Apr 24, 2017  8:40 AM CDT   Anticoagulation Visit with INR MG   [facility]    [address] [place] [address]   [phone]            Apr 24, 2017  9:00 AM CDT   Return Visit with Nina Carlin MD   [facility]    [address] [place] [address]   [phone]            Apr 28, 2017 11:00 AM CDT   Nurse Only with MG IMAGING NURSE   [facility]    [address] [place] [address]   [phone]            Apr 28, 2017 11:30 AM CDT   CT CHEST ABDOMEN PELVIS W/O & W CONTRAST with MGCT1   [facility]    [address] [place] [address]   [phone]           Please bring any scans or X-rays taken at other hospitals, if similar tests were done. Also bring a list of your medicines, including vitamins, minerals and over-the-counter drugs. It is safest to leave personal items at home.  Be sure to tell your doctor:   If you have any allergies.   If there s any chance you are pregnant.   If you are breastfeeding.   If you have any special needs.  You may have contrast for this exam. To prepare:   Do not eat or drink for 2 hours before your exam. If you need to take medicine, you may take it  with small sips of water. (We may ask you to take liquid medicine as well.)   The day before your exam, drink extra fluids at least six 8-ounce glasses (unless your doctor tells you to restrict your fluids).  Patients over 70 or patients with diabetes or kidney problems:   If you haven t had a blood test (creatinine test) within the last 30 days, go to your clinic or Diagnostic Imaging Department for this test.  If you have diabetes:   If your kidney function is normal, continue taking your metformin (Avandamet, Glucophage, Glucovance, Metaglip) on the day of your exam.   If your kidney function is abnormal, wait 48 hours before restarting this medicine.  You will have oral contrast for this exam:   You will drink the contrast at home. Get this from your clinic or Diagnostic Imaging Department. Please follow the directions given.  Please wear loose clothing, such as a sweat suit or jogging clothes. Avoid snaps, zippers and other metal. We may ask you to undress and put on a hospital gown.  If you have any questions, please call the Imaging Department where you will have your exam.            May 01, 2017  9:00 AM CDT   Return Visit with Nina Carlin MD   [facility])    [address] [facility] [address]   [phone]            May 02, 2017  8:45 AM CDT   Return Visit with NURSE ONLY CANCER CENTER   [facility] ([facility])    [address] [facility] [address]   [phone]            May 02, 2017  9:15 AM CDT   Return Visit with SANJEEV Camarillo [facility] ([facility])    [address] [facility] [address]   [phone]            May 02, 2017 10:30 AM CDT   Level 3 with BAY 8 INFUSION   [facility] ([facility])    [address] [facility] [address]   [phone]            May 09, 2017   3:30 PM CDT   [facility]    [address] [place] [address] [phone]              Future tests that were ordered for you today     Open Future Orders        Priority Expected Expires Ordered    CT Chest abdomen pelvis w & w/o contrast Routine  2018            Who to contact     If you have questions or need follow up information about today's clinic visit or your schedule please contact [facility] directly at [phone].  Normal or non-critical lab and imaging results will be communicated to you by Bin1 ATEhart, letter or phone within 4 business days after the clinic has received the results. If you do not hear from us within 7 days, please contact the clinic through Bin1 ATEhart or phone. If you have a critical or abnormal lab result, we will notify you by phone as soon as possible.  Submit refill requests through [employer] or call your pharmacy and they will forward the refill request to us. Please allow 3 business days for your refill to be completed.          Additional Information About Your Visit        [employer] Information     [employer] is an electronic gateway that provides easy, online access to your medical records. With [employer], you can request a clinic appointment, read your test results, renew a prescription or communicate with your care team.     To sign up for [employer] visit the website at [email or web address]   You will be asked to enter the access code listed below, as well as some personal information. Please follow the directions to create your username and password.     Your access code is: [email or web address]  Expires: 2017  2:26 PM     Your access code will  in 90 days. If you need help or a new code, please contact your [facility] or call [phone] for assistance.        Care EveryWhere ID     This is your Care  "EveryWhere ID. This could be used by other organizations to access your [place] medical records  [id number]        Your Vitals Were     Pulse Respirations Height Pulse Oximetry BMI (Body Mass Index)       94 24 1.702 m (5' 7\") 96% 34.22 kg/m2        Blood Pressure from Last 3 Encounters:   04/18/17 124/80   04/18/17 127/84   04/18/17 140/90    Weight from Last 3 Encounters:   04/18/17 99.1 kg (218 lb 8 oz)   04/18/17 98 kg (216 lb)   04/10/17 97.5 kg (215 lb)              Today, you had the following     No orders found for display       Primary Care Provider Office Phone # Fax #    Samuel RUFF Aranza [phone]       [facility] [address] [facility]  [place] [address]        Thank you!     Thank you for choosing [facility]  for your care. Our goal is always to provide you with excellent care. Hearing back from our patients is one way we can continue to improve our services. Please take a few minutes to complete the written survey that you may receive in the mail after your visit with us. Thank you!             Your Updated Medication List - Protect others around you: Learn how to safely use, store and throw away your medicines at www.disposemymeds.org.          This list is accurate as of: 4/18/17  4:59 PM.  Always use your most recent med list.                   Brand Name Dispense Instructions for use    busPIRone 15 MG tablet    BUSPAR     Take 15 mg by mouth 2 times daily       levothyroxine 150 MCG tablet    SYNTHROID/LEVOTHROID     Take 150 mcg by mouth daily       LEXAPRO 20 MG tablet   Generic drug:  escitalopram      Take 20 mg by mouth daily.       loperamide 2 MG capsule    IMODIUM    30 capsule    Start with 2 caps (4 mg), then take one cap (2 mg) after each diarrheal stool as needed. Do not use more than 8 caps (16 mg) per day.       NORVASC 5 MG tablet   Generic drug:  amLODIPine      Take 5 mg by mouth daily       OMEGA-3 FISH OIL PO          ondansetron 8 MG " tablet    ZOFRAN    10 tablet    Take 1 tablet (8 mg) by mouth every 8 hours as needed (Nausea/Vomiting)       prochlorperazine 10 MG tablet    COMPAZINE    30 tablet    Take 1 tablet (10 mg) by mouth every 6 hours as needed (Nausea/Vomiting)       senna-docusate 8.6-50 MG per tablet    SENNA S    60 tablet    1-2 tabs daily for constipation       tetrahydrozoline 0.05 % ophthalmic solution      1 drop 3 times daily       TOPAMAX 50 MG tablet   Generic drug:  topiramate      Take  by mouth 2 times daily. 1 tab q am 2 tabs q PM       triamterene-hydrochlorothiazide 37.5-25 MG per tablet    MAXZIDE-25     Take 1 tablet by mouth daily.       UNABLE TO FIND      MEDICATION NAME: Nature made digestive probiotics       VITAMIN D (CHOLECALCIFEROL) PO      Take 2,000 Units by mouth daily       warfarin 5 MG tablet    COUMADIN    60 tablet    Take 1 tablet (5 mg) by mouth daily

## 2017-04-18 NOTE — MR AVS SNAPSHOT
After Visit Summary   4/18/2017    Damari Bryant    MRN: [id number]           Patient Information     Date Of Birth          1945        Visit Information        Provider Department      4/18/2017 11:30 AM BAY 2 [facility]        Today's Diagnoses     Metastasis from colon cancer (H)    -  1       Follow-ups after your visit        Your next 10 appointments already scheduled     Apr 18, 2017  4:00 PM CDT   New Visit with Symone Elena MD   [facility])    [address] [place] [address]   [phone]            Apr 24, 2017  8:40 AM CDT   Anticoagulation Visit with INR MG   [facility])    [address] [place] [address]   [phone]            Apr 24, 2017  9:00 AM CDT   Return Visit with Nina Carlin MD   [facility])    [address] [place] [address]   [phone]            Apr 28, 2017 11:00 AM CDT   Nurse Only with MG IMAGING NURSE   [facility])    [address] [facility] [address]   [phone]            Apr 28, 2017 11:30 AM CDT   CT CHEST ABDOMEN PELVIS W/O & W CONTRAST with MGCT1   [facility])    [address] [place] [address]   [phone]           Please bring any scans or X-rays taken at other hospitals, if similar tests were done. Also bring a list of your medicines, including vitamins, minerals and over-the-counter drugs. It is safest to leave personal items at home.  Be sure to tell your doctor:   If you have any allergies.   If there s any chance you are pregnant.   If you are breastfeeding.   If you have any special needs.  You may have contrast for this exam. To prepare:   Do not eat or drink for 2 hours before your  exam. If you need to take medicine, you may take it with small sips of water. (We may ask you to take liquid medicine as well.)   The day before your exam, drink extra fluids at least six 8-ounce glasses (unless your doctor tells you to restrict your fluids).  Patients over 70 or patients with diabetes or kidney problems:   If you haven t had a blood test (creatinine test) within the last 30 days, go to your clinic or Diagnostic Imaging Department for this test.  If you have diabetes:   If your kidney function is normal, continue taking your metformin (Avandamet, Glucophage, Glucovance, Metaglip) on the day of your exam.   If your kidney function is abnormal, wait 48 hours before restarting this medicine.  You will have oral contrast for this exam:   You will drink the contrast at home. Get this from your clinic or Diagnostic Imaging Department. Please follow the directions given.  Please wear loose clothing, such as a sweat suit or jogging clothes. Avoid snaps, zippers and other metal. We may ask you to undress and put on a hospital gown.  If you have any questions, please call the Imaging Department where you will have your exam.            May 01, 2017  9:00 AM CDT   Return Visit with Nina Carlin MD   [facility])    [address] [facility] [address]   [phone]            May 02, 2017  8:45 AM CDT   Return Visit with [facility] [place]   [facility]    [address] [facility] [address]   [phone]            May 02, 2017  9:15 AM CDT   Return Visit with SANJEEV Camarillo CNP   [facility])    [address] [facility] [address]   [phone]            May 02, 2017 10:30 AM CDT   Level 3 with BAY 8 INFUSION   [facility])    [address] [facility]  [place] [address]   [phone]              Future tests that were ordered for you today     Open Future Orders        Priority Expected Expires Ordered    CT Chest abdomen pelvis w & w/o contrast Routine  2018            Who to contact     If you have questions or need follow up information about today's clinic visit or your schedule please contact [facility] directly at [phone].  Normal or non-critical lab and imaging results will be communicated to you by A-TEXhart, letter or phone within 4 business days after the clinic has received the results. If you do not hear from us within 7 days, please contact the clinic through A-TEXhart or phone. If you have a critical or abnormal lab result, we will notify you by phone as soon as possible.  Submit refill requests through [facility] or call your pharmacy and they will forward the refill request to us. Please allow 3 business days for your refill to be completed.          Additional Information About Your Visit        [facility] [facility] Information     [employer] [facility] is an electronic gateway that provides easy, online access to your medical records. With [facility] [facility], you can request a clinic appointment, read your test results, renew a prescription or communicate with your care team.     To sign up for [facility] [facility] visit the website at [email or web address]   You will be asked to enter the access code listed below, as well as some personal information. Please follow the directions to create your username and password.     Your access code is: [email or web address]  Expires: 2017  2:26 PM     Your access code will  in 90 days. If you need help or a new code, please contact your [facility] or call [phone] for assistance.        Care EveryWhere ID     This is your Care EveryWhere ID. This could be used by other organizations to access your [facility] medical records  [id number]         Blood Pressure from Last 3 Encounters:    04/18/17 127/84   04/18/17 140/90   04/10/17 (!) 155/96    Weight from Last 3 Encounters:   04/18/17 98 kg (216 lb)   04/10/17 97.5 kg (215 lb)   04/04/17 98.3 kg (216 lb 12.8 oz)              We Performed the Following     MD Instruction for Protocol     Road Map Alert     Treatment Conditions        Primary Care Provider Office Phone # Fax #    Samuel Cobian [phone]       [facility] [address] [facility] [address]        Thank you!     Thank you for choosing [facility]  for your care. Our goal is always to provide you with excellent care. Hearing back from our patients is one way we can continue to improve our services. Please take a few minutes to complete the written survey that you may receive in the mail after your visit with us. Thank you!             Your Updated Medication List - Protect others around you: Learn how to safely use, store and throw away your medicines at www.disposemymeds.org.          This list is accurate as of: 4/18/17  3:46 PM.  Always use your most recent med list.                   Brand Name Dispense Instructions for use    busPIRone 15 MG tablet    BUSPAR     Take 15 mg by mouth 2 times daily       levothyroxine 150 MCG tablet    SYNTHROID/LEVOTHROID     Take 150 mcg by mouth daily       LEXAPRO 20 MG tablet   Generic drug:  escitalopram      Take 20 mg by mouth daily.       loperamide 2 MG capsule    IMODIUM    30 capsule    Start with 2 caps (4 mg), then take one cap (2 mg) after each diarrheal stool as needed. Do not use more than 8 caps (16 mg) per day.       NORVASC 5 MG tablet   Generic drug:  amLODIPine      Take 5 mg by mouth daily       OMEGA-3 FISH OIL PO          ondansetron 8 MG tablet    ZOFRAN    10 tablet    Take 1 tablet (8 mg) by mouth every 8 hours as needed (Nausea/Vomiting)       prochlorperazine 10 MG tablet    COMPAZINE    30 tablet    Take 1 tablet (10 mg) by mouth every 6 hours as needed (Nausea/Vomiting)        senna-docusate 8.6-50 MG per tablet    SENNA S    60 tablet    1-2 tabs daily for constipation       tetrahydrozoline 0.05 % ophthalmic solution      1 drop 3 times daily       TOPAMAX 50 MG tablet   Generic drug:  topiramate      Take  by mouth 2 times daily. 1 tab q am 2 tabs q PM       triamterene-hydrochlorothiazide 37.5-25 MG per tablet    MAXZIDE-25     Take 1 tablet by mouth daily.       UNABLE TO FIND      MEDICATION NAME: Nature made digestive probiotics       VITAMIN D (CHOLECALCIFEROL) PO      Take 2,000 Units by mouth daily       warfarin 5 MG tablet    COUMADIN    60 tablet    Take 1 tablet (5 mg) by mouth daily

## 2017-04-18 NOTE — PROGRESS NOTES
"  ANTICOAGULATION FOLLOW-UP CLINIC VISIT    Patient Name:  Damari Bryant  Date:  4/18/2017  Contact Type:  Face to Face met with pt and  at the infusion center in person to go over dosing instructions and schedule a follow up.     SUBJECTIVE:     Patient Findings     Positives No Problem Findings    Comments Pt reports she \"ate a lot of potato salad this past weekend over [place].\"           OBJECTIVE    INR Point of Care   Date Value Ref Range Status   04/18/2017 4.1 (H) 0.86 - 1.14 Final     Comment:     This test is intended for monitoring Coumadin therapy.  Results are not   accurate   in patients with prolonged INR due to factor deficiency.         ASSESSMENT / PLAN  INR assessment SUPRA    Recheck INR In: 6 DAYS    INR Location Clinic      Anticoagulation Summary as of 4/18/2017     INR goal 2.0-3.0   Today's INR 4.1!   Maintenance plan 7.5 mg (5 mg x 1.5) on Wed; 5 mg (5 mg x 1) all other days   Full instructions 4/18: Hold; 4/19: 5 mg; Otherwise 7.5 mg on Wed; 5 mg all other days   Weekly total 37.5 mg   Plan last modified Mary Berry, RN (3/23/2017)   Next INR check 4/24/2017   Priority INR   Target end date 8/14/2017    Indications   Deep vein thrombosis (DVT) (H) [I82.409] [I82.409]  Long-term (current) use of anticoagulants [Z79.01] [Z79.01]         Anticoagulation Episode Summary     INR check location     Preferred lab [facility]    Send INR reminders to [facility] INR    Comments Chemo infusion pump for 48 hours every 2 weeks.  Her INR rises during this infusion. Most recent schd infusion was  4/4/17, next one is 4/18/17   Ross's cell phone for INR results [phone]. Martin rodriguez  Per Esther Greene MD- Goal of 2-3.      Anticoagulation Care Providers     Provider Role Specialty Phone number    Esther Greene MD Responsible Hematology [phone]            See the Encounter Report to view Anticoagulation Flowsheet and Dosing Calendar (Go to Encounters tab in chart " review, and find the Anticoagulation Therapy Visit)        Paulina Shaver RN

## 2017-04-18 NOTE — NURSING NOTE
"Damari Bryant is a 72 year old female who presents for:  Chief Complaint   Patient presents with     Oncology Clinic Visit     f/u prior to tx        Initial Vitals:  /90  Pulse 96  Temp 97  F (36.1  C) (Oral)  Resp 20  Ht 1.676 m (5' 5.98\")  Wt 98 kg (216 lb)  SpO2 95%  BMI 34.88 kg/m2 Estimated body mass index is 34.88 kg/(m^2) as calculated from the following:    Height as of this encounter: 1.676 m (5' 5.98\").    Weight as of this encounter: 98 kg (216 lb).. Body surface area is 2.14 meters squared. BP completed using cuff size: large  No Pain (0) No LMP recorded. Patient is postmenopausal. Allergies and medications reviewed.     Medications: Medication refills not needed today.  Pharmacy name entered into [facility]:    [id number] - [place], [place] - [address] [facility] [address] [facility] [address] [place] [address] [address] [facility]  [place], [place] - [address] [facility]    Comments:     8 minutes for nursing intake (face to face time)   JULES DIAL LPN        "

## 2017-04-18 NOTE — MR AVS SNAPSHOT
After Visit Summary   4/18/2017    Damari Bryant    MRN: [id number]           Patient Information     Date Of Birth          1945        Visit Information        Provider Department      4/18/2017 10:15 AM NURSE ONLY CANCER CENTER [facility]        Today's Diagnoses     Metastasis from colon cancer (H)    -  1       Follow-ups after your visit        Your next 10 appointments already scheduled     Apr 18, 2017 11:00 AM CDT   Return Visit with Esther Greene MD   [facility]    [address] [place] [address]   [phone]            Apr 18, 2017 11:30 AM CDT   Level 3 with BAY 2 INFUSION   [facility])    [address] [place] [address]   [phone]            Apr 24, 2017  9:00 AM CDT   Return Visit with Nina Carlin MD   [facility])    [address] [place] [address]   [phone]            May 01, 2017  9:00 AM CDT   Return Visit with Nina Carlin MD   [facility])    [address] [place] [address]   [phone]            May 02, 2017  8:45 AM CDT   Return Visit with NURSE ONLY CANCER CENTER   [facility])    [address] [facility] [address]   [phone]            May 02, 2017  9:15 AM CDT   Return Visit with SANJEEV Camarillo CNP   [facility])    [address] [facility] [address]   [phone]            May 02, 2017 10:30 AM CDT   Level 3 with BAY 8 INFUSION   [facility])    [address] [facility] [address]   [phone]              Who to contact     If you have questions or need follow up  information about today's clinic visit or your schedule please contact [facility] directly at [phone].  Normal or non-critical lab and imaging results will be communicated to you by Menigahart, letter or phone within 4 business days after the clinic has received the results. If you do not hear from us within 7 days, please contact the clinic through MyChart or phone. If you have a critical or abnormal lab result, we will notify you by phone as soon as possible.  Submit refill requests through [employer] or call your pharmacy and they will forward the refill request to us. Please allow 3 business days for your refill to be completed.          Additional Information About Your Visit        [employer] Information     [employer] is an electronic gateway that provides easy, online access to your medical records. With [employer], you can request a clinic appointment, read your test results, renew a prescription or communicate with your care team.     To sign up for [employer] visit the website at [email or web address]   You will be asked to enter the access code listed below, as well as some personal information. Please follow the directions to create your username and password.     Your access code is: [email or web address]  Expires: 2017  2:26 PM     Your access code will  in 90 days. If you need help or a new code, please contact your [facility] or call [phone] for assistance.        Care EveryWhere ID     This is your Care EveryWhere ID. This could be used by other organizations to access your [facility] medical records  [id number]         Blood Pressure from Last 3 Encounters:   04/10/17 (!) 155/96   17 121/80   17 125/81    Weight from Last 3 Encounters:   04/10/17 97.5 kg (215 lb)   17 98.3 kg (216 lb 12.8 oz)   17 98 kg (216 lb)              We Performed the Following     Bilirubin  total     CBC with platelets differential     CEA     Protein  qualitative urine        Primary Care Provider Office Phone # Fax #    Samuel Cobian [phone]       [facility] [address] [facility] [address]        Thank you!     Thank you for choosing [facility]  for your care. Our goal is always to provide you with excellent care. Hearing back from our patients is one way we can continue to improve our services. Please take a few minutes to complete the written survey that you may receive in the mail after your visit with us. Thank you!             Your Updated Medication List - Protect others around you: Learn how to safely use, store and throw away your medicines at www.disposemymeds.org.          This list is accurate as of: 4/18/17 10:39 AM.  Always use your most recent med list.                   Brand Name Dispense Instructions for use    busPIRone 15 MG tablet    BUSPAR     Take 15 mg by mouth 2 times daily       levothyroxine 150 MCG tablet    SYNTHROID/LEVOTHROID     Take 150 mcg by mouth daily       LEXAPRO 20 MG tablet   Generic drug:  escitalopram      Take 20 mg by mouth daily.       loperamide 2 MG capsule    IMODIUM    30 capsule    Start with 2 caps (4 mg), then take one cap (2 mg) after each diarrheal stool as needed. Do not use more than 8 caps (16 mg) per day.       NORVASC 5 MG tablet   Generic drug:  amLODIPine      Take 5 mg by mouth daily       OMEGA-3 FISH OIL PO          ondansetron 8 MG tablet    ZOFRAN    10 tablet    Take 1 tablet (8 mg) by mouth every 8 hours as needed (Nausea/Vomiting)       prochlorperazine 10 MG tablet    COMPAZINE    30 tablet    Take 1 tablet (10 mg) by mouth every 6 hours as needed (Nausea/Vomiting)       senna-docusate 8.6-50 MG per tablet    SENNA S    60 tablet    1-2 tabs daily for constipation       tetrahydrozoline 0.05 % ophthalmic solution      1 drop 3 times daily       TOPAMAX 50 MG tablet   Generic drug:  topiramate      Take  by mouth 2 times daily. 1 tab q am 2  tabs q PM       triamterene-hydrochlorothiazide 37.5-25 MG per tablet    MAXZIDE-25     Take 1 tablet by mouth daily.       UNABLE TO FIND      MEDICATION NAME: Nature made digestive probiotics       VITAMIN D (CHOLECALCIFEROL) PO      Take 2,000 Units by mouth daily       warfarin 5 MG tablet    COUMADIN    60 tablet    Take 1 tablet (5 mg) by mouth daily

## 2017-04-18 NOTE — PROGRESS NOTES
ONCOLOGY DIAGNOSES:   1. July 2013: Diagnosed with stage IIIB, [id number] colon cancer. Final pathology showed a mildly differentiated colonic adenocarcinoma. Tumor extended through the muscularis propria into the pericolonic adipose tissue. Lymphovascular invasion present. Margins negative. Three out of 30 lymph nodes were positive, 0.5 cm in greatest dimension. Extracapsular extension identified. DNA mismatch repair enzymes were intact.   2. November 2016: Metastatic colorectal cancer to the lung confirmed by a lung biopsy.   3. 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 No extension of thrombus into the right upper extremity.      THERAPY TO DATE:   1. July 21, 2013: Sigmoid colectomy.   2. August 2013 to January 2014: FOLFOX x12 cycles.   3. February 2014 to November 2016: Observation.   4. January 2017 to Present: FOLFIRI/Avastin.  5. January 2017 to Present: Anticoagulation.        INTERVAL HISTORY:  Damari Bryant is a 72-year-old female initially diagnosed with stage IIB, pT3 N1 MX colon cancer in 2013 after presenting with worsening diarrhea.  In 11/2016, patient found to have metastatic disease to lung confirmed by lung biopsy.  Presents to clinic for toxicity evaluation prior to cycle 7 of FOLFIRI, Avastin.  On today's visit, patient tells me overall she is tolerating therapy.  Her biggest concern is shortness of breath.  She has had this for about 2 months.  We did check a CT angiogram in the past which was negative for pulmonary embolus.  States that it occurs with walking and at rest.  However, sometimes she does not have shortness of breath and is variable.  Her neuropathy is minimal and she is able to knit.  Her diarrhea is controlled with Imodium and she is trying to press fluids.  She did meet with Dr. Cuevas and was very happy with meeting.  She felt this did help her psychologically and she tries to remind herself not to ruminate on her  cancer diagnosis.  Continues on Coumadin.  Denies pain, no fevers, chills, nausea, vomiting, chest pain, no abdominal discomfort.  No new palpable masses.  Does use Tylenol PM to help her sleep and the remainder of comprehensive review of systems is negative.       SOCIAL HISTORY:  , comes in with her  and son today.  No current tobacco use.  Lives in [place].      PHYSICAL EXAMINATION:   VITAL SIGNS:  Blood pressure 140/90, pulse 96, respirations 20, temp 97, pulse ox 95% on room air, weight 216 pounds, BMI is 34.88.   GENERAL:  Comfortable, no acute distress, pleasant.   HEENT:  Atraumatic, normocephalic.  Pupils equal, round, reactive.  Sclerae anicteric.  Oropharynx:  Moist mucous membranes.  No lesions, ulcers or exudate.   NECK:  Supple, full range of motion.  Trachea midline.   HEART:  Regular rate and rhythm, normal S1, S2.   LUNGS:  Clear to auscultation.  No crackles, wheezes.  Normal respiratory effort.   ABDOMEN:  Positive bowel sounds, soft, nontender.   EXTREMITIES:  No cyanosis, warm.   LYMPHATICS:  No cervical, supraclavicular nodes palpable.   SKIN:  No petechiae or rashes.   NEUROLOGIC:  Alert and oriented.      LABORATORY DATA:  WBC 3.7, hemoglobin 13.2, hematocrit 39.7, platelet 127.  ANC is 1.6.      IMAGING:   PET scan from 12/30/2016 showed enlarging hypermetabolic pulmonary nodules bilaterally and hypermetabolic retroperitoneal lymph nodes, although similar size compared to prior CT. Diffuse FTG was obtained of the thyroid gland consistent with nonspecific thyroiditis.   CT angiogram from 2/14/2017 showed no CT evidence for pulmonary emboli. Bilateral pulmonary nodules are either stable or decreased in size. There are also at least 3 pulmonary nodules which have resolved. This could be due to response to treatment for metastatic disease versus resolving infectious/inflammatory process.      ASSESSMENT AND PLAN:   1.  Metastatic colorectal cancer.  Appears to be tolerating  FOLFIRI, Avastin.  By her most recent imaging and CEA, appears to be responding.  Reviewed labs.  No contraindications to proceed with FOLFIRI, Avastin today.  Repeat CT chest, abdomen and pelvis prior to next visit.  Return to clinic in 2 weeks to see nurse practitioner and treatment.   2.  Right internal jugular/innominate deep vein thrombosis.  Continue Coumadin.  Followed by INR clinic.   3.  Diarrhea.  Controlled on Imodium.   4.  Neuropathy.  Does not affect activities of daily living, is able to knit.   5.  Dyspnea.  Performed pulmonary angio in the past which was negative.  Continues to be persistent problem.  Referral to pulmonary.  Patient is willing to go to whichever site will see her the soonest.   6.  Psychosocial.  Appreciate her meeting with Dr. Cuevas.  Will continue to monitor and if needed will refer back to Dr. Cuevas.         SUSANA MENARD MD             D: 2017 11:15   T: 2017 17:10   MT: TS      Name:     MARY SHI   MRN:      [id number]        Account:      [id number]   :      1945           Visit Date:   2017      Document: [id number]       cc: Samuel Cobian MD

## 2017-04-18 NOTE — PROGRESS NOTES
PFT Note:  Completed SVC, FVC, DLCO and Pleth per Dr. Elena's order (seeing the patient today for Dr. Esther Greene).

## 2017-04-18 NOTE — MR AVS SNAPSHOT
After Visit Summary   4/18/2017    Damari Bryant    MRN: [id number]           Patient Information     Date Of Birth          1945        Visit Information        Provider Department      4/18/2017 11:00 AM Esther Greene MD [facility]        Today's Diagnoses     Metastasis from colon cancer (H)    -  1    Acute deep vein thrombosis (DVT) of non-extremity vein        Long term current use of anticoagulant therapy        Shortness of breath           Follow-ups after your visit        Additional Services     PULMONARY MEDICINE REFERRAL                 Your next 10 appointments already scheduled     Apr 18, 2017  3:00 PM CDT   Office Visit with PFT LAB   [facility])    [address] [place] [id number]   [phone]           Bring a current list of meds and any records pertaining to this visit.  For Physicals, please bring immunization records and any forms needing to be filled out.  Please arrive 10 minutes early to complete paperwork.            Apr 18, 2017  4:00 PM CDT   New Visit with Symone Elena MD   [facility])    [id number] [place] [phone]   [phone]            Apr 24, 2017  9:00 AM CDT   Return Visit with Nina Carlin MD   [facility])    [address] [place] [phone]   [phone]            Apr 28, 2017 11:00 AM CDT   Nurse Only with MG IMAGING NURSE   [facility])    [address] [place] [phone]   [phone]            Apr 28, 2017 11:30 AM CDT   CT CHEST ABDOMEN PELVIS W/O & W CONTRAST with MGCT1   [facility])    [id number] [place] [phone]   [phone]           Please bring any scans or X-rays taken at other  [facility] [place], if similar tests were done. Also bring a list of your medicines, including vitamins, minerals and over-the-counter drugs. It is safest to leave personal items at home.  Be sure to tell your doctor:   If you have any allergies.   If there s any chance you are pregnant.   If you are breastfeeding.   If you have any special needs.  You may have contrast for this exam. To prepare:   Do not eat or drink for 2 hours before your exam. If you need to take medicine, you may take it with small sips of water. (We may ask you to take liquid medicine as well.)   The day before your exam, drink extra fluids at least six 8-ounce glasses (unless your doctor tells you to restrict your fluids).  Patients over 70 or patients with diabetes or kidney problems:   If you haven t had a blood test (creatinine test) within the last 30 days, go to your clinic or Diagnostic Imaging Department for this test.  If you have diabetes:   If your kidney function is normal, continue taking your metformin (Avandamet, Glucophage, Glucovance, Metaglip) on the day of your exam.   If your kidney function is abnormal, wait 48 hours before restarting this medicine.  You will have oral contrast for this exam:   You will drink the contrast at home. Get this from your clinic or Diagnostic Imaging Department. Please follow the directions given.  Please wear loose clothing, such as a sweat suit or jogging clothes. Avoid snaps, zippers and other metal. We may ask you to undress and put on a hospital gown.  If you have any questions, please call the Imaging Department where you will have your exam.            May 01, 2017  9:00 AM CDT   [facility] Visit with Nina Carlin MD   [facility]    [address] [place] [phone] [phone]            May 02, 2017  8:45 AM CDT   Return Visit with NURSE [facility]   [facility])     [address] [place] [phone]   [phone]            May 02, 2017  9:15 AM CDT   [facility] with SANJEEV Camarillo C[facility]   [facility]    [address] [place] [phone] [phone]            May 02, 2017 10:30 AM CDT   Level 3 with BAY 8 INFUSION   [facility]    [address] [place] [phone]              Future tests that were ordered for you today     Open Future Orders        Priority Expected Expires Ordered    CT Chest abdomen pelvis w & w/o contrast Routine  4/18/2018 4/18/2017            Who to contact     If you have questions or need follow up information about today's clinic visit or your schedule please contact [facility] directly at [phone].  Normal or non-critical lab and imaging results will be communicated to you by [employer], letter or phone within 4 business days after the clinic has received the results. If you do not hear from us within 7 days, please contact the clinic through [employer] or phone. If you have a critical or abnormal lab result, we will notify you by phone as soon as possible.  Submit refill requests through [employer] or call your pharmacy and they will forward the refill request to us. Please allow 3 business days for your refill to be completed.          Additional Information About Your Visit        [employer] Information     [employer] is an electronic gateway that provides easy, online access to your medical records. With [employer], you can request a clinic appointment, read your test results, renew a prescription or communicate with your care team.     To sign up for [employer] visit the website at [email or web address]   You will be asked to enter the access code listed below, as well as some personal information. Please follow the directions to create your username and password.     Your  "access code is: [email or web address]  Expires: 2017  2:26 PM     Your access code will  in 90 days. If you need help or a new code, please contact your [facility] or call [phone] for assistance.        Care EveryWhere ID     This is your Care EveryWhere ID. This could be used by other organizations to access your [facility] medical records  [id number]        Your Vitals Were     Pulse Temperature Respirations Height Pulse Oximetry BMI (Body Mass Index)    96 97  F (36.1  C) (Oral) 20 1.676 m (5' 5.98\") 95% 34.88 kg/m2       Blood Pressure from Last 3 Encounters:   17 127/84   17 140/90   04/10/17 (!) 155/96    Weight from Last 3 Encounters:   17 98 kg (216 lb)   04/10/17 97.5 kg (215 lb)   17 98.3 kg (216 lb 12.8 oz)              We Performed the Following     INR point of care     PULMONARY MEDICINE REFERRAL        Primary Care Provider Office Phone # Fax #    Samuel ELZBIETA Cobian [phone]       [facility] [address] [facility] [address]        Thank you!     Thank you for choosing [facility]  for your care. Our goal is always to provide you with excellent care. Hearing back from our patients is one way we can continue to improve our services. Please take a few minutes to complete the written survey that you may receive in the mail after your visit with us. Thank you!             Your Updated Medication List - Protect others around you: Learn how to safely use, store and throw away your medicines at www.disposemymeds.org.          This list is accurate as of: 17 12:08 PM.  Always use your most recent med list.                   Brand Name Dispense Instructions for use    busPIRone 15 MG tablet    BUSPAR     Take 15 mg by mouth 2 times daily       levothyroxine 150 MCG tablet    SYNTHROID/LEVOTHROID     Take 150 mcg by mouth daily       LEXAPRO 20 MG tablet   Generic drug:  escitalopram      Take 20 mg by " mouth daily.       loperamide 2 MG capsule    IMODIUM    30 capsule    Start with 2 caps (4 mg), then take one cap (2 mg) after each diarrheal stool as needed. Do not use more than 8 caps (16 mg) per day.       NORVASC 5 MG tablet   Generic drug:  amLODIPine      Take 5 mg by mouth daily       OMEGA-3 FISH OIL PO          ondansetron 8 MG tablet    ZOFRAN    10 tablet    Take 1 tablet (8 mg) by mouth every 8 hours as needed (Nausea/Vomiting)       prochlorperazine 10 MG tablet    COMPAZINE    30 tablet    Take 1 tablet (10 mg) by mouth every 6 hours as needed (Nausea/Vomiting)       senna-docusate 8.6-50 MG per tablet    SENNA S    60 tablet    1-2 tabs daily for constipation       tetrahydrozoline 0.05 % ophthalmic solution      1 drop 3 times daily       TOPAMAX 50 MG tablet   Generic drug:  topiramate      Take  by mouth 2 times daily. 1 tab q am 2 tabs q PM       triamterene-hydrochlorothiazide 37.5-25 MG per tablet    MAXZIDE-25     Take 1 tablet by mouth daily.       UNABLE TO FIND      MEDICATION NAME: Nature made digestive probiotics       VITAMIN D (CHOLECALCIFEROL) PO      Take 2,000 Units by mouth daily       warfarin 5 MG tablet    COUMADIN    60 tablet    Take 1 tablet (5 mg) by mouth daily

## 2017-04-18 NOTE — PROGRESS NOTES
"Infusion Nursing Note:  Damari Bryant presents today for C7 Avastin/ Irinotecan/fluorouracil pump connect.    Patient seen by provider today: Yes: Dr. Greene   present during visit today: Not Applicable.    Note: Pt did not want the atropine prior to the irinotecan. She did request it once during the infusion, which was effective in controlling her abdominal cramping.    Intravenous Access:  Implanted Port.    Treatment Conditions:  Lab Results   Component Value Date    HGB 13.2 04/18/2017     Lab Results   Component Value Date    WBC 3.7 04/18/2017      Lab Results   Component Value Date    ANEU 1.6 04/18/2017     Lab Results   Component Value Date     04/18/2017      Urine protein negative.      Post Infusion Assessment:  Patient tolerated infusion without incident.  Blood return noted pre and post infusion.  Prior to discharge: Port is secured in place with tegaderm and flushed with 10cc NS with positive blood return noted.  Continuous home infusion CADD pump connected.    All connectors secured in place and clamps taped open.    Pump started, \"running\" noted on display (CADD): YES.  Patient instructed to call our clinic or [facility] with any questions or concerns at home.  Patient verbalized understanding.    Patient set up for pump disconnect at our clinic on 4/20/17 1245.            Discharge Plan:   Patient will return 4/20/17 for next appointment.   Patient discharged in stable condition accompanied by: self and .  Departure Mode: Ambulatory.    Nga Olmos RN                        "

## 2017-04-19 LAB — CEA SERPL-MCNC: 3.7 UG/L (ref 0–2.5)

## 2017-04-19 NOTE — PROGRESS NOTES
CHIEF COMPLAINT:  Consultation requested by Esther Greene MD for the evaluation of shortness of breath.      HISTORY OF PRESENT ILLNESS:  Damari Bryant is a 72-year-old female here with her .  She was diagnosed with colon cancer in 2013 and has recently had identification of lung metastases and has been going through repeat chemotherapy.      In the last few months since re-starting chemotherapy she has noted some episodes of acute onset of shortness of breath.  She describes sensation of chest pressure and essentially suffocation.  She states that she has had some episodes shortly after standing from sitting or bending over, then walking maybe 15 feet  She has not identified other patterns.  She has not had any nocturnal symptoms.  Interestingly, she has had voice changes that she has been noting since December.  She is not able to sing in the choir, she normally would.  She has had a lot of sinus congestion and has recently seen ENT for this and has had significant improvement in congestion but no change in her voice hoarseness.  She has a history of gastroesophageal reflux and paraesophageal hernia in the past and is status post Nissen fundoplication.  She feels that that has since resolved those symptoms and she denies significant symptoms of reflux.  No history of dysphagia with eating or drinking.  No history of cough with eating or drinking.  She does not have a lot of cough with her symptoms when it occurs, nor wheezing.  She denies presyncope symptoms.  She does have some sweating when it happens, but no sense of palpitations, tachycardia or arrhythmia.      She has had a history of atrial fibrillation in the past and is status post ablation for this.  She also has a history of severe pneumonia, possibly 40 years ago for which she was hospitalized.  She was told it was viral. She has a remote minimal smoking history of about 4 years in the 60s.     PAST MEDICAL HISTORY:   1.  Carcinoma of the colon  diagnosed in 2013, status post segmental colectomy and chemotherapy.   2.  Meniere's disease.   3.  Gastroesophageal reflux disease.   4.  Hypertension.   5.  History of paraesophageal hernia, hiatal hernia and status post Nissen fundoplication with paraesophageal hernia reduction.   6.  History of supraventricular tachycardia a-fib, status post ablation.   7.  Recent diagnosis of deep vein thrombosis of the right internal jugular and right innominate vein 1/2017 treated with warfarin and levofloxacin.      ALLERGIES:  Multiple medication allergies and intolerances, please see EMR.  These are reviewed.      MEDICATIONS:   1.  Warfarin.   2.  BuSpar 50 mg twice daily.   3.  Amlodipine 5 mg daily.   4.  Citalopram 20 mg daily.   5.  Levothyroxine 150 mcg daily.   6.  Topiramate 2 tablets in the p.m., 1 tablet in a.m.   6.  Maxzide 1 tablet daily.   Please see EMR for remainder of p.r.n. and supplements.      SOCIAL HISTORY:  See HPI.  She worked in [employer], no  work.  No history of TB exposure.  Has a dog and a cat.  No allergies to dogs and cats.  Does not drink alcohol.  Remote smoking history.      REVIEW OF SYSTEMS:  Comprehensive review of systems is obtained and is notable for the items in the HPI.  She also has had some epistaxis that improved as well as balance problems and a general sense of weakness that gets worse when she has her episodes of shortness of breath.  Please see HPI for further details of the comprehensive review of systems.      PHYSICAL EXAMINATION:   GENERAL:  Pleasant obese female with her , in no distress.   VITAL SIGNS:  Blood pressure 124/80, pulse 94, respirations 24.  O2 saturation is 96%.  Weight is 218 pounds, for a body mass index of 34.   HEENT:  Normocephalic, atraumatic.  Pupils are equal, reactive to light.  Sclerae are without icterus.  Oropharynx is without evidence for ulceration and palate appears symmetric.  There is no pursed breathing or pharyngeal  erythema.   NECK:  Supple, without lymphadenopathy, no tenderness over the right internal jugular vein area.   CHEST:  Clear to auscultation bilaterally.  No rales or wheezes identified.   CARDIAC:  Regular rate and rhythm.  S1, S2 normal.   ABDOMEN:  Obese, soft and nontender.   EXTREMITIES:  Perfused.  There is no clubbing.  There is no significant lower extremity edema.      PULMONARY FUNCTION TESTING:  Performed today reveals forced vital capacity of 2.62 which is 89% predicted, FEV1 of 2.05 which is 91% predicted, ratio 78.  Total lung capacity is 5.66, which is 107% predicted, RV and RV TLC ratio increased within normal limits.  Diffusion capacity normal at 82% predicted.  Findings are consistent with normal pulmonary function.      Outside CT scans and chest x-rays were reviewed and CT from 11/2016 multiple cavitary lesions noted, particularly on the right.  These have resolved, for the most part are significantly improved on the most recent scanning however, there is bilateral ground-glass infiltrates.  The most recent imaging from February, although suboptimal exam, patient was not taking optimal breath at that time.      ASSESSMENT AND PLAN:  A 72-year-old female with metastatic colon cancer, some small lesions in her lungs that have improved with recent chemotherapy, sudden onset of multiple episodes of shortness of breath that appear quite frightening and severe could be consistent with laryngospasm rather than bronchospasm, less likely panic attacks.  They tend to resolve spontaneously without use of any inhaler and may be triggered by changes in body position and perhaps even eating.  The patient has not had an upper airway exam as of yet but has had voice changes that have been bothersome since at least December, also had increased risk of significant reflux given her abnormal esophageal anatomy (evident on CT) and most recent scan actually showed some debris in the esophagus.      PLAN:  The patient  is going to closely monitor her symptoms and urged her to watch her diet carefully to make sure to eat smaller meals and avoid eating before bedtime, also encouraged her to try sniffing if her symptoms recur.  Also asked her ENT doctor to evaluate her upper airway and vocal cords for evidence of recurrent laryngospasm related, reflux or vocal cord abnormality (unilateral cord paralysis?).  The patient also has an upcoming repeat scan in the next couple of weeks.  The patient will follow up with me in about 3-4 weeks to go over these findings together.  She is agreeable with this plan.  I did try to reassure her that small nodules in her lungs are likely not the source of symptoms and also I do not think an inhaler would be beneficial at this time.         FENG HANSEN MD             D: 2017 17:15   T: 2017 06:10   MT: [id number]      Name:     MARY SHI   MRN:      [id number]        Account:      [id number]   :      1945           Visit Date:   2017      Document: [id number]

## 2017-04-20 ENCOUNTER — INFUSION THERAPY VISIT (OUTPATIENT)
Dept: INFUSION THERAPY | Facility: CLINIC | Age: 72
End: 2017-04-20
Payer: COMMERCIAL

## 2017-04-20 VITALS
HEART RATE: 79 BPM | OXYGEN SATURATION: 99 % | SYSTOLIC BLOOD PRESSURE: 137 MMHG | DIASTOLIC BLOOD PRESSURE: 83 MMHG | RESPIRATION RATE: 18 BRPM | TEMPERATURE: 98.3 F

## 2017-04-20 DIAGNOSIS — C79.9 METASTASIS FROM COLON CANCER (H): Primary | ICD-10-CM

## 2017-04-20 DIAGNOSIS — C18.9 METASTASIS FROM COLON CANCER (H): Primary | ICD-10-CM

## 2017-04-20 PROCEDURE — 99207 ZZC NO CHARGE LOS: CPT | Performed by: INTERNAL MEDICINE

## 2017-04-20 PROCEDURE — 99207 ZZC NO CHARGE LOS: CPT

## 2017-04-20 RX ORDER — HEPARIN SODIUM (PORCINE) LOCK FLUSH IV SOLN 100 UNIT/ML 100 UNIT/ML
5 SOLUTION INTRAVENOUS ONCE
Status: COMPLETED | OUTPATIENT
Start: 2017-04-20 | End: 2017-04-20

## 2017-04-20 RX ADMIN — HEPARIN SODIUM (PORCINE) LOCK FLUSH IV SOLN 100 UNIT/ML 5 ML: 100 SOLUTION at 12:54

## 2017-04-20 NOTE — MR AVS SNAPSHOT
After Visit Summary   4/20/2017    Damari Bryant    MRN: [id number]           Patient Information     Date Of Birth          1945        Visit Information        Provider Department      4/20/2017 12:45 PM BAY 9 Replaced by [facility]        Today's Diagnoses     Metastasis from colon cancer (H)    -  1       Follow-ups after your visit        Your next 10 appointments already scheduled     Apr 24, 2017  8:40 AM CDT   Anticoagulation Visit with INR MG   [facility])    [address] [place] [address]   [phone]            Apr 24, 2017  9:00 AM CDT   Return Visit with Nina Carlin MD   [facility])    [address] [place] [address]   [phone]            Apr 28, 2017 11:00 AM CDT   Nurse Only with MG IMAGING NURSE   [facility])    [address] [place] [address]   [phone]            Apr 28, 2017 11:30 AM CDT   CT CHEST ABDOMEN PELVIS W/O & W CONTRAST with MGCT1   [facility])    [address] [place] [address]   [phone]           Please bring any scans or X-rays taken at other hospitals, if similar tests were done. Also bring a list of your medicines, including vitamins, minerals and over-the-counter drugs. It is safest to leave personal items at home.  Be sure to tell your doctor:   If you have any allergies.   If there s any chance you are pregnant.   If you are breastfeeding.   If you have any special needs.  You may have contrast for this exam. To prepare:   Do not eat or drink for 2 hours before your exam. If you need to take medicine, you may take it with small sips of water. (We may ask you to take liquid medicine as well.)   The day before your exam, drink extra fluids at least six 8-ounce glasses  (unless your doctor tells you to restrict your fluids).  Patients over 70 or patients with diabetes or kidney problems:   If you haven t had a blood test (creatinine test) within the last 30 days, go to your clinic or Diagnostic Imaging Department for this test.  If you have diabetes:   If your kidney function is normal, continue taking your metformin (Avandamet, Glucophage, Glucovance, Metaglip) on the day of your exam.   If your kidney function is abnormal, wait 48 hours before restarting this medicine.  You will have oral contrast for this exam:   You will drink the contrast at home. Get this from your clinic or Diagnostic Imaging Department. Please follow the directions given.  Please wear loose clothing, such as a sweat suit or jogging clothes. Avoid snaps, zippers and other metal. We may ask you to undress and put on a hospital gown.  If you have any questions, please call the Imaging Department where you will have your exam.            May 01, 2017  9:00 AM CDT   Return Visit with Nina Carlin MD   [facility])    [address] [place] [address]   [phone]            May 02, 2017  8:45 AM CDT   Return Visit with NURSE ONLY CANCER CENTER   [facility])    [address] [place] [address]   [phone]            May 02, 2017  9:15 AM CDT   Return Visit with SNAJEEV Camarillo CNP   [facility])    [address] [place] [address]   [phone]            May 02, 2017 10:30 AM CDT   Level 3 with BAY 8 INFUSION   [facility])    [address] [address] [address]   [phone]            May 09, 2017  3:30 PM CDT   Return Visit with Symone Elena MD   [facility])    [address] [place] [address]  [place] [phone] [phone]              Who to contact     If you have questions or need follow up information about today's clinic visit or your schedule please contact [facility] directly at [phone].  Normal or non-critical lab and imaging results will be communicated to you by MyChart, letter or phone within 4 business days after the clinic has received the results. If you do not hear from us within 7 days, please contact the clinic through [employer] or phone. If you have a critical or abnormal lab result, we will notify you by phone as soon as possible.  Submit refill requests through [employer] or call your pharmacy and they will forward the refill request to us. Please allow 3 business days for your refill to be completed.          Additional Information About Your Visit        [employer] Information     [employer] is an electronic gateway that provides easy, online access to your medical records. With [employer], you can request a clinic appointment, read your test results, renew a prescription or communicate with your care team.     To sign up for [employer] visit the website at [email or web address]   You will be asked to enter the access code listed below, as well as some personal information. Please follow the directions to create your username and password.     Your access code is: [email or web address]  Expires: 2017  2:26 PM     Your access code will  in 90 days. If you need help or a new code, please contact your [facility] or call [phone] for assistance.        Care EveryWhere ID     This is your Care EveryWhere ID. This could be used by other organizations to access your [facility] medical records  [id number]        Your Vitals Were     Pulse Temperature Respirations Pulse Oximetry          79 98.3  F (36.8  C) (Oral) 18 99%         Blood Pressure from Last 3 Encounters:   17 137/83   17 124/80   17 127/84    Weight from Last  3 Encounters:   04/18/17 99.1 kg (218 lb 8 oz)   04/18/17 98 kg (216 lb)   04/10/17 97.5 kg (215 lb)              Today, you had the following     No orders found for display       Primary Care Provider Office Phone # Fax #    Samuel Cobian [phone]       [facility] [address] [facility] [place] [address]        Thank you!     Thank you for choosing [facility]  for your care. Our goal is always to provide you with excellent care. Hearing back from our patients is one way we can continue to improve our services. Please take a few minutes to complete the written survey that you may receive in the mail after your visit with us. Thank you!             Your Updated Medication List - Protect others around you: Learn how to safely use, store and throw away your medicines at www.disposemymeds.org.          This list is accurate as of: 4/20/17  3:23 PM.  Always use your most recent med list.                   Brand Name Dispense Instructions for use    busPIRone 15 MG tablet    BUSPAR     Take 15 mg by mouth 2 times daily       levothyroxine 150 MCG tablet    SYNTHROID/LEVOTHROID     Take 150 mcg by mouth daily       LEXAPRO 20 MG tablet   Generic drug:  escitalopram      Take 20 mg by mouth daily.       loperamide 2 MG capsule    IMODIUM    30 capsule    Start with 2 caps (4 mg), then take one cap (2 mg) after each diarrheal stool as needed. Do not use more than 8 caps (16 mg) per day.       NORVASC 5 MG tablet   Generic drug:  amLODIPine      Take 5 mg by mouth daily       OMEGA-3 FISH OIL PO          ondansetron 8 MG tablet    ZOFRAN    10 tablet    Take 1 tablet (8 mg) by mouth every 8 hours as needed (Nausea/Vomiting)       prochlorperazine 10 MG tablet    COMPAZINE    30 tablet    Take 1 tablet (10 mg) by mouth every 6 hours as needed (Nausea/Vomiting)       senna-docusate 8.6-50 MG per tablet    SENNA S    60 tablet    1-2 tabs daily for constipation       tetrahydrozoline  0.05 % ophthalmic solution      1 drop 3 times daily       TOPAMAX 50 MG tablet   Generic drug:  topiramate      Take  by mouth 2 times daily. 1 tab q am 2 tabs q PM       triamterene-hydrochlorothiazide 37.5-25 MG per tablet    MAXZIDE-25     Take 1 tablet by mouth daily.       UNABLE TO FIND      MEDICATION NAME: Nature made digestive probiotics       VITAMIN D (CHOLECALCIFEROL) PO      Take 2,000 Units by mouth daily       warfarin 5 MG tablet    COUMADIN    60 tablet    Take 1 tablet (5 mg) by mouth daily

## 2017-04-24 ENCOUNTER — ANTICOAGULATION THERAPY VISIT (OUTPATIENT)
Dept: PHARMACY | Facility: CLINIC | Age: 72
End: 2017-04-24
Payer: COMMERCIAL

## 2017-04-24 ENCOUNTER — OFFICE VISIT (OUTPATIENT)
Dept: OTOLARYNGOLOGY | Facility: CLINIC | Age: 72
End: 2017-04-24
Payer: COMMERCIAL

## 2017-04-24 VITALS
DIASTOLIC BLOOD PRESSURE: 95 MMHG | HEART RATE: 114 BPM | BODY MASS INDEX: 34.55 KG/M2 | WEIGHT: 215 LBS | SYSTOLIC BLOOD PRESSURE: 165 MMHG | HEIGHT: 66 IN

## 2017-04-24 DIAGNOSIS — Z79.01 LONG-TERM (CURRENT) USE OF ANTICOAGULANTS: ICD-10-CM

## 2017-04-24 DIAGNOSIS — I82.90 ACUTE DEEP VEIN THROMBOSIS (DVT) OF NON-EXTREMITY VEIN: ICD-10-CM

## 2017-04-24 DIAGNOSIS — J38.3 PARADOXICAL VOCAL FOLD MOVEMENT: ICD-10-CM

## 2017-04-24 DIAGNOSIS — R04.0 EPISTAXIS: ICD-10-CM

## 2017-04-24 DIAGNOSIS — R05.3 CHRONIC COUGH: Primary | ICD-10-CM

## 2017-04-24 LAB — INR POINT OF CARE: 2.7 (ref 0.86–1.14)

## 2017-04-24 PROCEDURE — 36416 COLLJ CAPILLARY BLOOD SPEC: CPT

## 2017-04-24 PROCEDURE — 85610 PROTHROMBIN TIME: CPT | Mod: QW

## 2017-04-24 PROCEDURE — 99207 ZZC NO CHARGE NURSE ONLY: CPT

## 2017-04-24 PROCEDURE — 99214 OFFICE O/P EST MOD 30 MIN: CPT | Mod: 25 | Performed by: OTOLARYNGOLOGY

## 2017-04-24 PROCEDURE — 31575 DIAGNOSTIC LARYNGOSCOPY: CPT | Performed by: OTOLARYNGOLOGY

## 2017-04-24 NOTE — PROGRESS NOTES
"CC: follow-up of epistaxis and referral for vocal cord evaluation    HPI:  Damari returns for follow-up with her .  Nose bleeds were initially better after cauterization and using saline spray.  She stop the saline spray after about 10 days and nose bleeds started to come back.  In there interim, she has had non-productive cough and shortness of breath and hoarseness.  Dr. Elena evaluated her and could not find a pulmonary cause.  Hoarseness has actually been present since December 2016.      According to Dr. Elena's notes: \"In the last few months since re-starting chemotherapy she has noted some episodes of acute onset of shortness of breath. She describes sensation of chest pressure and essentially suffocation. She states that she has had some episodes shortly after standing from sitting or bending over, then walking maybe 15 feet She has not identified other patterns. She has not had any nocturnal symptoms. Interestingly, she has had voice changes that she has been noting since December. She is not able to sing in the choir, she normally would. She has had a lot of sinus congestion and has recently seen ENT for this and has had significant improvement in congestion but no change in her voice hoarseness. She has a history of gastroesophageal reflux and paraesophageal hernia in the past and is status post Nissen fundoplication. She feels that that has since resolved those symptoms and she denies significant symptoms of reflux. No history of dysphagia with eating or drinking. No history of cough with eating or drinking. She does not have a lot of cough with her symptoms when it occurs, nor wheezing. She denies presyncope symptoms. She does have some sweating when it happens, but no sense of palpitations, tachycardia or arrhythmia. \"     PE:  GEN: nad  NOSE: septum shows pinpoint bleeding bilaterally anteriorly.  The septal mucosa on the left is thinned.  There is thick clear nasal secretions present.  " The nose was cleaned and silver nitrate was placed bilaterally.      DIAGNOSTIC PROCEDURES:    Fiberoptic Laryngoscopy is performed to examine the upper airway for pathology, functional or anatomic abnormality.  After application of topical anesthetic/decongestant solution the flexible endoscope was passed into each nasal cavity separately.  Findings are as follows:               No abnormality of the nasopharynx.    Base of tongue and hypopharynx are clear of masses or lesions.    Bilateral true vocal fold motion is present.    No laryngeal masses or lesions    She displayed some mild paradoxical fold motion when she was anxious.  Easily cued to take slower breathes   through her nose and vocal fold motion returned to normal.    Mild compression with phonation.    A/P:  Mild paradoxical vocal fold motion, vocal hoarseness and cough - recommend speech therapy for these three issues.    Epistaxis - return in one week for recheck.  Restart nasal saline spray.    I spent a total of 25 minutes face-to-face with Damari rByant during today's office visit.  Over 50% of this time was spent counseling the patient on and/or coordinating care as documented in my assessment and plan.

## 2017-04-24 NOTE — NURSING NOTE
"Damari Bryant's goals for this visit include:   Chief Complaint   Patient presents with     RECHECK     \"coughing more\"       She requests these members of her care team be copied on today's visit information: yes      PCP: Samuel Cobian    Referring Provider:  No referring provider defined for this encounter.    Chief Complaint   Patient presents with     RECHECK     \"coughing more\"       Initial BP (!) 165/95  Pulse 114  Ht 1.676 m (5' 6\")  Wt 97.5 kg (215 lb)  BMI 34.7 kg/m2 Estimated body mass index is 34.7 kg/(m^2) as calculated from the following:    Height as of this encounter: 1.676 m (5' 6\").    Weight as of this encounter: 97.5 kg (215 lb).  Medication Reconciliation: complete    "

## 2017-04-24 NOTE — PROGRESS NOTES
ANTICOAGULATION FOLLOW-UP CLINIC VISIT    Patient Name:  Damari Bryant  Date:  4/24/2017  Contact Type:  Face to Face    SUBJECTIVE:     Patient Findings     Positives No Problem Findings           OBJECTIVE    INR Protime   Date Value Ref Range Status   04/24/2017 2.7 (A) 0.86 - 1.14 Final       ASSESSMENT / PLAN  INR assessment THER    Recheck INR In: 1 WEEK    INR Location Clinic      Anticoagulation Summary as of 4/24/2017     INR goal 2.0-3.0   Today's INR 2.7   Maintenance plan 7.5 mg (5 mg x 1.5) on Wed; 5 mg (5 mg x 1) all other days   Full instructions 4/26: 5 mg; Otherwise 7.5 mg on Wed; 5 mg all other days   Weekly total 37.5 mg   Plan last modified Mary Berry RN (3/23/2017)   Next INR check 5/1/2017   Priority INR   Target end date 8/14/2017    Indications   Deep vein thrombosis (DVT) (H) [I82.409] [I82.409]  Long-term (current) use of anticoagulants [Z79.01] [Z79.01]         Anticoagulation Episode Summary     INR check location     Preferred lab [facility]    Send INR reminders to [facility] INR    Comments Chemo infusion pump for 48 hours every 2 weeks.  Her INR rises during this infusion. Most recent schd infusion was  4/4/17, next one is 4/18/17   [facility]'s cell phone for INR results [phone]. Martin rodriguez  Per Esther Greene MD- Goal of 2-3.      Anticoagulation Care Providers     Provider Role Specialty Phone number    Esther Greene MD Responsible Hematology [phone]            See the Encounter Report to view Anticoagulation Flowsheet and Dosing Calendar (Go to Encounters tab in chart review, and find the Anticoagulation Therapy Visit)        Paulina Shaver RN

## 2017-04-24 NOTE — MR AVS SNAPSHOT
Damari Bryant   4/24/2017 8:40 AM   Anticoagulation Therapy Visit    Description:  72 year old female   Provider:  INR MG   Department:  Mg Med Monitoring           INR as of 4/24/2017     Today's INR 2.7      Anticoagulation Summary as of 4/24/2017     INR goal 2.0-3.0   Today's INR 2.7   Full instructions 4/26: 5 mg; Otherwise 7.5 mg on Wed; 5 mg all other days   Next INR check 5/1/2017    Indications   Deep vein thrombosis (DVT) (H) [I82.409] [I82.409]  Long-term (current) use of anticoagulants [Z79.01] [Z79.01]         Your next Anticoagulation Clinic appointment(s)     May 01, 2017  8:40 AM CDT   Anticoagulation Visit with INR MG   [facility] ([facility])    [address] [place] [address] [phone]              Contact Numbers     [facility]  Please call the anticoagulation nurse at [phone] to cancel and/or reschedule your appointment, or with any problems or questions regarding your therapy.  You may call the main clinic number at [phone] if the nurse is not available.           April 2017 Details    Sun Mon Tue Wed Thu Fri Sat           1                 2               3               4               5               6               7               8                 9               10               11               12               13               14               15                 16               17               18               19               20               21               22                 23               24      5 mg   See details      25      5 mg         26      5 mg         27      5 mg         28      5 mg         29      5 mg           30      5 mg                Date Details   04/24 This INR check               How to take your warfarin dose     To take:  5 mg Take 1 of the 5 mg tablets.           May 2017 Details    Sun Mon Tue Wed Thu Fri Sat      1            2               3               4                5               6                 7               8               9               10               11               12               13                 14               15               16               17               18               19               20                 21               22               23               24               25               26               27                 28               29               30               31                   Date Details   No additional details    Date of next INR:  5/1/2017         How to take your warfarin dose     To take:  5 mg Take 1 of the 5 mg tablets.

## 2017-04-25 ENCOUNTER — CARE COORDINATION (OUTPATIENT)
Dept: ONCOLOGY | Facility: CLINIC | Age: 72
End: 2017-04-25

## 2017-04-25 DIAGNOSIS — C18.9 METASTASIS FROM COLON CANCER (H): Primary | ICD-10-CM

## 2017-04-25 DIAGNOSIS — C79.9 METASTASIS FROM COLON CANCER (H): Primary | ICD-10-CM

## 2017-04-25 NOTE — PROGRESS NOTES
Faxed creatinine order to [facility]. Requested to have prior to CT cap.   Carlota Johns  RN, BSN, OCN

## 2017-04-27 LAB
DLCOUNC-%PRED-PRE: 82 %
DLCOUNC-PRE: 17.15 ML/MIN/MMHG
DLCOUNC-PRED: 20.88 ML/MIN/MMHG
ERV-%PRED-PRE: 29 %
ERV-PRE: 0.1 L
ERV-PRED: 0.34 L
EXPTIME-PRE: 6.79 SEC
FEF2575-%PRED-PRE: 100 %
FEF2575-PRE: 1.81 L/SEC
FEF2575-PRED: 1.81 L/SEC
FEFMAX-%PRED-PRE: 93 %
FEFMAX-PRE: 5.26 L/SEC
FEFMAX-PRED: 5.64 L/SEC
FEV1-%PRED-PRE: 91 %
FEV1-PRE: 2.05 L
FEV1FEV6-PRE: 78 %
FEV1FEV6-PRED: 79 %
FEV1FVC-PRE: 78 %
FEV1FVC-PRED: 77 %
FEV1SVC-PRE: 74 %
FEV1SVC-PRED: 69 %
FIFMAX-PRE: 2.07 L/SEC
FRCPLETH-%PRED-PRE: 106 %
FRCPLETH-PRE: 3.01 L
FRCPLETH-PRED: 2.83 L
FVC-%PRED-PRE: 89 %
FVC-PRE: 2.62 L
FVC-PRED: 2.92 L
IC-%PRED-PRE: 91 %
IC-PRE: 2.65 L
IC-PRED: 2.89 L
RVPLETH-%PRED-PRE: 130 %
RVPLETH-PRE: 2.91 L
RVPLETH-PRED: 2.22 L
TLCPLETH-%PRED-PRE: 107 %
TLCPLETH-PRE: 5.66 L
TLCPLETH-PRED: 5.27 L
VA-%PRED-PRE: 77 %
VA-PRE: 4.18 L
VC-%PRED-PRE: 85 %
VC-PRE: 2.75 L
VC-PRED: 3.23 L

## 2017-05-01 ENCOUNTER — TRANSFERRED RECORDS (OUTPATIENT)
Dept: HEALTH INFORMATION MANAGEMENT | Facility: CLINIC | Age: 72
End: 2017-05-01

## 2017-05-02 ENCOUNTER — ANTICOAGULATION THERAPY VISIT (OUTPATIENT)
Dept: PHARMACY | Facility: CLINIC | Age: 72
End: 2017-05-02
Payer: COMMERCIAL

## 2017-05-02 ENCOUNTER — CARE COORDINATION (OUTPATIENT)
Dept: ONCOLOGY | Facility: CLINIC | Age: 72
End: 2017-05-02

## 2017-05-02 ENCOUNTER — ONCOLOGY VISIT (OUTPATIENT)
Dept: ONCOLOGY | Facility: CLINIC | Age: 72
End: 2017-05-02
Payer: COMMERCIAL

## 2017-05-02 ENCOUNTER — INFUSION THERAPY VISIT (OUTPATIENT)
Dept: INFUSION THERAPY | Facility: CLINIC | Age: 72
End: 2017-05-02
Payer: COMMERCIAL

## 2017-05-02 VITALS
HEART RATE: 86 BPM | RESPIRATION RATE: 20 BRPM | TEMPERATURE: 97 F | SYSTOLIC BLOOD PRESSURE: 122 MMHG | DIASTOLIC BLOOD PRESSURE: 80 MMHG | BODY MASS INDEX: 34.54 KG/M2 | WEIGHT: 214.9 LBS | OXYGEN SATURATION: 97 % | HEIGHT: 66 IN

## 2017-05-02 DIAGNOSIS — C18.9 METASTASIS FROM COLON CANCER (H): Primary | ICD-10-CM

## 2017-05-02 DIAGNOSIS — Z79.01 LONG-TERM (CURRENT) USE OF ANTICOAGULANTS: ICD-10-CM

## 2017-05-02 DIAGNOSIS — F41.9 ANXIETY: ICD-10-CM

## 2017-05-02 DIAGNOSIS — C79.9 METASTASIS FROM COLON CANCER (H): Primary | ICD-10-CM

## 2017-05-02 DIAGNOSIS — R19.5 LOOSE STOOLS: ICD-10-CM

## 2017-05-02 DIAGNOSIS — K13.79 MOUTH SORES: ICD-10-CM

## 2017-05-02 DIAGNOSIS — I82.90 ACUTE DEEP VEIN THROMBOSIS (DVT) OF NON-EXTREMITY VEIN: ICD-10-CM

## 2017-05-02 DIAGNOSIS — M62.81 GENERALIZED MUSCLE WEAKNESS: ICD-10-CM

## 2017-05-02 DIAGNOSIS — E86.0 DEHYDRATION: ICD-10-CM

## 2017-05-02 DIAGNOSIS — N18.30 CKD (CHRONIC KIDNEY DISEASE) STAGE 3, GFR 30-59 ML/MIN (H): ICD-10-CM

## 2017-05-02 DIAGNOSIS — Z79.01 LONG TERM CURRENT USE OF ANTICOAGULANT THERAPY: ICD-10-CM

## 2017-05-02 LAB
ALBUMIN UR-MCNC: NEGATIVE MG/DL
BASOPHILS # BLD AUTO: 0 10E9/L (ref 0–0.2)
BASOPHILS NFR BLD AUTO: 0.5 %
BILIRUB SERPL-MCNC: 0.3 MG/DL (ref 0.2–1.3)
CEA SERPL-MCNC: 3.4 UG/L (ref 0–2.5)
CREAT SERPL-MCNC: 0.98 MG/DL (ref 0.52–1.04)
DIFFERENTIAL METHOD BLD: ABNORMAL
EOSINOPHIL # BLD AUTO: 0.1 10E9/L (ref 0–0.7)
EOSINOPHIL NFR BLD AUTO: 3.5 %
ERYTHROCYTE [DISTWIDTH] IN BLOOD BY AUTOMATED COUNT: 16.5 % (ref 10–15)
GFR SERPL CREATININE-BSD FRML MDRD: 55 ML/MIN/1.7M2
HCT VFR BLD AUTO: 39.3 % (ref 35–47)
HGB BLD-MCNC: 13.4 G/DL (ref 11.7–15.7)
INR BLD: 4.8 (ref 0.86–1.14)
LYMPHOCYTES # BLD AUTO: 2 10E9/L (ref 0.8–5.3)
LYMPHOCYTES NFR BLD AUTO: 49.9 %
MCH RBC QN AUTO: 31.7 PG (ref 26.5–33)
MCHC RBC AUTO-ENTMCNC: 34.1 G/DL (ref 31.5–36.5)
MCV RBC AUTO: 93 FL (ref 78–100)
MONOCYTES # BLD AUTO: 0.3 10E9/L (ref 0–1.3)
MONOCYTES NFR BLD AUTO: 7.4 %
NEUTROPHILS # BLD AUTO: 1.6 10E9/L (ref 1.6–8.3)
NEUTROPHILS NFR BLD AUTO: 38.7 %
PLATELET # BLD AUTO: 128 10E9/L (ref 150–450)
RBC # BLD AUTO: 4.23 10E12/L (ref 3.8–5.2)
WBC # BLD AUTO: 4.1 10E9/L (ref 4–11)

## 2017-05-02 PROCEDURE — 99207 ZZC NO CHARGE LOS: CPT

## 2017-05-02 PROCEDURE — 96415 CHEMO IV INFUSION ADDL HR: CPT | Performed by: NURSE PRACTITIONER

## 2017-05-02 PROCEDURE — 82247 BILIRUBIN TOTAL: CPT | Performed by: NURSE PRACTITIONER

## 2017-05-02 PROCEDURE — 96413 CHEMO IV INFUSION 1 HR: CPT | Performed by: NURSE PRACTITIONER

## 2017-05-02 PROCEDURE — 96375 TX/PRO/DX INJ NEW DRUG ADDON: CPT | Performed by: NURSE PRACTITIONER

## 2017-05-02 PROCEDURE — 85025 COMPLETE CBC W/AUTO DIFF WBC: CPT | Performed by: NURSE PRACTITIONER

## 2017-05-02 PROCEDURE — 82565 ASSAY OF CREATININE: CPT | Performed by: NURSE PRACTITIONER

## 2017-05-02 PROCEDURE — 99207 ZZC NO CHARGE NURSE ONLY: CPT | Performed by: FAMILY MEDICINE

## 2017-05-02 PROCEDURE — 96416 CHEMO PROLONG INFUSE W/PUMP: CPT | Performed by: NURSE PRACTITIONER

## 2017-05-02 PROCEDURE — 82378 CARCINOEMBRYONIC ANTIGEN: CPT | Performed by: INTERNAL MEDICINE

## 2017-05-02 PROCEDURE — 99207 ZZC NO CHARGE NURSE ONLY: CPT

## 2017-05-02 PROCEDURE — 96417 CHEMO IV INFUS EACH ADDL SEQ: CPT | Performed by: NURSE PRACTITIONER

## 2017-05-02 PROCEDURE — 81003 URINALYSIS AUTO W/O SCOPE: CPT | Performed by: NURSE PRACTITIONER

## 2017-05-02 PROCEDURE — 85610 PROTHROMBIN TIME: CPT | Mod: QW | Performed by: INTERNAL MEDICINE

## 2017-05-02 PROCEDURE — 96367 TX/PROPH/DG ADDL SEQ IV INF: CPT | Performed by: NURSE PRACTITIONER

## 2017-05-02 PROCEDURE — 99214 OFFICE O/P EST MOD 30 MIN: CPT | Mod: 25 | Performed by: NURSE PRACTITIONER

## 2017-05-02 RX ORDER — HEPARIN SODIUM (PORCINE) LOCK FLUSH IV SOLN 100 UNIT/ML 100 UNIT/ML
5 SOLUTION INTRAVENOUS
Status: DISCONTINUED | OUTPATIENT
Start: 2017-05-02 | End: 2017-05-02 | Stop reason: HOSPADM

## 2017-05-02 RX ORDER — DIPHENHYDRAMINE HYDROCHLORIDE 50 MG/ML
50 INJECTION INTRAMUSCULAR; INTRAVENOUS
Status: CANCELLED
Start: 2017-05-02

## 2017-05-02 RX ORDER — EPINEPHRINE 1 MG/ML
0.3 INJECTION INTRAMUSCULAR; INTRAVENOUS; SUBCUTANEOUS EVERY 5 MIN PRN
Status: CANCELLED | OUTPATIENT
Start: 2017-05-02

## 2017-05-02 RX ORDER — ALBUTEROL SULFATE 0.83 MG/ML
2.5 SOLUTION RESPIRATORY (INHALATION)
Status: CANCELLED | OUTPATIENT
Start: 2017-05-02

## 2017-05-02 RX ORDER — ALBUTEROL SULFATE 90 UG/1
1-2 AEROSOL, METERED RESPIRATORY (INHALATION)
Status: CANCELLED
Start: 2017-05-02

## 2017-05-02 RX ORDER — SODIUM CHLORIDE 9 MG/ML
1000 INJECTION, SOLUTION INTRAVENOUS CONTINUOUS PRN
Status: CANCELLED
Start: 2017-05-02

## 2017-05-02 RX ORDER — METHYLPREDNISOLONE SODIUM SUCCINATE 125 MG/2ML
125 INJECTION, POWDER, LYOPHILIZED, FOR SOLUTION INTRAMUSCULAR; INTRAVENOUS
Status: CANCELLED
Start: 2017-05-02

## 2017-05-02 RX ORDER — MEPERIDINE HYDROCHLORIDE 50 MG/ML
25 INJECTION INTRAMUSCULAR; INTRAVENOUS; SUBCUTANEOUS EVERY 30 MIN PRN
Status: CANCELLED | OUTPATIENT
Start: 2017-05-02

## 2017-05-02 RX ORDER — LORAZEPAM 2 MG/ML
0.5 INJECTION INTRAMUSCULAR EVERY 4 HOURS PRN
Status: CANCELLED
Start: 2017-05-02

## 2017-05-02 RX ORDER — EPINEPHRINE 0.3 MG/.3ML
0.3 INJECTION SUBCUTANEOUS EVERY 5 MIN PRN
Status: CANCELLED | OUTPATIENT
Start: 2017-05-02

## 2017-05-02 RX ADMIN — Medication 250 ML: at 10:38

## 2017-05-02 RX ADMIN — HEPARIN SODIUM (PORCINE) LOCK FLUSH IV SOLN 100 UNIT/ML 5 ML: 100 SOLUTION at 09:09

## 2017-05-02 ASSESSMENT — PAIN SCALES - GENERAL: PAINLEVEL: NO PAIN (0)

## 2017-05-02 NOTE — PROGRESS NOTES
Received 5/1/17 CT CAP report via fax from [facility].  CT images pushed to PACS.  Archiving request faxed to [facility].  Report labeled for EMR scanning.  Copy of report given to provider Chrystal Morel CNP.      Evans Archer, RN, BSN, OCN  Care Coordinator  [facility]  [phone]

## 2017-05-02 NOTE — MR AVS SNAPSHOT
After Visit Summary   5/2/2017    Damari Bryant    MRN: [id number]           Patient Information     Date Of Birth          1945        Visit Information        Provider Department      5/2/2017 9:15 AM Chrystal Morel APRN CNP [facility]        Today's Diagnoses     Dehydration    -  1    Metastasis from colon cancer (H)           Follow-ups after your visit        Your next 10 appointments already scheduled     May 02, 2017 10:30 AM CDT   Level 3 with BAY 8 INFUSION   [facility])    [address] [place] [address]   [phone]            May 08, 2017  9:00 AM CDT   Return Visit with Nina Carlin MD   [facility])    [address] [place] [address]   [phone]            May 09, 2017  3:30 PM CDT   Return Visit with Symone Elena MD   [facility])    [address] [place] [address]   [phone]            May 12, 2017 11:00 AM CDT   Evaluation with Allison E Alpers, SLP   [place] [facility] ([facility])    [address] [place] [address]   [phone]            May 16, 2017  7:45 AM CDT   Return Visit with NURSE ONLY CANCER CENTER   [facility])    [address] [place] [address]   [phone]            May 16, 2017  8:15 AM CDT   Return Visit with SANJEEV Camarillo CNP   [facility])    [address] [place] [address]   [phone]            May 16, 2017  9:30 AM CDT   Level 3 with BAY 2 INFUSION   [facility])    [address] [facility] [address]   [phone]            May 30, 2017  8:15 AM CDT   Return Visit with NURSE ONLY CANCER  CENTER   [facility] ([facility])    [address] [place] [address] [phone]            May 30, 2017  9:00 AM CDT   Return Visit with Esther Greene MD   [facility] ([facility])    [address] [place] [address] [phone]            May 30, 2017  1:30 PM CDT   Level 3 with BAY 7 INFUSION   [facility] ([facility])    [address] [place] [phone] [phone]              Who to contact     If you have questions or need follow up information about today's clinic visit or your schedule please contact [facility] directly at [phone].  Normal or non-critical lab and imaging results will be communicated to you by MyChart, letter or phone within 4 business days after the clinic has received the results. If you do not hear from us within 7 days, please contact the clinic through [employer] or phone. If you have a critical or abnormal lab result, we will notify you by phone as soon as possible.  Submit refill requests through [employer] or call your pharmacy and they will forward the refill request to us. Please allow 3 business days for your refill to be completed.          Additional Information About Your Visit        [employer] Information     [employer] is an electronic gateway that provides easy, online access to your medical records. With [employer], you can request a clinic appointment, read your test results, renew a prescription or communicate with your care team.     To sign up for [employer] visit the website at [email or web address]   You will be asked to enter the access code listed below, as well as some personal information. Please follow the directions to create your username and password.     Your access code is: [email or web address]  Expires: 2017  2:26 PM     Your access code will  in 90 days. If you need help or a new  "code, please contact your [facility] or call [phone] for assistance.        Care EveryWhere ID     This is your Care EveryWhere ID. This could be used by other organizations to access your [facility] medical records  [id number]        Your Vitals Were     Pulse Temperature Respirations Height Pulse Oximetry BMI (Body Mass Index)    86 97  F (36.1  C) (Oral) 20 1.676 m (5' 5.98\") 97% 34.7 kg/m2       Blood Pressure from Last 3 Encounters:   05/02/17 122/80   04/24/17 (!) 165/95   04/20/17 137/83    Weight from Last 3 Encounters:   05/02/17 97.5 kg (214 lb 14.4 oz)   04/24/17 97.5 kg (215 lb)   04/18/17 99.1 kg (218 lb 8 oz)              We Performed the Following     Creatinine        Primary Care Provider Office Phone # Fax #    Samuel ELZBIETA Chrisevelia [phone]       [facility] [address] [facility] [address]        Thank you!     Thank you for choosing [facility]  for your care. Our goal is always to provide you with excellent care. Hearing back from our patients is one way we can continue to improve our services. Please take a few minutes to complete the written survey that you may receive in the mail after your visit with us. Thank you!             Your Updated Medication List - Protect others around you: Learn how to safely use, store and throw away your medicines at www.disposemymeds.org.          This list is accurate as of: 5/2/17 10:09 AM.  Always use your most recent med list.                   Brand Name Dispense Instructions for use    busPIRone 15 MG tablet    BUSPAR     Take 15 mg by mouth 2 times daily       levothyroxine 150 MCG tablet    SYNTHROID/LEVOTHROID     Take 150 mcg by mouth daily       LEXAPRO 20 MG tablet   Generic drug:  escitalopram      Take 20 mg by mouth daily.       loperamide 2 MG capsule    IMODIUM    30 capsule    Start with 2 caps (4 mg), then take one cap (2 mg) after each diarrheal stool as needed. Do " not use more than 8 caps (16 mg) per day.       NORVASC 5 MG tablet   Generic drug:  amLODIPine      Take 5 mg by mouth daily       OMEGA-3 FISH OIL PO          ondansetron 8 MG tablet    ZOFRAN    10 tablet    Take 1 tablet (8 mg) by mouth every 8 hours as needed (Nausea/Vomiting)       prochlorperazine 10 MG tablet    COMPAZINE    30 tablet    Take 1 tablet (10 mg) by mouth every 6 hours as needed (Nausea/Vomiting)       senna-docusate 8.6-50 MG per tablet    SENNA S    60 tablet    1-2 tabs daily for constipation       tetrahydrozoline 0.05 % ophthalmic solution      1 drop 3 times daily       TOPAMAX 50 MG tablet   Generic drug:  topiramate      Take  by mouth 2 times daily. 1 tab q am 2 tabs q PM       triamterene-hydrochlorothiazide 37.5-25 MG per tablet    MAXZIDE-25     Take 1 tablet by mouth daily.       UNABLE TO FIND      MEDICATION NAME: Nature made digestive probiotics       VITAMIN D (CHOLECALCIFEROL) PO      Take 2,000 Units by mouth daily       warfarin 5 MG tablet    COUMADIN    60 tablet    Take 1 tablet (5 mg) by mouth daily

## 2017-05-02 NOTE — MR AVS SNAPSHOT
Damari Bryant   5/2/2017   Anticoagulation Therapy Visit    Description:  72 year old female   Provider:  Samuel Cobian   Department:  Mg Med Monitoring           INR as of 5/2/2017     Today's INR 4.8!      Anticoagulation Summary as of 5/2/2017     INR goal 2.0-3.0   Today's INR 4.8!   Full instructions 5/2: Hold; 5/3: 5 mg; 5/10: 5 mg; Otherwise 7.5 mg on Wed; 5 mg all other days   Next INR check 5/9/2017    Indications   Deep vein thrombosis (DVT) (H) [I82.409] [I82.409]  Long-term (current) use of anticoagulants [Z79.01] [Z79.01]         Contact Numbers     [facility]  Please call the anticoagulation nurse at [phone] to cancel and/or reschedule your appointment, or with any problems or questions regarding your therapy.  You may call the main clinic number at [phone] if the nurse is not available.           May 2017 Details    Sun Mon Tue Wed Thu Fri Sat      1               2      Hold   See details      3      5 mg         4      5 mg         5      5 mg         6      5 mg           7      5 mg         8      5 mg         9            10               11               12               13                 14               15               16               17               18               19               20                 21               22               23               24               25               26               27                 28               29               30               31                   Date Details   05/02 This INR check       Date of next INR:  5/9/2017         How to take your warfarin dose     To take:  5 mg Take 1 of the 5 mg tablets.    Hold Do not take your warfarin dose. See the Details table to the right for additional instructions.

## 2017-05-02 NOTE — MR AVS SNAPSHOT
After Visit Summary   5/2/2017    Damari Bryant    MRN: [id number]           Patient Information     Date Of Birth          1945        Visit Information        Provider Department      5/2/2017 8:45 AM [facility]        Today's Diagnoses     Metastasis from colon cancer (H)    -  1       Follow-ups after your visit        Your next 10 appointments already scheduled     May 02, 2017  9:15 AM CDT   Return Visit with SANJEEV Camarillo CNP   [facility])    [address] [place] [address] [phone]            May 02, 2017 10:30 AM CDT   Level 3 with BAY 8 INFUSION   [facility])    [address] [place] [address] [id number] [phone]            May 08, 2017  9:00 AM CDT   Return Visit with Nina Carlin MD   [facility])    [address] [place] [id number] [phone]            May 09, 2017  3:30 PM CDT   Return Visit with Symone Elena MD   [facility])    [address] [place] [address] [phone] [phone]            May 12, 2017 11:00 AM CDT   Evaluation with Allison E Alpers, SLP   [place] [facility] ([facility] [place] [place])    [address] [place] [address] [phone]              Who to contact     If you have questions or need follow up information about today's clinic visit or your schedule please contact [facility] directly at [phone].  Normal or non-critical lab and imaging results will be communicated to you by MyChart, letter or phone within 4 business days after the clinic has received the results. If you do not hear from us within 7 days, please contact the clinic through MyChart or phone. If you have a critical or abnormal lab  result, we will notify you by phone as soon as possible.  Submit refill requests through Cahootify or call your pharmacy and they will forward the refill request to us. Please allow 3 business days for your refill to be completed.          Additional Information About Your Visit        Cahootify Information     Cahootify is an electronic gateway that provides easy, online access to your medical records. With Cahootify, you can request a clinic appointment, read your test results, renew a prescription or communicate with your care team.     To sign up for Cahootify visit the website at [email or web address]   You will be asked to enter the access code listed below, as well as some personal information. Please follow the directions to create your username and password.     Your access code is: [email or web address]  Expires: 2017  2:26 PM     Your access code will  in 90 days. If you need help or a new code, please contact your [facility] or call [phone] for assistance.        Care EveryWhere ID     This is your Care EveryWhere ID. This could be used by other organizations to access your [place] medical records  [id number]         Blood Pressure from Last 3 Encounters:   17 (!) 165/95   17 137/83   17 124/80    Weight from Last 3 Encounters:   17 97.5 kg (215 lb)   17 99.1 kg (218 lb 8 oz)   17 98 kg (216 lb)              We Performed the Following     Bilirubin  total     CBC with platelets differential     CEA     Central Venous Catheter: implanted port (Port-A-Cath, Power Port)     CT Chest abdomen pelvis w & w/o contrast     Protein qualitative urine        Primary Care Provider Office Phone # Fax #    Samuel Cobian [phone]       [facility] [address] [facility] [place] [address]        Thank you!     Thank you for choosing [facility]  for your care. Our goal is always to provide you with excellent  care. Hearing back from our patients is one way we can continue to improve our services. Please take a few minutes to complete the written survey that you may receive in the mail after your visit with us. Thank you!             Your Updated Medication List - Protect others around you: Learn how to safely use, store and throw away your medicines at www.disposemymeds.org.          This list is accurate as of: 5/2/17  9:09 AM.  Always use your most recent med list.                   Brand Name Dispense Instructions for use    busPIRone 15 MG tablet    BUSPAR     Take 15 mg by mouth 2 times daily       levothyroxine 150 MCG tablet    SYNTHROID/LEVOTHROID     Take 150 mcg by mouth daily       LEXAPRO 20 MG tablet   Generic drug:  escitalopram      Take 20 mg by mouth daily.       loperamide 2 MG capsule    IMODIUM    30 capsule    Start with 2 caps (4 mg), then take one cap (2 mg) after each diarrheal stool as needed. Do not use more than 8 caps (16 mg) per day.       NORVASC 5 MG tablet   Generic drug:  amLODIPine      Take 5 mg by mouth daily       OMEGA-3 FISH OIL PO          ondansetron 8 MG tablet    ZOFRAN    10 tablet    Take 1 tablet (8 mg) by mouth every 8 hours as needed (Nausea/Vomiting)       prochlorperazine 10 MG tablet    COMPAZINE    30 tablet    Take 1 tablet (10 mg) by mouth every 6 hours as needed (Nausea/Vomiting)       senna-docusate 8.6-50 MG per tablet    SENNA S    60 tablet    1-2 tabs daily for constipation       tetrahydrozoline 0.05 % ophthalmic solution      1 drop 3 times daily       TOPAMAX 50 MG tablet   Generic drug:  topiramate      Take  by mouth 2 times daily. 1 tab q am 2 tabs q PM       triamterene-hydrochlorothiazide 37.5-25 MG per tablet    MAXZIDE-25     Take 1 tablet by mouth daily.       UNABLE TO FIND      MEDICATION NAME: Nature made digestive probiotics       VITAMIN D (CHOLECALCIFEROL) PO      Take 2,000 Units by mouth daily       warfarin 5 MG tablet    COUMADIN    60  tablet    Take 1 tablet (5 mg) by mouth daily

## 2017-05-02 NOTE — PROGRESS NOTES
Oncology Follow Up Visit: May 2, 2017     Oncologist: Dr Esther Greene  PCP: Samuel Cobian    Diagnosis: Mestastatic colon cancer  Damari Bryant is a 73 yo  female diagnosed July 2013 with Stage IIIB pT3N1b MX colon cancer.  Final pathology showed a mildly differentiated colonic adenocarcinoma, tumor extended through the muscularis propria into the pericolonic adipose tissue, lymphovascular invasion present.  Margins negative.  Three out of 30 lymph nodes were positive with 0.5 cm in greatest dimension and extracapsular extension identified.  DNA mismatch repair enzymes were intact.  Two pulmonary nodules noted on staging CT, indeterminate.   Treatment:  7/21/2013.  Sigmoid colectomy.   8/2013 to 1/2014  Adjuvant FOLFOX x 12 cycles.   2/2014 to 12/2016 observation  1/3/2017 began FOLFIRI/Avastin with new site to lung    INTERVAL HISTORY:  Ms Bryant comes to clinic with , Ross for review prior to continuation of therapy to treat her colon cancer. Pt shares she had elevated creatinine found with imaging yesterday and received additional fluids pre and post treatment.she admits sh has been neglecting some fluids recently but has been eating well and denies nausea except for day or so after treatment. She denies pain, SOB, fevers, skin issues but feels constantly weak though no falls. SHe also has seen more mouth sores with last infusion- tried scrubbing with toothbrush and changing paste to help and feels they went away but will trial salt water rinses next time. He has had intermittent loose stools but none over the last few days-will aware of using Imodium each day up to 8 tablets if needed. She feels she's got quite a bit of anxiety and depression but this is normal for her and she did enjoy her visit with Dr. Rodriguez in the past.  Remainder of her comprehensive review of systems is negative.   Current Outpatient Prescriptions   Medication     ondansetron (ZOFRAN) 8 MG tablet     loperamide  "(IMODIUM) 2 MG capsule     warfarin (COUMADIN) 5 MG tablet     senna-docusate (SENNA S) 8.6-50 MG per tablet     prochlorperazine (COMPAZINE) 10 MG tablet     Omega-3 Fatty Acids (OMEGA-3 FISH OIL PO)     tetrahydrozoline 0.05 % ophthalmic solution     UNABLE TO FIND     VITAMIN D, CHOLECALCIFEROL, PO     busPIRone (BUSPAR) 15 MG tablet     amLODIPine (NORVASC) 5 MG tablet     escitalopram (LEXAPRO) 20 MG tablet     levothyroxine (SYNTHROID, LEVOTHROID) 150 MCG tablet     topiramate (TOPAMAX) 50 MG tablet     triamterene-hydrochlorothiazide (MAXZIDE-25) 37.5-25 MG per tablet     No current facility-administered medications for this visit.      Facility-Administered Medications Ordered in Other Visits   Medication     sodium chloride (PF) 0.9% PF flush 10-20 mL     heparin 100 UNIT/ML injection 5 mL        Allergies   Allergen Reactions     Ativan Other (See Comments)     hallucinations     Avocado Nausea and Vomiting     Hmg-Coa-R Inhibitors Nausea     Iodine-131 Nausea and Vomiting     Shellfish Allergy GI Disturbance     Sulfa Drugs Nausea and Vomiting     Tegaderm Transparent Dressing (Informational Only) Rash      FAMILY HISTORY:  Father had colon cancer at age 70. Brother had lung cancer who was a smoker.      PHYSICAL EXAMINATION:/80  Pulse 86  Temp 97  F (36.1  C) (Oral)  Resp 20  Ht 1.676 m (5' 5.98\")  Wt 97.5 kg (214 lb 14.4 oz)  SpO2 97%  BMI 34.7 kg/m2 Constitutional: Alert, overweight, in no distress.cooperative  ENT: Eyes bright- glasses , No mouth sores noted today. Mild [place].  Neck: Supple, No adenopathy.Thyroid symmetric   Right port site without redness or swelling  Cardiac: Heart rate and rhythm is regular and strong without murmur  Respiratory: Breathing easy. Lung sounds clear to auscultation- no cough  Abdomen: Soft, non-tender, BS normal.  No organomegaly  MS: Muscle tone normal- moving well. Extremities normal with no edema.   Skin: No suspicious lesions or rashes  Neuro: Sensory " "grossly WNL, gait normal.   Lymph: Normal ant/post cervical, axillary, supraclavicular nodes  Psych: Mentation appears normal and affect normal/bright with easy smile.     LABORATORY DATA:   Ref. Range 5/2/2017 09:00   Creatinine Latest Ref Range: 0.52 - 1.04 mg/dL 0.98   GFR Estimate Latest Ref Range: >60 mL/min/1.7m2 55 (L)   GFR Estimate If Black Latest Ref Range: >60 mL/min/1.7m2 67   Bilirubin Total Latest Ref Range: 0.2 - 1.3 mg/dL 0.3   WBC Latest Ref Range: 4.0 - 11.0 10e9/L 4.1   Hemoglobin Latest Ref Range: 11.7 - 15.7 g/dL 13.4   Hematocrit Latest Ref Range: 35.0 - 47.0 % 39.3   Platelet Count Latest Ref Range: 150 - 450 10e9/L 128 (L)   RBC Count Latest Ref Range: 3.8 - 5.2 10e12/L 4.23   MCV Latest Ref Range: 78 - 100 fl 93   MCH Latest Ref Range: 26.5 - 33.0 pg 31.7   MCHC Latest Ref Range: 31.5 - 36.5 g/dL 34.1   RDW Latest Ref Range: 10.0 - 15.0 % 16.5 (H)   Diff Method Unknown Automated Method   % Neutrophils Latest Units: % 38.7   % Lymphocytes Latest Units: % 49.9   % Monocytes Latest Units: % 7.4   % Eosinophils Latest Units: % 3.5   % Basophils Latest Units: % 0.5   Absolute Neutrophil Latest Ref Range: 1.6 - 8.3 10e9/L 1.6   Absolute Lymphocytes Latest Ref Range: 0.8 - 5.3 10e9/L 2.0   Absolute Monocytes Latest Ref Range: 0.0 - 1.3 10e9/L 0.3   Absolute Eosinophils Latest Ref Range: 0.0 - 0.7 10e9/L 0.1   Absolute Basophils Latest Ref Range: 0.0 - 0.2 10e9/L 0.0   CT CAP completed 5/1/2017 at [facility]:   See care everywhere to find:  improvement seen to several of the pulmonary nodules and no recurrent issues to colon.     ASSESSMENT AND PLAN:   Metastatic colon cancer- CT Completed yesterday at [facility] showing improvement  With current treatment with the FOLFIRI/ Avastin. Pt is meeting goals to start cycle 8 FOLFIRI/Avastin today so will continue treatment though pt states last treatment was more \"draining\" with increase in weakness.   Pt will continue with plan today - no changes made " at this time.  She will return for review with Dr Greene in 2 weeks for review of symptoms prior to next cycle of treatment with FOLFIRI/Avastin.   Excess flatus/Loose stools- Pt aware to use imodium but needing only 6 tabs to control issues at most. Still having much flatus and gas X is not helpful.   Mouth sores- Asked her to try the salt and baking soda rinses every couple hours release 4 times a day to help with the mouth sores rather than brushing of the sores.   CKD, stage 3- elevated creatinine found yesterday with imaging- pt did receive fluid bolus. Recheck of creatinine today shows normal level for pt. Reminded of need for at least 60 ounces per day.  Weakness- suggested option of physical therapy. Previously received in her home- she will think about this option.   History of occlusive thrombus- pt currently using coumadin and being monitored by coag clinic.  INR elevated today- coag clinic to notify of directions.   Anxiety- pt using lexapro and buspar with stable results today. Pt did see Dr Rodriguez and wants to plan future visit.     TT=30 min with >50% in education and coordination of cares.    Chrystal Morel,CNP

## 2017-05-02 NOTE — PROGRESS NOTES
Infusion Nursing Note:  Damari Bryant presents today for Folfiri.    Patient seen by provider today: Yes: NP   present during visit today: Not Applicable.    Note: given dose of atropine before and after irinotecan to combat recurrent issues with diarrhea    Intravenous Access:  Implanted Port.    Treatment Conditions:  Results reviewed, labs MET treatment parameters, ok to proceed with treatment.      Post Infusion Assessment:  Patient tolerated infusion without incident.    Discharge Plan:   RTC in 46 hrs for chemo d/c.  Pt has other f/u appts.    CRISTINA ESTRADA RN

## 2017-05-02 NOTE — MR AVS SNAPSHOT
After Visit Summary   5/2/2017    Damari Bryant    MRN: [id number]           Patient Information     Date Of Birth          1945        Visit Information        Provider Department      5/2/2017 10:30 AM [address]        Today's Diagnoses     Metastasis from colon cancer (H)    -  1    Acute deep vein thrombosis (DVT) of non-extremity vein        Long term current use of anticoagulant therapy           Follow-ups after your visit        Your next 10 appointments already scheduled     May 04, 2017 11:30 AM CDT   Level O with [address])    [id number] [place] [phone]   [phone]            May 08, 2017  8:40 AM CDT   Anticoagulation Visit with INR MG   [facility])    [address] [place] [phone]   [phone]            May 08, 2017  9:00 AM CDT   Return Visit with Nina Carlin MD   [facility])    [address] [place] [phone]   [phone]            May 09, 2017  3:30 PM CDT   Return Visit with Symone Elena MD   [facility])    [address] [place] [phone]   [phone]            May 12, 2017 11:00 AM CDT   Evaluation with Allison E Alpers, SLP   [place] [facility] ([facility])    [address] [place] [address]   [phone]            May 16, 2017  7:45 AM CDT   Return Visit with NURSE ONLY CANCER CENTER   [facility])    [address] [place] [address]   [phone]            May 16, 2017  8:15 AM CDT   Return Visit with SANJEEV Camarillo CNP   [facility])    [address] [place] [phone]    [phone]            May 16, 2017  9:30 AM CDT   Level 3 with BAY 2 INFUSION   [email or web address] ([email or web address]    [address] [place] [address]   [phone]            May 30, 2017  8:15 AM CDT   Return Visit with [facility]UR[facility] ONLY CANCER CENTER   [email or web address] ([email or web address]    [address] [place] [address] [phone]            May 30, 2017  9:00 AM CDT   Return Visit with Esther Greene MD   [email or web address] ([email or web address]    [address] [place] [address] [phone]              Who to contact     If you have questions or need follow up information about today's clinic visit or your schedule please contact [facility] directly at [phone].  Normal or non-critical lab and imaging results will be communicated to you by AdECNhart, letter or phone within 4 business days after the clinic has received the results. If you do not hear from us within 7 days, please contact the clinic through FIZZAt or phone. If you have a critical or abnormal lab result, we will notify you by phone as soon as possible.  Submit refill requests through Zelgor or call your pharmacy and they will forward the refill request to us. Please allow 3 business days for your refill to be completed.          Additional Information About Your Visit        AdECNhart Information     Zelgor is an electronic gateway that provides easy, online access to your medical records. With Zelgor, you can request a clinic appointment, read your test results, renew a prescription or communicate with your care team.     To sign up for Zelgor visit the website at [email or web address]   You will be asked to enter the access code listed below, as well as some personal information. Please follow the directions to create your username and password.     Your access code is: [email or web address]  Expires:  2017  2:26 PM     Your access code will  in 90 days. If you need help or a new code, please contact your [facility] or call [phone] for assistance.        Care EveryWhere ID     This is your Care EveryWhere ID. This could be used by other organizations to access your [place] medical records  [id number]         Blood Pressure from Last 3 Encounters:   17 122/80   17 (!) 165/95   17 137/83    Weight from Last 3 Encounters:   17 97.5 kg (214 lb 14.4 oz)   17 97.5 kg (215 lb)   17 99.1 kg (218 lb 8 oz)              We Performed the Following     INR point of care        Primary Care Provider Office Phone # Fax #    Samuel Cobian [phone]       [facility] [address] [facility] [address]        Thank you!     Thank you for choosing [facility]  for your care. Our goal is always to provide you with excellent care. Hearing back from our patients is one way we can continue to improve our services. Please take a few minutes to complete the written survey that you may receive in the mail after your visit with us. Thank you!             Your Updated Medication List - Protect others around you: Learn how to safely use, store and throw away your medicines at www.disposemymeds.org.          This list is accurate as of: 17  2:17 PM.  Always use your most recent med list.                   Brand Name Dispense Instructions for use    busPIRone 15 MG tablet    BUSPAR     Take 15 mg by mouth 2 times daily       levothyroxine 150 MCG tablet    SYNTHROID/LEVOTHROID     Take 150 mcg by mouth daily       LEXAPRO 20 MG tablet   Generic drug:  escitalopram      Take 20 mg by mouth daily.       loperamide 2 MG capsule    IMODIUM    30 capsule    Start with 2 caps (4 mg), then take one cap (2 mg) after each diarrheal stool as needed. Do not use more than 8 caps (16 mg) per day.       NORVASC 5 MG tablet    Generic drug:  amLODIPine      Take 5 mg by mouth daily       OMEGA-3 FISH OIL PO          ondansetron 8 MG tablet    ZOFRAN    10 tablet    Take 1 tablet (8 mg) by mouth every 8 hours as needed (Nausea/Vomiting)       prochlorperazine 10 MG tablet    COMPAZINE    30 tablet    Take 1 tablet (10 mg) by mouth every 6 hours as needed (Nausea/Vomiting)       senna-docusate 8.6-50 MG per tablet    SENNA S    60 tablet    1-2 tabs daily for constipation       tetrahydrozoline 0.05 % ophthalmic solution      1 drop 3 times daily       TOPAMAX 50 MG tablet   Generic drug:  topiramate      Take  by mouth 2 times daily. 1 tab q am 2 tabs q PM       triamterene-hydrochlorothiazide 37.5-25 MG per tablet    MAXZIDE-25     Take 1 tablet by mouth daily.       UNABLE TO FIND      MEDICATION NAME: Nature made digestive probiotics       VITAMIN D (CHOLECALCIFEROL) PO      Take 2,000 Units by mouth daily       warfarin 5 MG tablet    COUMADIN    60 tablet    Take 1 tablet (5 mg) by mouth daily

## 2017-05-02 NOTE — PROGRESS NOTES
ANTICOAGULATION FOLLOW-UP CLINIC VISIT    Patient Name:  Damari Bryant  Date:  5/2/2017  Contact Type:  Face to Face    SUBJECTIVE:     Patient Findings     Positives No Problem Findings           OBJECTIVE    INR Point of Care   Date Value Ref Range Status   05/02/2017 4.8 (H) 0.86 - 1.14 Final     Comment:     This test is intended for monitoring Coumadin therapy.  Results are not   accurate   in patients with prolonged INR due to factor deficiency.         ASSESSMENT / PLAN  INR assessment SUPRA    Recheck INR In: 1 WEEK    INR Location Outside lab      Anticoagulation Summary as of 5/2/2017     INR goal 2.0-3.0   Today's INR 4.8!   Maintenance plan 7.5 mg (5 mg x 1.5) on Wed; 5 mg (5 mg x 1) all other days   Full instructions 5/2: Hold; 5/3: 5 mg; 5/10: 5 mg; Otherwise 7.5 mg on Wed; 5 mg all other days   Weekly total 37.5 mg   Plan last modified Mary Berry RN (3/23/2017)   Next INR check 5/8/2017   Priority INR   Target end date 8/14/2017    Indications   Deep vein thrombosis (DVT) (H) [I82.409] [I82.409]  Long-term (current) use of anticoagulants [Z79.01] [Z79.01]         Anticoagulation Episode Summary     INR check location     Preferred lab [facility]    Send INR reminders to [facility] INR    Comments Chemo infusion pump for 48 hours every 2 weeks.  Her INR rises during this infusion. Most recent schd infusion was  4/4/17, next one is 4/18/17   [employer]'s cell phone for INR results [phone]. Poke lightly  Per Esther Greene MD- Goal of 2-3.      Anticoagulation Care Providers     Provider Role Specialty Phone number    Esther Greene MD Responsible Hematology [phone]            See the Encounter Report to view Anticoagulation Flowsheet and Dosing Calendar (Go to Encounters tab in chart review, and find the Anticoagulation Therapy Visit)        Paulina Shaver RN

## 2017-05-02 NOTE — NURSING NOTE
"Oncology Rooming Note    May 2, 2017 9:13 AM   Damari Bryant is a 72 year old female who presents for: Oncology Clinic Visit    Initial Vitals: /80  Pulse 86  Temp 97  F (36.1  C) (Oral)  Resp 20  Ht 1.676 m (5' 5.98\")  Wt 97.5 kg (214 lb 14.4 oz)  SpO2 97%  BMI 34.7 kg/m2 Estimated body mass index is 34.7 kg/(m^2) as calculated from the following:    Height as of this encounter: 1.676 m (5' 5.98\").    Weight as of this encounter: 97.5 kg (214 lb 14.4 oz). Body surface area is 2.13 meters squared.  No Pain (0) Comment: Data Unavailable   No LMP recorded. Patient is postmenopausal.  Allergies reviewed: Yes  Medications reviewed: Yes    Medications: Medication refills not needed today.  Pharmacy name entered into [employer]:    [id number] - [place], [place] - [address] [facility] [address] [facility] [address] [place], [place] - [address] [facility] [place] [address] [facility]    Clinical concerns:    8 minutes for nursing intake (face to face time)     JULES DIAL LPN                "

## 2017-05-04 ENCOUNTER — INFUSION THERAPY VISIT (OUTPATIENT)
Dept: INFUSION THERAPY | Facility: CLINIC | Age: 72
End: 2017-05-04
Payer: COMMERCIAL

## 2017-05-04 VITALS
DIASTOLIC BLOOD PRESSURE: 77 MMHG | SYSTOLIC BLOOD PRESSURE: 117 MMHG | HEART RATE: 84 BPM | RESPIRATION RATE: 16 BRPM | TEMPERATURE: 98 F | OXYGEN SATURATION: 95 %

## 2017-05-04 DIAGNOSIS — C79.9 METASTASIS FROM COLON CANCER (H): Primary | ICD-10-CM

## 2017-05-04 DIAGNOSIS — C18.9 METASTASIS FROM COLON CANCER (H): Primary | ICD-10-CM

## 2017-05-04 PROCEDURE — 36593 DECLOT VASCULAR DEVICE: CPT | Performed by: INTERNAL MEDICINE

## 2017-05-04 PROCEDURE — 99207 ZZC NO CHARGE LOS: CPT

## 2017-05-04 RX ORDER — HEPARIN SODIUM (PORCINE) LOCK FLUSH IV SOLN 100 UNIT/ML 100 UNIT/ML
5 SOLUTION INTRAVENOUS ONCE
Status: COMPLETED | OUTPATIENT
Start: 2017-05-04 | End: 2017-05-04

## 2017-05-04 RX ADMIN — HEPARIN SODIUM (PORCINE) LOCK FLUSH IV SOLN 100 UNIT/ML 5 ML: 100 SOLUTION at 14:00

## 2017-05-04 RX ADMIN — Medication 2 MG: at 12:11

## 2017-05-04 ASSESSMENT — PAIN SCALES - GENERAL: PAINLEVEL: NO PAIN (0)

## 2017-05-04 NOTE — PROGRESS NOTES
"Infusion Nursing Note:  Damari Bryant presents today for Fluorouracil CADD pump disconnect    Patient seen by provider today: No   present during visit today: Not Applicable.    Note:   Pump infused fully without incidence. Pt reports feeling \"better\" having had atropine with chemotherapy. She is only having 1 loose stool per day so far and she as been able to drink 64 ounces of water (with crystal light).       Intravenous Access:  Implanted Port.  Pt's port c/d/i. Flushing well but no blood return. TPA instilled for 90 mins and brisk blood return post.     Treatment Conditions:  Not Applicable.      Post Infusion Assessment:  Patient tolerated infusion without incident.  Blood return noted pre and post infusion.  Access discontinued per protocol.    Discharge Plan:   Copy of AVS reviewed with patient and/or family.  Patient will return 5/16 for next appointment.  Patient discharged in stable condition accompanied by: self and .  Departure Mode: Ambulatory  Ramona Torres RN                        "

## 2017-05-04 NOTE — MR AVS SNAPSHOT
After Visit Summary   5/4/2017    Damari Bryant    MRN: [id number]           Patient Information     Date Of Birth          1945        Visit Information        Provider Department      5/4/2017 11:30 AM BAY 8 INFUSION [facility]        Today's Diagnoses     Metastasis from colon cancer (H)    -  1       Follow-ups after your visit        Your next 10 appointments already scheduled     May 08, 2017  8:40 AM CDT   Anticoagulation Visit with INR MG   [facility])    [id number] [place] [address]   [phone]            May 08, 2017  9:00 AM CDT   Return Visit with Nina Carlin MD   [facility])    [address] [place] [address]   [phone]            May 09, 2017  3:30 PM CDT   Return Visit with Symone Elena MD   [facility])    [address] [place] [address]   [phone]            May 12, 2017 11:00 AM CDT   Evaluation with Allison E Alpers, SLP   [place] [facility] ([facility])    [address] [place] [address]   [phone]            May 16, 2017  7:45 AM CDT   Return Visit with NURSE ONLY CANCER CENTER   [facility])    [address] [place] [address]   [phone]            May 16, 2017  8:15 AM CDT   Return Visit with SANJEEV Camarillo CNP   [facility])    [address] [place] [address]   [phone]            May 16, 2017  9:30 AM CDT   Level 3 with BAY 2 INFUSION   [facility])    [address] [place] [address]   [phone]            May 30, 2017  8:15 AM CDT   Return Visit with NURSE ONLY CANCER CENTER   [facility]  [facility] ([facility])    [address] [place] [address] [phone]            May 30, 2017  9:00 AM CDT   Return Visit with Esther Greene MD   [facility] ([facility])    [address] [place] [address] [phone]            May 30, 2017 11:00 AM CDT   Level 3 with BAY 7 INFUSION   [facility] ([facility])    [address] [place] [id number] [phone]              Who to contact     If you have questions or need follow up information about today's clinic visit or your schedule please contact [facility] directly at [phone].  Normal or non-critical lab and imaging results will be communicated to you by [employer], letter or phone within 4 business days after the clinic has received the results. If you do not hear from us within 7 days, please contact the clinic through [employer] or phone. If you have a critical or abnormal lab result, we will notify you by phone as soon as possible.  Submit refill requests through [employer] or call your pharmacy and they will forward the refill request to us. Please allow 3 business days for your refill to be completed.          Additional Information About Your Visit        [employer] Information     [employer] is an electronic gateway that provides easy, online access to your medical records. With [employer], you can request a clinic appointment, read your test results, renew a prescription or communicate with your care team.     To sign up for [employer] visit the website at [email or web address]   You will be asked to enter the access code listed below, as well as some personal information. Please follow the directions to create your username and password.     Your access code is: [email or web address]  Expires: 2017  2:26 PM     Your access code will  in 90 days. If you need help or a new code, please contact your  [facility] or call [phone] for assistance.        Care EveryWhere ID     This is your Care EveryWhere ID. This could be used by other organizations to access your [place] medical records  [id number]        Your Vitals Were     Pulse Temperature Respirations Pulse Oximetry          84 98  F (36.7  C) 16 95%         Blood Pressure from Last 3 Encounters:   05/04/17 117/77   05/02/17 122/80   04/24/17 (!) 165/95    Weight from Last 3 Encounters:   05/02/17 97.5 kg (214 lb 14.4 oz)   04/24/17 97.5 kg (215 lb)   04/18/17 99.1 kg (218 lb 8 oz)              Today, you had the following     No orders found for display       Primary Care Provider Office Phone # Fax #    Samuel RUFF Dotevelia [phone]       [facility] [address] [facility] [address]        Thank you!     Thank you for choosing [facility]  for your care. Our goal is always to provide you with excellent care. Hearing back from our patients is one way we can continue to improve our services. Please take a few minutes to complete the written survey that you may receive in the mail after your visit with us. Thank you!             Your Updated Medication List - Protect others around you: Learn how to safely use, store and throw away your medicines at www.disposemymeds.org.          This list is accurate as of: 5/4/17  2:27 PM.  Always use your most recent med list.                   Brand Name Dispense Instructions for use    busPIRone 15 MG tablet    BUSPAR     Take 15 mg by mouth 2 times daily       levothyroxine 150 MCG tablet    SYNTHROID/LEVOTHROID     Take 150 mcg by mouth daily       LEXAPRO 20 MG tablet   Generic drug:  escitalopram      Take 20 mg by mouth daily.       loperamide 2 MG capsule    IMODIUM    30 capsule    Start with 2 caps (4 mg), then take one cap (2 mg) after each diarrheal stool as needed. Do not use more than 8 caps (16 mg) per day.       NORVASC 5 MG tablet    Generic drug:  amLODIPine      Take 5 mg by mouth daily       OMEGA-3 FISH OIL PO          ondansetron 8 MG tablet    ZOFRAN    10 tablet    Take 1 tablet (8 mg) by mouth every 8 hours as needed (Nausea/Vomiting)       prochlorperazine 10 MG tablet    COMPAZINE    30 tablet    Take 1 tablet (10 mg) by mouth every 6 hours as needed (Nausea/Vomiting)       senna-docusate 8.6-50 MG per tablet    SENNA S    60 tablet    1-2 tabs daily for constipation       tetrahydrozoline 0.05 % ophthalmic solution      1 drop 3 times daily       TOPAMAX 50 MG tablet   Generic drug:  topiramate      Take  by mouth 2 times daily. 1 tab q am 2 tabs q PM       triamterene-hydrochlorothiazide 37.5-25 MG per tablet    MAXZIDE-25     Take 1 tablet by mouth daily.       UNABLE TO FIND      MEDICATION NAME: Nature made digestive probiotics       VITAMIN D (CHOLECALCIFEROL) PO      Take 2,000 Units by mouth daily       warfarin 5 MG tablet    COUMADIN    60 tablet    Take 1 tablet (5 mg) by mouth daily

## 2017-05-08 ENCOUNTER — OFFICE VISIT (OUTPATIENT)
Dept: OTOLARYNGOLOGY | Facility: CLINIC | Age: 72
End: 2017-05-08
Payer: COMMERCIAL

## 2017-05-08 ENCOUNTER — ANTICOAGULATION THERAPY VISIT (OUTPATIENT)
Dept: PHARMACY | Facility: CLINIC | Age: 72
End: 2017-05-08
Payer: COMMERCIAL

## 2017-05-08 VITALS
DIASTOLIC BLOOD PRESSURE: 87 MMHG | WEIGHT: 215 LBS | HEIGHT: 66 IN | HEART RATE: 94 BPM | BODY MASS INDEX: 34.55 KG/M2 | SYSTOLIC BLOOD PRESSURE: 146 MMHG

## 2017-05-08 DIAGNOSIS — Z79.01 LONG-TERM (CURRENT) USE OF ANTICOAGULANTS: ICD-10-CM

## 2017-05-08 DIAGNOSIS — R04.0 EPISTAXIS: Primary | ICD-10-CM

## 2017-05-08 DIAGNOSIS — I82.90 ACUTE DEEP VEIN THROMBOSIS (DVT) OF NON-EXTREMITY VEIN: ICD-10-CM

## 2017-05-08 LAB — INR POINT OF CARE: 4.3 (ref 0.86–1.14)

## 2017-05-08 PROCEDURE — 99207 ZZC NO CHARGE NURSE ONLY: CPT

## 2017-05-08 PROCEDURE — 36416 COLLJ CAPILLARY BLOOD SPEC: CPT

## 2017-05-08 PROCEDURE — 85610 PROTHROMBIN TIME: CPT | Mod: QW

## 2017-05-08 PROCEDURE — 30901 CONTROL OF NOSEBLEED: CPT | Performed by: OTOLARYNGOLOGY

## 2017-05-08 PROCEDURE — 99213 OFFICE O/P EST LOW 20 MIN: CPT | Mod: 25 | Performed by: OTOLARYNGOLOGY

## 2017-05-08 NOTE — MR AVS SNAPSHOT
Damari Bryant   5/8/2017 8:40 AM   Anticoagulation Therapy Visit    Description:  72 year old female   Provider:  INR MG   Department:  Mg Med Monitoring           INR as of 5/8/2017     Today's INR 4.3!      Anticoagulation Summary as of 5/8/2017     INR goal 2.0-3.0   Today's INR 4.3!   Full instructions 5/8: Hold; 5/10: 5 mg; 5/15: 2.5 mg; 5/17: 5 mg; Otherwise 7.5 mg on Wed; 5 mg all other days   Next INR check 5/16/2017    Indications   Deep vein thrombosis (DVT) (H) [I82.409] [I82.409]  Long-term (current) use of anticoagulants [Z79.01] [Z79.01]         Contact Numbers     [facility]  Please call the anticoagulation nurse at [phone] to cancel and/or reschedule your appointment, or with any problems or questions regarding your therapy.  You may call the main clinic number at [phone] if the nurse is not available.           May 2017 Details    Sun Mon Tue Wed Thu Fri Sat      1               2               3               4               5               6                 7               8      Hold   See details      9      5 mg         10      5 mg         11      5 mg         12      5 mg         13      5 mg           14      5 mg         15      2.5 mg         16            17               18               19               20                 21               22               23               24               25               26               27                 28               29               30               31                   Date Details   05/08 This INR check       Date of next INR:  5/16/2017         How to take your warfarin dose     To take:  2.5 mg Take 0.5 of a 5 mg tablet.    To take:  5 mg Take 1 of the 5 mg tablets.    Hold Do not take your warfarin dose. See the Details table to the right for additional instructions.

## 2017-05-08 NOTE — PROGRESS NOTES
ANTICOAGULATION FOLLOW-UP CLINIC VISIT    Patient Name:  Damari Bryant  Date:  5/8/2017  Contact Type:  Face to Face    SUBJECTIVE:     Patient Findings     Positives Nose bleeds    Comments Pt reports she still notices some blood when she blows her nose. Pt has follow up with [facility] today.            OBJECTIVE    INR Protime   Date Value Ref Range Status   05/08/2017 4.3 (A) 0.86 - 1.14 Final       ASSESSMENT / PLAN  INR assessment SUPRA    Recheck INR In: 1 WEEK    INR Location Clinic      Anticoagulation Summary as of 5/8/2017     INR goal 2.0-3.0   Today's INR 4.3!   Maintenance plan 7.5 mg (5 mg x 1.5) on Wed; 5 mg (5 mg x 1) all other days   Full instructions 5/8: Hold; 5/10: 5 mg; 5/15: 2.5 mg; 5/17: 5 mg; Otherwise 7.5 mg on Wed; 5 mg all other days   Weekly total 37.5 mg   Plan last modified Mary Berry RN (3/23/2017)   Next INR check 5/16/2017   Priority INR   Target end date 8/14/2017    Indications   Deep vein thrombosis (DVT) (H) [I82.409] [I82.409]  Long-term (current) use of anticoagulants [Z79.01] [Z79.01]         Anticoagulation Episode Summary     INR check location     Preferred lab [facility]    Send INR reminders to [facility] INR    Comments Chemo infusion pump for 48 hours every 2 weeks.  Her INR rises during this infusion. Most recent schd infusion was  4/4/17, next one is 4/18/17   [employer]'s cell phone for INR results [phone]. Martin rodriguez  Per Esther Greene MD- Goal of 2-3.      Anticoagulation Care Providers     Provider Role Specialty Phone number    Esther Greene MD Responsible Hematology [phone]            See the Encounter Report to view Anticoagulation Flowsheet and Dosing Calendar (Go to Encounters tab in chart review, and find the Anticoagulation Therapy Visit)      Paulina Shaver RN

## 2017-05-08 NOTE — NURSING NOTE
"Damari Bryant's goals for this visit include:   Chief Complaint   Patient presents with     RECHECK     Last nose bleed was on the 2nd, and it wasn't bad.        She requests these members of her care team be copied on today's visit information: yes      PCP: Samuel Cobian    Referring Provider:  No referring provider defined for this encounter.    Chief Complaint   Patient presents with     RECHECK     Last nose bleed was on the 2nd, and it wasn't bad.        Initial /87  Pulse 94  Ht 1.676 m (5' 6\")  Wt 97.5 kg (215 lb)  BMI 34.7 kg/m2 Estimated body mass index is 34.7 kg/(m^2) as calculated from the following:    Height as of this encounter: 1.676 m (5' 6\").    Weight as of this encounter: 97.5 kg (215 lb).  Medication Reconciliation: complete    "

## 2017-05-08 NOTE — MR AVS SNAPSHOT
After Visit Summary   5/8/2017    Damari Bryant    MRN: [id number]           Patient Information     Date Of Birth          1945        Visit Information        Provider Department      5/8/2017 9:00 AM Nina Carlin MD [facility]        Today's Diagnoses     Epistaxis    -  1       Follow-ups after your visit        Your next 10 appointments already scheduled     May 09, 2017  3:30 PM CDT   Return Visit with Symone Elena MD   [facility] ([facility])    [address] [place] [address]   [phone]            May 12, 2017 11:00 AM CDT   Evaluation with Allison E Alpers, SLP   [place] [facility] ([facility]    [address] [place] [address]   [phone]            May 16, 2017  7:45 AM CDT   Return Visit with NURSE ONLY CANCER CENTER   [facility] ([facility])    [address] [place] [address]   [phone]            May 16, 2017  8:15 AM CDT   Return Visit with SANJEEV Camarillo CNP   [facility] ([facility])    [address] [place] [address]   [phone]            May 16, 2017  9:30 AM CDT   Level 3 with BAY 2 INFUSION   [facility] ([facility])    [address] [place] [address]   [phone]            May 30, 2017  8:15 AM CDT   Return Visit with NURSE ONLY CANCER CENTER   [facility] ([facility])    [address] [facility] [address]   [phone]            May 30, 2017  9:00 AM CDT   Return Visit with Esther Greene MD   [facility])    [address] [facility] [address]   [phone]            May 30, 2017 11:00 AM CDT   Level 3 with BAY 7 INFUSION   [facility] ([facility]  "[facility]    [address] [place] [address] [phone]              Who to contact     If you have questions or need follow up information about today's clinic visit or your schedule please contact [facility] directly at [phone].  Normal or non-critical lab and imaging results will be communicated to you by MyChart, letter or phone within 4 business days after the clinic has received the results. If you do not hear from us within 7 days, please contact the clinic through MyChart or phone. If you have a critical or abnormal lab result, we will notify you by phone as soon as possible.  Submit refill requests through [employer] or call your pharmacy and they will forward the refill request to us. Please allow 3 business days for your refill to be completed.          Additional Information About Your Visit        [employer] Information     [employer] is an electronic gateway that provides easy, online access to your medical records. With [employer], you can request a clinic appointment, read your test results, renew a prescription or communicate with your care team.     To sign up for [employer] visit the website at [email or web address]   You will be asked to enter the access code listed below, as well as some personal information. Please follow the directions to create your username and password.     Your access code is: [email or web address]  Expires: 2017  2:26 PM     Your access code will  in 90 days. If you need help or a new code, please contact your [facility] or call [phone] for assistance.        Care EveryWhere ID     This is your Care EveryWhere ID. This could be used by other organizations to access your [place] medical records  [id number]        Your Vitals Were     Pulse Height BMI (Body Mass Index)             94 1.676 m (5' 6\") 34.7 kg/m2          Blood Pressure from Last 3 Encounters:   17 146/87   17 " 117/77   05/02/17 122/80    Weight from Last 3 Encounters:   05/08/17 97.5 kg (215 lb)   05/02/17 97.5 kg (214 lb 14.4 oz)   04/24/17 97.5 kg (215 lb)              We Performed the Following     CONTROL NOSEBLEED ANTERIOR, SIMPLE        Primary Care Provider Office Phone # Fax #    Samuel Cobian [phone]       [facility] [address] [facility] [address]        Thank you!     Thank you for choosing [facility]  for your care. Our goal is always to provide you with excellent care. Hearing back from our patients is one way we can continue to improve our services. Please take a few minutes to complete the written survey that you may receive in the mail after your visit with us. Thank you!             Your Updated Medication List - Protect others around you: Learn how to safely use, store and throw away your medicines at www.disposemymeds.org.          This list is accurate as of: 5/8/17 10:21 AM.  Always use your most recent med list.                   Brand Name Dispense Instructions for use    busPIRone 15 MG tablet    BUSPAR     Take 15 mg by mouth 2 times daily       levothyroxine 150 MCG tablet    SYNTHROID/LEVOTHROID     Take 150 mcg by mouth daily       LEXAPRO 20 MG tablet   Generic drug:  escitalopram      Take 20 mg by mouth daily.       loperamide 2 MG capsule    IMODIUM    30 capsule    Start with 2 caps (4 mg), then take one cap (2 mg) after each diarrheal stool as needed. Do not use more than 8 caps (16 mg) per day.       NORVASC 5 MG tablet   Generic drug:  amLODIPine      Take 5 mg by mouth daily       OMEGA-3 FISH OIL PO          ondansetron 8 MG tablet    ZOFRAN    10 tablet    Take 1 tablet (8 mg) by mouth every 8 hours as needed (Nausea/Vomiting)       prochlorperazine 10 MG tablet    COMPAZINE    30 tablet    Take 1 tablet (10 mg) by mouth every 6 hours as needed (Nausea/Vomiting)       senna-docusate 8.6-50 MG per tablet    SENNA S    60 tablet    1-2  tabs daily for constipation       tetrahydrozoline 0.05 % ophthalmic solution      1 drop 3 times daily       TOPAMAX 50 MG tablet   Generic drug:  topiramate      Take  by mouth 2 times daily. 1 tab q am 2 tabs q PM       triamterene-hydrochlorothiazide 37.5-25 MG per tablet    MAXZIDE-25     Take 1 tablet by mouth daily.       UNABLE TO FIND      Reported on 5/8/2017       VITAMIN D (CHOLECALCIFEROL) PO      Take 2,000 Units by mouth daily Reported on 5/8/2017       warfarin 5 MG tablet    COUMADIN    60 tablet    Take 1 tablet (5 mg) by mouth daily

## 2017-05-16 ENCOUNTER — INFUSION THERAPY VISIT (OUTPATIENT)
Dept: INFUSION THERAPY | Facility: CLINIC | Age: 72
End: 2017-05-16
Payer: COMMERCIAL

## 2017-05-16 ENCOUNTER — ONCOLOGY VISIT (OUTPATIENT)
Dept: ONCOLOGY | Facility: CLINIC | Age: 72
End: 2017-05-16
Payer: COMMERCIAL

## 2017-05-16 ENCOUNTER — ANTICOAGULATION THERAPY VISIT (OUTPATIENT)
Dept: PHARMACY | Facility: CLINIC | Age: 72
End: 2017-05-16
Payer: COMMERCIAL

## 2017-05-16 VITALS
BODY MASS INDEX: 33.75 KG/M2 | RESPIRATION RATE: 20 BRPM | OXYGEN SATURATION: 97 % | HEART RATE: 94 BPM | WEIGHT: 210 LBS | SYSTOLIC BLOOD PRESSURE: 116 MMHG | HEIGHT: 66 IN | TEMPERATURE: 98.1 F | DIASTOLIC BLOOD PRESSURE: 81 MMHG

## 2017-05-16 DIAGNOSIS — D69.6 THROMBOCYTOPENIA (H): ICD-10-CM

## 2017-05-16 DIAGNOSIS — C18.9 METASTASIS FROM COLON CANCER (H): Primary | ICD-10-CM

## 2017-05-16 DIAGNOSIS — I82.90 ACUTE DEEP VEIN THROMBOSIS (DVT) OF NON-EXTREMITY VEIN: ICD-10-CM

## 2017-05-16 DIAGNOSIS — C79.9 METASTASIS FROM COLON CANCER (H): Primary | ICD-10-CM

## 2017-05-16 DIAGNOSIS — R19.5 LOOSE STOOLS: ICD-10-CM

## 2017-05-16 DIAGNOSIS — Z79.01 LONG TERM CURRENT USE OF ANTICOAGULANT THERAPY: ICD-10-CM

## 2017-05-16 DIAGNOSIS — Z79.01 LONG-TERM (CURRENT) USE OF ANTICOAGULANTS: ICD-10-CM

## 2017-05-16 DIAGNOSIS — R09.81 CONGESTION OF PARANASAL SINUS: ICD-10-CM

## 2017-05-16 DIAGNOSIS — M62.81 GENERALIZED MUSCLE WEAKNESS: ICD-10-CM

## 2017-05-16 DIAGNOSIS — K13.79 MOUTH SORES: ICD-10-CM

## 2017-05-16 DIAGNOSIS — F41.9 ANXIETY: ICD-10-CM

## 2017-05-16 LAB
ALBUMIN UR-MCNC: 10 MG/DL
BASOPHILS # BLD AUTO: 0.1 10E9/L (ref 0–0.2)
BASOPHILS NFR BLD AUTO: 1.2 %
BILIRUB SERPL-MCNC: 0.4 MG/DL (ref 0.2–1.3)
CEA SERPL-MCNC: 3.4 UG/L (ref 0–2.5)
DIFFERENTIAL METHOD BLD: ABNORMAL
EOSINOPHIL # BLD AUTO: 0.2 10E9/L (ref 0–0.7)
EOSINOPHIL NFR BLD AUTO: 3.5 %
ERYTHROCYTE [DISTWIDTH] IN BLOOD BY AUTOMATED COUNT: 16.6 % (ref 10–15)
HCT VFR BLD AUTO: 41 % (ref 35–47)
HGB BLD-MCNC: 13.5 G/DL (ref 11.7–15.7)
INR BLD: 3.2 (ref 0.86–1.14)
LYMPHOCYTES # BLD AUTO: 2.4 10E9/L (ref 0.8–5.3)
LYMPHOCYTES NFR BLD AUTO: 49.6 %
MCH RBC QN AUTO: 31 PG (ref 26.5–33)
MCHC RBC AUTO-ENTMCNC: 32.9 G/DL (ref 31.5–36.5)
MCV RBC AUTO: 94 FL (ref 78–100)
MONOCYTES # BLD AUTO: 0.4 10E9/L (ref 0–1.3)
MONOCYTES NFR BLD AUTO: 7.5 %
NEUTROPHILS # BLD AUTO: 1.8 10E9/L (ref 1.6–8.3)
NEUTROPHILS NFR BLD AUTO: 38.2 %
PLATELET # BLD AUTO: 127 10E9/L (ref 150–450)
RBC # BLD AUTO: 4.36 10E12/L (ref 3.8–5.2)
WBC # BLD AUTO: 4.8 10E9/L (ref 4–11)

## 2017-05-16 PROCEDURE — 99207 ZZC NO CHARGE LOS: CPT

## 2017-05-16 PROCEDURE — 81003 URINALYSIS AUTO W/O SCOPE: CPT | Performed by: NURSE PRACTITIONER

## 2017-05-16 PROCEDURE — 96417 CHEMO IV INFUS EACH ADDL SEQ: CPT | Performed by: NURSE PRACTITIONER

## 2017-05-16 PROCEDURE — 36416 COLLJ CAPILLARY BLOOD SPEC: CPT | Performed by: INTERNAL MEDICINE

## 2017-05-16 PROCEDURE — 82247 BILIRUBIN TOTAL: CPT | Performed by: NURSE PRACTITIONER

## 2017-05-16 PROCEDURE — 96413 CHEMO IV INFUSION 1 HR: CPT | Performed by: NURSE PRACTITIONER

## 2017-05-16 PROCEDURE — 96416 CHEMO PROLONG INFUSE W/PUMP: CPT | Performed by: NURSE PRACTITIONER

## 2017-05-16 PROCEDURE — 85025 COMPLETE CBC W/AUTO DIFF WBC: CPT | Performed by: NURSE PRACTITIONER

## 2017-05-16 PROCEDURE — 96375 TX/PRO/DX INJ NEW DRUG ADDON: CPT | Performed by: NURSE PRACTITIONER

## 2017-05-16 PROCEDURE — 96367 TX/PROPH/DG ADDL SEQ IV INF: CPT | Performed by: NURSE PRACTITIONER

## 2017-05-16 PROCEDURE — 99214 OFFICE O/P EST MOD 30 MIN: CPT | Mod: 25 | Performed by: NURSE PRACTITIONER

## 2017-05-16 PROCEDURE — 85610 PROTHROMBIN TIME: CPT | Mod: QW | Performed by: INTERNAL MEDICINE

## 2017-05-16 PROCEDURE — 99207 ZZC NO CHARGE NURSE ONLY: CPT

## 2017-05-16 PROCEDURE — 82378 CARCINOEMBRYONIC ANTIGEN: CPT | Performed by: NURSE PRACTITIONER

## 2017-05-16 PROCEDURE — 99207 ZZC NO CHARGE NURSE ONLY: CPT | Performed by: FAMILY MEDICINE

## 2017-05-16 RX ORDER — HEPARIN SODIUM (PORCINE) LOCK FLUSH IV SOLN 100 UNIT/ML 100 UNIT/ML
5 SOLUTION INTRAVENOUS
Status: DISCONTINUED | OUTPATIENT
Start: 2017-05-16 | End: 2017-05-16 | Stop reason: HOSPADM

## 2017-05-16 RX ORDER — SODIUM CHLORIDE 9 MG/ML
1000 INJECTION, SOLUTION INTRAVENOUS CONTINUOUS PRN
Status: CANCELLED
Start: 2017-05-16

## 2017-05-16 RX ORDER — EPINEPHRINE 1 MG/ML
0.3 INJECTION INTRAMUSCULAR; INTRAVENOUS; SUBCUTANEOUS EVERY 5 MIN PRN
Status: CANCELLED | OUTPATIENT
Start: 2017-05-16

## 2017-05-16 RX ORDER — LORAZEPAM 2 MG/ML
0.5 INJECTION INTRAMUSCULAR EVERY 4 HOURS PRN
Status: CANCELLED
Start: 2017-05-16

## 2017-05-16 RX ORDER — DIPHENHYDRAMINE HYDROCHLORIDE 50 MG/ML
50 INJECTION INTRAMUSCULAR; INTRAVENOUS
Status: CANCELLED
Start: 2017-05-16

## 2017-05-16 RX ORDER — ALBUTEROL SULFATE 90 UG/1
1-2 AEROSOL, METERED RESPIRATORY (INHALATION)
Status: CANCELLED
Start: 2017-05-16

## 2017-05-16 RX ORDER — EPINEPHRINE 0.3 MG/.3ML
0.3 INJECTION SUBCUTANEOUS EVERY 5 MIN PRN
Status: CANCELLED | OUTPATIENT
Start: 2017-05-16

## 2017-05-16 RX ORDER — ALBUTEROL SULFATE 0.83 MG/ML
2.5 SOLUTION RESPIRATORY (INHALATION)
Status: CANCELLED | OUTPATIENT
Start: 2017-05-16

## 2017-05-16 RX ORDER — MEPERIDINE HYDROCHLORIDE 50 MG/ML
25 INJECTION INTRAMUSCULAR; INTRAVENOUS; SUBCUTANEOUS EVERY 30 MIN PRN
Status: CANCELLED | OUTPATIENT
Start: 2017-05-16

## 2017-05-16 RX ORDER — METHYLPREDNISOLONE SODIUM SUCCINATE 125 MG/2ML
125 INJECTION, POWDER, LYOPHILIZED, FOR SOLUTION INTRAMUSCULAR; INTRAVENOUS
Status: CANCELLED
Start: 2017-05-16

## 2017-05-16 RX ADMIN — HEPARIN SODIUM (PORCINE) LOCK FLUSH IV SOLN 100 UNIT/ML 5 ML: 100 SOLUTION at 08:08

## 2017-05-16 RX ADMIN — Medication 250 ML: at 09:42

## 2017-05-16 ASSESSMENT — PAIN SCALES - GENERAL: PAINLEVEL: NO PAIN (0)

## 2017-05-16 NOTE — MR AVS SNAPSHOT
After Visit Summary   5/16/2017    Damari Bryant    MRN: [id number]           Patient Information     Date Of Birth          1945        Visit Information        Provider Department      5/16/2017 7:45 AM NURSE ONLY CANCER CENTER [facility]        Today's Diagnoses     Metastasis from colon cancer (H)    -  1       Follow-ups after your visit        Your next 10 appointments already scheduled     May 16, 2017  8:15 AM CDT   Return Visit with SANJEEV Camarillo CNP   [facility] ([facility])    [id number] [place] [address]   [phone]            May 16, 2017  9:30 AM CDT   Level 3 with BAY 2 INFUSION   [facility] ([facility])    [address] [place] [address]   [phone]            May 30, 2017  8:15 AM CDT   Return Visit with NURSE ONLY CANCER CENTER   [facility] ([facility])    [address] [place] [address]   [phone]            May 30, 2017  9:00 AM CDT   Return Visit with Esther Greene MD   [facility] ([facility])    [address] [place] [address]   [phone]            May 30, 2017 11:00 AM CDT   Level 3 with BAY 7 INFUSION   [facility])    [address] [place] [address]   [phone]              Who to contact     If you have questions or need follow up information about today's clinic visit or your schedule please contact [facility] directly at [phone].  Normal or non-critical lab and imaging results will be communicated to you by MyChart, letter or phone within 4 business days after the clinic has received the results. If you do not hear from us within 7 days, please contact the clinic through MyChart or phone. If you have a critical or abnormal lab  result, we will notify you by phone as soon as possible.  Submit refill requests through Job2Day or call your pharmacy and they will forward the refill request to us. Please allow 3 business days for your refill to be completed.          Additional Information About Your Visit        algranoharEndorphMe Information     Job2Day is an electronic gateway that provides easy, online access to your medical records. With Job2Day, you can request a clinic appointment, read your test results, renew a prescription or communicate with your care team.     To sign up for Job2Day visit the website at [email or web address]   You will be asked to enter the access code listed below, as well as some personal information. Please follow the directions to create your username and password.     Your access code is: [email or web address]  Expires: 2017  8:08 AM     Your access code will  in 90 days. If you need help or a new code, please contact your [facility] or call [phone] for assistance.        Care EveryWhere ID     This is your Care EveryWhere ID. This could be used by other organizations to access your [place] medical records  [id number]         Blood Pressure from Last 3 Encounters:   17 146/87   17 117/77   17 122/80    Weight from Last 3 Encounters:   17 97.5 kg (215 lb)   17 97.5 kg (214 lb 14.4 oz)   17 97.5 kg (215 lb)              We Performed the Following     Bilirubin  total     CBC with platelets differential     CEA     Central Venous Catheter: implanted port (Port-A-Cath, Power Port)     Protein qualitative urine        Primary Care Provider Office Phone # Fax #    Samuel Cobian [phone]       [facility] [address] [facility] [address]  [place] [address]        Thank you!     Thank you for choosing [facility]  for your care. Our goal is always to provide you with excellent care. Hearing back from our patients is one  way we can continue to improve our services. Please take a few minutes to complete the written survey that you may receive in the mail after your visit with us. Thank you!             Your Updated Medication List - Protect others around you: Learn how to safely use, store and throw away your medicines at www.disposemymeds.org.          This list is accurate as of: 5/16/17  8:08 AM.  Always use your most recent med list.                   Brand Name Dispense Instructions for use    busPIRone 15 MG tablet    BUSPAR     Take 15 mg by mouth 2 times daily       levothyroxine 150 MCG tablet    SYNTHROID/LEVOTHROID     Take 150 mcg by mouth daily       LEXAPRO 20 MG tablet   Generic drug:  escitalopram      Take 20 mg by mouth daily.       loperamide 2 MG capsule    IMODIUM    30 capsule    Start with 2 caps (4 mg), then take one cap (2 mg) after each diarrheal stool as needed. Do not use more than 8 caps (16 mg) per day.       NORVASC 5 MG tablet   Generic drug:  amLODIPine      Take 5 mg by mouth daily       OMEGA-3 FISH OIL PO          ondansetron 8 MG tablet    ZOFRAN    10 tablet    Take 1 tablet (8 mg) by mouth every 8 hours as needed (Nausea/Vomiting)       prochlorperazine 10 MG tablet    COMPAZINE    30 tablet    Take 1 tablet (10 mg) by mouth every 6 hours as needed (Nausea/Vomiting)       senna-docusate 8.6-50 MG per tablet    SENNA S    60 tablet    1-2 tabs daily for constipation       tetrahydrozoline 0.05 % ophthalmic solution      1 drop 3 times daily       TOPAMAX 50 MG tablet   Generic drug:  topiramate      Take  by mouth 2 times daily. 1 tab q am 2 tabs q PM       triamterene-hydrochlorothiazide 37.5-25 MG per tablet    MAXZIDE-25     Take 1 tablet by mouth daily.       UNABLE TO FIND      Reported on 5/8/2017       VITAMIN D (CHOLECALCIFEROL) PO      Take 2,000 Units by mouth daily Reported on 5/8/2017       warfarin 5 MG tablet    COUMADIN    60 tablet    Take 1 tablet (5 mg) by mouth daily

## 2017-05-16 NOTE — PROGRESS NOTES
ANTICOAGULATION FOLLOW-UP CLINIC VISIT    Patient Name:  Damari Bryant  Date:  5/16/2017  Contact Type:  Face to Face at the chemo [place] 8 with .     SUBJECTIVE:     Patient Findings     Positives No Problem Findings    Comments Pt having chemo today.            OBJECTIVE    INR Point of Care   Date Value Ref Range Status   05/16/2017 3.2 (H) 0.86 - 1.14 Final     Comment:     This test is intended for monitoring Coumadin therapy.  Results are not   accurate   in patients with prolonged INR due to factor deficiency.         ASSESSMENT / PLAN  INR assessment SUPRA    Recheck INR In: 1 WEEK    INR Location Outside lab      Anticoagulation Summary as of 5/16/2017     INR goal 2.0-3.0   Today's INR 3.2!   Maintenance plan 7.5 mg (5 mg x 1.5) on Wed; 5 mg (5 mg x 1) all other days   Full instructions 5/16: 2.5 mg; 5/17: 5 mg; 5/19: 2.5 mg; 5/24: 5 mg; 5/26: 2.5 mg; 5/29: 2.5 mg; Otherwise 7.5 mg on Wed; 5 mg all other days   Weekly total 37.5 mg   Plan last modified Mary Berry, RN (3/23/2017)   Next INR check 5/22/2017   Priority INR   Target end date 8/14/2017    Indications   Deep vein thrombosis (DVT) (H) [I82.409] [I82.409]  Long-term (current) use of anticoagulants [Z79.01] [Z79.01]         Anticoagulation Episode Summary     INR check location     Preferred lab [facility]    Send INR reminders to [facility] INR    Comments Chemo infusion pump for 48 hours every 2 weeks.  Her INR rises during this infusion. Most recent schd infusion was  4/4/17, next one is 4/18/17   MOLOME's cell phone for INR results [phone]. Warrenke lightlorenzo  Per Esther Greene MD- Goal of 2-3.      Anticoagulation Care Providers     Provider Role Specialty Phone number    Esther Greene MD Responsible Hematology [phone]            See the Encounter Report to view Anticoagulation Flowsheet and Dosing Calendar (Go to Encounters tab in chart review, and find the Anticoagulation Therapy Visit)      Paulina Shaver  RN

## 2017-05-16 NOTE — MR AVS SNAPSHOT
After Visit Summary   5/16/2017    Damari Bryant    MRN: [id number]           Patient Information     Date Of Birth          1945        Visit Information        Provider Department      5/16/2017 8:15 AM Chrystal Morel APRN CNP [facility]         Follow-ups after your visit        Your next 10 appointments already scheduled     May 16, 2017  9:30 AM CDT   Level 3 with BAY 2 INFUSION   [facility] ([facility])    [address] [facility] [address]   [phone]            May 30, 2017  8:15 AM CDT   Return Visit with NURSE ONLY CANCER CENTER   [facility] ([facility])    [address] [facility] [address]   [phone]            May 30, 2017  9:00 AM CDT   Return Visit with Esther Greene MD   [facility] ([facility])    [address] [facility] [address]   [phone]            May 30, 2017 11:00 AM CDT   Level 3 with BAY 7 INFUSION   [facility] ([facility])    [address] [facility] [address]   [phone]            Jun 13, 2017  7:45 AM CDT   Return Visit with NURSE ONLY CANCER CENTER   [facility] ([facility])    [address] [facility] [address]   [phone]            Jun 13, 2017  8:15 AM CDT   Return Visit with SANJEEV Camarillo CNP   [facility] ([facility])    [address] [facility] [address]   [phone]            Jun 13, 2017  9:15 AM CDT   Level 3 with BAY 7 INFUSION   [facility] ([facility])    [address] [facility] [address]   [phone]            Jun 27, 2017  7:45 AM CDT   Return Visit with NURSE ONLY CANCER CENTER   [facility] ([facility])    [address] [address]   [phone]            2017  8:30 AM CDT   [facility] with [facility] Greene [facility]    [address] [place] [address] [phone]            2017  9:00 AM CDT   Level 3 with BAY 6 INFUSION   [facility]    [address] [address] [address] [phone]              Who to contact     If you have questions or need follow up information about today's clinic visit or your schedule please contact [facility] directly at [phone].  Normal or non-critical lab and imaging results will be communicated to you by MyChart, letter or phone within 4 business days after the clinic has received the results. If you do not hear from us within 7 days, please contact the clinic through MyChart or phone. If you have a critical or abnormal lab result, we will notify you by phone as soon as possible.  Submit refill requests through EvaluAgent or call your pharmacy and they will forward the refill request to us. Please allow 3 business days for your refill to be completed.          Additional Information About Your Visit        EvaluAgent Information     EvaluAgent is an electronic gateway that provides easy, online access to your medical records. With EvaluAgent, you can request a clinic appointment, read your test results, renew a prescription or communicate with your care team.     To sign up for EvaluAgent visit the website at [email or web address]   You will be asked to enter the access code listed below, as well as some personal information. Please follow the directions to create your username and password.     Your access code is: [email or web address]  Expires: 2017  8:08 AM     Your access code will  in 90 days. If you need help or a new code, please contact your [facility] or call [phone]  "for assistance.        Care EveryWhere ID     This is your Care EveryWhere ID. This could be used by other organizations to access your [place] medical records  [id number]        Your Vitals Were     Pulse Temperature Respirations Height Pulse Oximetry BMI (Body Mass Index)    94 98.1  F (36.7  C) (Oral) 20 1.676 m (5' 5.98\") 97% 33.91 kg/m2       Blood Pressure from Last 3 Encounters:   05/16/17 116/81   05/08/17 146/87   05/04/17 117/77    Weight from Last 3 Encounters:   05/16/17 95.3 kg (210 lb)   05/08/17 97.5 kg (215 lb)   05/02/17 97.5 kg (214 lb 14.4 oz)              Today, you had the following     No orders found for display       Primary Care Provider Office Phone # Fax #    Samuel RUFF Dotevelia [phone]       [facility] [address] [facility] [address]        Thank you!     Thank you for choosing [facility]  for your care. Our goal is always to provide you with excellent care. Hearing back from our patients is one way we can continue to improve our services. Please take a few minutes to complete the written survey that you may receive in the mail after your visit with us. Thank you!             Your Updated Medication List - Protect others around you: Learn how to safely use, store and throw away your medicines at www.disposemymeds.org.          This list is accurate as of: 5/16/17  9:21 AM.  Always use your most recent med list.                   Brand Name Dispense Instructions for use    busPIRone 15 MG tablet    BUSPAR     Take 15 mg by mouth 2 times daily       levothyroxine 150 MCG tablet    SYNTHROID/LEVOTHROID     Take 150 mcg by mouth daily       LEXAPRO 20 MG tablet   Generic drug:  escitalopram      Take 20 mg by mouth daily.       loperamide 2 MG capsule    IMODIUM    30 capsule    Start with 2 caps (4 mg), then take one cap (2 mg) after each diarrheal stool as needed. Do not use more than 8 caps (16 mg) per day.       NORVASC 5 MG tablet   Generic " drug:  amLODIPine      Take 5 mg by mouth daily       OMEGA-3 FISH OIL PO          ondansetron 8 MG tablet    ZOFRAN    10 tablet    Take 1 tablet (8 mg) by mouth every 8 hours as needed (Nausea/Vomiting)       prochlorperazine 10 MG tablet    COMPAZINE    30 tablet    Take 1 tablet (10 mg) by mouth every 6 hours as needed (Nausea/Vomiting)       senna-docusate 8.6-50 MG per tablet    SENNA S    60 tablet    1-2 tabs daily for constipation       tetrahydrozoline 0.05 % ophthalmic solution      1 drop 3 times daily       TOPAMAX 50 MG tablet   Generic drug:  topiramate      Take  by mouth 2 times daily. 1 tab q am 2 tabs q PM       triamterene-hydrochlorothiazide 37.5-25 MG per tablet    MAXZIDE-25     Take 1 tablet by mouth daily.       UNABLE TO FIND      Reported on 5/8/2017       VITAMIN D (CHOLECALCIFEROL) PO      Take 2,000 Units by mouth daily Reported on 5/8/2017       warfarin 5 MG tablet    COUMADIN    60 tablet    Take 1 tablet (5 mg) by mouth daily

## 2017-05-16 NOTE — MR AVS SNAPSHOT
Damari Bryant   5/16/2017   Anticoagulation Therapy Visit    Description:  72 year old female   Provider:  Samuel Cobian   Department:   Med Monitoring           INR as of 5/16/2017     Today's INR 3.2!      Anticoagulation Summary as of 5/16/2017     INR goal 2.0-3.0   Today's INR 3.2!   Full instructions 5/16: 2.5 mg; 5/17: 5 mg; 5/19: 2.5 mg; 5/24: 5 mg; 5/26: 2.5 mg; 5/29: 2.5 mg; Otherwise 7.5 mg on Wed; 5 mg all other days   Next INR check 5/23/2017    Indications   Deep vein thrombosis (DVT) (H) [I82.409] [I82.409]  Long-term (current) use of anticoagulants [Z79.01] [Z79.01]         Contact Numbers     [facility]  Please call the anticoagulation nurse at [phone] to cancel and/or reschedule your appointment, or with any problems or questions regarding your therapy.  You may call the main clinic number at [phone] if the nurse is not available.           May 2017 Details    Sun Mon Tue Wed Thu Fri Sat      1               2               3               4               5               6                 7               8               9               10               11               12               13                 14               15               16      2.5 mg   See details      17      5 mg         18      5 mg         19      2.5 mg         20      5 mg           21      5 mg         22      5 mg         23            24               25               26               27                 28               29               30               31                   Date Details   05/16 This INR check       Date of next INR:  5/23/2017         How to take your warfarin dose     To take:  2.5 mg Take 0.5 of a 5 mg tablet.    To take:  5 mg Take 1 of the 5 mg tablets.

## 2017-05-16 NOTE — PROGRESS NOTES
"Infusion Nursing Note:  Damari Bryant presents today for C9 D1 avastin/irinotecan/fluorouracil pump connect.    Patient seen by provider today: Yes: Chrystal Morel NP   present during visit today: Not Applicable.    Note: N/A.    Intravenous Access:  Implanted Port.    Treatment Conditions:  Lab Results   Component Value Date    HGB 13.5 05/16/2017     Lab Results   Component Value Date    WBC 4.8 05/16/2017      Lab Results   Component Value Date    ANEU 1.8 05/16/2017     Lab Results   Component Value Date     05/16/2017      Lab Results   Component Value Date     02/16/2017                   Lab Results   Component Value Date    POTASSIUM 3.1 02/16/2017           Lab Results   Component Value Date    MAG 2.1 08/23/2013            Lab Results   Component Value Date    CR 0.98 05/02/2017                   Lab Results   Component Value Date    KADIE 9.3 02/16/2017                Lab Results   Component Value Date    BILITOTAL 0.4 05/16/2017           Lab Results   Component Value Date    ALBUMIN 3.7 02/16/2017                    Lab Results   Component Value Date    ALT 38 02/16/2017           Lab Results   Component Value Date    AST 29 02/16/2017     Urine protein 10.      Post Infusion Assessment:  Patient tolerated infusion without incident.  Blood return noted pre and post infusion.  Prior to discharge: Port is secured in place with tegaderm and flushed with 10cc NS with positive blood return noted.  Continuous home infusion CADD pump connected.    All connectors secured in place and clamps taped open.    Pump started, \"running\" noted on display (CADD): YES.  Patient instructed to call our clinic or [facility] with any questions or concerns at home.  Patient verbalized understanding.    Patient set up for pump disconnect at our clinic on 5/18/17 at 1030.            Discharge Plan:   Patient will return 5/18/17 for next appointment.   Patient discharged in stable condition " accompanied by: self,  and daughter.  Departure Mode: Ambulatory.    Nga Olmos RN

## 2017-05-16 NOTE — MR AVS SNAPSHOT
After Visit Summary   5/16/2017    Damari Bryant    MRN: [id number]           Patient Information     Date Of Birth          1945        Visit Information        Provider Department      5/16/2017 9:30 AM BAY 2 INFUSION [facility]        Today's Diagnoses     Metastasis from colon cancer (H)    -  1       Follow-ups after your visit        Your next 10 appointments already scheduled     May 18, 2017 10:30 AM CDT   Level O with BAY 2 INFUSION   [facility] ([facility])    [address] [facility] [address]   [phone]            May 30, 2017  8:15 AM CDT   Return Visit with [facility]   [facility] ([facility])    [address] [facility] [address]   [phone]            May 30, 2017  9:00 AM CDT   Return Visit with Esther Greene MD   [facility] ([facility])    [address] [facility] [address]   [phone]            May 30, 2017 11:00 AM CDT   Level 3 with BAY 7 INFUSION   [facility] ([facility])    [address] [facility] [address]   [phone]            Jun 13, 2017  7:45 AM CDT   Return Visit with [facility] ONLY [facility]   [facility] ([facility])    [address] [facility] [address]   [phone]            Jun 13, 2017  8:15 AM CDT   Return Visit with SANJEEV Camarillo CNP   [facility] ([facility])    [address] [facility] [address]   [phone]            Jun 13, 2017  9:15 AM CDT   Level 3 with BAY 7 INFUSION   [facility] ([facility])    [address] [facility] [address]   [phone]            Jun 27, 2017  7:45 AM CDT   Return Visit with NURSE ONLY CANCER CENTER   [facility]  ([facility])    [address] [place] [address]   [phone]            2017  8:30 AM CDT   Return Visit with Esther Greene MD   [facility] ([facility])    [address] [place] [address] [phone]            2017  9:00 AM CDT   Level 3 with BAY 6 INFUSION   [facility] ([facility])    [address] [place] [address] [phone]              Who to contact     If you have questions or need follow up information about today's clinic visit or your schedule please contact [facility] directly at [phone].  Normal or non-critical lab and imaging results will be communicated to you by "[employer]hart, letter or phone within 4 business days after the clinic has received the results. If you do not hear from us within 7 days, please contact the clinic through MyChart or phone. If you have a critical or abnormal lab result, we will notify you by phone as soon as possible.  Submit refill requests through MixRank or call your pharmacy and they will forward the refill request to us. Please allow 3 business days for your refill to be completed.          Additional Information About Your Visit        MixRank Information     [employer]Rank is an electronic gateway that provides easy, online access to your medical records. With MixRank, you can request a clinic appointment, read your test results, renew a prescription or communicate with your care team.     To sign up for MixRank visit the website at [email or web address]   You will be asked to enter the access code listed below, as well as some personal information. Please follow the directions to create your username and password.     Your access code is: [email or web address]  Expires: 2017  8:08 AM     Your access code will  in 90 days. If you need help or a new code, please contact your [facility] or call [phone] for assistance.        Care EveryWhere ID     This is your Care EveryWhere ID. This could be used by other organizations to access your [facility] medical records  [id number]         Blood Pressure from Last 3 Encounters:   05/16/17 116/81   05/08/17 146/87   05/04/17 117/77    Weight from Last 3 Encounters:   05/16/17 95.3 kg (210 lb)   05/08/17 97.5 kg (215 lb)   05/02/17 97.5 kg (214 lb 14.4 oz)              We Performed the Following     MD Instruction for Protocol     Road Map Alert     Treatment Conditions        Primary Care Provider Office Phone # Fax #    Samuel Cobian [phone]       [facility] [address] [facility] [address]        Thank you!     Thank you for choosing [facility]  for your care. Our goal is always to provide you with excellent care. Hearing back from our patients is one way we can continue to improve our services. Please take a few minutes to complete the written survey that you may receive in the mail after your visit with us. Thank you!             Your Updated Medication List - Protect others around you: Learn how to safely use, store and throw away your medicines at www.disposemymeds.org.          This list is accurate as of: 5/16/17  2:10 PM.  Always use your most recent med list.                   Brand Name Dispense Instructions for use    busPIRone 15 MG tablet    BUSPAR     Take 15 mg by mouth 2 times daily       levothyroxine 150 MCG tablet    SYNTHROID/LEVOTHROID     Take 150 mcg by mouth daily       LEXAPRO 20 MG tablet   Generic drug:  escitalopram      Take 20 mg by mouth daily.       loperamide 2 MG capsule    IMODIUM    30 capsule    Start with 2 caps (4 mg), then take one cap (2 mg) after each diarrheal stool as needed. Do not use more than 8 caps (16 mg) per day.       NORVASC 5 MG tablet   Generic drug:  amLODIPine      Take 5 mg by mouth daily       OMEGA-3 FISH OIL PO           ondansetron 8 MG tablet    ZOFRAN    10 tablet    Take 1 tablet (8 mg) by mouth every 8 hours as needed (Nausea/Vomiting)       prochlorperazine 10 MG tablet    COMPAZINE    30 tablet    Take 1 tablet (10 mg) by mouth every 6 hours as needed (Nausea/Vomiting)       senna-docusate 8.6-50 MG per tablet    SENNA S    60 tablet    1-2 tabs daily for constipation       tetrahydrozoline 0.05 % ophthalmic solution      1 drop 3 times daily       TOPAMAX 50 MG tablet   Generic drug:  topiramate      Take  by mouth 2 times daily. 1 tab q am 2 tabs q PM       triamterene-hydrochlorothiazide 37.5-25 MG per tablet    MAXZIDE-25     Take 1 tablet by mouth daily.       UNABLE TO FIND      Reported on 5/8/2017       VITAMIN D (CHOLECALCIFEROL) PO      Take 2,000 Units by mouth daily Reported on 5/8/2017       warfarin 5 MG tablet    COUMADIN    60 tablet    Take 1 tablet (5 mg) by mouth daily

## 2017-05-16 NOTE — NURSING NOTE
"Oncology Rooming Note    May 16, 2017 8:12 AM   Damari Bryant is a 72 year old female who presents for:    Chief Complaint   Patient presents with     Oncology Clinic Visit     f/u prior to chemo     Initial Vitals: /81  Pulse 94  Temp 98.1  F (36.7  C) (Oral)  Resp 20  Ht 1.676 m (5' 5.98\")  Wt 95.3 kg (210 lb)  SpO2 97%  BMI 33.91 kg/m2 Estimated body mass index is 33.91 kg/(m^2) as calculated from the following:    Height as of this encounter: 1.676 m (5' 5.98\").    Weight as of this encounter: 95.3 kg (210 lb). Body surface area is 2.11 meters squared.  No Pain (0) Comment: Data Unavailable   No LMP recorded. Patient is postmenopausal.  Allergies reviewed: Yes  Medications reviewed: Yes    Medications: Medication refills not needed today.  Pharmacy name entered into Infantium:    [id number] - [place], [place] - [address] [facility] [address] [place], [place] - [address] [facility] [place], [place] - [address] [facility]    Clinical concerns:     8 minutes for nursing intake (face to face time)     JULES DIAL LPN              "

## 2017-05-16 NOTE — PROGRESS NOTES
Oncology Follow Up Visit: May 16, 2017     Oncologist: Dr Esther Greene  PCP: Samuel Cobian    Diagnosis: Mestastatic colon cancer  Damari Bryant is a 73 yo  female diagnosed July 2013 with Stage IIIB pT3N1b MX colon cancer.  Final pathology showed a mildly differentiated colonic adenocarcinoma, tumor extended through the muscularis propria into the pericolonic adipose tissue, lymphovascular invasion present.  Margins negative.  Three out of 30 lymph nodes were positive with 0.5 cm in greatest dimension and extracapsular extension identified.  DNA mismatch repair enzymes were intact.  Two pulmonary nodules noted on staging CT, indeterminate.   Treatment:  7/21/2013.  Sigmoid colectomy.   8/2013 to 1/2014  Adjuvant FOLFOX x 12 cycles.   2/2014 to 12/2016 observation  1/3/2017 began FOLFIRI/Avastin with new site to lung    INTERVAL HISTORY:  Ms Bryant comes to clinic with , Ross and daughter for review prior to continuation of therapy to treat her colon cancer. Pt She was she's having some nausea first few days that the medication is taking care of this problem and she is eating well rest of the time between treatments. She is not losing weight.She continues with irregular mouth sores but the rinses are very helpful. She reports seeing the ENT-with cauterizing the area in the nose as well as helping to clean the sinuses and feels she is much better with the treatment she's been getting And decided that the shortness of breath was because she had to mouth breathe and that made her anxious. She has had some loose stools but is using the Imodium as requested and feels this is improving.Weakness continues but has not fallen. She does think her memory has been affected by the treatments and her  and her daughter keep her on track under good supports. Her anxiety continues however.  Remainder of her comprehensive review of systems is negative.   Current Outpatient Prescriptions   Medication      "ondansetron (ZOFRAN) 8 MG tablet     loperamide (IMODIUM) 2 MG capsule     warfarin (COUMADIN) 5 MG tablet     senna-docusate (SENNA S) 8.6-50 MG per tablet     prochlorperazine (COMPAZINE) 10 MG tablet     Omega-3 Fatty Acids (OMEGA-3 FISH OIL PO)     tetrahydrozoline 0.05 % ophthalmic solution     UNABLE TO FIND     VITAMIN D, CHOLECALCIFEROL, PO     busPIRone (BUSPAR) 15 MG tablet     amLODIPine (NORVASC) 5 MG tablet     escitalopram (LEXAPRO) 20 MG tablet     levothyroxine (SYNTHROID, LEVOTHROID) 150 MCG tablet     topiramate (TOPAMAX) 50 MG tablet     triamterene-hydrochlorothiazide (MAXZIDE-25) 37.5-25 MG per tablet     No current facility-administered medications for this visit.         Allergies   Allergen Reactions     Ativan Other (See Comments)     hallucinations     Avocado Nausea and Vomiting     Hmg-Coa-R Inhibitors Nausea     Iodine-131 Nausea and Vomiting     Shellfish Allergy GI Disturbance     Sulfa Drugs Nausea and Vomiting     Tegaderm Transparent Dressing (Informational Only) Rash      FAMILY HISTORY:  Father had colon cancer at age 70. Brother had lung cancer who was a smoker.      PHYSICAL EXAMINATION:/81  Pulse 94  Temp 98.1  F (36.7  C) (Oral)  Resp 20  Ht 1.676 m (5' 5.98\")  Wt 95.3 kg (210 lb)  SpO2 97%  BMI 33.91 kg/m2    Constitutional: Alert and in no distress.  ENT: Eyes bright, No mouth sores noted today.   Neck: Supple, No adenopathy.Thyroid symmetric   Right port site without redness or swelling  Cardiac: Heart rate and rhythm is regular and strong without murmur  Respiratory: Breathing easy. Lung sounds clear to auscultation- no cough  Abdomen: Soft, non-tender, BS normal.  No organomegaly  MS: Muscle tone normal- moving well. Extremities normal with no edema.   Skin: No suspicious lesions or rashes  Neuro: Sensory grossly WNL, gait normal.   Lymph: Normal ant/post cervical, axillary, supraclavicular nodes  Psych: Mentation appears normal and affect normal/bright with " good conversation.     LABORATORY DATA:  Results for orders placed or performed in visit on 05/16/17   CBC with platelets differential   Result Value Ref Range    WBC 4.8 4.0 - 11.0 10e9/L    RBC Count 4.36 3.8 - 5.2 10e12/L    Hemoglobin 13.5 11.7 - 15.7 g/dL    Hematocrit 41.0 35.0 - 47.0 %    MCV 94 78 - 100 fl    MCH 31.0 26.5 - 33.0 pg    MCHC 32.9 31.5 - 36.5 g/dL    RDW 16.6 (H) 10.0 - 15.0 %    Platelet Count 127 (L) 150 - 450 10e9/L    Diff Method Automated Method     % Neutrophils 38.2 %    % Lymphocytes 49.6 %    % Monocytes 7.5 %    % Eosinophils 3.5 %    % Basophils 1.2 %    Absolute Neutrophil 1.8 1.6 - 8.3 10e9/L    Absolute Lymphocytes 2.4 0.8 - 5.3 10e9/L    Absolute Monocytes 0.4 0.0 - 1.3 10e9/L    Absolute Eosinophils 0.2 0.0 - 0.7 10e9/L    Absolute Basophils 0.1 0.0 - 0.2 10e9/L   Bilirubin  total   Result Value Ref Range    Bilirubin Total 0.4 0.2 - 1.3 mg/dL   Protein qualitative urine   Result Value Ref Range    Protein Albumin Urine 10 (A) NEG mg/dL   CEA   Result Value Ref Range    CEA 3.4 (H) 0 - 2.5 ug/L     ASSESSMENT AND PLAN:   Metastatic colon cancer- pt currently on treatment with FOLFIRI/ Avastin. Pt is meeting goals to start cycle 10  today though seeing some mild early nausea, weakness and mouth sores.   She will return for review with Dr Greene in 2 weeks for review of symptoms prior to next cycle of treatment with FOLFIRI/Avastin.   Thrombocytopenia, mild- noted over several visits but is stable in 120,000.   Excess flatus/Loose stools- Pt aware to use imodium but needing only 2-4 tabs to control issues at most. Also using gas X regularly  Sinus congestion resulting in feeling of SOB- pt continues to see ENT and feels this has been helpful. She is now concentrating on her breathing to help with feeling of SOB brought on by change in breathing but related to anxiety  Weakness- Again suggested option of physical therapy. Previously received in her home- but feels this would be in  her future.   Mouth sores- Using salt and baking soda rinses every couple hours or at least 4 times a day to help with the mouth sores with good results  History of occlusive thrombus- pt using coumadin -monitored by coag clinic.  INR elevated today- coag clinic to notify of directions.   Anxiety- pt using lexapro and buspar with stable results today. Pt did see Dr Rodriguez in recent past and enjoyed visit.     Total Time =25 min with >50% in education and coordination of cares.    Chrystal Morel,CNP

## 2017-05-18 ENCOUNTER — INFUSION THERAPY VISIT (OUTPATIENT)
Dept: INFUSION THERAPY | Facility: CLINIC | Age: 72
End: 2017-05-18
Payer: COMMERCIAL

## 2017-05-18 VITALS
DIASTOLIC BLOOD PRESSURE: 88 MMHG | HEART RATE: 88 BPM | SYSTOLIC BLOOD PRESSURE: 143 MMHG | RESPIRATION RATE: 18 BRPM | OXYGEN SATURATION: 95 % | TEMPERATURE: 97.6 F

## 2017-05-18 DIAGNOSIS — C18.9 METASTASIS FROM COLON CANCER (H): Primary | ICD-10-CM

## 2017-05-18 DIAGNOSIS — C79.9 METASTASIS FROM COLON CANCER (H): Primary | ICD-10-CM

## 2017-05-18 PROCEDURE — 99207 ZZC NO CHARGE LOS: CPT

## 2017-05-18 PROCEDURE — 99207 ZZC NO CHARGE LOS: CPT | Performed by: INTERNAL MEDICINE

## 2017-05-18 RX ORDER — HEPARIN SODIUM (PORCINE) LOCK FLUSH IV SOLN 100 UNIT/ML 100 UNIT/ML
5 SOLUTION INTRAVENOUS
Status: DISCONTINUED | OUTPATIENT
Start: 2017-05-18 | End: 2017-05-18 | Stop reason: HOSPADM

## 2017-05-18 RX ADMIN — HEPARIN SODIUM (PORCINE) LOCK FLUSH IV SOLN 100 UNIT/ML 5 ML: 100 SOLUTION at 10:54

## 2017-05-18 ASSESSMENT — PAIN SCALES - GENERAL: PAINLEVEL: NO PAIN (0)

## 2017-05-18 NOTE — MR AVS SNAPSHOT
After Visit Summary   5/18/2017    Damari Bryant    MRN: [id number]           Patient Information     Date Of Birth          1945        Visit Information        Provider Department      5/18/2017 10:30 AM BAY 2 INFUSION [facility]        Today's Diagnoses     Metastasis from colon cancer (H)    -  1       Follow-ups after your visit        Your next 10 appointments already scheduled     May 30, 2017  8:15 AM CDT   Return Visit with NURSE ONLY CANCER CENTER   [facility] ([facility])    [address] [facility] [address]   [phone]            May 30, 2017  9:00 AM CDT   Return Visit with Esther Greene MD   [facility] ([facility])    [address] [facility] [address]   [phone]            May 30, 2017 11:00 AM CDT   Level 3 with [address] ([facility])    [address] [facility] [address]   [phone]            Jun 13, 2017  7:45 AM CDT   Return Visit with NURSE ONLY CANCER CENTER   [facility] ([facility])    [address] [facility] [address]   [phone]            Jun 13, 2017  8:15 AM CDT   Return Visit with SANJEEV Camarillo CNP   [facility] ([facility])    [address] [facility] [address]   [phone]            Jun 13, 2017  9:15 AM CDT   Level 3 with [address] ([facility])    [address] [facility] [address]   [phone]            Jun 27, 2017  7:45 AM CDT   Return Visit with NURSE ONLY CANCER CENTER   [facility] ([facility])    [address] [facility] [address]   [phone]            Jun 27, 2017  8:30 AM CDT   Return Visit with Esther Greene MD   [facility]  [facility] ([facility])    [address] [address] [place] [address] [phone]            2017  9:00 AM CDT   Level 3 with BAY 6 INFUSION   [facility] ([facility])    [address] [place] [address] [phone]              Who to contact     If you have questions or need follow up information about today's clinic visit or your schedule please contact [facility] directly at [phone].  Normal or non-critical lab and imaging results will be communicated to you by [employer], letter or phone within 4 business days after the clinic has received the results. If you do not hear from us within 7 days, please contact the clinic through [employer] or phone. If you have a critical or abnormal lab result, we will notify you by phone as soon as possible.  Submit refill requests through [employer] or call your pharmacy and they will forward the refill request to us. Please allow 3 business days for your refill to be completed.          Additional Information About Your Visit        [employer] Information     [employer] is an electronic gateway that provides easy, online access to your medical records. With [employer], you can request a clinic appointment, read your test results, renew a prescription or communicate with your care team.     To sign up for [employer] visit the website at [email or web address]   You will be asked to enter the access code listed below, as well as some personal information. Please follow the directions to create your username and password.     Your access code is: [email or web address]  Expires: 2017  8:08 AM     Your access code will  in 90 days. If you need help or a new code, please contact your [facility] or call [phone] for assistance.        Care EveryWhere ID     This is your Care EveryWhere ID. This could be used by other organizations to access your  [facility] medical records  [id number]        Your Vitals Were     Pulse Temperature Respirations Pulse Oximetry          88 97.6  F (36.4  C) (Oral) 18 95%         Blood Pressure from Last 3 Encounters:   05/18/17 143/88   05/16/17 116/81   05/08/17 146/87    Weight from Last 3 Encounters:   05/16/17 95.3 kg (210 lb)   05/08/17 97.5 kg (215 lb)   05/02/17 97.5 kg (214 lb 14.4 oz)              We Performed the Following     Central Venous Catheter: implanted port (Port-A-Cath, Power Port)        Primary Care Provider Office Phone # Fax #    Samuel Cobian [phone]       [facility] [address] [facility] [address]        Thank you!     Thank you for choosing [facility]  for your care. Our goal is always to provide you with excellent care. Hearing back from our patients is one way we can continue to improve our services. Please take a few minutes to complete the written survey that you may receive in the mail after your visit with us. Thank you!             Your Updated Medication List - Protect others around you: Learn how to safely use, store and throw away your medicines at www.disposemymeds.org.          This list is accurate as of: 5/18/17  3:04 PM.  Always use your most recent med list.                   Brand Name Dispense Instructions for use    busPIRone 15 MG tablet    BUSPAR     Take 15 mg by mouth 2 times daily       levothyroxine 150 MCG tablet    SYNTHROID/LEVOTHROID     Take 150 mcg by mouth daily       LEXAPRO 20 MG tablet   Generic drug:  escitalopram      Take 20 mg by mouth daily.       loperamide 2 MG capsule    IMODIUM    30 capsule    Start with 2 caps (4 mg), then take one cap (2 mg) after each diarrheal stool as needed. Do not use more than 8 caps (16 mg) per day.       NORVASC 5 MG tablet   Generic drug:  amLODIPine      Take 5 mg by mouth daily       OMEGA-3 FISH OIL PO          ondansetron 8 MG tablet    ZOFRAN    10 tablet    Take 1 tablet (8  mg) by mouth every 8 hours as needed (Nausea/Vomiting)       prochlorperazine 10 MG tablet    COMPAZINE    30 tablet    Take 1 tablet (10 mg) by mouth every 6 hours as needed (Nausea/Vomiting)       senna-docusate 8.6-50 MG per tablet    SENNA S    60 tablet    1-2 tabs daily for constipation       tetrahydrozoline 0.05 % ophthalmic solution      1 drop 3 times daily       TOPAMAX 50 MG tablet   Generic drug:  topiramate      Take  by mouth 2 times daily. 1 tab q am 2 tabs q PM       triamterene-hydrochlorothiazide 37.5-25 MG per tablet    MAXZIDE-25     Take 1 tablet by mouth daily.       UNABLE TO FIND      Reported on 5/8/2017       VITAMIN D (CHOLECALCIFEROL) PO      Take 2,000 Units by mouth daily Reported on 5/8/2017       warfarin 5 MG tablet    COUMADIN    60 tablet    Take 1 tablet (5 mg) by mouth daily

## 2017-05-19 ENCOUNTER — TELEPHONE (OUTPATIENT)
Dept: ONCOLOGY | Facility: CLINIC | Age: 72
End: 2017-05-19

## 2017-05-22 ENCOUNTER — TRANSFERRED RECORDS (OUTPATIENT)
Dept: HEALTH INFORMATION MANAGEMENT | Facility: CLINIC | Age: 72
End: 2017-05-22

## 2017-05-22 ENCOUNTER — ANTICOAGULATION THERAPY VISIT (OUTPATIENT)
Dept: PHARMACY | Facility: CLINIC | Age: 72
End: 2017-05-22
Payer: COMMERCIAL

## 2017-05-22 DIAGNOSIS — I82.90 ACUTE DEEP VEIN THROMBOSIS (DVT) OF NON-EXTREMITY VEIN: ICD-10-CM

## 2017-05-22 DIAGNOSIS — Z79.01 LONG-TERM (CURRENT) USE OF ANTICOAGULANTS: ICD-10-CM

## 2017-05-22 LAB — INR PPP: 2.5

## 2017-05-22 PROCEDURE — 99207 ZZC NO CHARGE NURSE ONLY: CPT | Performed by: INTERNAL MEDICINE

## 2017-05-22 NOTE — PROGRESS NOTES
ANTICOAGULATION FOLLOW-UP CLINIC VISIT    Patient Name:  Damari Bryant  Date:  5/22/2017  Contact Type:  Telephone/ Ross, patient's . Discussed INR results, dosing instructions with readback and date of next INR.     SUBJECTIVE:     Patient Findings     Comments 55 mcg of vitamin K in the OTC multivitamin children's Equate brand.            OBJECTIVE    INR   Date Value Ref Range Status   05/22/2017 2.5  Final       ASSESSMENT / PLAN  INR assessment THER    Recheck INR In: 1 WEEK    INR Location Outside lab      Anticoagulation Summary as of 5/22/2017     INR goal 2.0-3.0   Today's INR 2.5   Maintenance plan 7.5 mg (5 mg x 1.5) on Wed; 5 mg (5 mg x 1) all other days   Full instructions 5/23: 2.5 mg; 5/24: 5 mg; 5/26: 2.5 mg; Otherwise 7.5 mg on Wed; 5 mg all other days   Weekly total 37.5 mg   Plan last modified Mary Berry RN (3/23/2017)   Next INR check 5/30/2017   Priority INR   Target end date 8/14/2017    Indications   Deep vein thrombosis (DVT) (H) [I82.409] [I82.409]  Long-term (current) use of anticoagulants [Z79.01] [Z79.01]         Anticoagulation Episode Summary     INR check location     Preferred lab [facility]    Send INR reminders to [facility] INR    Comments Chemo infusion pump for 48 hours every 2 weeks.  Her INR rises during this infusion. Most recent schd infusion was  4/4/17, next one is 4/18/17   Ross's cell phone for INR results [phone]. Poke jennifer  Per Esther Greene MD- Goal of 2-3.      Anticoagulation Care Providers     Provider Role Specialty Phone number    Esther Greene MD Responsible Hematology [phone]            See the Encounter Report to view Anticoagulation Flowsheet and Dosing Calendar (Go to Encounters tab in chart review, and find the Anticoagulation Therapy Visit)      Paulina Shaver RN

## 2017-05-22 NOTE — MR AVS SNAPSHOT
Damari Bryant   5/22/2017   Anticoagulation Therapy Visit    Description:  72 year old female   Provider:  Samuel Cobian   Department:   Med Monitoring           INR as of 5/22/2017     Today's INR 2.5      Anticoagulation Summary as of 5/22/2017     INR goal 2.0-3.0   Today's INR 2.5   Full instructions 5/23: 2.5 mg; 5/24: 5 mg; 5/26: 2.5 mg; Otherwise 7.5 mg on Wed; 5 mg all other days   Next INR check 5/30/2017    Indications   Deep vein thrombosis (DVT) (H) [I82.409] [I82.409]  Long-term (current) use of anticoagulants [Z79.01] [Z79.01]         Contact Numbers     [facility]  Please call the anticoagulation nurse at [phone] to cancel and/or reschedule your appointment, or with any problems or questions regarding your therapy.  You may call the main clinic number at [phone] if the nurse is not available.           May 2017 Details    Sun Mon Tue Wed Thu Fri Sat      1               2               3               4               5               6                 7               8               9               10               11               12               13                 14               15               16               17               18               19               20                 21               22      5 mg   See details      23      2.5 mg         24      5 mg         25      5 mg         26      2.5 mg         27      5 mg           28      5 mg         29      5 mg         30            31                   Date Details   05/22 This INR check       Date of next INR:  5/30/2017         How to take your warfarin dose     To take:  2.5 mg Take 0.5 of a 5 mg tablet.    To take:  5 mg Take 1 of the 5 mg tablets.

## 2017-05-30 ENCOUNTER — ANTICOAGULATION THERAPY VISIT (OUTPATIENT)
Dept: PHARMACY | Facility: CLINIC | Age: 72
End: 2017-05-30
Payer: COMMERCIAL

## 2017-05-30 ENCOUNTER — ONCOLOGY VISIT (OUTPATIENT)
Dept: ONCOLOGY | Facility: CLINIC | Age: 72
End: 2017-05-30
Payer: COMMERCIAL

## 2017-05-30 ENCOUNTER — DOCUMENTATION ONLY (OUTPATIENT)
Dept: PHARMACY | Facility: CLINIC | Age: 72
End: 2017-05-30

## 2017-05-30 ENCOUNTER — INFUSION THERAPY VISIT (OUTPATIENT)
Dept: INFUSION THERAPY | Facility: CLINIC | Age: 72
End: 2017-05-30
Payer: COMMERCIAL

## 2017-05-30 ENCOUNTER — CARE COORDINATION (OUTPATIENT)
Dept: ONCOLOGY | Facility: CLINIC | Age: 72
End: 2017-05-30

## 2017-05-30 VITALS
SYSTOLIC BLOOD PRESSURE: 139 MMHG | HEIGHT: 66 IN | RESPIRATION RATE: 20 BRPM | WEIGHT: 208.2 LBS | OXYGEN SATURATION: 97 % | TEMPERATURE: 98.4 F | HEART RATE: 90 BPM | DIASTOLIC BLOOD PRESSURE: 88 MMHG | BODY MASS INDEX: 33.46 KG/M2

## 2017-05-30 DIAGNOSIS — I82.90 ACUTE DEEP VEIN THROMBOSIS (DVT) OF NON-EXTREMITY VEIN: ICD-10-CM

## 2017-05-30 DIAGNOSIS — E87.6 HYPOKALEMIA: Primary | ICD-10-CM

## 2017-05-30 DIAGNOSIS — M62.81 GENERALIZED MUSCLE WEAKNESS: ICD-10-CM

## 2017-05-30 DIAGNOSIS — Z79.01 LONG TERM CURRENT USE OF ANTICOAGULANT THERAPY: ICD-10-CM

## 2017-05-30 DIAGNOSIS — C79.9 METASTASIS FROM COLON CANCER (H): Primary | ICD-10-CM

## 2017-05-30 DIAGNOSIS — C18.9 METASTASIS FROM COLON CANCER (H): Primary | ICD-10-CM

## 2017-05-30 DIAGNOSIS — Z79.01 LONG-TERM (CURRENT) USE OF ANTICOAGULANTS: ICD-10-CM

## 2017-05-30 LAB
ALBUMIN SERPL-MCNC: 3.6 G/DL (ref 3.4–5)
ALBUMIN UR-MCNC: NEGATIVE MG/DL
ALP SERPL-CCNC: 87 U/L (ref 40–150)
ALT SERPL W P-5'-P-CCNC: 34 U/L (ref 0–50)
ANION GAP SERPL CALCULATED.3IONS-SCNC: 15 MMOL/L (ref 3–14)
AST SERPL W P-5'-P-CCNC: 27 U/L (ref 0–45)
BASOPHILS # BLD AUTO: 0 10E9/L (ref 0–0.2)
BASOPHILS NFR BLD AUTO: 1 %
BILIRUB SERPL-MCNC: 0.4 MG/DL (ref 0.2–1.3)
BILIRUB SERPL-MCNC: 0.4 MG/DL (ref 0.2–1.3)
BUN SERPL-MCNC: 21 MG/DL (ref 7–30)
CALCIUM SERPL-MCNC: 9.4 MG/DL (ref 8.5–10.1)
CEA SERPL-MCNC: 3 UG/L (ref 0–2.5)
CHLORIDE SERPL-SCNC: 97 MMOL/L (ref 94–109)
CO2 SERPL-SCNC: 22 MMOL/L (ref 20–32)
CREAT SERPL-MCNC: 1.08 MG/DL (ref 0.52–1.04)
DIFFERENTIAL METHOD BLD: ABNORMAL
EOSINOPHIL # BLD AUTO: 0 10E9/L (ref 0–0.7)
EOSINOPHIL NFR BLD AUTO: 1 %
ERYTHROCYTE [DISTWIDTH] IN BLOOD BY AUTOMATED COUNT: 17.2 % (ref 10–15)
GFR SERPL CREATININE-BSD FRML MDRD: 50 ML/MIN/1.7M2
GLUCOSE SERPL-MCNC: 278 MG/DL (ref 70–99)
HCT VFR BLD AUTO: 39.7 % (ref 35–47)
HGB BLD-MCNC: 13.4 G/DL (ref 11.7–15.7)
INR BLD: 3.4 (ref 0.86–1.14)
LYMPHOCYTES # BLD AUTO: 2.9 10E9/L (ref 0.8–5.3)
LYMPHOCYTES NFR BLD AUTO: 61 %
MCH RBC QN AUTO: 31.4 PG (ref 26.5–33)
MCHC RBC AUTO-ENTMCNC: 33.8 G/DL (ref 31.5–36.5)
MCV RBC AUTO: 93 FL (ref 78–100)
MONOCYTES # BLD AUTO: 0.1 10E9/L (ref 0–1.3)
MONOCYTES NFR BLD AUTO: 3 %
NEUTROPHILS # BLD AUTO: 1.5 10E9/L (ref 1.6–8.3)
NEUTROPHILS NFR BLD AUTO: 34 %
PLATELET # BLD AUTO: 127 10E9/L (ref 150–450)
PLATELET # BLD EST: NORMAL 10*3/UL
POTASSIUM SERPL-SCNC: 2.7 MMOL/L (ref 3.4–5.3)
PROT SERPL-MCNC: 7.2 G/DL (ref 6.8–8.8)
RBC # BLD AUTO: 4.27 10E12/L (ref 3.8–5.2)
RBC MORPH BLD: NORMAL
SODIUM SERPL-SCNC: 134 MMOL/L (ref 133–144)
WBC # BLD AUTO: 4.5 10E9/L (ref 4–11)

## 2017-05-30 PROCEDURE — 96365 THER/PROPH/DIAG IV INF INIT: CPT | Performed by: INTERNAL MEDICINE

## 2017-05-30 PROCEDURE — 99207 ZZC NO CHARGE LOS: CPT

## 2017-05-30 PROCEDURE — 99207 ZZC NO CHARGE NURSE ONLY: CPT | Performed by: FAMILY MEDICINE

## 2017-05-30 PROCEDURE — 99214 OFFICE O/P EST MOD 30 MIN: CPT | Mod: 25 | Performed by: INTERNAL MEDICINE

## 2017-05-30 PROCEDURE — 85610 PROTHROMBIN TIME: CPT | Mod: QW | Performed by: INTERNAL MEDICINE

## 2017-05-30 PROCEDURE — 82378 CARCINOEMBRYONIC ANTIGEN: CPT | Performed by: INTERNAL MEDICINE

## 2017-05-30 PROCEDURE — 85025 COMPLETE CBC W/AUTO DIFF WBC: CPT | Performed by: INTERNAL MEDICINE

## 2017-05-30 PROCEDURE — 99207 ZZC NO CHARGE LOS: CPT | Performed by: DIETITIAN, REGISTERED

## 2017-05-30 PROCEDURE — 96366 THER/PROPH/DIAG IV INF ADDON: CPT | Performed by: INTERNAL MEDICINE

## 2017-05-30 PROCEDURE — 81003 URINALYSIS AUTO W/O SCOPE: CPT | Performed by: INTERNAL MEDICINE

## 2017-05-30 PROCEDURE — 99207 ZZC NO CHARGE NURSE ONLY: CPT

## 2017-05-30 PROCEDURE — 80053 COMPREHEN METABOLIC PANEL: CPT | Performed by: INTERNAL MEDICINE

## 2017-05-30 RX ORDER — ALBUTEROL SULFATE 0.83 MG/ML
2.5 SOLUTION RESPIRATORY (INHALATION)
Status: CANCELLED | OUTPATIENT
Start: 2017-06-13

## 2017-05-30 RX ORDER — POTASSIUM CHLORIDE 750 MG/1
30 TABLET, EXTENDED RELEASE ORAL ONCE
Status: COMPLETED | OUTPATIENT
Start: 2017-05-30 | End: 2017-05-30

## 2017-05-30 RX ORDER — ALBUTEROL SULFATE 90 UG/1
1-2 AEROSOL, METERED RESPIRATORY (INHALATION)
Status: CANCELLED
Start: 2017-06-13

## 2017-05-30 RX ORDER — EPINEPHRINE 1 MG/ML
0.3 INJECTION INTRAMUSCULAR; INTRAVENOUS; SUBCUTANEOUS EVERY 5 MIN PRN
Status: CANCELLED | OUTPATIENT
Start: 2017-06-13

## 2017-05-30 RX ORDER — DIPHENHYDRAMINE HYDROCHLORIDE 50 MG/ML
50 INJECTION INTRAMUSCULAR; INTRAVENOUS
Status: CANCELLED
Start: 2017-06-13

## 2017-05-30 RX ORDER — EPINEPHRINE 0.3 MG/.3ML
0.3 INJECTION SUBCUTANEOUS EVERY 5 MIN PRN
Status: CANCELLED | OUTPATIENT
Start: 2017-06-13

## 2017-05-30 RX ORDER — MEPERIDINE HYDROCHLORIDE 50 MG/ML
25 INJECTION INTRAMUSCULAR; INTRAVENOUS; SUBCUTANEOUS EVERY 30 MIN PRN
Status: CANCELLED | OUTPATIENT
Start: 2017-06-13

## 2017-05-30 RX ORDER — CAPECITABINE 500 MG/1
1000 TABLET, FILM COATED ORAL 2 TIMES DAILY
Qty: 112 TABLET | Refills: 0 | Status: SHIPPED | OUTPATIENT
Start: 2017-05-30 | End: 2017-06-18

## 2017-05-30 RX ORDER — LORAZEPAM 2 MG/ML
0.5 INJECTION INTRAMUSCULAR EVERY 4 HOURS PRN
Status: CANCELLED
Start: 2017-06-13

## 2017-05-30 RX ORDER — HEPARIN SODIUM (PORCINE) LOCK FLUSH IV SOLN 100 UNIT/ML 100 UNIT/ML
5 SOLUTION INTRAVENOUS
Status: DISCONTINUED | OUTPATIENT
Start: 2017-05-30 | End: 2017-05-30 | Stop reason: HOSPADM

## 2017-05-30 RX ORDER — SODIUM CHLORIDE 9 MG/ML
1000 INJECTION, SOLUTION INTRAVENOUS CONTINUOUS
Status: CANCELLED
Start: 2017-05-30

## 2017-05-30 RX ORDER — SODIUM CHLORIDE 9 MG/ML
1000 INJECTION, SOLUTION INTRAVENOUS CONTINUOUS PRN
Status: CANCELLED
Start: 2017-06-13

## 2017-05-30 RX ORDER — METHYLPREDNISOLONE SODIUM SUCCINATE 125 MG/2ML
125 INJECTION, POWDER, LYOPHILIZED, FOR SOLUTION INTRAMUSCULAR; INTRAVENOUS
Status: CANCELLED
Start: 2017-06-13

## 2017-05-30 RX ADMIN — HEPARIN SODIUM (PORCINE) LOCK FLUSH IV SOLN 100 UNIT/ML 5 ML: 100 SOLUTION at 08:33

## 2017-05-30 RX ADMIN — HEPARIN SODIUM (PORCINE) LOCK FLUSH IV SOLN 100 UNIT/ML 5 ML: 100 SOLUTION at 13:20

## 2017-05-30 RX ADMIN — POTASSIUM CHLORIDE 30 MEQ: 750 TABLET, EXTENDED RELEASE ORAL at 11:18

## 2017-05-30 ASSESSMENT — PAIN SCALES - GENERAL: PAINLEVEL: NO PAIN (0)

## 2017-05-30 NOTE — MR AVS SNAPSHOT
After Visit Summary   5/30/2017    Damari Bryant    MRN: [id number]           Patient Information     Date Of Birth          1945        Visit Information        Provider Department      5/30/2017 11:30 AM Tejal Barr RD [facility]        Today's Diagnoses     Metastasis from colon cancer (H)    -  1       Follow-ups after your visit        Your next 10 appointments already scheduled     Jun 13, 2017  7:45 AM CDT   Return Visit with [facility]   [facility] ([facility])    [address] [place] [address]   [phone]            Jun 13, 2017  8:15 AM CDT   Return Visit with SANJEEV Camarillo CNP   [facility])    [address] [place] [address] [phone]            Jun 13, 2017  9:15 AM CDT   Level 3 with BAY 7 INFUSION   [facility])    [address] [place] [address] [phone]            Jun 27, 2017  7:45 AM CDT   Return Visit with [facility]   [facility])    [address] [place] [address]   [phone]            Jun 27, 2017  8:30 AM CDT   Return Visit with Esther Greene MD   [facility])    [address] [place] [address]   [phone]            Jun 27, 2017  9:00 AM CDT   Level 3 with BAY 6 INFUSION   [facility] ([facility])    [address] [place] [address] [phone]              Who to contact     If you have questions or need follow up information about today's clinic visit or your schedule please contact [facility] directly at [phone].  Normal or non-critical lab and imaging results will be communicated to you by Pierce  letter or phone within 4 business days after the clinic has received the results. If you do not hear from us within 7 days, please contact the clinic through Nurigene or phone. If you have a critical or abnormal lab result, we will notify you by phone as soon as possible.  Submit refill requests through Nurigene or call your pharmacy and they will forward the refill request to us. Please allow 3 business days for your refill to be completed.          Additional Information About Your Visit        Nurigene Information     Nurigene is an electronic gateway that provides easy, online access to your medical records. With Nurigene, you can request a clinic appointment, read your test results, renew a prescription or communicate with your care team.     To sign up for Nurigene visit the website at [email or web address]   You will be asked to enter the access code listed below, as well as some personal information. Please follow the directions to create your username and password.     Your access code is: [email or web address]  Expires: 2017  8:08 AM     Your access code will  in 90 days. If you need help or a new code, please contact your [facility] or call [phone] for assistance.        Care EveryWhere ID     This is your Care EveryWhere ID. This could be used by other organizations to access your [facility] medical records  [id number]         Blood Pressure from Last 3 Encounters:   17 139/88   17 143/88   17 116/81    Weight from Last 3 Encounters:   17 94.4 kg (208 lb 3.2 oz)   17 95.3 kg (210 lb)   17 97.5 kg (215 lb)              Today, you had the following     No orders found for display         Today's Medication Changes          These changes are accurate as of: 17 12:28 PM.  If you have any questions, ask your nurse or doctor.               Start taking these medicines.        Dose/Directions    capecitabine 500 MG tablet CHEMO    Commonly known as:  XELODA   Used for:  Metastasis from colon cancer (H)   Started by:  Tara Long RPH        Dose:  1000 mg/m2   Take 4 tablets (2,000 mg) by mouth 2 times daily for 14 days Take by mouth Twice daily for 14 days then 7 days off   Quantity:  112 tablet   Refills:  0         Stop taking these medicines if you haven't already. Please contact your care team if you have questions.     prochlorperazine 10 MG tablet   Commonly known as:  COMPAZINE   Stopped by:  Esther Greene MD                Where to get your medicines      These medications were sent to [facility] - [place], [place] - [address], [address]  [address], [address], [facility] [address]     Phone:  [phone]     capecitabine 500 MG tablet CHEMO                Primary Care Provider Office Phone # Fax #    Samuel Cobian [phone]       [facility] [address] [facility] [address]        Thank you!     Thank you for choosing [facility]  for your care. Our goal is always to provide you with excellent care. Hearing back from our patients is one way we can continue to improve our services. Please take a few minutes to complete the written survey that you may receive in the mail after your visit with us. Thank you!             Your Updated Medication List - Protect others around you: Learn how to safely use, store and throw away your medicines at www.disposemymeds.org.          This list is accurate as of: 5/30/17 12:28 PM.  Always use your most recent med list.                   Brand Name Dispense Instructions for use    busPIRone 15 MG tablet    BUSPAR     Take 15 mg by mouth 2 times daily       capecitabine 500 MG tablet CHEMO    XELODA    112 tablet    Take 4 tablets (2,000 mg) by mouth 2 times daily for 14 days Take by mouth Twice daily for 14 days then 7 days off       levothyroxine 150 MCG tablet    SYNTHROID/LEVOTHROID     Take 150 mcg by  mouth daily       LEXAPRO 20 MG tablet   Generic drug:  escitalopram      Take 20 mg by mouth daily.       loperamide 2 MG capsule    IMODIUM    30 capsule    Start with 2 caps (4 mg), then take one cap (2 mg) after each diarrheal stool as needed. Do not use more than 8 caps (16 mg) per day.       NORVASC 5 MG tablet   Generic drug:  amLODIPine      Take 5 mg by mouth daily       OMEGA-3 FISH OIL PO          ondansetron 8 MG tablet    ZOFRAN    10 tablet    Take 1 tablet (8 mg) by mouth every 8 hours as needed (Nausea/Vomiting)       senna-docusate 8.6-50 MG per tablet    SENNA S    60 tablet    1-2 tabs daily for constipation       tetrahydrozoline 0.05 % ophthalmic solution      1 drop 3 times daily       TOPAMAX 50 MG tablet   Generic drug:  topiramate      Take  by mouth 2 times daily. 1 tab q am 2 tabs q PM       triamterene-hydrochlorothiazide 37.5-25 MG per tablet    MAXZIDE-25     Take 1 tablet by mouth daily.       UNABLE TO FIND      Reported on 5/8/2017       VITAMIN D (CHOLECALCIFEROL) PO      Take 2,000 Units by mouth daily Reported on 5/8/2017       warfarin 5 MG tablet    COUMADIN    60 tablet    Take 1 tablet (5 mg) by mouth daily

## 2017-05-30 NOTE — MR AVS SNAPSHOT
After Visit Summary   5/30/2017    Damari Bryant    MRN: [id number]           Patient Information     Date Of Birth          1945        Visit Information        Provider Department      5/30/2017 11:00 AM [address]        Today's Diagnoses     Hypokalemia    -  1    Acute deep vein thrombosis (DVT) of non-extremity vein        Long term current use of anticoagulant therapy           Follow-ups after your visit        Your next 10 appointments already scheduled     Jun 13, 2017  7:45 AM CDT   Return Visit with [facility] ONLY [facility]ER CENTER   [facility] ([facility])    [address] [place] [address]   [phone]            Jun 13, 2017  8:15 AM CDT   Return Visit with SANJEEV Camarillo [facility])    [address] [place] [address]   [phone]            Jun 13, 2017  9:15 AM CDT   Level 3 with [facility])    [address] [place] [address]   [phone]            Jun 27, 2017  7:45 AM CDT   Return Visit with [facility] ONLY CANCER CENTER   [facility] ([facility])    [address] [place] [address]   [phone]            Jun 27, 2017  8:30 AM CDT   Return Visit with Esther Greene MD   [facility])    [address] [place] [address]   [phone]            Jun 27, 2017  9:00 AM CDT   Level 3 with [address] ([facility])    [address] [place] [address]   [phone]              Who to contact     If you have questions or need follow up information about today's clinic visit or your schedule please contact [facility] directly at [phone].  Normal or  non-critical lab and imaging results will be communicated to you by MyChart, letter or phone within 4 business days after the clinic has received the results. If you do not hear from us within 7 days, please contact the clinic through Cleanifyt or phone. If you have a critical or abnormal lab result, we will notify you by phone as soon as possible.  Submit refill requests through [facility] or call your pharmacy and they will forward the refill request to us. Please allow 3 business days for your refill to be completed.          Additional Information About Your Visit        [facility] Information     [facility] is an electronic gateway that provides easy, online access to your medical records. With [facility], you can request a clinic appointment, read your test results, renew a prescription or communicate with your care team.     To sign up for [facility] visit the website at [email or web address]   You will be asked to enter the access code listed below, as well as some personal information. Please follow the directions to create your username and password.     Your access code is: [email or web address]  Expires: 2017  8:08 AM     Your access code will  in 90 days. If you need help or a new code, please contact your [facility] or call [phone] for assistance.        Care EveryWhere ID     This is your Care EveryWhere ID. This could be used by other organizations to access your [facility] medical records  [id number]         Blood Pressure from Last 3 Encounters:   17 139/88   17 143/88   17 116/81    Weight from Last 3 Encounters:   17 94.4 kg (208 lb 3.2 oz)   17 95.3 kg (210 lb)   17 97.5 kg (215 lb)              We Performed the Following     INR point of care          Today's Medication Changes          These changes are accurate as of: 17  3:32 PM.  If you have any questions, ask your nurse or doctor.               Start taking these  medicines.        Dose/Directions    capecitabine 500 MG tablet CHEMO   Commonly known as:  XELODA   Used for:  Metastasis from colon cancer (H)   Started by:  Tara Long RPH        Dose:  1000 mg/m2   Take 4 tablets (2,000 mg) by mouth 2 times daily for 14 days Take by mouth Twice daily for 14 days then 7 days off   Quantity:  112 tablet   Refills:  0         Stop taking these medicines if you haven't already. Please contact your care team if you have questions.     prochlorperazine 10 MG tablet   Commonly known as:  COMPAZINE   Stopped by:  Esther Greene MD                Where to get your medicines      These medications were sent to [facility] [place], [place] - [address]  [address], [facility] [address]     Phone:  [phone]     capecitabine 500 MG tablet CHEMO                Primary Care Provider Office Phone # Fax #    Samuel Cobian [phone]       [facility] [address] [facility] [address]        Thank you!     Thank you for choosing [facility]  for your care. Our goal is always to provide you with excellent care. Hearing back from our patients is one way we can continue to improve our services. Please take a few minutes to complete the written survey that you may receive in the mail after your visit with us. Thank you!             Your Updated Medication List - Protect others around you: Learn how to safely use, store and throw away your medicines at www.disposemymeds.org.          This list is accurate as of: 5/30/17  3:32 PM.  Always use your most recent med list.                   Brand Name Dispense Instructions for use    busPIRone 15 MG tablet    BUSPAR     Take 15 mg by mouth 2 times daily       capecitabine 500 MG tablet CHEMO    XELODA    112 tablet    Take 4 tablets (2,000 mg) by mouth 2 times daily for 14 days Take by mouth Twice daily for 14 days then 7 days off        levothyroxine 150 MCG tablet    SYNTHROID/LEVOTHROID     Take 150 mcg by mouth daily       LEXAPRO 20 MG tablet   Generic drug:  escitalopram      Take 20 mg by mouth daily.       loperamide 2 MG capsule    IMODIUM    30 capsule    Start with 2 caps (4 mg), then take one cap (2 mg) after each diarrheal stool as needed. Do not use more than 8 caps (16 mg) per day.       NORVASC 5 MG tablet   Generic drug:  amLODIPine      Take 5 mg by mouth daily       OMEGA-3 FISH OIL PO          ondansetron 8 MG tablet    ZOFRAN    10 tablet    Take 1 tablet (8 mg) by mouth every 8 hours as needed (Nausea/Vomiting)       senna-docusate 8.6-50 MG per tablet    SENNA S    60 tablet    1-2 tabs daily for constipation       tetrahydrozoline 0.05 % ophthalmic solution      1 drop 3 times daily       TOPAMAX 50 MG tablet   Generic drug:  topiramate      Take  by mouth 2 times daily. 1 tab q am 2 tabs q PM       triamterene-hydrochlorothiazide 37.5-25 MG per tablet    MAXZIDE-25     Take 1 tablet by mouth daily.       UNABLE TO FIND      Reported on 5/8/2017       VITAMIN D (CHOLECALCIFEROL) PO      Take 2,000 Units by mouth daily Reported on 5/8/2017       warfarin 5 MG tablet    COUMADIN    60 tablet    Take 1 tablet (5 mg) by mouth daily

## 2017-05-30 NOTE — PROGRESS NOTES
ANTICOAGULATION FOLLOW-UP CLINIC VISIT    Patient Name:  Damari Bryant  Date:  5/30/2017  Contact Type:  Face to Face at chemo infusion in lower level room 9. Spoke with , Ross.     SUBJECTIVE:     Patient Findings     Positives Change in medications    Comments Pt will start taking Xeraldo chemo agent next week. Date unknown at this time. It can increase INR.           OBJECTIVE    INR Point of Care   Date Value Ref Range Status   05/30/2017 3.4 (H) 0.86 - 1.14 Final     Comment:     This test is intended for monitoring Coumadin therapy.  Results are not   accurate   in patients with prolonged INR due to factor deficiency.         ASSESSMENT / PLAN  INR assessment SUPRA    Recheck INR In: 1 WEEK    INR Location Outside lab      Anticoagulation Summary as of 5/30/2017     INR goal 2.0-3.0   Today's INR 3.4!   Maintenance plan 7.5 mg (5 mg x 1.5) on Wed; 5 mg (5 mg x 1) all other days   Full instructions 5/30: Hold; 5/31: 5 mg; 6/2: 2.5 mg; 6/4: 2.5 mg; 6/6: 2.5 mg; 6/7: 5 mg; 6/9: 2.5 mg; Otherwise 7.5 mg on Wed; 5 mg all other days   Weekly total 37.5 mg   Plan last modified Mary Berry RN (3/23/2017)   Next INR check 6/6/2017   Priority INR   Target end date 8/14/2017    Indications   Deep vein thrombosis (DVT) (H) [I82.409] [I82.409]  Long-term (current) use of anticoagulants [Z79.01] [Z79.01]         Anticoagulation Episode Summary     INR check location     Preferred lab [facility]    Send INR reminders to [facility] INR    Comments Chemo infusion pump for 48 hours every 2 weeks.  Her INR rises during this infusion. Most recent schd infusion was  4/4/17, next one is 4/18/17   Ross's cell phone for INR results [phone]. Martin rodriguez  Per Esther Greene MD- Goal of 2-3.      Anticoagulation Care Providers     Provider Role Specialty Phone number    Esther Greene MD Responsible Hematology [phone]            See the Encounter Report to view Anticoagulation Flowsheet and  Dosing Calendar (Go to Encounters tab in chart review, and find the Anticoagulation Therapy Visit)      Paulina Shaver RN

## 2017-05-30 NOTE — PROGRESS NOTES
Oncology Nutrition:  Reason for Contact:  Met with Damari and her spouse, Ross today d/t request to follow up with dietitian on the Oncology Distress Screening tool.  RD is familiar with patient from January.    Damari reports that her primary barrier to eating at this time is lack of taste.  Due to lack of taste, her intake has declined over the past 4 months, thus, losing ~ 10 lbs.    She reports that her appetite tends to be poor for 3-4 days post chemo and then will return for 1 week before her next chemo treatment.  She has been diligent with her water, soda salt rinses.     Her  has been preparing all of her meals and will prepare whatever she likes.  He is very accommodating to her needs and requests.  She will often request a meal and then will be unable to eat it once it's prepared.      Per diet recall, she has been able to eat more savory and salty foods such as Spam, potato salad, fried chicken, anat slaw, hot dogs.    Interventions:  Provided pt with tips sheet for 'Coping with Taste Changes'  Provided pt with savory recipes for batch cooking.     RD available for further nutrition questions and concerns.      Tejal Guzman RD, LD  Oncology Dietitian  [phone]  Pager: [phone]

## 2017-05-30 NOTE — MR AVS SNAPSHOT
After Visit Summary   5/30/2017    Damari Bryant    MRN: [id number]           Patient Information     Date Of Birth          1945        Visit Information        Provider Department      5/30/2017 8:15 AM NURSE ONLY CANCER CENTER [facility]        Today's Diagnoses     Metastasis from colon cancer (H)    -  1       Follow-ups after your visit        Your next 10 appointments already scheduled     May 30, 2017  9:00 AM CDT   Return Visit with Esther Greene MD   [facility] ([facility])    [address] [facility] [address]   [phone]            May 30, 2017 11:00 AM CDT   Level 3 with BAY 7 INFUSION   [facility] ([facility])    [address] [facility] [address]   [phone]            Jun 13, 2017  7:45 AM CDT   Return Visit with NURSE ONLY CANCER CENTER   [facility])    [address] [facility] [address]   [phone]            Jun 13, 2017  8:15 AM CDT   Return Visit with SANJEEV Camarillo CNP   [facility])    [address] [facility] [address]   [phone]            Jun 13, 2017  9:15 AM CDT   Level 3 with BAY 7 INFUSION   [facility])    [address] [facility] [address]   [phone]            Jun 27, 2017  7:45 AM CDT   Return Visit with NURSE ONLY CANCER CENTER   [facility])    [address] [facility] [address]   [phone]            Jun 27, 2017  8:30 AM CDT   Return Visit with Esther Greene MD   [facility]    [address] [facility] [address]   [phone]            Jun 27, 2017  9:00 AM CDT   Level 3 with BAY 6 INFUSION   [facility] ([facility])    [address] [place] [address] [phone]              Who to contact     If you have questions or need follow up information about today's clinic visit or your schedule please contact [facility] directly at [phone].  Normal or non-critical lab and imaging results will be communicated to you by MyChart, letter or phone within 4 business days after the clinic has received the results. If you do not hear from us within 7 days, please contact the clinic through MyChart or phone. If you have a critical or abnormal lab result, we will notify you by phone as soon as possible.  Submit refill requests through [employer] or call your pharmacy and they will forward the refill request to us. Please allow 3 business days for your refill to be completed.          Additional Information About Your Visit        [employer] Information     [employer] is an electronic gateway that provides easy, online access to your medical records. With [employer], you can request a clinic appointment, read your test results, renew a prescription or communicate with your care team.     To sign up for [employer] visit the website at [email or web address]   You will be asked to enter the access code listed below, as well as some personal information. Please follow the directions to create your username and password.     Your access code is: [email or web address]  Expires: 2017  8:08 AM     Your access code will  in 90 days. If you need help or a new code, please contact your [facility] or call [phone] for assistance.        Care EveryWhere ID     This is your Care EveryWhere ID. This could be used by other organizations to access your [place] medical records  [id number]         Blood Pressure from Last 3 Encounters:   17 143/88   17 116/81   17 146/87    Weight from Last 3 Encounters:   17 95.3 kg (210 lb)    05/08/17 97.5 kg (215 lb)   05/02/17 97.5 kg (214 lb 14.4 oz)              We Performed the Following     Bilirubin  total     CBC with platelets differential     Protein qualitative urine        Primary Care Provider Office Phone # Fax #    Samuel Cobian [phone]       [facility] [address] [facility] [place] [address] [address]        Thank you!     Thank you for choosing [facility]  for your care. Our goal is always to provide you with excellent care. Hearing back from our patients is one way we can continue to improve our services. Please take a few minutes to complete the written survey that you may receive in the mail after your visit with us. Thank you!             Your Updated Medication List - Protect others around you: Learn how to safely use, store and throw away your medicines at www.disposemymeds.org.          This list is accurate as of: 5/30/17  8:42 AM.  Always use your most recent med list.                   Brand Name Dispense Instructions for use    busPIRone 15 MG tablet    BUSPAR     Take 15 mg by mouth 2 times daily       levothyroxine 150 MCG tablet    SYNTHROID/LEVOTHROID     Take 150 mcg by mouth daily       LEXAPRO 20 MG tablet   Generic drug:  escitalopram      Take 20 mg by mouth daily.       loperamide 2 MG capsule    IMODIUM    30 capsule    Start with 2 caps (4 mg), then take one cap (2 mg) after each diarrheal stool as needed. Do not use more than 8 caps (16 mg) per day.       NORVASC 5 MG tablet   Generic drug:  amLODIPine      Take 5 mg by mouth daily       OMEGA-3 FISH OIL PO          ondansetron 8 MG tablet    ZOFRAN    10 tablet    Take 1 tablet (8 mg) by mouth every 8 hours as needed (Nausea/Vomiting)       prochlorperazine 10 MG tablet    COMPAZINE    30 tablet    Take 1 tablet (10 mg) by mouth every 6 hours as needed (Nausea/Vomiting)       senna-docusate 8.6-50 MG per tablet    SENNA S    60 tablet    1-2 tabs daily for constipation        tetrahydrozoline 0.05 % ophthalmic solution      1 drop 3 times daily       TOPAMAX 50 MG tablet   Generic drug:  topiramate      Take  by mouth 2 times daily. 1 tab q am 2 tabs q PM       triamterene-hydrochlorothiazide 37.5-25 MG per tablet    MAXZIDE-25     Take 1 tablet by mouth daily.       UNABLE TO FIND      Reported on 5/8/2017       VITAMIN D (CHOLECALCIFEROL) PO      Take 2,000 Units by mouth daily Reported on 5/8/2017       warfarin 5 MG tablet    COUMADIN    60 tablet    Take 1 tablet (5 mg) by mouth daily

## 2017-05-30 NOTE — NURSING NOTE
"Oncology Rooming Note    May 30, 2017 9:00 AM   Damari Bryant is a 72 year old female who presents for:    Chief Complaint   Patient presents with     Oncology Clinic Visit     prior to treatment visit     Initial Vitals: /88  Pulse 90  Temp 98.4  F (36.9  C) (Oral)  Resp 20  Ht 1.676 m (5' 5.98\")  Wt 94.4 kg (208 lb 3.2 oz)  SpO2 97%  BMI 33.62 kg/m2 Estimated body mass index is 33.62 kg/(m^2) as calculated from the following:    Height as of this encounter: 1.676 m (5' 5.98\").    Weight as of this encounter: 94.4 kg (208 lb 3.2 oz). Body surface area is 2.1 meters squared.  No Pain (0) Comment: Data Unavailable   No LMP recorded. Patient is postmenopausal.  Allergies reviewed: Yes  Medications reviewed: Yes    Medications: Medication refills not needed today.  Pharmacy name entered into [employer]:    [id number] - [place], [place] - [address] [facility] [address] [facility] [address] [place], [place] - [address] [facility] [place], [place] - [address] [facility]    Clinical concerns: patient reports having heartburn symptoms Saturday night and Sunday night, took some Rolaids and that helped alleviate symptoms.  No heartburn symptoms since that time.  Dr. Greene notified.    12 minutes for nursing intake (face to face time)     Evans Archer RN    "

## 2017-05-30 NOTE — PROGRESS NOTES
Infusion Nursing Note:  Damari Bryant presents today for K+ replacement.    Patient seen by provider today: Yes: MD   present during visit today: Not Applicable.    Note: N/A.    Intravenous Access:  Implanted Port.    Treatment Conditions:  Not Applicable.      Post Infusion Assessment:  Patient tolerated infusion without incident.    Discharge Plan:   RTC per MD dispo.    CRISTINA ESTRADA RN

## 2017-05-30 NOTE — MR AVS SNAPSHOT
Damari Bryant   5/30/2017   Anticoagulation Therapy Visit    Description:  72 year old female   Provider:  Samuel Cobian   Department:  Mg Med Monitoring           INR as of 5/30/2017     Today's INR 3.4!      Anticoagulation Summary as of 5/30/2017     INR goal 2.0-3.0   Today's INR 3.4!   Full instructions 5/30: Hold; 5/31: 5 mg; 6/2: 2.5 mg; 6/6: 2.5 mg; 6/7: 5 mg; 6/9: 2.5 mg; Otherwise 7.5 mg on Wed; 5 mg all other days   Next INR check 6/6/2017    Indications   Deep vein thrombosis (DVT) (H) [I82.409] [I82.409]  Long-term (current) use of anticoagulants [Z79.01] [Z79.01]         Contact Numbers     [facility]  Please call the anticoagulation nurse at [phone] to cancel and/or reschedule your appointment, or with any problems or questions regarding your therapy.  You may call the main clinic number at [phone] if the nurse is not available.           May 2017 Details    Sun Mon Tue Wed Thu Fri Sat      1               2               3               4               5               6                 7               8               9               10               11               12               13                 14               15               16               17               18               19               20                 21               22               23               24               25               26               27                 28               29               30      Hold   See details      31      5 mg             Date Details   05/30 This INR check               How to take your warfarin dose     To take:  5 mg Take 1 of the 5 mg tablets.    Hold Do not take your warfarin dose. See the Details table to the right for additional instructions.                June 2017 Details    Sun Mon Tue Wed Thu Fri Sat         1      5 mg         2      2.5 mg         3      5 mg           4      5 mg         5      5 mg         6            7               8               9                10                 11               12               13               14               15               16               17                 18               19               20               21               22               23               24                 25               26               27               28               29               30                 Date Details   No additional details    Date of next INR:  6/6/2017         How to take your warfarin dose     To take:  2.5 mg Take 0.5 of a 5 mg tablet.    To take:  5 mg Take 1 of the 5 mg tablets.

## 2017-05-30 NOTE — PROGRESS NOTES
Chemotherapy Education Notes    Met with patient, spouse, and daughter in clinic for chemotherapy education on Avastin. ChemoCare handout dated 05/30/17 was discussed and given to patient to take home.  Noted patient has received Avastin education in the past, and has already been receiving Avastin infusions since 01/03/17.  Reviewed and reiterated the following information to patient and family members:    Treatment Goal/Regimen/Duration: rationale for strict adherence, specific medication names including pre-treatment medications, medication delivery methods; chemotherapy side effects and management of side effects, including: weakness, abdominal pain, headache, anemia, neutropenia, thrombocytopenia, diarrhea/constipation, nausea/vomiting, hair loss, memory changes, mouth sores, taste changes, appetite changes, weight loss, high blood pressure, fatigue, and myelosuppression.  Infection prevention, and monitoring of lab values, what lab tests and what changes of these values meant, along with the possibility of IV hydration or blood product transfusion, or the need to defer or hold treatment.  Also reviewed signs/symptoms that should be reported to care team or on-call provider immediately, including: temperature of 100.4 degrees or higher, shortness of breath, chest pain, unusual bruising, bleeding symptoms, extreme fatigue, >4-6 episodes of diarrhea in 24 hours, uncontrolled nausea/vomiting, symptoms of dehydration (severe dry mouth/severe thirst, rapid heart beat, dizziness, dark or decreased urination, and lethargy), or symptoms of DVT (sudden onset redness, swelling, tenderness, and warmth of extremity).      General Chemotherapy Information, what to do if needing to miss a treatment, and when to call the provider.  Importance of Central line care (port-a-cath) or IV site care.  Patient already has a port-a-cath in place.    Written Information: Printout on possible chemotherapy side effects/ways to cope with  "side effects, \"When to Call the Doctor,\" and \"Self-Care Tips During Cancer Treatment.\"      No barriers to learning identified. Patient and family members verbalized understanding of all written and verbal information. All questions answered patient s satisfaction.  Learning barriers and method preference are documented in the patient education flowsheet.  Patient instructed to call with further questions or concerns.  Patient states understanding and is in agreement with this plan.       Face to Face Time with Patient: 15 minutes    Evans Archer RN, BSN, OCN  Care Coordinator  [facility]  [phone]  "

## 2017-05-30 NOTE — MR AVS SNAPSHOT
After Visit Summary   5/30/2017    Damari Bryant    MRN: [id number]           Patient Information     Date Of Birth          1945        Visit Information        Provider Department      5/30/2017 9:00 AM Esther Greene MD [facility]        Today's Diagnoses     Metastasis from colon cancer (H)    -  1    Generalized muscle weakness           Follow-ups after your visit        Additional Services     PHYSICAL THERAPY REFERRAL                 Your next 10 appointments already scheduled     Jun 13, 2017  7:45 AM CDT   Return Visit with NURSE ONLY CANCER CENTER   [facility])    [address] [place] [address]   [phone]            Jun 13, 2017  8:15 AM CDT   Return Visit with SANJEEV Camarillo CNP   [facility])    [address] [place] [address]   [phone]            Jun 13, 2017  9:15 AM CDT   Level 2 with BAY 7 INFUSION   [facility])    [address] [place] [address]   [phone]            Jun 23, 2017  2:30 PM CDT   Neuro Eval with Maxine Ballesteros PT   [facility] ([facility])    [address] [place] [address]   [phone]            Jul 03, 2017  8:30 AM CDT   Return Visit with NURSE ONLY CANCER CENTER   [facility] ([facility])    [address] [place] [address]   [phone]            Jul 03, 2017  8:45 AM CDT   Return Visit with SANJEEV Camarillo CNP   [facility])    [address] [facility] [address]   [phone]            Jul 03, 2017  9:30 AM CDT   Level 1 with BAY 10 INFUSION   [facility] ([facility])    [address] [address] [address]  "  [phone]              Who to contact     If you have questions or need follow up information about today's clinic visit or your schedule please contact [facility] directly at [phone].  Normal or non-critical lab and imaging results will be communicated to you by MyChart, letter or phone within 4 business days after the clinic has received the results. If you do not hear from us within 7 days, please contact the clinic through MyChart or phone. If you have a critical or abnormal lab result, we will notify you by phone as soon as possible.  Submit refill requests through [employer] or call your pharmacy and they will forward the refill request to us. Please allow 3 business days for your refill to be completed.          Additional Information About Your Visit        [employer] Information     [employer] is an electronic gateway that provides easy, online access to your medical records. With [employer], you can request a clinic appointment, read your test results, renew a prescription or communicate with your care team.     To sign up for [employer] visit the website at [email or web address]   You will be asked to enter the access code listed below, as well as some personal information. Please follow the directions to create your username and password.     Your access code is: [email or web address]  Expires: 2017  8:08 AM     Your access code will  in 90 days. If you need help or a new code, please contact your [facility] or call [phone] for assistance.        Care EveryWhere ID     This is your Care EveryWhere ID. This could be used by other organizations to access your [place] medical records  [id number]        Your Vitals Were     Pulse Temperature Respirations Height Pulse Oximetry BMI (Body Mass Index)    90 98.4  F (36.9  C) (Oral) 20 1.676 m (5' 5.98\") 97% 33.62 kg/m2       Blood Pressure from Last 3 Encounters:   17 139/88   17 143/88 "   05/16/17 116/81    Weight from Last 3 Encounters:   05/30/17 94.4 kg (208 lb 3.2 oz)   05/16/17 95.3 kg (210 lb)   05/08/17 97.5 kg (215 lb)              We Performed the Following     Comprehensive metabolic panel     PHYSICAL THERAPY REFERRAL          Today's Medication Changes          These changes are accurate as of: 5/30/17 11:59 PM.  If you have any questions, ask your nurse or doctor.               Start taking these medicines.        Dose/Directions    capecitabine 500 MG tablet CHEMO   Commonly known as:  XELODA   Used for:  Metastasis from colon cancer (H)   Started by:  Tara Long RPH        Dose:  1000 mg/m2   Take 4 tablets (2,000 mg) by mouth 2 times daily for 14 days Take by mouth Twice daily for 14 days then 7 days off   Quantity:  112 tablet   Refills:  0         Stop taking these medicines if you haven't already. Please contact your care team if you have questions.     prochlorperazine 10 MG tablet   Commonly known as:  COMPAZINE   Stopped by:  Esther Greene MD                Where to get your medicines      These medications were sent to [facility] - [place], [place] - [address]  [address], [facility] [address]     Phone:  [phone]     capecitabine 500 MG tablet CHEMO                Primary Care Provider Office Phone # Fax #    Samuel Cobian [phone]       [facility] [address] [facility] [address]        Thank you!     Thank you for choosing [facility]  for your care. Our goal is always to provide you with excellent care. Hearing back from our patients is one way we can continue to improve our services. Please take a few minutes to complete the written survey that you may receive in the mail after your visit with us. Thank you!             Your Updated Medication List - Protect others around you: Learn how to safely use, store and throw away your medicines at  www.disposemymeds.org.          This list is accurate as of: 5/30/17 11:59 PM.  Always use your most recent med list.                   Brand Name Dispense Instructions for use    busPIRone 15 MG tablet    BUSPAR     Take 15 mg by mouth 2 times daily       capecitabine 500 MG tablet CHEMO    XELODA    112 tablet    Take 4 tablets (2,000 mg) by mouth 2 times daily for 14 days Take by mouth Twice daily for 14 days then 7 days off       levothyroxine 150 MCG tablet    SYNTHROID/LEVOTHROID     Take 150 mcg by mouth daily       LEXAPRO 20 MG tablet   Generic drug:  escitalopram      Take 20 mg by mouth daily.       loperamide 2 MG capsule    IMODIUM    30 capsule    Start with 2 caps (4 mg), then take one cap (2 mg) after each diarrheal stool as needed. Do not use more than 8 caps (16 mg) per day.       NORVASC 5 MG tablet   Generic drug:  amLODIPine      Take 5 mg by mouth daily       OMEGA-3 FISH OIL PO          ondansetron 8 MG tablet    ZOFRAN    10 tablet    Take 1 tablet (8 mg) by mouth every 8 hours as needed (Nausea/Vomiting)       senna-docusate 8.6-50 MG per tablet    SENNA S    60 tablet    1-2 tabs daily for constipation       tetrahydrozoline 0.05 % ophthalmic solution      1 drop 3 times daily       TOPAMAX 50 MG tablet   Generic drug:  topiramate      Take  by mouth 2 times daily. 1 tab q am 2 tabs q PM       triamterene-hydrochlorothiazide 37.5-25 MG per tablet    MAXZIDE-25     Take 1 tablet by mouth daily.       UNABLE TO FIND      Reported on 5/8/2017       VITAMIN D (CHOLECALCIFEROL) PO      Take 2,000 Units by mouth daily Reported on 5/8/2017       warfarin 5 MG tablet    COUMADIN    60 tablet    Take 1 tablet (5 mg) by mouth daily

## 2017-05-30 NOTE — PROGRESS NOTES
ONCOLOGY DIAGNOSES:   1. July 2013: Diagnosed with stage IIIB, iF3T0gCL colon cancer. Final pathology showed a mildly differentiated colonic adenocarcinoma. Tumor extended through the muscularis propria into the pericolonic adipose tissue. Lymphovascular invasion present. Margins negative. Three out of 30 lymph nodes were positive, 0.5 cm in greatest dimension. Extracapsular extension identified. DNA mismatch repair enzymes were intact.   2. November 2016: Metastatic colorectal cancer to the lung confirmed by a lung biopsy.   3. 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 No extension of thrombus into the right upper extremity.      THERAPY TO DATE:   1. July 21, 2013: Sigmoid colectomy.   2. August 2013 to January 2014: FOLFOX x12 cycles.   3. February 2014 to November 2016: Observation.   4. January 2017 to Present: FOLFIRI/Avastin.  5. January 2017 to Present: Anticoagulation.        INTERVAL HISTORY:  Damari Bryant is a 72-year-old female initially diagnosed with stage IIB, pT3 N1 MX colon cancer in 2013 after presenting with worsening diarrhea.  In 11/2016 the patient was found to have metastatic disease to lung, confirmed by lung biopsy and was restarted on systemic therapy.  Presents to clinic for toxicity evaluation prior to next dose of FOLFIRI and Avastin.  On today's visit, the patient states her only complaint is weakness, appears that with each cycle she gets a little more week.  Her appetite has been down, is not eating as well and has lost a few pounds since her last visit.  Denies any pain.  No nausea and vomiting.  The patient has mouth sores but controlled with salt water rinses.  She has issues with shortness of breath, was seen by Pulmonary and this was thought to be more related to mild paradoxical vocal fold  motion, speech therapy was recommended, which patient did not follow up with.  No fevers, chills, chest pain, diarrhea.  Well  controlled with Imodium.  The remainder of comprehensive review of systems is negative.      SOCIAL HISTORY:   and comes in with her  and daughter today.  No current tobacco use.  Lives in [place].      PAST MEDICAL HISTORY:  Per my previous note updated for mild paradoxical vocal fold motion causing shortness of breath and hoarseness.      PHYSICAL EXAMINATION:   VITAL SIGNS:  Blood pressure 139/88, pulse 90, respirations 20, temperature 98.4, pulse ox 97% on room air, weight 208 pounds.   GENERAL:  Comfortable, in no acute distress, pleasant.   HEENT:  Atraumatic, normocephalic.  Pupils equal, round, reactive.  Sclerae anicteric.  Oropharynx moist membranes.  No lesions, ulcers or exudate.   NECK:  Supple, full range of motion.  Trachea midline.   HEART:  Regular rate and rhythm, normal S1, S2.   LUNGS:  Clear to auscultation.  No crackles or wheezes.  Normal respiratory effort.   GASTROINTESTINAL:  Positive bowel sounds, soft, and nontender.   EXTREMITIES:  No cyanosis, warm.   MUSCULOSKELETAL:  No point tenderness.   LYMPHATICS:  No cervical, supraclavicular or axillary nodes.   SKIN:  No petechiae or rashes.   NEUROLOGIC:  Alert and oriented.      LABORATORY DATA:  Sodium 134, potassium 2.7, creatinine 1.08, alkaline phosphatase 87, ALT 34, AST 27, WBC 4.5, hemoglobin 13.4, hematocrit 39.7, platelet 127.  ANC is 1.5.      IMAGING:  CT chest, abdomen and pelvis from 5/1/2017, improvement of several pulmonary nodules, some resolved, reflective of a good treatment response.  Status post partial colectomy, no recurrent mass near the anastomosis, no evidence of metastatic disease in the abdomen or pelvis.  Stable chronic findings as above.  Remainder of imaging per my note 04/2017.     PET scan from 12/30/2016 showed enlarging hypermetabolic pulmonary nodules bilaterally and hypermetabolic retroperitoneal lymph nodes, although similar size compared to prior CT. Diffuse FTG was obtained of the thyroid  gland consistent with nonspecific thyroiditis.   CT angiogram from 2/14/2017 showed no CT evidence for pulmonary emboli. Bilateral pulmonary nodules are either stable or decreased in size. There are also at least 3 pulmonary nodules which have resolved. This could be due to response to treatment for metastatic disease versus resolving infectious/inflammatory process.      ASSESSMENT AND PLAN:   1.  Metastatic colorectal cancer.  Overall, patient is tolerating FOLFIRI Avastin and has shown a very nice response.  At this point we will consider switching over to maintenance with capecitabine and Avastin.  We reviewed the potential risks, side effects and toxicities including but not limited to alopecia, nausea, vomiting, bone marrow suppression, increases for infection, bleeding, allergic reactions, organ dysfunction, hand-foot syndrome stomatitis, diarrhea and death.  The patient has verbalized understanding and would like to proceed.  Plan will be on 2 weeks on, 1 week off with Avastin every 3 weeks.  The patient will be given educational material by our pharmacy team.  The patient to start as soon as she can obtain drug and then return to clinic to see NP prior to cycle 2.   2.  Right internal jugular, innominate deep vein thrombosis.  Continue Coumadin.  Consider repeat imaging in the future.   3.  Diarrhea.  Controlled with Imodium.  Will need to carefully monitor since this may worsen with capecitabine.   4.  Neuropathy.  Does not affect her activities of daily living.   5.  Paradoxical vocal cord motion.  Shortness of breath.  ENT recommended the patient meet with speech therapy, which she has not yet done.  Again, recommended the patient to schedule this appointment.   6.  Weakness.  Physical therapy referral.   7.  Hypokalemia.  Replacement today.         SUSANA MENARD MD        Addendum: Received message from Pharmacy team regarding inability to find financial assistance. If patient unable to afford  capecitabine, will switch to maintenance  infusional 5-FU/LV     D: 2017 10:41   T: 2017 12:52   MT: TEOFILO#[id number]      Name:     MARY SHI   MRN:      [id number]        Account:      [id number]   :      1945           Visit Date:   2017      Document: [id number]       cc: Samuel Cobian MD

## 2017-06-02 ENCOUNTER — TELEPHONE (OUTPATIENT)
Dept: PHARMACY | Facility: CLINIC | Age: 72
End: 2017-06-02

## 2017-06-02 NOTE — TELEPHONE ENCOUNTER
Copay for capecitabine is over $300.  Spoke with pt's  and received authorization to look for financial assistance.  However, I have not been able to locate any assistance at this time.  All foundations for colon cancer are fully allocated.  I will look one more time today to find any additional support and then will call pt to inform.      Muriel Conway, Paramjit  Pharmacy Liaison/Coordinator  [facility]

## 2017-06-06 ENCOUNTER — TELEPHONE (OUTPATIENT)
Dept: PHARMACY | Facility: CLINIC | Age: 72
End: 2017-06-06

## 2017-06-06 NOTE — TELEPHONE ENCOUNTER
Spoke with pt .  They are comfortable paying the $305 copay for Xeloda for Cycle 1.  According to insurance, deductible should then be met and subsequent cycles should be covered without copay.  I will manage next cycle to insure this is correct.    Muriel Conway, [facility]  Pharmacy Liaison/Coordinator  [facility]

## 2017-06-07 ENCOUNTER — TRANSFERRED RECORDS (OUTPATIENT)
Dept: HEALTH INFORMATION MANAGEMENT | Facility: CLINIC | Age: 72
End: 2017-06-07

## 2017-06-07 LAB — INR PPP: 2

## 2017-06-08 ENCOUNTER — ANTICOAGULATION THERAPY VISIT (OUTPATIENT)
Dept: PHARMACY | Facility: CLINIC | Age: 72
End: 2017-06-08
Payer: COMMERCIAL

## 2017-06-08 ENCOUNTER — TELEPHONE (OUTPATIENT)
Dept: PHARMACY | Facility: CLINIC | Age: 72
End: 2017-06-08

## 2017-06-08 DIAGNOSIS — I82.90 ACUTE DEEP VEIN THROMBOSIS (DVT) OF NON-EXTREMITY VEIN: ICD-10-CM

## 2017-06-08 DIAGNOSIS — Z79.01 LONG-TERM (CURRENT) USE OF ANTICOAGULANTS: ICD-10-CM

## 2017-06-08 PROCEDURE — 99207 ZZC NO CHARGE NURSE ONLY: CPT | Performed by: INTERNAL MEDICINE

## 2017-06-08 NOTE — MR AVS SNAPSHOT
Damari Bryant   6/8/2017   Anticoagulation Therapy Visit    Description:  72 year old female   Provider:  Samuel Cobian   Department:  [facility]           INR as of 6/8/2017     Today's INR 2.0 (6/7/2017)      Anticoagulation Summary as of 6/8/2017     INR goal 2.0-3.0   Today's INR 2.0 (6/7/2017)   Full instructions 6/9: 2.5 mg; Otherwise 7.5 mg on Wed; 5 mg all other days   Next INR check 6/13/2017    Indications   Deep vein thrombosis (DVT) (H) [I82.409] [I82.409]  Long-term (current) use of anticoagulants [Z79.01] [Z79.01]         Contact Numbers     [facility]  Please call the anticoagulation nurse at [phone] to cancel and/or reschedule your appointment, or with any problems or questions regarding your therapy.  You may call the main clinic number at [phone] if the nurse is not available.           June 2017 Details    Sun Mon Tue Wed Thu Fri Sat         1               2               3                 4               5               6               7               8      5 mg   See details      9      2.5 mg         10      5 mg           11      5 mg         12      5 mg         13            14               15               16               17                 18               19               20               21               22               23               24                 25               26               27               28               29               30                 Date Details   06/08 This INR check       Date of next INR:  6/13/2017         How to take your warfarin dose     To take:  2.5 mg Take 0.5 of a 5 mg tablet.    To take:  5 mg Take 1 of the 5 mg tablets.

## 2017-06-08 NOTE — TELEPHONE ENCOUNTER
Ross called back, INR done at [facility].  Please see anticoagulation encounter.     Nina Silva RN, [facility]

## 2017-06-08 NOTE — TELEPHONE ENCOUNTER
Patient's , Ross called.  He states on the voicemail, they are looking for dosing this week.      Do not see INR was done, called Ross back, left message with phone number.    Nina Silva RN, [facility]

## 2017-06-13 ENCOUNTER — ONCOLOGY VISIT (OUTPATIENT)
Dept: ONCOLOGY | Facility: CLINIC | Age: 72
End: 2017-06-13
Payer: COMMERCIAL

## 2017-06-13 ENCOUNTER — INFUSION THERAPY VISIT (OUTPATIENT)
Dept: INFUSION THERAPY | Facility: CLINIC | Age: 72
End: 2017-06-13
Payer: COMMERCIAL

## 2017-06-13 ENCOUNTER — ANTICOAGULATION THERAPY VISIT (OUTPATIENT)
Dept: PHARMACY | Facility: CLINIC | Age: 72
End: 2017-06-13
Payer: COMMERCIAL

## 2017-06-13 VITALS
WEIGHT: 205.5 LBS | SYSTOLIC BLOOD PRESSURE: 136 MMHG | RESPIRATION RATE: 18 BRPM | DIASTOLIC BLOOD PRESSURE: 84 MMHG | BODY MASS INDEX: 33.03 KG/M2 | HEIGHT: 66 IN | OXYGEN SATURATION: 95 % | TEMPERATURE: 98.4 F | HEART RATE: 101 BPM

## 2017-06-13 DIAGNOSIS — C18.9 METASTASIS FROM COLON CANCER (H): Primary | ICD-10-CM

## 2017-06-13 DIAGNOSIS — I82.90 DEEP VEIN THROMBOSIS (DVT) OF NON-EXTREMITY VEIN, UNSPECIFIED CHRONICITY: ICD-10-CM

## 2017-06-13 DIAGNOSIS — R82.90 NONSPECIFIC FINDING ON EXAMINATION OF URINE: ICD-10-CM

## 2017-06-13 DIAGNOSIS — E87.6 HYPOKALEMIA: ICD-10-CM

## 2017-06-13 DIAGNOSIS — R19.5 LOOSE STOOLS: ICD-10-CM

## 2017-06-13 DIAGNOSIS — Z79.01 LONG-TERM (CURRENT) USE OF ANTICOAGULANTS: ICD-10-CM

## 2017-06-13 DIAGNOSIS — C79.9 METASTASIS FROM COLON CANCER (H): Primary | ICD-10-CM

## 2017-06-13 DIAGNOSIS — M62.81 GENERALIZED MUSCLE WEAKNESS: ICD-10-CM

## 2017-06-13 DIAGNOSIS — R32 UNSPECIFIED URINARY INCONTINENCE: ICD-10-CM

## 2017-06-13 DIAGNOSIS — Z79.01 LONG TERM CURRENT USE OF ANTICOAGULANT THERAPY: ICD-10-CM

## 2017-06-13 DIAGNOSIS — F41.9 ANXIETY: ICD-10-CM

## 2017-06-13 DIAGNOSIS — C18.9 METASTASIS FROM COLON CANCER (H): ICD-10-CM

## 2017-06-13 DIAGNOSIS — I82.90 ACUTE DEEP VEIN THROMBOSIS (DVT) OF NON-EXTREMITY VEIN: ICD-10-CM

## 2017-06-13 DIAGNOSIS — R32 URINARY INCONTINENCE, UNSPECIFIED TYPE: Primary | ICD-10-CM

## 2017-06-13 DIAGNOSIS — C79.9 METASTASIS FROM COLON CANCER (H): ICD-10-CM

## 2017-06-13 LAB
ALBUMIN UR-MCNC: 10 MG/DL
ALBUMIN UR-MCNC: 10 MG/DL
ALBUMIN UR-MCNC: ABNORMAL MG/DL
AMORPH CRY #/AREA URNS HPF: ABNORMAL /HPF
APPEARANCE UR: ABNORMAL
APPEARANCE UR: ABNORMAL
BACTERIA #/AREA URNS HPF: ABNORMAL /HPF
BASOPHILS # BLD AUTO: 0.1 10E9/L (ref 0–0.2)
BASOPHILS NFR BLD AUTO: 0.8 %
BILIRUB UR QL STRIP: ABNORMAL
BILIRUB UR QL STRIP: NEGATIVE
CEA SERPL-MCNC: 2.7 UG/L (ref 0–2.5)
COLOR UR AUTO: ABNORMAL
COLOR UR AUTO: YELLOW
CREAT SERPL-MCNC: 0.99 MG/DL (ref 0.52–1.04)
DIFFERENTIAL METHOD BLD: ABNORMAL
EOSINOPHIL # BLD AUTO: 0.1 10E9/L (ref 0–0.7)
EOSINOPHIL NFR BLD AUTO: 1.9 %
ERYTHROCYTE [DISTWIDTH] IN BLOOD BY AUTOMATED COUNT: 17 % (ref 10–15)
GFR SERPL CREATININE-BSD FRML MDRD: 55 ML/MIN/1.7M2
GLUCOSE UR STRIP-MCNC: ABNORMAL MG/DL
GLUCOSE UR STRIP-MCNC: NEGATIVE MG/DL
HCT VFR BLD AUTO: 43 % (ref 35–47)
HGB BLD-MCNC: 14.3 G/DL (ref 11.7–15.7)
HGB UR QL STRIP: ABNORMAL
HGB UR QL STRIP: ABNORMAL
HYALINE CASTS #/AREA URNS LPF: ABNORMAL /LPF (ref 0–2)
INR BLD: 1.3 (ref 0.86–1.14)
KETONES UR STRIP-MCNC: ABNORMAL MG/DL
KETONES UR STRIP-MCNC: NEGATIVE MG/DL
LEUKOCYTE ESTERASE UR QL STRIP: ABNORMAL
LEUKOCYTE ESTERASE UR QL STRIP: ABNORMAL
LYMPHOCYTES # BLD AUTO: 2.7 10E9/L (ref 0.8–5.3)
LYMPHOCYTES NFR BLD AUTO: 43.2 %
MCH RBC QN AUTO: 31.4 PG (ref 26.5–33)
MCHC RBC AUTO-ENTMCNC: 33.3 G/DL (ref 31.5–36.5)
MCV RBC AUTO: 95 FL (ref 78–100)
MONOCYTES # BLD AUTO: 0.6 10E9/L (ref 0–1.3)
MONOCYTES NFR BLD AUTO: 9.7 %
NEUTROPHILS # BLD AUTO: 2.8 10E9/L (ref 1.6–8.3)
NEUTROPHILS NFR BLD AUTO: 44.4 %
NITRATE UR QL: ABNORMAL
NITRATE UR QL: NEGATIVE
NON-SQ EPI CELLS #/AREA URNS LPF: ABNORMAL /LPF
PH UR STRIP: 5 PH (ref 5–7)
PH UR STRIP: ABNORMAL PH (ref 5–7)
PLATELET # BLD AUTO: 144 10E9/L (ref 150–450)
POTASSIUM SERPL-SCNC: 3.1 MMOL/L (ref 3.4–5.3)
RBC # BLD AUTO: 4.55 10E12/L (ref 3.8–5.2)
RBC #/AREA URNS AUTO: ABNORMAL /HPF (ref 0–2)
SP GR UR STRIP: 1.01 (ref 1–1.03)
SP GR UR STRIP: ABNORMAL (ref 1–1.03)
URN SPEC COLLECT METH UR: ABNORMAL
URN SPEC COLLECT METH UR: ABNORMAL
UROBILINOGEN UR STRIP-MCNC: ABNORMAL MG/DL (ref 0–2)
UROBILINOGEN UR STRIP-MCNC: NORMAL MG/DL (ref 0–2)
WBC # BLD AUTO: 6.3 10E9/L (ref 4–11)
WBC #/AREA URNS AUTO: ABNORMAL /HPF (ref 0–2)

## 2017-06-13 PROCEDURE — 82378 CARCINOEMBRYONIC ANTIGEN: CPT | Performed by: INTERNAL MEDICINE

## 2017-06-13 PROCEDURE — 82565 ASSAY OF CREATININE: CPT | Performed by: INTERNAL MEDICINE

## 2017-06-13 PROCEDURE — 99207 ZZC NO CHARGE LOS: CPT

## 2017-06-13 PROCEDURE — 81001 URINALYSIS AUTO W/SCOPE: CPT | Performed by: NURSE PRACTITIONER

## 2017-06-13 PROCEDURE — 96413 CHEMO IV INFUSION 1 HR: CPT | Performed by: INTERNAL MEDICINE

## 2017-06-13 PROCEDURE — 99207 ZZC NO CHARGE NURSE ONLY: CPT

## 2017-06-13 PROCEDURE — 87086 URINE CULTURE/COLONY COUNT: CPT | Performed by: NURSE PRACTITIONER

## 2017-06-13 PROCEDURE — 84132 ASSAY OF SERUM POTASSIUM: CPT | Performed by: NURSE PRACTITIONER

## 2017-06-13 PROCEDURE — 99207 ZZC NO CHARGE NURSE ONLY: CPT | Performed by: FAMILY MEDICINE

## 2017-06-13 PROCEDURE — 85025 COMPLETE CBC W/AUTO DIFF WBC: CPT | Performed by: INTERNAL MEDICINE

## 2017-06-13 PROCEDURE — 85610 PROTHROMBIN TIME: CPT | Mod: QW | Performed by: INTERNAL MEDICINE

## 2017-06-13 PROCEDURE — 99214 OFFICE O/P EST MOD 30 MIN: CPT | Mod: 25 | Performed by: NURSE PRACTITIONER

## 2017-06-13 RX ORDER — HEPARIN SODIUM (PORCINE) LOCK FLUSH IV SOLN 100 UNIT/ML 100 UNIT/ML
5 SOLUTION INTRAVENOUS
Status: DISCONTINUED | OUTPATIENT
Start: 2017-06-13 | End: 2017-06-13 | Stop reason: HOSPADM

## 2017-06-13 RX ORDER — POTASSIUM CHLORIDE 1500 MG/1
TABLET, EXTENDED RELEASE ORAL
Qty: 3 TABLET | Refills: 0 | Status: SHIPPED | OUTPATIENT
Start: 2017-06-13

## 2017-06-13 RX ORDER — CIPROFLOXACIN 250 MG/1
250 TABLET, FILM COATED ORAL 2 TIMES DAILY
Qty: 6 TABLET | Refills: 0 | Status: SHIPPED | OUTPATIENT
Start: 2017-06-13 | End: 2017-06-16

## 2017-06-13 RX ADMIN — Medication 250 ML: at 09:07

## 2017-06-13 RX ADMIN — HEPARIN SODIUM (PORCINE) LOCK FLUSH IV SOLN 100 UNIT/ML 5 ML: 100 SOLUTION at 10:30

## 2017-06-13 RX ADMIN — HEPARIN SODIUM (PORCINE) LOCK FLUSH IV SOLN 100 UNIT/ML 5 ML: 100 SOLUTION at 08:07

## 2017-06-13 ASSESSMENT — PAIN SCALES - GENERAL: PAINLEVEL: NO PAIN (0)

## 2017-06-13 NOTE — PROGRESS NOTES
Oncology Follow Up Visit: June 13, 2017      Oncologist: Dr Esther Greene  PCP: Samuel Cobian    Diagnosis: Mestastatic colon cancer  Damari Bryant is a 71 yo  female diagnosed July 2013 with Stage IIIB pT3N1b MX colon cancer.  Final pathology showed a mildly differentiated colonic adenocarcinoma, tumor extended through the muscularis propria into the pericolonic adipose tissue, lymphovascular invasion present.  Margins negative.  Three out of 30 lymph nodes were positive with 0.5 cm in greatest dimension and extracapsular extension identified.  DNA mismatch repair enzymes were intact.  Two pulmonary nodules noted on staging CT, indeterminate.   Treatment:  7/21/2013.  Sigmoid colectomy.   8/2013 to 1/2014  Adjuvant FOLFOX x 12 cycles.   2/2014 to 12/2016 observation  1/3/2017 began FOLFIRI/Avastin with new site to lung    INTERVAL HISTORY:  Ms Bryant comes to clinic with , Ross and daughter for review prior to continuation of therapy to treat her colon cancer. Pt shares she has had urine leakage now for a couple mornings and after sitting for a period of time but without pain or burning- may be more frequent as well- this is new but no fevers. She also shares she has had had a decrease in appetite and is seeing some mild weight loss over last weeks though appetite was good over last 2 days. She is having no pain, nausea. She is seeing better control of the stools with daily use of the imodium in the morning and then prn through day. neuropathy is stable and not affecting function. She admits to depression but finding that daughter moving in has been helpful for her. She is sleeping well.   Remainder of her comprehensive review of systems is negative.   Current Outpatient Prescriptions   Medication     capecitabine (XELODA) 500 MG tablet CHEMO     ondansetron (ZOFRAN) 8 MG tablet     loperamide (IMODIUM) 2 MG capsule     warfarin (COUMADIN) 5 MG tablet     senna-docusate (SENNA S) 8.6-50 MG per  "tablet     Omega-3 Fatty Acids (OMEGA-3 FISH OIL PO)     tetrahydrozoline 0.05 % ophthalmic solution     UNABLE TO FIND     VITAMIN D, CHOLECALCIFEROL, PO     busPIRone (BUSPAR) 15 MG tablet     amLODIPine (NORVASC) 5 MG tablet     escitalopram (LEXAPRO) 20 MG tablet     levothyroxine (SYNTHROID, LEVOTHROID) 150 MCG tablet     topiramate (TOPAMAX) 50 MG tablet     triamterene-hydrochlorothiazide (MAXZIDE-25) 37.5-25 MG per tablet     No current facility-administered medications for this visit.         Allergies   Allergen Reactions     Ativan Other (See Comments)     hallucinations     Avocado Nausea and Vomiting     Hmg-Coa-R Inhibitors Nausea     Iodine-131 Nausea and Vomiting     Shellfish Allergy GI Disturbance     Sulfa Drugs Nausea and Vomiting     Tegaderm Transparent Dressing (Informational Only) Rash      FAMILY HISTORY:  Father had colon cancer at age 70. Brother had lung cancer who was a smoker.      PHYSICAL EXAMINATION:/84  Pulse 101  Temp 98.4  F (36.9  C) (Oral)  Resp 18  Ht 1.676 m (5' 6\")  Wt 93.2 kg (205 lb 8 oz)  SpO2 95%  BMI 33.17 kg/m2   Constitutional: Alert and in no pain. Cooperative. No odor of urine  ENT: Eyes bright, No mouth sores  Neck: Supple, No adenopathy.Thyroid symmetric   Right port site without redness or swelling  Cardiac: Heart rate and rhythm is regular and strong without murmur  Respiratory: Breathing easy. Lung sounds clear to auscultation- no cough  Abdomen: Soft, non-tender, BS normal.  No organomegaly  MS: Muscle tone normal- moving well. Extremities normal with no edema.   Skin: No suspicious lesions or rashes  Neuro: Sensory grossly WNL, gait normal.   Lymph: Normal ant/post cervical, axillary, supraclavicular nodes  Psych: Mentation appears normal and affect normal/bright with good eye contact and open about issues.     LABORATORY DATA:  Results for orders placed or performed in visit on 06/13/17   CBC with platelets differential   Result Value Ref Range "    WBC 6.3 4.0 - 11.0 10e9/L    RBC Count 4.55 3.8 - 5.2 10e12/L    Hemoglobin 14.3 11.7 - 15.7 g/dL    Hematocrit 43.0 35.0 - 47.0 %    MCV 95 78 - 100 fl    MCH 31.4 26.5 - 33.0 pg    MCHC 33.3 31.5 - 36.5 g/dL    RDW 17.0 (H) 10.0 - 15.0 %    Platelet Count 144 (L) 150 - 450 10e9/L    Diff Method Automated Method     % Neutrophils 44.4 %    % Lymphocytes 43.2 %    % Monocytes 9.7 %    % Eosinophils 1.9 %    % Basophils 0.8 %    Absolute Neutrophil 2.8 1.6 - 8.3 10e9/L    Absolute Lymphocytes 2.7 0.8 - 5.3 10e9/L    Absolute Monocytes 0.6 0.0 - 1.3 10e9/L    Absolute Eosinophils 0.1 0.0 - 0.7 10e9/L    Absolute Basophils 0.1 0.0 - 0.2 10e9/L   Creatinine   Result Value Ref Range    Creatinine 0.99 0.52 - 1.04 mg/dL    GFR Estimate 55 (L) >60 mL/min/1.7m2    GFR Estimate If Black 67 >60 mL/min/1.7m2   Protein qualitative urine   Result Value Ref Range    Protein Albumin Urine 10 (A) NEG mg/dL   INR point of care   Result Value Ref Range    INR Point of Care 1.3 (H) 0.86 - 1.14     K+ =3.1    ASSESSMENT AND PLAN:   Metastatic colon cancer- pt has been changed to more of a maintenance plan of capecitabine and avastin and appears to be tolerating well but is seeing slow weight loss with appetite changes, continued loose stools and new signs of urinary incontinence this check.   She does meet goals to continue with treatment  She will return for visit in 2 weeks due to high needs and changing side effects.   Hypokalemia with last infusion- K+=3.1 today- will replace per electrolyte replacement plan. Potassium order placed in each lab check for future infusions.   Loose stools- using imodium each morning to control with another dosing prn which is working well for her at this time.   Urinary incontinence- question of urinary tract infection but had problems with label and specimen- what was reviewed with suspicious for infection. Will order 3 day treatment of cipro 250 mg bid.   Weakness- Again suggested option of  physical therapy. Previously received in her home- refused today. Also did not attend speech therapy appt for voice changes- pt cancelled herself.    History of occlusive thrombus- pt using coumadin -monitored by coag clinic.  INR only 1.3 today- coag clinic to notify of directions.   Anxiety- pt using lexapro and buspar with stable results today. Pt did see Dr Rodriguez in recent past and was offered further visit. .     Total Time =30 min with >50% in education and coordination of cares.    Chrystal Morel,CNP

## 2017-06-13 NOTE — NURSING NOTE
"Oncology Rooming Note    June 13, 2017 8:21 AM   Damari Bryant is a 72 year old female who presents for:    Chief Complaint   Patient presents with     Oncology Clinic Visit     prior to treatment     Initial Vitals: /84  Pulse 101  Temp 98.4  F (36.9  C) (Oral)  Resp 18  Ht 1.676 m (5' 6\")  Wt 93.2 kg (205 lb 8 oz)  SpO2 95%  BMI 33.17 kg/m2 Estimated body mass index is 33.17 kg/(m^2) as calculated from the following:    Height as of this encounter: 1.676 m (5' 6\").    Weight as of this encounter: 93.2 kg (205 lb 8 oz). Body surface area is 2.08 meters squared.  No Pain (0) Comment: Data Unavailable   No LMP recorded. Patient is postmenopausal.  Allergies reviewed: Yes  Medications reviewed: Yes    Medications: MEDICATION REFILLS NEEDED TODAY. Provider was notified.  Pharmacy name entered into [employer] Check:    [id number] - [place], [place] - [address]  [facility] [address] [facility] [place], [place] [address] [facility]    Clinical concerns: Patient reports urinary incontinence on occasion for the past 2 weeks.  Seems to be getting worse.  No burning/pain with urination. Chrystal was notified.    10 minutes for nursing intake (face to face time)     Evans Archer RN    "

## 2017-06-13 NOTE — MR AVS SNAPSHOT
Damari Bryant   6/13/2017   Anticoagulation Therapy Visit    Description:  72 year old female   Provider:  Samuel Cobian   Department:  [facility]           INR as of 6/13/2017     Today's INR 1.3!      Anticoagulation Summary as of 6/13/2017     INR goal 2.0-3.0   Today's INR 1.3!   Full instructions 6/13: 10 mg; Otherwise 7.5 mg on Wed; 5 mg all other days   Next INR check 6/15/2017    Indications   Deep vein thrombosis (DVT) (H) [I82.409] [I82.409]  Long-term (current) use of anticoagulants [Z79.01] [Z79.01]         Contact Numbers     [facility]  Please call the anticoagulation nurse at [phone] to cancel and/or reschedule your appointment, or with any problems or questions regarding your therapy.  You may call the main clinic number at [phone] if the nurse is not available.           June 2017 Details    Sun Mon Tue Wed Thu Fri Sat         1               2               3                 4               5               6               7               8               9               10                 11               12               13      10 mg   See details      14      7.5 mg         15            16               17                 18               19               20               21               22               23               24                 25               26               27               28               29               30                 Date Details   06/13 This INR check       Date of next INR:  6/15/2017         How to take your warfarin dose     To take:  5 mg Take 1 of the 5 mg tablets.    To take:  7.5 mg Take 1.5 of the 5 mg tablets.    To take:  10 mg Take 2 of the 5 mg tablets.

## 2017-06-13 NOTE — PROGRESS NOTES
Infusion Nursing Note:  Damari Bryant presents today for D1C1 Avastin/Xeloda.    Patient seen by provider today: Yes: Chrystal oMrel NP   present during visit today: Not Applicable.    Note: Potassium 3.1. Discussed with Chrystal BABB. New order for Potassium Chloride 20 MEQ oral every 2 hours for 3 doses.    Intravenous Access:  Implanted Port.    Treatment Conditions:  Lab Results   Component Value Date    HGB 14.3 06/13/2017     Lab Results   Component Value Date    WBC 6.3 06/13/2017      Lab Results   Component Value Date    ANEU 2.8 06/13/2017     Lab Results   Component Value Date     06/13/2017      Lab Results   Component Value Date     05/30/2017                   Lab Results   Component Value Date    POTASSIUM 2.7 05/30/2017           Lab Results   Component Value Date    MAG 2.1 08/23/2013            Lab Results   Component Value Date    CR 0.99 06/13/2017                   Lab Results   Component Value Date    KADIE 9.4 05/30/2017                Lab Results   Component Value Date    BILITOTAL 0.4 05/30/2017    BILITOTAL 0.4 05/30/2017           Lab Results   Component Value Date    ALBUMIN 3.6 05/30/2017                    Lab Results   Component Value Date    ALT 34 05/30/2017           Lab Results   Component Value Date    AST 27 05/30/2017     Results reviewed, labs MET treatment parameters, ok to proceed with treatment.      Post Infusion Assessment:  Patient tolerated infusion without incident.  Blood return noted pre and post infusion.  Site patent and intact, free from redness, edema or discomfort.  Access discontinued per protocol.    Discharge Plan:   Prescription refills given for Xeloda.  Discharge instructions reviewed with: Patient and spouse.  Patient and/or family verbalized understanding of discharge instructions and all questions answered.  Patient will return 7/3/17 for next appointment.  Patient discharged in stable condition accompanied by: .  Departure  Mode: Ambulatory.    Trista Chaves RN

## 2017-06-13 NOTE — MR AVS SNAPSHOT
After Visit Summary   6/13/2017    Damari Bryant    MRN: [id number]           Patient Information     Date Of Birth          1945        Visit Information        Provider Department      6/13/2017 8:15 AM Chrystal Morel APRN CNP [facility]        Today's Diagnoses     Urinary incontinence, unspecified type    -  1    Metastasis from colon cancer (H)        Hypokalemia        Loose stools        Generalized muscle weakness        Deep vein thrombosis (DVT) of non-extremity vein, unspecified chronicity        Anxiety           Follow-ups after your visit        Your next 10 appointments already scheduled     Jul 03, 2017  8:30 AM CDT   Return Visit with [facility]URSE [facility]   [facility]    [address] [facility] [address]   [phone]            Jul 03, 2017  8:45 AM CDT   Return Visit with SANJEEV Camarillo CNP   [facility] ([facility])    [address] [facility] [address]   [phone]            Jul 03, 2017  9:30 AM CDT   Level 1 with [place] 10 [facility] ([facility])    [address] [facility] [address]   [phone]            Jul 25, 2017  8:45 AM CDT   Return Visit with NURSE ONLY CANCER CENTER   [facility] ([facility])    [address] [facility] [address]   [phone]            Jul 25, 2017  9:30 AM CDT   Return Visit with Esther Greene MD   [facility] ([facility])    [address] [facility] [address]   [phone]            Jul 25, 2017 10:00 AM CDT   Level 1 with BAY 6 INFUSION   [facility] ([facility])    [address] [facility] [address] [phone]              Who to contact     If you have questions or need follow up  "information about today's clinic visit or your schedule please contact [facility] directly at [phone].  Normal or non-critical lab and imaging results will be communicated to you by MusicAllhart, letter or phone within 4 business days after the clinic has received the results. If you do not hear from us within 7 days, please contact the clinic through MusicAllhart or phone. If you have a critical or abnormal lab result, we will notify you by phone as soon as possible.  Submit refill requests through [employer]cor[employer] or call your pharmacy and they will forward the refill request to us. Please allow 3 business days for your refill to be completed.          Additional Information About Your Visit        MusicAllharAppBrick Information     [employer] is an electronic gateway that provides easy, online access to your medical records. With [employer], you can request a clinic appointment, read your test results, renew a prescription or communicate with your care team.     To sign up for [employer] visit the website at [email or web address]   You will be asked to enter the access code listed below, as well as some personal information. Please follow the directions to create your username and password.     Your access code is: [email or web address]  Expires: 2017  8:08 AM     Your access code will  in 90 days. If you need help or a new code, please contact your [facility] or call [phone] for assistance.        Care EveryWhere ID     This is your Care EveryWhere ID. This could be used by other organizations to access your [place] medical records  [id number]        Your Vitals Were     Pulse Temperature Respirations Height Pulse Oximetry BMI (Body Mass Index)    101 98.4  F (36.9  C) (Oral) 18 1.676 m (5' 6\") 95% 33.17 kg/m2       Blood Pressure from Last 3 Encounters:   17 124/78   17 136/84   17 139/88    Weight from Last 3 Encounters:   17 91.6 kg (202 lb)   17 " 93.2 kg (205 lb 8 oz)   05/30/17 94.4 kg (208 lb 3.2 oz)              We Performed the Following     Potassium     UA reflex to Microscopic and Culture          Today's Medication Changes          These changes are accurate as of: 6/13/17 11:59 PM.  If you have any questions, ask your nurse or doctor.               Start taking these medicines.        Dose/Directions    ciprofloxacin 250 MG tablet   Commonly known as:  CIPRO   Used for:  Urinary incontinence, unspecified type   Started by:  Chrystal Morel APRN CNP        Dose:  250 mg   Take 1 tablet (250 mg) by mouth 2 times daily for 3 days   Quantity:  6 tablet   Refills:  0       Potassium Chloride ER 20 MEQ Tbcr   Used for:  Hypokalemia        Take 1 tablet every 2 hours   Quantity:  3 tablet   Refills:  0            Where to get your medicines      These medications were sent to [facility] [place], [place] - [address]  [address], [facility] [address]     Phone:  [phone]     Potassium Chloride ER 20 MEQ Tbcr         These medications were sent to [id number] - [place], [place] - [address]  [address] [facility] [address], [place] [address]     Phone:  [phone]     ciprofloxacin 250 MG tablet                Primary Care Provider Office Phone # Fax #    Samuel Cobian [phone]       [facility] [address] [facility] [address]        Thank you!     Thank you for choosing [facility]  for your care. Our goal is always to provide you with excellent care. Hearing back from our patients is one way we can continue to improve our services. Please take a few minutes to complete the written survey that you may receive in the mail after your visit with us. Thank you!             Your Updated Medication List - Protect others around you: Learn how to safely use, store and throw away your medicines at www.disposemymeds.org.          This list is  accurate as of: 6/13/17 11:59 PM.  Always use your most recent med list.                   Brand Name Dispense Instructions for use    busPIRone 15 MG tablet    BUSPAR     Take 15 mg by mouth 2 times daily       capecitabine 500 MG tablet CHEMO    XELODA    112 tablet    Take 4 tablets (2,000 mg) by mouth 2 times daily for 14 days Take by mouth Twice daily for 14 days then 7 days off       ciprofloxacin 250 MG tablet    CIPRO    6 tablet    Take 1 tablet (250 mg) by mouth 2 times daily for 3 days       levothyroxine 150 MCG tablet    SYNTHROID/LEVOTHROID     Take 150 mcg by mouth daily       LEXAPRO 20 MG tablet   Generic drug:  escitalopram      Take 20 mg by mouth daily.       loperamide 2 MG capsule    IMODIUM    30 capsule    Start with 2 caps (4 mg), then take one cap (2 mg) after each diarrheal stool as needed. Do not use more than 8 caps (16 mg) per day.       NORVASC 5 MG tablet   Generic drug:  amLODIPine      Take 5 mg by mouth daily       OMEGA-3 FISH OIL PO          ondansetron 8 MG tablet    ZOFRAN    10 tablet    Take 1 tablet (8 mg) by mouth every 8 hours as needed (Nausea/Vomiting)       Potassium Chloride ER 20 MEQ Tbcr     3 tablet    Take 1 tablet every 2 hours       senna-docusate 8.6-50 MG per tablet    SENNA S    60 tablet    1-2 tabs daily for constipation       tetrahydrozoline 0.05 % ophthalmic solution      1 drop 3 times daily       TOPAMAX 50 MG tablet   Generic drug:  topiramate      Take  by mouth 2 times daily. 1 tab q am 2 tabs q PM       triamterene-hydrochlorothiazide 37.5-25 MG per tablet    MAXZIDE-25     Take 1 tablet by mouth daily.       UNABLE TO FIND      Reported on 5/8/2017       VITAMIN D (CHOLECALCIFEROL) PO      Take 2,000 Units by mouth daily Reported on 5/8/2017       warfarin 5 MG tablet    COUMADIN    60 tablet    Take 1 tablet (5 mg) by mouth daily

## 2017-06-13 NOTE — MR AVS SNAPSHOT
After Visit Summary   6/13/2017    Damari Bryant    MRN: [id number]           Patient Information     Date Of Birth          1945        Visit Information        Provider Department      6/13/2017 9:15 AM BAY 7 INFUSION [facility]        Today's Diagnoses     Metastasis from colon cancer (H)    -  1    Hypokalemia        Unspecified urinary incontinence        Nonspecific finding on examination of urine           Follow-ups after your visit        Your next 10 appointments already scheduled     Jun 23, 2017  2:30 PM CDT   Neuro Eval with Maxine Ballesteros PT   [facility] ([facility])    [address] [place] [address]   [phone]            Jul 03, 2017  8:30 AM CDT   Return Visit with NURSE [place] [facility]   [facility])    [address] [place] [id number] [phone]            Jul 03, 2017  8:45 AM CDT   Return Visit with SANJEEV Camarillo CNP   [facility])    [address] [place] [address] [phone]            Jul 03, 2017  9:30 AM CDT   Level 1 with [place] 10 [facility])    [address] [place] [address]   [phone]              Who to contact     If you have questions or need follow up information about today's clinic visit or your schedule please contact [facility] directly at [phone].  Normal or non-critical lab and imaging results will be communicated to you by MyChart, letter or phone within 4 business days after the clinic has received the results. If you do not hear from us within 7 days, please contact the clinic through MyChart or phone. If you have a critical or abnormal lab result, we will notify you by phone as soon as possible.  Submit refill requests through [email or web address] or  call your pharmacy and they will forward the refill request to us. Please allow 3 business days for your refill to be completed.          Additional Information About Your Visit        Orche[employer]ate [employer] TechnologiesharSportfort Information     [employer] is an electronic gateway that provides easy, online access to your medical records. With [employer], you can request a clinic appointment, read your test results, renew a prescription or communicate with your care team.     To sign up for [employer] visit the website at [email or web address]   You will be asked to enter the access code listed below, as well as some personal information. Please follow the directions to create your username and password.     Your access code is: [email or web address]  Expires: 2017  8:08 AM     Your access code will  in 90 days. If you need help or a new code, please contact your [facility] or call [phone] for assistance.        Care EveryWhere ID     This is your Care EveryWhere ID. This could be used by other organizations to access your [place] medical records  [id number]         Blood Pressure from Last 3 Encounters:   17 136/84   17 139/88   17 143/88    Weight from Last 3 Encounters:   17 93.2 kg (205 lb 8 oz)   17 94.4 kg (208 lb 3.2 oz)   17 95.3 kg (210 lb)              We Performed the Following     *UA reflex to Microscopic and Culture ([place]; [facility]; [facility]; [facility]; [facility]; [facility]; [place]; [place])     Urine Culture Aerobic Bacterial     Urine Microscopic          Today's Medication Changes          These changes are accurate as of: 17 12:41 PM.  If you have any questions, ask your nurse or doctor.               Start taking these medicines.        Dose/Directions    ciprofloxacin 250 MG tablet   Commonly known as:  CIPRO   Used for:  Urinary incontinence, unspecified type   Started by:  Chrystal Morel APRN CNP        Dose:  250  mg   Take 1 tablet (250 mg) by mouth 2 times daily for 3 days   Quantity:  6 tablet   Refills:  0       Potassium Chloride ER 20 MEQ Tbcr   Used for:  Hypokalemia        Take 1 tablet every 2 hours   Quantity:  3 tablet   Refills:  0            Where to get your medicines      These medications were sent to [facility] [place], [place] - [address]  [address], [facility] [address]     Phone:  [phone]     Potassium Chloride ER 20 MEQ Tbcr         These medications were sent to [id number] - [place], [place] - [address]  [address], [place] [address]     Phone:  [phone]     ciprofloxacin 250 MG tablet                Primary Care Provider Office Phone # Fax #    Samuel RUFF Aranza [phone]       [facility] [address] [facility] [address]        Thank you!     Thank you for choosing [facility]  for your care. Our goal is always to provide you with excellent care. Hearing back from our patients is one way we can continue to improve our services. Please take a few minutes to complete the written survey that you may receive in the mail after your visit with us. Thank you!             Your Updated Medication List - Protect others around you: Learn how to safely use, store and throw away your medicines at www.disposemymeds.org.          This list is accurate as of: 6/13/17 12:41 PM.  Always use your most recent med list.                   Brand Name Dispense Instructions for use    busPIRone 15 MG tablet    BUSPAR     Take 15 mg by mouth 2 times daily       capecitabine 500 MG tablet CHEMO    XELODA    112 tablet    Take 4 tablets (2,000 mg) by mouth 2 times daily for 14 days Take by mouth Twice daily for 14 days then 7 days off       ciprofloxacin 250 MG tablet    CIPRO    6 tablet    Take 1 tablet (250 mg) by mouth 2 times daily for 3 days       levothyroxine 150 MCG tablet     SYNTHROID/LEVOTHROID     Take 150 mcg by mouth daily       LEXAPRO 20 MG tablet   Generic drug:  escitalopram      Take 20 mg by mouth daily.       loperamide 2 MG capsule    IMODIUM    30 capsule    Start with 2 caps (4 mg), then take one cap (2 mg) after each diarrheal stool as needed. Do not use more than 8 caps (16 mg) per day.       NORVASC 5 MG tablet   Generic drug:  amLODIPine      Take 5 mg by mouth daily       OMEGA-3 FISH OIL PO          ondansetron 8 MG tablet    ZOFRAN    10 tablet    Take 1 tablet (8 mg) by mouth every 8 hours as needed (Nausea/Vomiting)       Potassium Chloride ER 20 MEQ Tbcr     3 tablet    Take 1 tablet every 2 hours       senna-docusate 8.6-50 MG per tablet    SENNA S    60 tablet    1-2 tabs daily for constipation       tetrahydrozoline 0.05 % ophthalmic solution      1 drop 3 times daily       TOPAMAX 50 MG tablet   Generic drug:  topiramate      Take  by mouth 2 times daily. 1 tab q am 2 tabs q PM       triamterene-hydrochlorothiazide 37.5-25 MG per tablet    MAXZIDE-25     Take 1 tablet by mouth daily.       UNABLE TO FIND      Reported on 5/8/2017       VITAMIN D (CHOLECALCIFEROL) PO      Take 2,000 Units by mouth daily Reported on 5/8/2017       warfarin 5 MG tablet    COUMADIN    60 tablet    Take 1 tablet (5 mg) by mouth daily

## 2017-06-13 NOTE — MR AVS SNAPSHOT
After Visit Summary   6/13/2017    Damari Bryant    MRN: [id number]           Patient Information     Date Of Birth          1945        Visit Information        Provider Department      6/13/2017 7:45 AM NURSE ONLY CANCER CENTER [facility]        Today's Diagnoses     Metastasis from colon cancer (H)    -  1       Follow-ups after your visit        Your next 10 appointments already scheduled     Jun 13, 2017  8:15 AM CDT   Return Visit with SANJEEV Camarillo CNP   [facility])    [address] [facility] [address]   [phone]            Jun 13, 2017  9:15 AM CDT   Level 2 with BAY 7 INFUSION   [facility] ([facility])    [address] [facility] [address] [phone]            Jun 23, 2017  2:30 PM CDT   Neuro Eval with Maxine Ballesteros, PT   [facility] ([facility])    [address] [facility] [address] [phone]            Jul 03, 2017  8:30 AM CDT   Return Visit with NURSE ONLY CANCER CENTER   [facility] ([facility])    [address] [facility] [address] [phone]            Jul 03, 2017  8:45 AM CDT   Return Visit with SANJEEV Camarillo CNP   [facility])    [address] [facility] [address] [phone]            Jul 03, 2017  9:30 AM CDT   Level 1 with [place] 10 INFUSION   [facility] ([facility])    [address] [facility] [address] [phone]              Who to contact     If you have questions or need follow up information about today's clinic visit or your schedule please contact [facility] directly at [phone].  Normal or non-critical lab and imaging results will be communicated to you by Brandit, letter  or phone within 4 business days after the clinic has received the results. If you do not hear from us within 7 days, please contact the clinic through BTI Systems or phone. If you have a critical or abnormal lab result, we will notify you by phone as soon as possible.  Submit refill requests through [employer] or call your pharmacy and they will forward the refill request to us. Please allow 3 business days for your refill to be completed.          Additional Information About Your Visit        BTI Systems Information     [employer] is an electronic gateway that provides easy, online access to your medical records. With [employer], you can request a clinic appointment, read your test results, renew a prescription or communicate with your care team.     To sign up for [employer] visit the website at [email or web address]   You will be asked to enter the access code listed below, as well as some personal information. Please follow the directions to create your username and password.     Your access code is: [email or web address]  Expires: 2017  8:08 AM     Your access code will  in 90 days. If you need help or a new code, please contact your [facility] or call [phone] for assistance.        Care EveryWhere ID     This is your Care EveryWhere ID. This could be used by other organizations to access your [place] medical records  [id number]         Blood Pressure from Last 3 Encounters:   17 139/88   17 143/88   17 116/81    Weight from Last 3 Encounters:   17 94.4 kg (208 lb 3.2 oz)   17 95.3 kg (210 lb)   17 97.5 kg (215 lb)              We Performed the Following     CBC with platelets differential     Creatinine     Protein qualitative urine        Primary Care Provider Office Phone # Fax #    Samuel Cobian [phone]       [facility] [address] [facility] [place] [address]        Thank you!     Thank you for choosing [facility]  for your care. Our goal is always to provide you with excellent care. Hearing back from our patients is one way we can continue to improve our services. Please take a few minutes to complete the written survey that you may receive in the mail after your visit with us. Thank you!             Your Updated Medication List - Protect others around you: Learn how to safely use, store and throw away your medicines at www.disposemymeds.org.          This list is accurate as of: 6/13/17  8:08 AM.  Always use your most recent med list.                   Brand Name Dispense Instructions for use    busPIRone 15 MG tablet    BUSPAR     Take 15 mg by mouth 2 times daily       capecitabine 500 MG tablet CHEMO    XELODA    112 tablet    Take 4 tablets (2,000 mg) by mouth 2 times daily for 14 days Take by mouth Twice daily for 14 days then 7 days off       levothyroxine 150 MCG tablet    SYNTHROID/LEVOTHROID     Take 150 mcg by mouth daily       LEXAPRO 20 MG tablet   Generic drug:  escitalopram      Take 20 mg by mouth daily.       loperamide 2 MG capsule    IMODIUM    30 capsule    Start with 2 caps (4 mg), then take one cap (2 mg) after each diarrheal stool as needed. Do not use more than 8 caps (16 mg) per day.       NORVASC 5 MG tablet   Generic drug:  amLODIPine      Take 5 mg by mouth daily       OMEGA-3 FISH OIL PO          ondansetron 8 MG tablet    ZOFRAN    10 tablet    Take 1 tablet (8 mg) by mouth every 8 hours as needed (Nausea/Vomiting)       senna-docusate 8.6-50 MG per tablet    SENNA S    60 tablet    1-2 tabs daily for constipation       tetrahydrozoline 0.05 % ophthalmic solution      1 drop 3 times daily       TOPAMAX 50 MG tablet   Generic drug:  topiramate      Take  by mouth 2 times daily. 1 tab q am 2 tabs q PM       triamterene-hydrochlorothiazide 37.5-25 MG per tablet    MAXZIDE-25     Take 1 tablet by mouth daily.       UNABLE TO FIND      Reported on 5/8/2017       VITAMIN D  (CHOLECALCIFEROL) PO      Take 2,000 Units by mouth daily Reported on 5/8/2017       warfarin 5 MG tablet    COUMADIN    60 tablet    Take 1 tablet (5 mg) by mouth daily

## 2017-06-14 LAB
BACTERIA SPEC CULT: NORMAL
MICRO REPORT STATUS: NORMAL
SPECIMEN SOURCE: NORMAL

## 2017-06-16 ENCOUNTER — ANTICOAGULATION THERAPY VISIT (OUTPATIENT)
Dept: PHARMACY | Facility: CLINIC | Age: 72
End: 2017-06-16
Payer: COMMERCIAL

## 2017-06-16 DIAGNOSIS — I82.90 DEEP VEIN THROMBOSIS (DVT) OF NON-EXTREMITY VEIN, UNSPECIFIED CHRONICITY: ICD-10-CM

## 2017-06-16 DIAGNOSIS — Z79.01 LONG-TERM (CURRENT) USE OF ANTICOAGULANTS: ICD-10-CM

## 2017-06-16 LAB — INR PPP: 1.8

## 2017-06-16 PROCEDURE — 99207 ZZC NO CHARGE NURSE ONLY: CPT | Performed by: INTERNAL MEDICINE

## 2017-06-16 NOTE — MR AVS SNAPSHOT
Damari Bryant   6/16/2017   Anticoagulation Therapy Visit    Description:  72 year old female   Provider:  Samuel Cobian   Department:  Mg Med Monitoring           INR as of 6/16/2017     Today's INR 1.8!      Anticoagulation Summary as of 6/16/2017     INR goal 2.0-3.0   Today's INR 1.8!   Full instructions 7.5 mg on Wed; 5 mg all other days   Next INR check 6/20/2017    Indications   Deep vein thrombosis (DVT) (H) [I82.409] [I82.409]  Long-term (current) use of anticoagulants [Z79.01] [Z79.01]         Contact Numbers     [facility]  Please call the anticoagulation nurse at [phone] to cancel and/or reschedule your appointment, or with any problems or questions regarding your therapy.  You may call the main clinic number at [phone] if the nurse is not available.           June 2017 Details    Sun Mon Tue Wed Thu Fri Sat         1               2               3                 4               5               6               7               8               9               10                 11               12               13               14               15               16      5 mg   See details      17      5 mg           18      5 mg         19      5 mg         20            21               22               23               24                 25               26               27               28               29               30                 Date Details   06/16 This INR check       Date of next INR:  6/20/2017         How to take your warfarin dose     To take:  5 mg Take 1 of the 5 mg tablets.

## 2017-06-16 NOTE — PROGRESS NOTES
ANTICOAGULATION FOLLOW-UP CLINIC VISIT    Patient Name:  Damari Bryant  Date:  6/16/2017  Contact Type:  Telephone Spoke with Ross (pt's ), discussed INR result, questions/concerns addressed, dosing instructions with readback, and date of next INR.           SUBJECTIVE:     Patient Findings     Positives Unexplained INR or factor level change    Comments Pt started new chemo agent. At this time there is not enough data to support the affects of this chemo agent on INR.            OBJECTIVE    INR   Date Value Ref Range Status   06/16/2017 1.8  Final       ASSESSMENT / PLAN  INR assessment SUB    Recheck INR In: 5 DAYS    INR Location Outside lab      Anticoagulation Summary as of 6/16/2017     INR goal 2.0-3.0   Today's INR 1.8!   Maintenance plan 7.5 mg (5 mg x 1.5) on Wed; 5 mg (5 mg x 1) all other days   Full instructions 7.5 mg on Wed; 5 mg all other days   Weekly total 37.5 mg   No change documented Paulina Shaver RN   Plan last modified Mary Berry RN (3/23/2017)   Next INR check 6/20/2017   Priority INR   Target end date 8/14/2017    Indications   Deep vein thrombosis (DVT) (H) [I82.409] [I82.409]  Long-term (current) use of anticoagulants [Z79.01] [Z79.01]         Anticoagulation Episode Summary     INR check location     Preferred lab [facility]    Send INR reminders to [facility] INR    Comments Chemo infusion pump for 48 hours every 2 weeks.  Her INR rises during this infusion. Most recent schd infusion was  4/4/17, next one is 4/18/17   Ross's cell phone for INR results [phone]. Martin rodriguez  Per Esther Greene MD- Goal of 2-3.      Anticoagulation Care Providers     Provider Role Specialty Phone number    Esther Greene MD Responsible Hematology [phone]            See the Encounter Report to view Anticoagulation Flowsheet and Dosing Calendar (Go to Encounters tab in chart review, and find the Anticoagulation Therapy Visit)      Paulina Shaver, MELONIE

## 2017-06-18 ENCOUNTER — HOSPITAL ENCOUNTER (EMERGENCY)
Facility: CLINIC | Age: 72
Discharge: HOME | End: 2017-06-18
Attending: FAMILY MEDICINE | Admitting: FAMILY MEDICINE
Payer: MEDICARE

## 2017-06-18 ENCOUNTER — NURSE TRIAGE (OUTPATIENT)
Dept: NURSING | Facility: CLINIC | Age: 72
End: 2017-06-18

## 2017-06-18 VITALS
BODY MASS INDEX: 32.6 KG/M2 | OXYGEN SATURATION: 96 % | WEIGHT: 202 LBS | RESPIRATION RATE: 20 BRPM | TEMPERATURE: 96.5 F | HEART RATE: 85 BPM | SYSTOLIC BLOOD PRESSURE: 124 MMHG | DIASTOLIC BLOOD PRESSURE: 78 MMHG

## 2017-06-18 DIAGNOSIS — K29.60 OTHER GASTRITIS WITHOUT BLEEDING: ICD-10-CM

## 2017-06-18 DIAGNOSIS — K29.00 OTHER ACUTE GASTRITIS: ICD-10-CM

## 2017-06-18 LAB
ALBUMIN SERPL-MCNC: 3.9 G/DL (ref 3.4–5)
ALP SERPL-CCNC: 74 U/L (ref 40–150)
ALT SERPL W P-5'-P-CCNC: 36 U/L (ref 0–50)
ANION GAP SERPL CALCULATED.3IONS-SCNC: 13 MMOL/L (ref 3–14)
AST SERPL W P-5'-P-CCNC: 33 U/L (ref 0–45)
BASOPHILS # BLD AUTO: 0 10E9/L (ref 0–0.2)
BASOPHILS NFR BLD AUTO: 0.5 %
BILIRUB SERPL-MCNC: 0.7 MG/DL (ref 0.2–1.3)
BUN SERPL-MCNC: 17 MG/DL (ref 7–30)
CALCIUM SERPL-MCNC: 9.7 MG/DL (ref 8.5–10.1)
CHLORIDE SERPL-SCNC: 99 MMOL/L (ref 94–109)
CO2 SERPL-SCNC: 25 MMOL/L (ref 20–32)
CREAT SERPL-MCNC: 1.06 MG/DL (ref 0.52–1.04)
DIFFERENTIAL METHOD BLD: ABNORMAL
EOSINOPHIL # BLD AUTO: 0.1 10E9/L (ref 0–0.7)
EOSINOPHIL NFR BLD AUTO: 1.1 %
ERYTHROCYTE [DISTWIDTH] IN BLOOD BY AUTOMATED COUNT: 16.3 % (ref 10–15)
GFR SERPL CREATININE-BSD FRML MDRD: 51 ML/MIN/1.7M2
GLUCOSE SERPL-MCNC: 145 MG/DL (ref 70–99)
HCT VFR BLD AUTO: 44.1 % (ref 35–47)
HGB BLD-MCNC: 14.2 G/DL (ref 11.7–15.7)
IMM GRANULOCYTES # BLD: 0 10E9/L (ref 0–0.4)
IMM GRANULOCYTES NFR BLD: 0.4 %
INR PPP: 1.69 (ref 0.86–1.14)
LIPASE SERPL-CCNC: 97 U/L (ref 73–393)
LYMPHOCYTES # BLD AUTO: 1.9 10E9/L (ref 0.8–5.3)
LYMPHOCYTES NFR BLD AUTO: 32.9 %
MCH RBC QN AUTO: 30.5 PG (ref 26.5–33)
MCHC RBC AUTO-ENTMCNC: 32.2 G/DL (ref 31.5–36.5)
MCV RBC AUTO: 95 FL (ref 78–100)
MONOCYTES # BLD AUTO: 0.5 10E9/L (ref 0–1.3)
MONOCYTES NFR BLD AUTO: 8.1 %
NEUTROPHILS # BLD AUTO: 3.3 10E9/L (ref 1.6–8.3)
NEUTROPHILS NFR BLD AUTO: 57 %
PLATELET # BLD AUTO: 166 10E9/L (ref 150–450)
POTASSIUM SERPL-SCNC: 3.4 MMOL/L (ref 3.4–5.3)
PROT SERPL-MCNC: 7.7 G/DL (ref 6.8–8.8)
RBC # BLD AUTO: 4.65 10E12/L (ref 3.8–5.2)
SODIUM SERPL-SCNC: 137 MMOL/L (ref 133–144)
WBC # BLD AUTO: 5.7 10E9/L (ref 4–11)

## 2017-06-18 PROCEDURE — 96361 HYDRATE IV INFUSION ADD-ON: CPT | Performed by: FAMILY MEDICINE

## 2017-06-18 PROCEDURE — 83690 ASSAY OF LIPASE: CPT | Performed by: FAMILY MEDICINE

## 2017-06-18 PROCEDURE — 80053 COMPREHEN METABOLIC PANEL: CPT | Performed by: FAMILY MEDICINE

## 2017-06-18 PROCEDURE — 99284 EMERGENCY DEPT VISIT MOD MDM: CPT | Mod: 25 | Performed by: FAMILY MEDICINE

## 2017-06-18 PROCEDURE — 96374 THER/PROPH/DIAG INJ IV PUSH: CPT | Performed by: FAMILY MEDICINE

## 2017-06-18 PROCEDURE — 99284 EMERGENCY DEPT VISIT MOD MDM: CPT | Mod: Z6 | Performed by: FAMILY MEDICINE

## 2017-06-18 PROCEDURE — 25000128 H RX IP 250 OP 636: Performed by: FAMILY MEDICINE

## 2017-06-18 PROCEDURE — 85610 PROTHROMBIN TIME: CPT | Performed by: FAMILY MEDICINE

## 2017-06-18 PROCEDURE — 85025 COMPLETE CBC W/AUTO DIFF WBC: CPT | Performed by: FAMILY MEDICINE

## 2017-06-18 RX ORDER — SUCRALFATE 1 G/1
1 TABLET ORAL 4 TIMES DAILY
Qty: 40 TABLET | Refills: 1 | Status: SHIPPED | OUTPATIENT
Start: 2017-06-18

## 2017-06-18 RX ORDER — SUCRALFATE ORAL 1 G/10ML
1 SUSPENSION ORAL 4 TIMES DAILY
Qty: 420 ML | Refills: 1 | Status: SHIPPED | OUTPATIENT
Start: 2017-06-18 | End: 2017-06-18

## 2017-06-18 RX ORDER — ONDANSETRON 2 MG/ML
4 INJECTION INTRAMUSCULAR; INTRAVENOUS EVERY 30 MIN PRN
Status: DISCONTINUED | OUTPATIENT
Start: 2017-06-18 | End: 2017-06-18 | Stop reason: HOSPADM

## 2017-06-18 RX ORDER — SODIUM CHLORIDE 9 MG/ML
1000 INJECTION, SOLUTION INTRAVENOUS CONTINUOUS
Status: DISCONTINUED | OUTPATIENT
Start: 2017-06-18 | End: 2017-06-18 | Stop reason: HOSPADM

## 2017-06-18 RX ADMIN — SODIUM CHLORIDE 1000 ML: 9 INJECTION, SOLUTION INTRAVENOUS at 17:12

## 2017-06-18 RX ADMIN — ONDANSETRON 4 MG: 2 INJECTION INTRAMUSCULAR; INTRAVENOUS at 17:13

## 2017-06-18 ASSESSMENT — ENCOUNTER SYMPTOMS
APPETITE CHANGE: 1
FATIGUE: 1
DIAPHORESIS: 0
HEMATOLOGIC/LYMPHATIC NEGATIVE: 1
VOMITING: 0
NAUSEA: 1
RECTAL PAIN: 0
CARDIOVASCULAR NEGATIVE: 1
NEUROLOGICAL NEGATIVE: 1
UNEXPECTED WEIGHT CHANGE: 0
ACTIVITY CHANGE: 0
ABDOMINAL PAIN: 1
ALLERGIC/IMMUNOLOGIC NEGATIVE: 1
RESPIRATORY NEGATIVE: 1
FEVER: 0
ENDOCRINE NEGATIVE: 1
EYES NEGATIVE: 1
DIARRHEA: 0
ANAL BLEEDING: 0
ABDOMINAL DISTENTION: 0
CONSTIPATION: 0
BLOOD IN STOOL: 0
MUSCULOSKELETAL NEGATIVE: 1
CHILLS: 0

## 2017-06-18 NOTE — DISCHARGE INSTRUCTIONS
Understanding Gastritis    Gastritis is a painful inflammation of the stomach lining. It has a number of causes. Gastritis and its symptoms can be relieved with treatment. Work with your healthcare provider to find ways to treat your symptoms.  The Stomach  To digest the food you eat, your stomach makes strong acids and enzymes. A healthy stomach has built-in defenses that protect its lining from damage by these acids and enzymes.  When you have gastritis  Acids may damage the stomach lining when the built-in defenses of the stomach don t function as they should. The stomach lining can then become inflamed. When this happens, it is called gastritis.  Causes of gastritis  Gastritis has many causes. They may include:    Aspirin and anti-inflammatory medicines    Tobacco use    Alcohol use    Helicobacter pylori (H. pylori) bacteria    Trauma from injuries, burns, or major surgery    Critical illness or autoimmune disorders  Common symptoms  With gastritis, you may notice one or more of the following:    A burning feeling in your upper belly    Pain that happens after eating certain foods    Gas or a bloated feeling in your stomach    Frequent belching    Nausea with or without vomiting    Loss of appetite    Feeling full quickly    Fatigue  Date Last Reviewed: 7/1/2016 2000-2017 [employer]. [address] [place] [address]. All rights reserved. This information is not intended as a substitute for professional medical care. Always follow your healthcare professional's instructions.

## 2017-06-18 NOTE — ED AVS SNAPSHOT
[facility] Emergency Department    [address] [place] [address]    Phone:  [phone]    Fax:  [phone]                                       Damari Bryant   MRN: [id number]    Department:  [facility] Emergency Department   Date of Visit:  6/18/2017           After Visit Summary Signature Page     I have received my discharge instructions, and my questions have been answered. I have discussed any challenges I see with this plan with the nurse or doctor.    ..........................................................................................................................................  Patient/Patient Representative Signature      ..........................................................................................................................................  Patient Representative Print Name and Relationship to Patient    ..................................................               ................................................  Date                                            Time    ..........................................................................................................................................  Reviewed by Signature/Title    ...................................................              ..............................................  Date                                                            Time

## 2017-06-18 NOTE — ED NOTES
Pt c/o epigastric pain when drinking or eating anything.  Started after she began her new chemo meds Xeloda, pain seems to be getting worse with each does of medication.

## 2017-06-18 NOTE — ED PROVIDER NOTES
History     Chief Complaint   Patient presents with     Abdominal Pain     HPI Comments: 72-year-old female who presents with abdominal pain associated with nausea over the past few days. She has a known history of stage III metastatic colon cancer and has just been started on a new chemo drug, Xoleda, which she has been taking twice a day over the past 5 days. Is experiencing increased pain at the upper abdomen, were sustaining eating. Almost feels like bad heartburn. Feels like she's not been able to eat or drink and often feels mildly dehydrated with weakness, dizziness. There is been no chest pain. She denies any vomiting, there is no diarrhea. She denies a rash, cough, headache. She has history of low potassium in the past she is currently taking Coumadin.    The history is provided by the patient and the spouse. No  was used.         I have reviewed the Medications, Allergies, Past Medical and Surgical History, and Social History in the Epic system.    Allergies:   Allergies   Allergen Reactions     Ativan Other (See Comments)     hallucinations     Avocado Nausea and Vomiting     Hmg-Coa-R Inhibitors Nausea     Iodine-131 Nausea and Vomiting     Shellfish Allergy GI Disturbance     Sulfa Drugs Nausea and Vomiting     Tegaderm Transparent Dressing (Informational Only) Rash         No current facility-administered medications on file prior to encounter.   Current Outpatient Prescriptions on File Prior to Encounter:  capecitabine (XELODA) 500 MG tablet CHEMO Take 4 tablets (2,000 mg) by mouth 2 times daily for 14 days Take by mouth Twice daily for 14 days then 7 days off   ondansetron (ZOFRAN) 8 MG tablet Take 1 tablet (8 mg) by mouth every 8 hours as needed (Nausea/Vomiting)   loperamide (IMODIUM) 2 MG capsule Start with 2 caps (4 mg), then take one cap (2 mg) after each diarrheal stool as needed. Do not use more than 8 caps (16 mg) per day.   warfarin (COUMADIN) 5 MG tablet Take 1 tablet  (5 mg) by mouth daily   Omega-3 Fatty Acids (OMEGA-3 FISH OIL PO)    tetrahydrozoline 0.05 % ophthalmic solution 1 drop 3 times daily   VITAMIN D, CHOLECALCIFEROL, PO Take 2,000 Units by mouth daily Reported on 5/8/2017   busPIRone (BUSPAR) 15 MG tablet Take 15 mg by mouth 2 times daily   amLODIPine (NORVASC) 5 MG tablet Take 5 mg by mouth daily   escitalopram (LEXAPRO) 20 MG tablet Take 20 mg by mouth daily.   levothyroxine (SYNTHROID, LEVOTHROID) 150 MCG tablet Take 150 mcg by mouth daily    topiramate (TOPAMAX) 50 MG tablet Take  by mouth 2 times daily. 1 tab q am 2 tabs q PM   triamterene-hydrochlorothiazide (MAXZIDE-25) 37.5-25 MG per tablet Take 1 tablet by mouth daily.   Potassium Chloride ER 20 MEQ TBCR Take 1 tablet every 2 hours   senna-docusate (SENNA S) 8.6-50 MG per tablet 1-2 tabs daily for constipation   UNABLE TO FIND Reported on 5/8/2017       Patient Active Problem List   Diagnosis     Hypokalemia     Chronic headaches     Anxiety     Thrombocytopenia (H)     Incisional hernia     Metastasis from colon cancer (H)     Poor fluid intake     Dizziness     Deep vein thrombosis (DVT) (H) [I82.409]     Long-term (current) use of anticoagulants [Z79.01]       Past Surgical History:   Procedure Laterality Date     BLADDER SURGERY       BYPASS CARDIOPULMONARY       COLECTOMY WITHOUT COLOSTOMY  6/21/2013    lap sigmoid colectomy     FUSION LUMBAR ANTERIOR TWO LEVELS  1999    L4-5     HYSTERECTOMY  1995     NISSEN FUNDOPLICATION  9/2012    paraesophageal hernia reduction     TONSILLECTOMY & ADENOIDECTOMY         Social History   Substance Use Topics     Smoking status: Never Smoker     Smokeless tobacco: Never Used     Alcohol use No       Most Recent Immunizations   Administered Date(s) Administered     Influenza (IIV3) 10/16/2012     Pneumococcal 23 valent 12/15/2008     Tdap (Adacel,Boostrix) 12/15/2008       BMI: Estimated body mass index is 32.6 kg/(m^2) as calculated from the following:    Height as of  "6/13/17: 1.676 m (5' 6\").    Weight as of this encounter: 91.6 kg (202 lb).      Review of Systems   Constitutional: Positive for appetite change and fatigue. Negative for activity change, chills, diaphoresis, fever and unexpected weight change.   HENT: Negative.    Eyes: Negative.    Respiratory: Negative.    Cardiovascular: Negative.    Gastrointestinal: Positive for abdominal pain and nausea. Negative for abdominal distention, anal bleeding, blood in stool, constipation, diarrhea, rectal pain and vomiting.   Endocrine: Negative.    Genitourinary: Negative.    Musculoskeletal: Negative.    Skin: Negative.    Allergic/Immunologic: Negative.    Neurological: Negative.    Hematological: Negative.        Physical Exam   BP: (!) 145/95  Pulse: 85  Temp: 96.5  F (35.8  C)  Resp: 18  Weight: 91.6 kg (202 lb)  SpO2: 96 %  Physical Exam   Constitutional: She is oriented to person, place, and time. She appears well-developed and well-nourished. No distress.   HENT:   Head: Normocephalic and atraumatic.   Mouth/Throat: Oropharyngeal exudate present.   Eyes: Conjunctivae are normal. Pupils are equal, round, and reactive to light. No scleral icterus.   Neck: Normal range of motion. Neck supple. No tracheal deviation present.   Cardiovascular: Normal rate, regular rhythm and normal heart sounds.    No murmur heard.  Pulmonary/Chest: Effort normal and breath sounds normal. No respiratory distress. She has no wheezes. She has no rales. She exhibits no tenderness.   She has a port in right upper chest, no tenderness   Abdominal: Soft. Bowel sounds are normal. She exhibits no distension and no mass. There is tenderness (in epigastric area). There is no rebound and no guarding.   Musculoskeletal: Normal range of motion.   Lymphadenopathy:     She has no cervical adenopathy.   Neurological: She is alert and oriented to person, place, and time.   Skin: Skin is warm and dry. She is not diaphoretic.   Psychiatric: She has a normal mood " and affect. Her behavior is normal. Judgment and thought content normal.   Nursing note and vitals reviewed.      ED Course     ED Course     Procedures        Results for orders placed or performed during the hospital encounter of 06/18/17   CBC with platelets differential   Result Value Ref Range    WBC 5.7 4.0 - 11.0 10e9/L    RBC Count 4.65 3.8 - 5.2 10e12/L    Hemoglobin 14.2 11.7 - 15.7 g/dL    Hematocrit 44.1 35.0 - 47.0 %    MCV 95 78 - 100 fl    MCH 30.5 26.5 - 33.0 pg    MCHC 32.2 31.5 - 36.5 g/dL    RDW 16.3 (H) 10.0 - 15.0 %    Platelet Count 166 150 - 450 10e9/L    Diff Method Automated Method     % Neutrophils 57.0 %    % Lymphocytes 32.9 %    % Monocytes 8.1 %    % Eosinophils 1.1 %    % Basophils 0.5 %    % Immature Granulocytes 0.4 %    Absolute Neutrophil 3.3 1.6 - 8.3 10e9/L    Absolute Lymphocytes 1.9 0.8 - 5.3 10e9/L    Absolute Monocytes 0.5 0.0 - 1.3 10e9/L    Absolute Eosinophils 0.1 0.0 - 0.7 10e9/L    Absolute Basophils 0.0 0.0 - 0.2 10e9/L    Abs Immature Granulocytes 0.0 0 - 0.4 10e9/L   INR   Result Value Ref Range    INR 1.69 (H) 0.86 - 1.14   Lipase   Result Value Ref Range    Lipase 97 73 - 393 U/L   Comprehensive metabolic panel   Result Value Ref Range    Sodium 137 133 - 144 mmol/L    Potassium 3.4 3.4 - 5.3 mmol/L    Chloride 99 94 - 109 mmol/L    Carbon Dioxide 25 20 - 32 mmol/L    Anion Gap 13 3 - 14 mmol/L    Glucose 145 (H) 70 - 99 mg/dL    Urea Nitrogen 17 7 - 30 mg/dL    Creatinine 1.06 (H) 0.52 - 1.04 mg/dL    GFR Estimate 51 (L) >60 mL/min/1.7m2    GFR Estimate If Black 61 >60 mL/min/1.7m2    Calcium 9.7 8.5 - 10.1 mg/dL    Bilirubin Total 0.7 0.2 - 1.3 mg/dL    Albumin 3.9 3.4 - 5.0 g/dL    Protein Total 7.7 6.8 - 8.8 g/dL    Alkaline Phosphatase 74 40 - 150 U/L    ALT 36 0 - 50 U/L    AST 33 0 - 45 U/L           patient was seen shortly after arrival and assessed. IV fluids were started, lab work done. Lab work was discussed with patient which was essentially  normal.    Labs Ordered and Resulted from Time of ED Arrival Up to the Time of Departure from the ED   CBC WITH PLATELETS DIFFERENTIAL - Abnormal; Notable for the following:        Result Value    RDW 16.3 (*)     All other components within normal limits   INR - Abnormal; Notable for the following:     INR 1.69 (*)     All other components within normal limits   COMPREHENSIVE METABOLIC PANEL - Abnormal; Notable for the following:     Glucose 145 (*)     Creatinine 1.06 (*)     GFR Estimate 51 (*)     All other components within normal limits   LIPASE   VITAL SIGNS        Assessments & Plan (with Medical Decision Making)   72-year-old female with known metastatic cancer recently started on a new chemotherapeutic agent with secondary nausea and abdominal pain. Evaluation of the emergency department is reassuring with no change in her laboratory workup. She seemed mildly dehydrated and this was corrected with IV fluids to be discharged home to continue her meds but will add Carafate suspension 2 teaspoons 4 times daily to take before meals. Suggest that she call her oncologist sometime next day or so to discuss the side effects from the medication and is see if it would make any changes in her treatment plan.  I have reviewed the nursing notes.    I have reviewed the findings, diagnosis, plan and need for follow up with the patient.      New Prescriptions    SUCRALFATE (CARAFATE) 1 GM/10ML SUSPENSION    Take 10 mLs (1 g) by mouth 4 times daily       Final diagnoses:   Other acute gastritis       6/18/2017   [facility] EMERGENCY DEPARTMENT     Dequan Cueva MD  06/18/17 [id number]

## 2017-06-18 NOTE — TELEPHONE ENCOUNTER
"\"I am taking a new kind of chemo pill   capecitabine (XELODA) 500 MG tablet CHEMO 112 tablet 0 5/30/2017 6/13/2017 --   Sig: Take 4 tablets (2,000 mg) by mouth 2 times daily for 14 days Take by mouth Twice daily for 14 days then 7 days off     I think It's messing with my stomach. I am not able to eat or drink anything. Every time I try to swallow it hurts   my throat and chest area. I am not vomiting, but I am waking up gagging. If I try to eat a piece of banana my whole upper abdomen really hurts.. I am barely urinating and I'm dizzy.\" Denies other sx. Patient feels she is dehydrated. Advised ER  Lucy Seay RN [facility]      Reason for Disposition    Patient sounds very sick or weak to the triager    Additional Information    Negative: Shock suspected (e.g., cold/pale/clammy skin, too weak to stand, low BP, rapid pulse)    Negative: Sounds like a life-threatening emergency to the triager    Negative: [1] Nausea or vomiting AND [2] pregnancy < 20 weeks    Negative: Menstrual Period - Missed or Late (i.e., pregnancy suspected)    Negative: Heat exhaustion suspected (i.e., dehydration from heat exposure)    Negative: Motion sickness suspected (i.e., nausea with car, plane, boat, or train travel)    Negative: Anxiety or stress suspected (i.e., nausea with anxiety attacks or stressful situations)    Negative: Traumatic Brain Injury (TBI) suspected    Negative: Vomiting occurs    Negative: Other symptom is present, see that guideline.  (e.g., chest pain, headache, dizziness, abdominal pain, colds, sore throat, etc.).    Negative: Unable to walk, or can only walk with assistance (e.g., requires support)    Negative: Difficulty breathing    Negative: [1] Insulin-dependent diabetes (Type I) AND [2] glucose > 400 mg/dl (22 mmol/l)    Negative: [1] Drinking very little AND [2] dehydration suspected (e.g., no urine > 12 hours, very dry mouth, very lightheaded)    Protocols used: NAUSEA-ADULT-AH    "

## 2017-06-18 NOTE — ED AVS SNAPSHOT
[facility] Emergency Department    [address] [place] [address]    Phone:  [phone]    Fax:  [phone]                                       Damari Bryant   MRN: [id number]    Department:  [facility] Emergency Department   Date of Visit:  6/18/2017           Patient Information     Date Of Birth          1945        Your diagnoses for this visit were:     Other acute gastritis        You were seen by Dequan Cueva MD.      Follow-up Information     Follow up with Samuel Cobian    Specialty:  Family Practice    Contact information:    [facility]  [address] [place] [address]  [phone]          Discharge Instructions         Understanding Gastritis    Gastritis is a painful inflammation of the stomach lining. It has a number of causes. Gastritis and its symptoms can be relieved with treatment. Work with your healthcare provider to find ways to treat your symptoms.  The Stomach  To digest the food you eat, your stomach makes strong acids and enzymes. A healthy stomach has built-in defenses that protect its lining from damage by these acids and enzymes.  When you have gastritis  Acids may damage the stomach lining when the built-in defenses of the stomach don t function as they should. The stomach lining can then become inflamed. When this happens, it is called gastritis.  Causes of gastritis  Gastritis has many causes. They may include:    Aspirin and anti-inflammatory medicines    Tobacco use    Alcohol use    Helicobacter pylori (H. pylori) bacteria    Trauma from injuries, burns, or major surgery    Critical illness or autoimmune disorders  Common symptoms  With gastritis, you may notice one or more of the following:    A burning feeling in your upper belly    Pain that happens after eating certain foods    Gas or a bloated feeling in your stomach    Frequent belching    Nausea with or without vomiting    Loss of appetite    Feeling full  quickly    Fatigue  Date Last Reviewed: 7/1/2016 2000-2017 [employer]. [address] [place] [address], [place] [address]. All rights reserved. This information is not intended as a substitute for professional medical care. Always follow your healthcare professional's instructions.          Future Appointments        Provider Department Dept Phone Center    7/3/2017 8:30 AM Only Cancer Nurse [facility] [phone] [place]    7/3/2017 8:45 AM Chrystal Morel NP, APRN CNP [facility] [phone] [place]    7/3/2017 9:30 AM [facility] [phone] [place]      24 Hour Appointment Hotline       To make an appointment at any [facility], call [phone] ([phone]). If you don't have a family doctor or clinic, we will help you find one. [facility] are conveniently located to serve the needs of you and your family.             Review of your medicines      START taking        Dose / Directions Last dose taken    sucralfate 1 GM tablet   Commonly known as:  CARAFATE   Dose:  1 g   Quantity:  40 tablet        Take 1 tablet (1 g) by mouth 4 times daily Mix with 15cc of water and then drink slurry   Refills:  1          Our records show that you are taking the medicines listed below. If these are incorrect, please call your family doctor or clinic.        Dose / Directions Last dose taken    busPIRone 15 MG tablet   Commonly known as:  BUSPAR   Dose:  15 mg        Take 15 mg by mouth 2 times daily   Refills:  0        capecitabine 500 MG tablet CHEMO   Commonly known as:  XELODA   Dose:  1000 mg/m2   Quantity:  112 tablet        Take 4 tablets (2,000 mg) by mouth 2 times daily for 14 days Take by mouth Twice daily for 14 days then 7 days off   Refills:  0        levothyroxine 150 MCG tablet   Commonly known as:  SYNTHROID/LEVOTHROID   Dose:  150 mcg        Take 150 mcg by mouth daily   Refills:  0        LEXAPRO  20 MG tablet   Dose:  20 mg   Generic drug:  escitalopram        Take 20 mg by mouth daily.   Refills:  0        loperamide 2 MG capsule   Commonly known as:  IMODIUM   Quantity:  30 capsule        Start with 2 caps (4 mg), then take one cap (2 mg) after each diarrheal stool as needed. Do not use more than 8 caps (16 mg) per day.   Refills:  1        NORVASC 5 MG tablet   Dose:  5 mg   Generic drug:  amLODIPine        Take 5 mg by mouth daily   Refills:  0        OMEGA-3 FISH OIL PO        Refills:  0        ondansetron 8 MG tablet   Commonly known as:  ZOFRAN   Dose:  8 mg   Quantity:  10 tablet        Take 1 tablet (8 mg) by mouth every 8 hours as needed (Nausea/Vomiting)   Refills:  2        Potassium Chloride ER 20 MEQ Tbcr   Quantity:  3 tablet        Take 1 tablet every 2 hours   Refills:  0        senna-docusate 8.6-50 MG per tablet   Commonly known as:  SENNA S   Quantity:  60 tablet        1-2 tabs daily for constipation   Refills:  1        tetrahydrozoline 0.05 % ophthalmic solution   Dose:  1 drop        1 drop 3 times daily   Refills:  0        TOPAMAX 50 MG tablet   Generic drug:  topiramate        Take  by mouth 2 times daily. 1 tab q am 2 tabs q PM   Refills:  0        triamterene-hydrochlorothiazide 37.5-25 MG per tablet   Commonly known as:  MAXZIDE-25   Dose:  1 tablet        Take 1 tablet by mouth daily.   Refills:  0        UNABLE TO FIND        Reported on 5/8/2017   Refills:  0        VITAMIN D (CHOLECALCIFEROL) PO   Dose:  2000 Units        Take 2,000 Units by mouth daily Reported on 5/8/2017   Refills:  0        warfarin 5 MG tablet   Commonly known as:  COUMADIN   Dose:  5 mg   Quantity:  60 tablet        Take 1 tablet (5 mg) by mouth daily   Refills:  1                Prescriptions were sent or printed at these locations (1 Prescription)                   [facility]   [address] [address]    Telephone:  [phone]   Fax:  [phone]  "  Hours:                  These medications are not ready yet because we are checking if your insurance will help you pay for them. Call your pharmacy to confirm that your medication is ready for pickup. It may take up to 24 hours for them to receive the prescription. If the prescription is not ready within 3 business days, please contact your clinic or your provider (1 of 1)         sucralfate (CARAFATE) 1 GM tablet                Procedures and tests performed during your visit     CBC with platelets differential    Comprehensive metabolic panel    INR    Lipase      Orders Needing Specimen Collection     None      Pending Results     No orders found from 6/16/2017 to 6/19/2017.            Pending Culture Results     No orders found from 6/16/2017 to 6/19/2017.            Pending Results Instructions     If you had any lab results that were not finalized at the time of your Discharge, you can call the ED Lab Result RN at [phone]. You will be contacted by this team for any positive Lab results or changes in treatment. The nurses are available 7 days a week from 10A to 6:30P.  You can leave a message 24 hours per day and they will return your call.        Thank you for choosing [facility]       Thank you for choosing [facility] for your care. Our goal is always to provide you with excellent care. Hearing back from our patients is one way we can continue to improve our services. Please take a few minutes to complete the written survey that you may receive in the mail after you visit with us. Thank you!        [employer]ding [employer]ologist[employer]Soo[employer] Information     [employer] lets you send messages to your doctor, view your test results, renew your prescriptions, schedule appointments and more. To sign up, go to [email or web address] . Click on \"Log in\" on the left side of the screen, which will take you to the Welcome page. Then click on \"Sign up Now\" on the right side of the page.     You will be asked to enter the access code listed below, as " well as some personal information. Please follow the directions to create your username and password.     Your access code is: [email or web address]  Expires: 2017  8:08 AM     Your access code will  in 90 days. If you need help or a new code, please call your [facility] clinic or [phone].        Care EveryWhere ID     This is your Care EveryWhere ID. This could be used by other organizations to access your [facility] medical records  [id number]        After Visit Summary       This is your record. Keep this with you and show to your community pharmacist(s) and doctor(s) at your next visit.

## 2017-06-19 ENCOUNTER — DOCUMENTATION ONLY (OUTPATIENT)
Dept: PHARMACY | Facility: CLINIC | Age: 72
End: 2017-06-19

## 2017-06-19 ENCOUNTER — CARE COORDINATION (OUTPATIENT)
Dept: ONCOLOGY | Facility: CLINIC | Age: 72
End: 2017-06-19

## 2017-06-19 DIAGNOSIS — C79.9 METASTASIS FROM COLON CANCER (H): ICD-10-CM

## 2017-06-19 DIAGNOSIS — R11.0 NAUSEA: ICD-10-CM

## 2017-06-19 DIAGNOSIS — C18.9 METASTASIS FROM COLON CANCER (H): Primary | ICD-10-CM

## 2017-06-19 DIAGNOSIS — C18.9 METASTASIS FROM COLON CANCER (H): ICD-10-CM

## 2017-06-19 DIAGNOSIS — C79.9 METASTASIS FROM COLON CANCER (H): Primary | ICD-10-CM

## 2017-06-19 DIAGNOSIS — T45.1X5A CHEMOTHERAPY-INDUCED NAUSEA: Primary | ICD-10-CM

## 2017-06-19 DIAGNOSIS — R11.0 CHEMOTHERAPY-INDUCED NAUSEA: Primary | ICD-10-CM

## 2017-06-19 RX ORDER — ONDANSETRON 8 MG/1
8 TABLET, FILM COATED ORAL EVERY 8 HOURS PRN
Qty: 30 TABLET | Refills: 1 | Status: SHIPPED | OUTPATIENT
Start: 2017-06-19

## 2017-06-19 NOTE — PROGRESS NOTES
Noted patient was in the [facility] ER over this past weekend with complaints of nausea, upper abdominal pain, and dehydration.  Patient feels this is due to her Xeloda which she recently started.  Patient was given IV fluids in the ER, and was discharged home with instructions to follow-up with Oncology clinic today.  Dr. Greene was updated, and would like patient to come into clinic this week for symptom assessment with provider Chrystal Morel CNP.  Patient's spouse, Ross, was called with Dr. Greene' recommendations.  Ross verbalizes understanding, and will plan to have patient here tomorrow to see Chrystal.  Encouraged spouse to call in the interim if patient experiences any worsening symptoms.    Evans Archer, RN, BSN, OCN  Care Coordinator  [facility]  [phone]

## 2017-06-19 NOTE — PROGRESS NOTES
Oral Chemotherapy Monitoring Program    Primary Oncologist: Dr. Esther Greene  Primary Oncology Clinic: [facility]  Cancer Diagnosis: Metastatic colorectal cancer     Drug: Capecitabine 2,000 mg BID x 14 days then 7 days off in combination with Bevacizumab  Start Date: 6/13/17  Expected duration of therapy: Until disease progression or unacceptable toxicity    Drug Interaction Assessment: Upon review of the medication list, one possible significant drug interaction with Warfarin was identified. Patient was counseled on the possibility of capecitabine increasing INR and that she should contact her clinic to notify them that she is starting capecitabine and her INR may need to be monitored more closely for the first few cycles.     Lab Monitoring Plan  Monitoring plan for the Q3 week cycle:   C1D1+   CMP, CBC C2D1+ Call, CMP, CBC C3D1+ Call, CMP, CBC C4D1+ Call, CMP, CBC C5D1+ Call, CMP, CBC C6D1+ Call, CMP, CBC   C1D8+ Call C2D8+  C3D8+  C4D8+  C5D8+  C6D8+    C1D15+  C2D15+  C3D15+  C4D15+  C5D15+  C6D15+    Monitoring plan for Q3 week cycle:  CBC Q3 weeks  CMP Q3 weeks     Subjective/Objective:  Damari Bryant is a 72 year old female contacted by phone for a follow-up visit for oral chemotherapy.  Damari was seen in the [place] ER this past weekend for abdominal pain and burning in her GI tract. She was assessed and determined there were no concerns. The physician concluded the Xeloda was likely the cause of her symptoms. Spoke with Damari and her , Ross, this morning. Damari states she took her first dose of Xeloda without food but took subsequent doses with food or at least within 10 minutes of a meal. The pain continued to get worse with each dose of Xeloda and prompted her to go to the ED. She denies any diarrhea with her abdominal pain. She also admits to not taking any of her anti-nausea medications.     ORAL CHEMOTHERAPY 6/19/2017   Drug Name Xeloda (Capecitabine)  "  Current Dosage 2000mg   Current Schedule BID   Cycle Details 2 weeks on 1 week off   Start Date of Last Cycle 6/13/2017   Planned next cycle start date 7/4/2017   Doses missed in last 2 weeks 2   Adherence Assessment Non-adherent   Reason for Non-adherence Felt drug was toxic/harmful   Adherence Intervention Recommended Medication education   Adverse Effects Nausea;Other (see note for details)   Nausea Grade 2   Pharmacist Intervention(nausea) Yes   Intervention(s) OTC recommendation   Other (see note for details) (No Data)   Pharmacist intervention? Yes   Intervention(s) Rx medication recommendation   Any new drug interactions? No   Is the patient currently in pain? Yes   Does the patient feel the pain is currently being managed by a provider? Yes   Has the patient been assessed within the past 6 months for depression? Yes       Vitals:  BP:   BP Readings from Last 1 Encounters:   06/18/17 124/78     Wt Readings from Last 1 Encounters:   06/18/17 91.6 kg (202 lb)     Estimated body surface area is 2.07 meters squared as calculated from the following:    Height as of 6/13/17: 1.676 m (5' 6\").    Weight as of 6/18/17: 91.6 kg (202 lb).    Labs:  Lab Results   Component Value Date     06/18/2017      Lab Results   Component Value Date    POTASSIUM 3.4 06/18/2017     Lab Results   Component Value Date    KADIE 9.7 06/18/2017     Lab Results   Component Value Date    ALBUMIN 3.9 06/18/2017       Lab Results   Component Value Date    BUN 17 06/18/2017     Lab Results   Component Value Date    CR 1.06 06/18/2017       Lab Results   Component Value Date    AST 33 06/18/2017     Lab Results   Component Value Date    ALT 36 06/18/2017     Lab Results   Component Value Date    BILITOTAL 0.7 06/18/2017       Lab Results   Component Value Date    WBC 5.7 06/18/2017     Lab Results   Component Value Date    HGB 14.2 06/18/2017     Lab Results   Component Value Date     06/18/2017     Lab Results   Component Value " Date    ANEU 3.3 06/18/2017       Assessment/Plan  Damari is not tolerating the capecitabine. Educated patient on the use of anti-nausea medications. Suggested taking ondansetron 30 minutes before each dose of capecitabine. Continue taking the capecitabine with food and a full glass of water. In addition, if she continues to have symptoms, to take either famotidine or ranitidine. If needed, take an anti-nausea medication (ondansetrons or prochlorperazine) and an H2 blocker (ranitidine or famotidine) 30 minutes prior to each dose of capecitabine. Explained this information to both Damari and Ross and answered all their questions to their satisfaction.       Follow-Up:  Will call in 2 days (6/21) to see how Damari is doing with the interventions that were suggested today.       Tiff Humphreys, Pharm. D.

## 2017-06-20 ENCOUNTER — ANTICOAGULATION THERAPY VISIT (OUTPATIENT)
Dept: PEDIATRICS | Facility: CLINIC | Age: 72
End: 2017-06-20
Payer: COMMERCIAL

## 2017-06-20 ENCOUNTER — ONCOLOGY VISIT (OUTPATIENT)
Dept: ONCOLOGY | Facility: CLINIC | Age: 72
End: 2017-06-20
Payer: COMMERCIAL

## 2017-06-20 VITALS — HEART RATE: 75 BPM | TEMPERATURE: 97.2 F | SYSTOLIC BLOOD PRESSURE: 132 MMHG | DIASTOLIC BLOOD PRESSURE: 83 MMHG

## 2017-06-20 VITALS
BODY MASS INDEX: 32.61 KG/M2 | HEIGHT: 66 IN | HEART RATE: 89 BPM | DIASTOLIC BLOOD PRESSURE: 88 MMHG | RESPIRATION RATE: 15 BRPM | WEIGHT: 202.9 LBS | TEMPERATURE: 97.1 F | SYSTOLIC BLOOD PRESSURE: 129 MMHG

## 2017-06-20 DIAGNOSIS — Z79.01 LONG-TERM (CURRENT) USE OF ANTICOAGULANTS: ICD-10-CM

## 2017-06-20 DIAGNOSIS — R10.13 ABDOMINAL PAIN, EPIGASTRIC: Primary | ICD-10-CM

## 2017-06-20 DIAGNOSIS — I82.90 DEEP VEIN THROMBOSIS (DVT) OF NON-EXTREMITY VEIN, UNSPECIFIED CHRONICITY: ICD-10-CM

## 2017-06-20 DIAGNOSIS — F41.9 ANXIETY: ICD-10-CM

## 2017-06-20 DIAGNOSIS — C79.9 METASTASIS FROM COLON CANCER (H): ICD-10-CM

## 2017-06-20 DIAGNOSIS — C18.9 METASTASIS FROM COLON CANCER (H): ICD-10-CM

## 2017-06-20 DIAGNOSIS — Z79.01 LONG TERM CURRENT USE OF ANTICOAGULANT THERAPY: ICD-10-CM

## 2017-06-20 DIAGNOSIS — C18.9 COLON CANCER (H): Primary | ICD-10-CM

## 2017-06-20 DIAGNOSIS — I82.90 ACUTE DEEP VEIN THROMBOSIS (DVT) OF NON-EXTREMITY VEIN: ICD-10-CM

## 2017-06-20 DIAGNOSIS — E87.6 HYPOKALEMIA: ICD-10-CM

## 2017-06-20 DIAGNOSIS — R11.0 NAUSEA: ICD-10-CM

## 2017-06-20 LAB
ALBUMIN SERPL-MCNC: 3.9 G/DL (ref 3.4–5)
ALP SERPL-CCNC: 72 U/L (ref 40–150)
ALT SERPL W P-5'-P-CCNC: 33 U/L (ref 0–50)
ANION GAP SERPL CALCULATED.3IONS-SCNC: 9 MMOL/L (ref 3–14)
AST SERPL W P-5'-P-CCNC: 30 U/L (ref 0–45)
BASOPHILS # BLD AUTO: 0 10E9/L (ref 0–0.2)
BASOPHILS NFR BLD AUTO: 0.5 %
BILIRUB SERPL-MCNC: 0.7 MG/DL (ref 0.2–1.3)
BUN SERPL-MCNC: 15 MG/DL (ref 7–30)
CALCIUM SERPL-MCNC: 9.3 MG/DL (ref 8.5–10.1)
CHLORIDE SERPL-SCNC: 101 MMOL/L (ref 94–109)
CO2 SERPL-SCNC: 26 MMOL/L (ref 20–32)
CREAT SERPL-MCNC: 1.03 MG/DL (ref 0.52–1.04)
DIFFERENTIAL METHOD BLD: ABNORMAL
EOSINOPHIL # BLD AUTO: 0.2 10E9/L (ref 0–0.7)
EOSINOPHIL NFR BLD AUTO: 2.9 %
ERYTHROCYTE [DISTWIDTH] IN BLOOD BY AUTOMATED COUNT: 16.1 % (ref 10–15)
GFR SERPL CREATININE-BSD FRML MDRD: 53 ML/MIN/1.7M2
GLUCOSE SERPL-MCNC: 228 MG/DL (ref 70–99)
HCT VFR BLD AUTO: 42.1 % (ref 35–47)
HGB BLD-MCNC: 14.1 G/DL (ref 11.7–15.7)
INR BLD: 1.8 (ref 0.86–1.14)
LYMPHOCYTES # BLD AUTO: 2 10E9/L (ref 0.8–5.3)
LYMPHOCYTES NFR BLD AUTO: 32.9 %
MCH RBC QN AUTO: 31.6 PG (ref 26.5–33)
MCHC RBC AUTO-ENTMCNC: 33.5 G/DL (ref 31.5–36.5)
MCV RBC AUTO: 94 FL (ref 78–100)
MONOCYTES # BLD AUTO: 0.4 10E9/L (ref 0–1.3)
MONOCYTES NFR BLD AUTO: 6.2 %
NEUTROPHILS # BLD AUTO: 3.4 10E9/L (ref 1.6–8.3)
NEUTROPHILS NFR BLD AUTO: 57.5 %
PLATELET # BLD AUTO: 148 10E9/L (ref 150–450)
POTASSIUM SERPL-SCNC: 3.1 MMOL/L (ref 3.4–5.3)
PROT SERPL-MCNC: 7.7 G/DL (ref 6.8–8.8)
RBC # BLD AUTO: 4.46 10E12/L (ref 3.8–5.2)
SODIUM SERPL-SCNC: 136 MMOL/L (ref 133–144)
WBC # BLD AUTO: 6 10E9/L (ref 4–11)

## 2017-06-20 PROCEDURE — 85610 PROTHROMBIN TIME: CPT | Mod: QW | Performed by: INTERNAL MEDICINE

## 2017-06-20 PROCEDURE — 96366 THER/PROPH/DIAG IV INF ADDON: CPT

## 2017-06-20 PROCEDURE — 36416 COLLJ CAPILLARY BLOOD SPEC: CPT | Performed by: INTERNAL MEDICINE

## 2017-06-20 PROCEDURE — 85025 COMPLETE CBC W/AUTO DIFF WBC: CPT | Performed by: INTERNAL MEDICINE

## 2017-06-20 PROCEDURE — 99207 ZZC NO CHARGE NURSE ONLY: CPT | Performed by: FAMILY MEDICINE

## 2017-06-20 PROCEDURE — 96365 THER/PROPH/DIAG IV INF INIT: CPT

## 2017-06-20 PROCEDURE — 99207 ZZC NO CHARGE NURSE ONLY: CPT

## 2017-06-20 PROCEDURE — 80053 COMPREHEN METABOLIC PANEL: CPT | Performed by: INTERNAL MEDICINE

## 2017-06-20 PROCEDURE — 99214 OFFICE O/P EST MOD 30 MIN: CPT | Mod: 25 | Performed by: NURSE PRACTITIONER

## 2017-06-20 RX ORDER — SODIUM CHLORIDE 9 MG/ML
1000 INJECTION, SOLUTION INTRAVENOUS CONTINUOUS
Status: DISCONTINUED | OUTPATIENT
Start: 2017-06-20 | End: 2017-06-20

## 2017-06-20 RX ORDER — HEPARIN SODIUM (PORCINE) LOCK FLUSH IV SOLN 100 UNIT/ML 100 UNIT/ML
5 SOLUTION INTRAVENOUS
Status: DISCONTINUED | OUTPATIENT
Start: 2017-06-20 | End: 2017-06-20 | Stop reason: HOSPADM

## 2017-06-20 RX ORDER — SODIUM CHLORIDE 9 MG/ML
1000 INJECTION, SOLUTION INTRAVENOUS CONTINUOUS
Status: CANCELLED
Start: 2017-06-20

## 2017-06-20 RX ADMIN — HEPARIN SODIUM (PORCINE) LOCK FLUSH IV SOLN 100 UNIT/ML 5 ML: 100 SOLUTION at 15:38

## 2017-06-20 RX ADMIN — HEPARIN SODIUM (PORCINE) LOCK FLUSH IV SOLN 100 UNIT/ML 5 ML: 100 SOLUTION at 12:26

## 2017-06-20 NOTE — MR AVS SNAPSHOT
Damari Bryant   6/20/2017   Anticoagulation Therapy Visit    Description:  72 year old female   Provider:  Samuel Cobian   Department:  [facility]p/Im/Peds           INR as of 6/20/2017     Today's INR 1.8!      Anticoagulation Summary as of 6/20/2017     INR goal 2.0-3.0   Today's INR 1.8!   Full instructions 6/21: 5 mg; Otherwise 7.5 mg on Wed; 5 mg all other days   Next INR check 6/23/2017    Indications   Deep vein thrombosis (DVT) (H) [I82.409] [I82.409]  Long-term (current) use of anticoagulants [Z79.01] [Z79.01]         June 2017 Details    Sun Mon Tue Wed Thu Fri Sat         1               2               3                 4               5               6               7               8               9               10                 11               12               13               14               15               16               17                 18               19               20      5 mg   See details      21      5 mg         22      5 mg         23            24                 25               26               27               28               29               30                 Date Details   06/20 This INR check       Date of next INR:  6/23/2017         How to take your warfarin dose     To take:  5 mg Take 1 of the 5 mg tablets.

## 2017-06-20 NOTE — MR AVS SNAPSHOT
After Visit Summary   6/20/2017    Damari Bryant    MRN: [id number]           Patient Information     Date Of Birth          1945        Visit Information        Provider Department      6/20/2017 12:45 PM Chrystal Morel APRN CNP [facility]        Today's Diagnoses     Abdominal pain, epigastric    -  1    Metastasis from colon cancer (H)        Hypokalemia        Anxiety        Acute deep vein thrombosis (DVT) of non-extremity vein        Long term current use of anticoagulant therapy           Follow-ups after your visit        Your next 10 appointments already scheduled     Jul 03, 2017  8:30 AM CDT   Return Visit with [facility]   [facility]    [address] [place] [address]   [phone]            Jul 03, 2017  8:45 AM CDT   Return Visit with SANJEEV Camarillo CNP   [facility] ([facility])    [address] [place] [address]   [phone]            Jul 03, 2017  9:30 AM CDT   Level 1 with [place] [facility])    [address] [facility] [address]   [phone]            Jul 25, 2017  8:45 AM CDT   Return Visit with [facility]SE ONLY CANCER CENTER   [facility] ([facility])    [address] [facility] [address]   [phone]            Jul 25, 2017  9:30 AM CDT   Return Visit with Esther Greene MD   [facility])    [address] [facility] [address]   [phone]            Jul 25, 2017 10:00 AM CDT   Level 1 with [place] [facility])    [address] [place] [address] [phone]              Who to contact     If you have questions or need follow up information about today's clinic  "visit or your schedule please contact [facility] directly at [phone].  Normal or non-critical lab and imaging results will be communicated to you by MyChart, letter or phone within 4 business days after the clinic has received the results. If you do not hear from us within 7 days, please contact the clinic through MyChart or phone. If you have a critical or abnormal lab result, we will notify you by phone as soon as possible.  Submit refill requests through [employer] or call your pharmacy and they will forward the refill request to us. Please allow 3 business days for your refill to be completed.          Additional Information About Your Visit        Nu[employer]hart Information     Al[employer].ni is an electronic gateway that provides easy, online access to your medical records. With [email or web address], you can request a clinic appointment, read your test results, renew a prescription or communicate with your care team.     To sign up for [email or web address] visit the website at [email or web address]   You will be asked to enter the access code listed below, as well as some personal information. Please follow the directions to create your username and password.     Your access code is: [email or web address]  Expires: 2017  8:08 AM     Your access code will  in 90 days. If you need help or a new code, please contact your [facility] or call [phone] for assistance.        Care EveryWhere ID     This is your Care EveryWhere ID. This could be used by other organizations to access your [facility] medical records  [id number]        Your Vitals Were     Pulse Temperature Respirations Height BMI (Body Mass Index)       89 97.1  F (36.2  C) 15 1.676 m (5' 5.98\") 32.76 kg/m2        Blood Pressure from Last 3 Encounters:   17 129/88   17 124/78   17 136/84    Weight from Last 3 Encounters:   17 92 kg (202 lb 14.4 oz)   17 91.6 kg (202 lb)   17 93.2 kg (205 lb 8 oz) "              We Performed the Following     INR point of care        Primary Care Provider Office Phone # Fax #    Samuel Cobian [phone]       [facility] [address] [facility]  [place] [address]        Thank you!     Thank you for choosing [facility]  for your care. Our goal is always to provide you with excellent care. Hearing back from our patients is one way we can continue to improve our services. Please take a few minutes to complete the written survey that you may receive in the mail after your visit with us. Thank you!             Your Updated Medication List - Protect others around you: Learn how to safely use, store and throw away your medicines at www.disposemymeds.org.          This list is accurate as of: 6/20/17  2:24 PM.  Always use your most recent med list.                   Brand Name Dispense Instructions for use    busPIRone 15 MG tablet    BUSPAR     Take 15 mg by mouth 2 times daily       capecitabine 500 MG tablet CHEMO    XELODA    112 tablet    Take 4 tablets (2,000 mg) by mouth 2 times daily for 14 days Take by mouth Twice daily for 14 days then 7 days off       levothyroxine 150 MCG tablet    SYNTHROID/LEVOTHROID     Take 150 mcg by mouth daily       LEXAPRO 20 MG tablet   Generic drug:  escitalopram      Take 20 mg by mouth daily.       loperamide 2 MG capsule    IMODIUM    30 capsule    Start with 2 caps (4 mg), then take one cap (2 mg) after each diarrheal stool as needed. Do not use more than 8 caps (16 mg) per day.       NORVASC 5 MG tablet   Generic drug:  amLODIPine      Take 5 mg by mouth daily       OMEGA-3 FISH OIL PO          ondansetron 8 MG tablet    ZOFRAN    30 tablet    Take 1 tablet (8 mg) by mouth every 8 hours as needed (Nausea/Vomiting)       Potassium Chloride ER 20 MEQ Tbcr     3 tablet    Take 1 tablet every 2 hours       senna-docusate 8.6-50 MG per tablet    SENNA S    60 tablet    1-2 tabs daily for constipation        sucralfate 1 GM tablet    CARAFATE    40 tablet    Take 1 tablet (1 g) by mouth 4 times daily Mix with 15cc of water and then drink slurry       tetrahydrozoline 0.05 % ophthalmic solution      1 drop 3 times daily       TOPAMAX 50 MG tablet   Generic drug:  topiramate      Take  by mouth 2 times daily. 1 tab q am 2 tabs q PM       triamterene-hydrochlorothiazide 37.5-25 MG per tablet    MAXZIDE-25     Take 1 tablet by mouth daily.       UNABLE TO FIND      Reported on 5/8/2017       VITAMIN D (CHOLECALCIFEROL) PO      Take 2,000 Units by mouth daily Reported on 5/8/2017       warfarin 5 MG tablet    COUMADIN    60 tablet    Take 1 tablet (5 mg) by mouth daily

## 2017-06-20 NOTE — MR AVS SNAPSHOT
After Visit Summary   6/20/2017    Damari Bryant    MRN: [id number]           Patient Information     Date Of Birth          1945        Visit Information        Provider Department      6/20/2017 12:15 PM NURSE ONLY CANCER CENTER [facility]        Today's Diagnoses     Metastasis from colon cancer (H)        Nausea           Follow-ups after your visit        Your next 10 appointments already scheduled     Jun 20, 2017 12:45 PM CDT   Return Visit with SANJEEV Camarillo CNP   [facility] ([facility])    [address] [facility] [address]   [phone]            Jul 03, 2017  8:30 AM CDT   Return Visit with NURSE ONLY CANCER CENTER   [facility] ([facility])    [address] [facility] [address]   [phone]            Jul 03, 2017  8:45 AM CDT   Return Visit with SANJEEV Camarillo CNP   [facility] ([facility])    [address] [facility] [address]   [phone]            Jul 03, 2017  9:30 AM CDT   Level 1 with [facility] ([facility])    [address] [facility] [address]   [phone]            Jul 25, 2017  8:45 AM CDT   Return Visit with NURSE ONLY CANCER CENTER   [facility] ([facility])    [address] [facility] [address]   [phone]            Jul 25, 2017  9:30 AM CDT   Return Visit with Esther Greene MD   [facility] ([facility])    [address] [facility] [address]   [phone]            Jul 25, 2017 10:00 AM CDT   Level 1 with [facility] ([facility])    [address] [facility] [address]   [phone]              Who to contact     If you have questions or need follow up  information about today's clinic visit or your schedule please contact [facility] directly at [phone].  Normal or non-critical lab and imaging results will be communicated to you by Above Securityhart, letter or phone within 4 business days after the clinic has received the results. If you do not hear from us within 7 days, please contact the clinic through Above Securityhart or phone. If you have a critical or abnormal lab result, we will notify you by phone as soon as possible.  Submit refill requests through Placely or call your pharmacy and they will forward the refill request to us. Please allow 3 business days for your refill to be completed.          Additional Information About Your Visit        Above SecurityharStarline Promotions Information     Placely is an electronic gateway that provides easy, online access to your medical records. With Placely, you can request a clinic appointment, read your test results, renew a prescription or communicate with your care team.     To sign up for Placely visit the website at [email or web address]   You will be asked to enter the access code listed below, as well as some personal information. Please follow the directions to create your username and password.     Your access code is: [email or web address]  Expires: 2017  8:08 AM     Your access code will  in 90 days. If you need help or a new code, please contact your [facility] or call [phone] for assistance.        Care EveryWhere ID     This is your Care EveryWhere ID. This could be used by other organizations to access your [facility] medical records  [id number]         Blood Pressure from Last 3 Encounters:   17 124/78   17 136/84   17 139/88    Weight from Last 3 Encounters:   17 91.6 kg (202 lb)   17 93.2 kg (205 lb 8 oz)   17 94.4 kg (208 lb 3.2 oz)              We Performed the Following     *CBC with platelets differential     Comprehensive metabolic panel         Primary Care Provider Office Phone # Fax #    Samuel Cobian [phone]       [facility] [address] [facility]  [place] [address]        Thank you!     Thank you for choosing [facility]  for your care. Our goal is always to provide you with excellent care. Hearing back from our patients is one way we can continue to improve our services. Please take a few minutes to complete the written survey that you may receive in the mail after your visit with us. Thank you!             Your Updated Medication List - Protect others around you: Learn how to safely use, store and throw away your medicines at www.disposemymeds.org.          This list is accurate as of: 6/20/17 12:27 PM.  Always use your most recent med list.                   Brand Name Dispense Instructions for use    busPIRone 15 MG tablet    BUSPAR     Take 15 mg by mouth 2 times daily       capecitabine 500 MG tablet CHEMO    XELODA    112 tablet    Take 4 tablets (2,000 mg) by mouth 2 times daily for 14 days Take by mouth Twice daily for 14 days then 7 days off       levothyroxine 150 MCG tablet    SYNTHROID/LEVOTHROID     Take 150 mcg by mouth daily       LEXAPRO 20 MG tablet   Generic drug:  escitalopram      Take 20 mg by mouth daily.       loperamide 2 MG capsule    IMODIUM    30 capsule    Start with 2 caps (4 mg), then take one cap (2 mg) after each diarrheal stool as needed. Do not use more than 8 caps (16 mg) per day.       NORVASC 5 MG tablet   Generic drug:  amLODIPine      Take 5 mg by mouth daily       OMEGA-3 FISH OIL PO          ondansetron 8 MG tablet    ZOFRAN    30 tablet    Take 1 tablet (8 mg) by mouth every 8 hours as needed (Nausea/Vomiting)       Potassium Chloride ER 20 MEQ Tbcr     3 tablet    Take 1 tablet every 2 hours       senna-docusate 8.6-50 MG per tablet    SENNA S    60 tablet    1-2 tabs daily for constipation       sucralfate 1 GM tablet    CARAFATE    40 tablet    Take 1 tablet (1  g) by mouth 4 times daily Mix with 15cc of water and then drink slurry       tetrahydrozoline 0.05 % ophthalmic solution      1 drop 3 times daily       TOPAMAX 50 MG tablet   Generic drug:  topiramate      Take  by mouth 2 times daily. 1 tab q am 2 tabs q PM       triamterene-hydrochlorothiazide 37.5-25 MG per tablet    MAXZIDE-25     Take 1 tablet by mouth daily.       UNABLE TO FIND      Reported on 5/8/2017       VITAMIN D (CHOLECALCIFEROL) PO      Take 2,000 Units by mouth daily Reported on 5/8/2017       warfarin 5 MG tablet    COUMADIN    60 tablet    Take 1 tablet (5 mg) by mouth daily

## 2017-06-20 NOTE — PROGRESS NOTES
Infusion Nursing Note:  Damari Bryant presents today for IVF/KCL.    Patient seen by provider today: Yes: Chrystal Morel CNP    present during visit today: Not Applicable.    Note: N/A.    Intravenous Access:  Implanted Port.    Treatment Conditions:  Lab Results   Component Value Date     06/20/2017                   Lab Results   Component Value Date    POTASSIUM 3.1 06/20/2017           Lab Results   Component Value Date    MAG 2.1 08/23/2013            Lab Results   Component Value Date    CR 1.03 06/20/2017                   Lab Results   Component Value Date    KADIE 9.3 06/20/2017                Lab Results   Component Value Date    BILITOTAL 0.7 06/20/2017           Lab Results   Component Value Date    ALBUMIN 3.9 06/20/2017                    Lab Results   Component Value Date    ALT 33 06/20/2017           Lab Results   Component Value Date    AST 30 06/20/2017         Post Infusion Assessment:  Patient tolerated infusion without incident.  Blood return noted pre and post infusion.  Site patent and intact, free from redness, edema or discomfort.  No evidence of extravasations.  Access discontinued per protocol.    Discharge Plan:   AVS to patient via MYCHART.  Patient will return 7/3/17 for next appointment.   Patient discharged in stable condition accompanied by: .  Departure Mode: Wheelchair.    ANDRÉS DOVER RN

## 2017-06-20 NOTE — MR AVS SNAPSHOT
After Visit Summary   6/20/2017    Damari Bryant    MRN: [id number]           Patient Information     Date Of Birth          1945        Visit Information        Provider Department      6/20/2017 1:30 PM NURSE ONLY CANCER CENTER [facility]        Today's Diagnoses     Colon cancer (H)    -  1    Hypokalemia           Follow-ups after your visit        Your next 10 appointments already scheduled     Jul 03, 2017  8:30 AM CDT   Return Visit with NURSE ONLY CANCER CENTER   [facility] ([facility])    [address] [address]   [phone]            Jul 03, 2017  8:45 AM CDT   Return Visit with SANJEEV Camarillo CNP   [facility] ([facility])    [address] [place] [address] [phone]            Jul 03, 2017  9:30 AM CDT   Level 1 with [address] [facility]   [facility])    [address] [place] [address] [phone]            Jul 25, 2017  8:45 AM CDT   Return Visit with NURSE ONLY CANCER CENTER   [facility] ([facility])    [address] [facility] [address]   [phone]            Jul 25, 2017  9:30 AM CDT   Return Visit with Esther Greene MD   [facility])    [address] [facility] [address] [phone]            Jul 25, 2017 10:00 AM CDT   Level 1 with [address] [facility]   [facility])    [address] [facility] [address] [phone]              Who to contact     If you have questions or need follow up information about today's clinic visit or your schedule please contact [facility] directly at [phone].  Normal or non-critical lab and imaging results will be communicated to you by Pierce, letter  or phone within 4 business days after the clinic has received the results. If you do not hear from us within 7 days, please contact the clinic through Pricelock or phone. If you have a critical or abnormal lab result, we will notify you by phone as soon as possible.  Submit refill requests through Pricelock or call your pharmacy and they will forward the refill request to us. Please allow 3 business days for your refill to be completed.          Additional Information About Your Visit        Pricelock Information     Pricelock is an electronic gateway that provides easy, online access to your medical records. With Pricelock, you can request a clinic appointment, read your test results, renew a prescription or communicate with your care team.     To sign up for Pricelock visit the website at [email or web address]   You will be asked to enter the access code listed below, as well as some personal information. Please follow the directions to create your username and password.     Your access code is: [email or web address]  Expires: 2017  8:08 AM     Your access code will  in 90 days. If you need help or a new code, please contact your [facility] or call [phone] for assistance.        Care EveryWhere ID     This is your Care EveryWhere ID. This could be used by other organizations to access your [facility] medical records  [id number]        Your Vitals Were     Pulse Temperature                75 97.2  F (36.2  C) (Oral)           Blood Pressure from Last 3 Encounters:   17 132/83   17 129/88   17 124/78    Weight from Last 3 Encounters:   17 92 kg (202 lb 14.4 oz)   17 91.6 kg (202 lb)   17 93.2 kg (205 lb 8 oz)              Today, you had the following     No orders found for display       Primary Care Provider Office Phone # Fax #    Samuel Cobian [phone]       [facility] [address] [facility] [place] [address]        Thank  you!     Thank you for choosing [facility]  for your care. Our goal is always to provide you with excellent care. Hearing back from our patients is one way we can continue to improve our services. Please take a few minutes to complete the written survey that you may receive in the mail after your visit with us. Thank you!             Your Updated Medication List - Protect others around you: Learn how to safely use, store and throw away your medicines at www.disposemymeds.org.          This list is accurate as of: 6/20/17  3:41 PM.  Always use your most recent med list.                   Brand Name Dispense Instructions for use    busPIRone 15 MG tablet    BUSPAR     Take 15 mg by mouth 2 times daily       capecitabine 500 MG tablet CHEMO    XELODA    112 tablet    Take 4 tablets (2,000 mg) by mouth 2 times daily for 14 days Take by mouth Twice daily for 14 days then 7 days off       levothyroxine 150 MCG tablet    SYNTHROID/LEVOTHROID     Take 150 mcg by mouth daily       LEXAPRO 20 MG tablet   Generic drug:  escitalopram      Take 20 mg by mouth daily.       loperamide 2 MG capsule    IMODIUM    30 capsule    Start with 2 caps (4 mg), then take one cap (2 mg) after each diarrheal stool as needed. Do not use more than 8 caps (16 mg) per day.       NORVASC 5 MG tablet   Generic drug:  amLODIPine      Take 5 mg by mouth daily       OMEGA-3 FISH OIL PO          ondansetron 8 MG tablet    ZOFRAN    30 tablet    Take 1 tablet (8 mg) by mouth every 8 hours as needed (Nausea/Vomiting)       Potassium Chloride ER 20 MEQ Tbcr     3 tablet    Take 1 tablet every 2 hours       senna-docusate 8.6-50 MG per tablet    SENNA S    60 tablet    1-2 tabs daily for constipation       sucralfate 1 GM tablet    CARAFATE    40 tablet    Take 1 tablet (1 g) by mouth 4 times daily Mix with 15cc of water and then drink slurry       tetrahydrozoline 0.05 % ophthalmic solution      1 drop 3 times daily       TOPAMAX 50 MG  tablet   Generic drug:  topiramate      Take  by mouth 2 times daily. 1 tab q am 2 tabs q PM       triamterene-hydrochlorothiazide 37.5-25 MG per tablet    MAXZIDE-25     Take 1 tablet by mouth daily.       UNABLE TO FIND      Reported on 5/8/2017       VITAMIN D (CHOLECALCIFEROL) PO      Take 2,000 Units by mouth daily Reported on 5/8/2017       warfarin 5 MG tablet    COUMADIN    60 tablet    Take 1 tablet (5 mg) by mouth daily

## 2017-06-20 NOTE — PROGRESS NOTES
ANTICOAGULATION FOLLOW-UP CLINIC VISIT    Patient Name:  Damari Bryant  Date:  6/20/2017  Contact Type:  Face to Face at Oncology unit    SUBJECTIVE:     Patient Findings     Positives Other complaints    Comments Spoke with pt's  Ross, pt will no longer be getting Xelado chemo agent. Ross also reports that pt may be done with chemo, they will find out in the next couple weeks.            OBJECTIVE    INR Point of Care   Date Value Ref Range Status   06/20/2017 1.8 (H) 0.86 - 1.14 Final     Comment:     This test is intended for monitoring Coumadin therapy.  Results are not   accurate   in patients with prolonged INR due to factor deficiency.         ASSESSMENT / PLAN  INR assessment SUB    Recheck INR In: 3 DAYS    INR Location Outside lab      Anticoagulation Summary as of 6/20/2017     INR goal 2.0-3.0   Today's INR 1.8!   Maintenance plan 7.5 mg (5 mg x 1.5) on Wed; 5 mg (5 mg x 1) all other days   Full instructions 6/21: 5 mg; Otherwise 7.5 mg on Wed; 5 mg all other days   Weekly total 37.5 mg   Plan last modified Mary Berry RN (3/23/2017)   Next INR check 6/23/2017   Priority INR   Target end date 8/14/2017    Indications   Deep vein thrombosis (DVT) (H) [I82.409] [I82.409]  Long-term (current) use of anticoagulants [Z79.01] [Z79.01]         Anticoagulation Episode Summary     INR check location     Preferred lab [facility]    Send INR reminders to [facility] INR    Comments Chemo infusion pump for 48 hours every 2 weeks.  Her INR rises during this infusion. Ross's cell phone for INR results [phone].   Per Esther Greene MD- Goal of 2-3.      Anticoagulation Care Providers     Provider Role Specialty Phone number    Esther Greene MD Responsible Hematology [phone]            See the Encounter Report to view Anticoagulation Flowsheet and Dosing Calendar (Go to Encounters tab in chart review, and find the Anticoagulation Therapy Visit)      Paulina Shaver RN

## 2017-06-20 NOTE — PROGRESS NOTES
Oncology Follow Up Visit: June 20, 2017      Oncologist: Dr Esther Greene  PCP: Samuel Cobian    Diagnosis: Mestastatic colon cancer  Damari Bryant is a 71 yo  female diagnosed July 2013 with Stage IIIB pT3N1b MX colon cancer.  Final pathology showed a mildly differentiated colonic adenocarcinoma, tumor extended through the muscularis propria into the pericolonic adipose tissue, lymphovascular invasion present.  Margins negative.  Three out of 30 lymph nodes were positive with 0.5 cm in greatest dimension and extracapsular extension identified.  DNA mismatch repair enzymes were intact.  Two pulmonary nodules noted on staging CT, indeterminate.   Treatment:  7/21/2013.  Sigmoid colectomy.   8/2013 to 1/2014  Adjuvant FOLFOX x 12 cycles.   2/2014 to 12/2016 observation  1/3/2017 -  FOLFIRI/Avastin with new site to lung    INTERVAL HISTORY:  Ms Bryant comes to clinic with , Ross for review after ER visit this weekend for dehydration and poor tolerance to new use of Xeloda.Pt shares that from first administration of the Xeloda she has had burning of the esophagus and stomach. This has been causing more nausea and decrease fluid and food intake and lead to dehydration and ER visit. Pt was asked to trial eating first and using antiemetic and then taking pills and use of other antiemetics as needed however pt felt this was not any better. C/o poor quality of life with the oral pills and feeling poorly - pt with tears in eyes. Rating pain to epigastric area at 5-6/10 with nausea, some loose stools and feels memory and brain function affected by this plan- wants to stop plan.   Remainder of her comprehensive review of systems is negative.   Current Outpatient Prescriptions   Medication     ondansetron (ZOFRAN) 8 MG tablet     sucralfate (CARAFATE) 1 GM tablet     loperamide (IMODIUM) 2 MG capsule     warfarin (COUMADIN) 5 MG tablet     senna-docusate (SENNA S) 8.6-50 MG per tablet     Omega-3 Fatty Acids  "(OMEGA-3 FISH OIL PO)     tetrahydrozoline 0.05 % ophthalmic solution     VITAMIN D, CHOLECALCIFEROL, PO     busPIRone (BUSPAR) 15 MG tablet     amLODIPine (NORVASC) 5 MG tablet     escitalopram (LEXAPRO) 20 MG tablet     levothyroxine (SYNTHROID, LEVOTHROID) 150 MCG tablet     topiramate (TOPAMAX) 50 MG tablet     triamterene-hydrochlorothiazide (MAXZIDE-25) 37.5-25 MG per tablet     Potassium Chloride ER 20 MEQ TBCR     capecitabine (XELODA) 500 MG tablet CHEMO     UNABLE TO FIND     No current facility-administered medications for this visit.      Allergies   Allergen Reactions     Ativan Other (See Comments)     hallucinations     Avocado Nausea and Vomiting     Hmg-Coa-R Inhibitors Nausea     Iodine-131 Nausea and Vomiting     Shellfish Allergy GI Disturbance     Sulfa Drugs Nausea and Vomiting     Tegaderm Transparent Dressing (Informational Only) Rash      PHYSICAL EXAMINATION:/88  Pulse 89  Temp 97.1  F (36.2  C)  Resp 15  Ht 1.676 m (5' 5.98\")  Wt 92 kg (202 lb 14.4 oz)  BMI 32.76 kg/m2   Constitutional: Alert and Cooperative. Appears distressed from situation  ENT: Eyes bright, No mouth sores  Neck: Supple, No adenopathy.Thyroid symmetric   Right port site without redness or swelling  Cardiac: Heart rate and rhythm is regular and strong without murmur  Respiratory: Breathing easy. Lung sounds clear to auscultation- no cough  Abdomen: Soft, non-tender, BS normal.  No organomegaly  MS: Muscle tone normal- moving well. Extremities normal with no edema.   Skin: No suspicious lesions or rashes- skin turgor fair to poor  Neuro: Sensory grossly WNL, gait normal.   Lymph: Normal ant/post cervical, axillary, supraclavicular nodes  Psych: Mentation appears normal and affect depressed about changes to life with medication.     LABORATORY DATA:   Ref. Range 6/20/2017 12:20   Sodium Latest Ref Range: 133 - 144 mmol/L 136   Potassium Latest Ref Range: 3.4 - 5.3 mmol/L 3.1 (L)   Chloride Latest Ref Range: 94 " - 109 mmol/L 101   Carbon Dioxide Latest Ref Range: 20 - 32 mmol/L 26   Urea Nitrogen Latest Ref Range: 7 - 30 mg/dL 15   Creatinine Latest Ref Range: 0.52 - 1.04 mg/dL 1.03   GFR Estimate Latest Ref Range: >60 mL/min/1.7m2 53 (L)   GFR Estimate If Black Latest Ref Range: >60 mL/min/1.7m2 64   Calcium Latest Ref Range: 8.5 - 10.1 mg/dL 9.3   Anion Gap Latest Ref Range: 3 - 14 mmol/L 9   Albumin Latest Ref Range: 3.4 - 5.0 g/dL 3.9   Protein Total Latest Ref Range: 6.8 - 8.8 g/dL 7.7   Bilirubin Total Latest Ref Range: 0.2 - 1.3 mg/dL 0.7   Alkaline Phosphatase Latest Ref Range: 40 - 150 U/L 72   ALT Latest Ref Range: 0 - 50 U/L 33   AST Latest Ref Range: 0 - 45 U/L 30   Glucose Latest Ref Range: 70 - 99 mg/dL 228 (H)   WBC Latest Ref Range: 4.0 - 11.0 10e9/L 6.0   Hemoglobin Latest Ref Range: 11.7 - 15.7 g/dL 14.1   Hematocrit Latest Ref Range: 35.0 - 47.0 % 42.1   Platelet Count Latest Ref Range: 150 - 450 10e9/L 148 (L)   RBC Count Latest Ref Range: 3.8 - 5.2 10e12/L 4.46   MCV Latest Ref Range: 78 - 100 fl 94   MCH Latest Ref Range: 26.5 - 33.0 pg 31.6   MCHC Latest Ref Range: 31.5 - 36.5 g/dL 33.5   RDW Latest Ref Range: 10.0 - 15.0 % 16.1 (H)   Diff Method Unknown Automated Method   % Neutrophils Latest Units: % 57.5   % Lymphocytes Latest Units: % 32.9   % Monocytes Latest Units: % 6.2   % Eosinophils Latest Units: % 2.9   % Basophils Latest Units: % 0.5   Absolute Neutrophil Latest Ref Range: 1.6 - 8.3 10e9/L 3.4   Absolute Lymphocytes Latest Ref Range: 0.8 - 5.3 10e9/L 2.0   Absolute Monocytes Latest Ref Range: 0.0 - 1.3 10e9/L 0.4   Absolute Eosinophils Latest Ref Range: 0.0 - 0.7 10e9/L 0.2   Absolute Basophils Latest Ref Range: 0.0 - 0.2 10e9/L 0.0       ASSESSMENT AND PLAN:   Metastatic colon cancer- pt was changed to Xeloda 2000 mg bid and pt noted immediate side effects of burning to esophagus and epigastric region which affected intake and quality of life. Damari is feeling that she cannot tolerate  this treatment and does not want to go back to every other week treatment. She will stop Xeloda- does not want to decrease dosing either. She will have a vacation from treatment and review with Dr Greene at scheduled visit in July. Will need CBC and CMP with next visit.   Pt was in ER over weekend and fluids were given and today is noted to need fluids and potassium replacement.   Epigastric pain- feels burning to area. With the Xeolda discontinued for this cycle will ask her to continue the carafate since she feels this is helpful and use the PPI daily with small meals more often until appetite returns.   Hypokalemia/ poor oral intake- K+=3.1 today- will replace per electrolyte replacement plan per IV with 1 liter of fluid for help with decreased fluid intake.   Loose stools- using imodium prn and stools were improving but still loose.at times- aware to use imodium prn.  History of occlusive thrombus- pt using coumadin -monitored by coag clinic.  INR only 1.8 today- coag clinic to notify of directions.   Anxiety- pt using lexapro and buspar but is very anxious and teary today- states she feels good about stopping treatment for now but want pt to discuss after symptoms are improved before making this final. Pt did see Dr Rodriguez in recent past. .     Total Time =35 min with >50% in education and coordination of cares.    Chrystal Morel,CNP

## 2017-06-20 NOTE — NURSING NOTE
"Oncology Rooming Note    June 20, 2017 12:34 PM   Damari Bryant is a 72 year old female who presents for:    Chief Complaint   Patient presents with     Oncology Clinic Visit     follow up     Initial Vitals: /88  Pulse 89  Temp 97.1  F (36.2  C)  Resp 15  Ht 1.676 m (5' 5.98\")  Wt 92 kg (202 lb 14.4 oz)  BMI 32.76 kg/m2 Estimated body mass index is 32.76 kg/(m^2) as calculated from the following:    Height as of this encounter: 1.676 m (5' 5.98\").    Weight as of this encounter: 92 kg (202 lb 14.4 oz). Body surface area is 2.07 meters squared.  Data Unavailable Comment: Data Unavailable   No LMP recorded. Patient is postmenopausal.  Allergies reviewed: Yes  Medications reviewed: Yes    Medications: Medication refills not needed today.  Pharmacy name entered into [employer]:    [id number] - [place], [place] - [address] [facility] [address] [place], [place] - [address] [facility] [place]  [place] - [address] [facility]        5 minutes for nursing intake (face to face time)     Elmira Gerardo LPN              "

## 2017-06-22 ENCOUNTER — TELEPHONE (OUTPATIENT)
Dept: ONCOLOGY | Facility: CLINIC | Age: 72
End: 2017-06-22

## 2017-06-22 NOTE — TELEPHONE ENCOUNTER
Pt  left message stating that UTI that patient was treated for a few weeks ago has returned, as pt experiencing symptoms again.  He requests an Rx for this UTI be sent to local pharmacy.     Discussed this with Chrystal.    Called  explaining that best practice is not to do Rx without UA/UC as we want to make sure we are using the correct antibiotic.  I ask that he bring patient to local primary care provider or walk in clinic to see provider.  Pt agrees to plan  Damaris Whitehead RN

## 2017-06-23 ENCOUNTER — ANTICOAGULATION THERAPY VISIT (OUTPATIENT)
Dept: PHARMACY | Facility: CLINIC | Age: 72
End: 2017-06-23
Payer: COMMERCIAL

## 2017-06-23 DIAGNOSIS — Z79.01 LONG-TERM (CURRENT) USE OF ANTICOAGULANTS: ICD-10-CM

## 2017-06-23 DIAGNOSIS — I82.90 DEEP VEIN THROMBOSIS (DVT) OF NON-EXTREMITY VEIN, UNSPECIFIED CHRONICITY: ICD-10-CM

## 2017-06-23 LAB — INR PPP: 2.8

## 2017-06-23 PROCEDURE — 99207 ZZC NO CHARGE NURSE ONLY: CPT | Performed by: FAMILY MEDICINE

## 2017-06-23 NOTE — MR AVS SNAPSHOT
Damari Bryant   6/23/2017   Anticoagulation Therapy Visit    Description:  72 year old female   Provider:  Samuel Cobian   Department:   Med Monitoring           INR as of 6/23/2017     Today's INR 2.8      Anticoagulation Summary as of 6/23/2017     INR goal 2.0-3.0   Today's INR 2.8   Full instructions 6/23: 2.5 mg; 6/27: 2.5 mg; 6/28: 5 mg; 6/30: 2.5 mg; Otherwise 7.5 mg on Wed; 5 mg all other days   Next INR check 7/3/2017    Indications   Deep vein thrombosis (DVT) (H) [I82.409] [I82.409]  Long-term (current) use of anticoagulants [Z79.01] [Z79.01]         Contact Numbers     [facility]  Please call the anticoagulation nurse at [phone] to cancel and/or reschedule your appointment, or with any problems or questions regarding your therapy.  You may call the main clinic number at [phone] if the nurse is not available.           June 2017 Details    Sun Mon Tue Wed Thu Fri Sat         1               2               3                 4               5               6               7               8               9               10                 11               12               13               14               15               16               17                 18               19               20               21               22               23      2.5 mg   See details      24      5 mg           25      5 mg         26      5 mg         27      2.5 mg         28      5 mg         29      5 mg         30      2.5 mg           Date Details   06/23 This INR check               How to take your warfarin dose     To take:  2.5 mg Take 0.5 of a 5 mg tablet.    To take:  5 mg Take 1 of the 5 mg tablets.           July 2017 Details    Sun Mon Tue Wed Thu Fri Sat           1      5 mg           2      5 mg         3            4               5               6               7               8                 9               10               11               12               13                14               15                 16               17               18               19               20               21               22                 23               24               25               26               27               28               29                 30               31                     Date Details   No additional details    Date of next INR:  7/3/2017         How to take your warfarin dose     To take:  5 mg Take 1 of the 5 mg tablets.

## 2017-06-23 NOTE — PROGRESS NOTES
ANTICOAGULATION FOLLOW-UP CLINIC VISIT    Patient Name:  Damari Bryant  Date:  6/23/2017  Contact Type:  Telephone Spoke with Ross (pt's ), discussed INR result, questions/concerns addressed, dosing instructions with readback, and date of next INR.           SUBJECTIVE:     Patient Findings     Positives No Problem Findings    Comments Next chemo 7/3.    Due to the jump from 1.8-2.8 in just 2 days, there is some concern that pt may continue to rise. Especially, since pt does not have the best appetite with the chemo therapy. Going to taper dosage so that pt does not continue to rise.            OBJECTIVE    INR   Date Value Ref Range Status   06/23/2017 2.8  Final       ASSESSMENT / PLAN  INR assessment THER    Recheck INR In: 1 WEEK    INR Location Outside lab      Anticoagulation Summary as of 6/23/2017     INR goal 2.0-3.0   Today's INR 2.8   Maintenance plan 7.5 mg (5 mg x 1.5) on Wed; 5 mg (5 mg x 1) all other days   Full instructions 6/23: 2.5 mg; 6/27: 2.5 mg; 6/28: 5 mg; 6/30: 2.5 mg; Otherwise 7.5 mg on Wed; 5 mg all other days   Weekly total 37.5 mg   Plan last modified Mary Berry RN (3/23/2017)   Next INR check 7/3/2017   Priority INR   Target end date 8/14/2017    Indications   Deep vein thrombosis (DVT) (H) [I82.409] [I82.409]  Long-term (current) use of anticoagulants [Z79.01] [Z79.01]         Anticoagulation Episode Summary     INR check location     Preferred lab [facility]    Send INR reminders to [facility] INR    Comments Chemo infusion pump for 48 hours every 2 weeks.  Her INR rises during this infusion. Ross's cell phone for INR results [phone].   Per Esther Greene MD- Goal of 2-3.      Anticoagulation Care Providers     Provider Role Specialty Phone number    Esther Greene MD Responsible Hematology [phone]            See the Encounter Report to view Anticoagulation Flowsheet and Dosing Calendar (Go to Encounters tab in chart review, and find the  Anticoagulation Therapy Visit)      Paulina Shaver RN

## 2017-06-26 ENCOUNTER — DOCUMENTATION ONLY (OUTPATIENT)
Dept: PHARMACY | Facility: CLINIC | Age: 72
End: 2017-06-26

## 2017-06-26 DIAGNOSIS — I82.90 OCCLUSIVE THROMBUS: ICD-10-CM

## 2017-06-26 RX ORDER — WARFARIN SODIUM 5 MG/1
5 TABLET ORAL DAILY
Qty: 60 TABLET | Refills: 1 | Status: SHIPPED | OUTPATIENT
Start: 2017-06-26

## 2017-06-26 NOTE — TELEPHONE ENCOUNTER
Pending Prescriptions:                       Disp   Refills    warfarin (COUMADIN) 5 MG tablet           60 tab*1            Sig: Take 1 tablet (5 mg) by mouth daily    Last filled 4/20/17  Fela Carrizales LPN

## 2017-07-03 ENCOUNTER — ANTICOAGULATION THERAPY VISIT (OUTPATIENT)
Dept: PHARMACY | Facility: CLINIC | Age: 72
End: 2017-07-03
Payer: COMMERCIAL

## 2017-07-03 ENCOUNTER — ONCOLOGY VISIT (OUTPATIENT)
Dept: ONCOLOGY | Facility: CLINIC | Age: 72
End: 2017-07-03
Payer: COMMERCIAL

## 2017-07-03 ENCOUNTER — INFUSION THERAPY VISIT (OUTPATIENT)
Dept: INFUSION THERAPY | Facility: CLINIC | Age: 72
End: 2017-07-03
Payer: COMMERCIAL

## 2017-07-03 VITALS
OXYGEN SATURATION: 99 % | BODY MASS INDEX: 32.78 KG/M2 | HEIGHT: 66 IN | DIASTOLIC BLOOD PRESSURE: 89 MMHG | HEART RATE: 90 BPM | RESPIRATION RATE: 20 BRPM | SYSTOLIC BLOOD PRESSURE: 136 MMHG | TEMPERATURE: 97.3 F | WEIGHT: 204 LBS

## 2017-07-03 DIAGNOSIS — C18.9 METASTASIS FROM COLON CANCER (H): Primary | ICD-10-CM

## 2017-07-03 DIAGNOSIS — Z79.01 LONG-TERM (CURRENT) USE OF ANTICOAGULANTS: ICD-10-CM

## 2017-07-03 DIAGNOSIS — R19.7 DIARRHEA, UNSPECIFIED TYPE: ICD-10-CM

## 2017-07-03 DIAGNOSIS — E87.6 HYPOKALEMIA: ICD-10-CM

## 2017-07-03 DIAGNOSIS — R10.13 EPIGASTRIC PAIN: ICD-10-CM

## 2017-07-03 DIAGNOSIS — Z79.01 LONG TERM CURRENT USE OF ANTICOAGULANT THERAPY: ICD-10-CM

## 2017-07-03 DIAGNOSIS — I82.90 ACUTE DEEP VEIN THROMBOSIS (DVT) OF NON-EXTREMITY VEIN: ICD-10-CM

## 2017-07-03 DIAGNOSIS — D69.6 THROMBOCYTOPENIA (H): ICD-10-CM

## 2017-07-03 DIAGNOSIS — C79.9 METASTASIS FROM COLON CANCER (H): Primary | ICD-10-CM

## 2017-07-03 DIAGNOSIS — C18.9 COLON CANCER (H): Primary | ICD-10-CM

## 2017-07-03 DIAGNOSIS — F43.23 ADJUSTMENT DISORDER WITH MIXED ANXIETY AND DEPRESSED MOOD: ICD-10-CM

## 2017-07-03 DIAGNOSIS — I82.90 DEEP VEIN THROMBOSIS (DVT) OF NON-EXTREMITY VEIN, UNSPECIFIED CHRONICITY: ICD-10-CM

## 2017-07-03 LAB
BASOPHILS # BLD AUTO: 0 10E9/L (ref 0–0.2)
BASOPHILS NFR BLD AUTO: 0.4 %
CEA SERPL-MCNC: 2.1 UG/L (ref 0–2.5)
CREAT SERPL-MCNC: 0.95 MG/DL (ref 0.52–1.04)
DIFFERENTIAL METHOD BLD: ABNORMAL
EOSINOPHIL # BLD AUTO: 0.2 10E9/L (ref 0–0.7)
EOSINOPHIL NFR BLD AUTO: 5 %
ERYTHROCYTE [DISTWIDTH] IN BLOOD BY AUTOMATED COUNT: 16.6 % (ref 10–15)
GFR SERPL CREATININE-BSD FRML MDRD: 58 ML/MIN/1.7M2
HCT VFR BLD AUTO: 40.4 % (ref 35–47)
HGB BLD-MCNC: 13.5 G/DL (ref 11.7–15.7)
INR BLD: 2.2 (ref 0.86–1.14)
LYMPHOCYTES # BLD AUTO: 2.1 10E9/L (ref 0.8–5.3)
LYMPHOCYTES NFR BLD AUTO: 46.1 %
MCH RBC QN AUTO: 31.9 PG (ref 26.5–33)
MCHC RBC AUTO-ENTMCNC: 33.4 G/DL (ref 31.5–36.5)
MCV RBC AUTO: 96 FL (ref 78–100)
MONOCYTES # BLD AUTO: 0.4 10E9/L (ref 0–1.3)
MONOCYTES NFR BLD AUTO: 8.3 %
NEUTROPHILS # BLD AUTO: 1.8 10E9/L (ref 1.6–8.3)
NEUTROPHILS NFR BLD AUTO: 40.2 %
PLATELET # BLD AUTO: 98 10E9/L (ref 150–450)
POTASSIUM SERPL-SCNC: 2.7 MMOL/L (ref 3.4–5.3)
RBC # BLD AUTO: 4.23 10E12/L (ref 3.8–5.2)
WBC # BLD AUTO: 4.6 10E9/L (ref 4–11)

## 2017-07-03 PROCEDURE — 99207 ZZC NO CHARGE NURSE ONLY: CPT

## 2017-07-03 PROCEDURE — 84132 ASSAY OF SERUM POTASSIUM: CPT | Performed by: INTERNAL MEDICINE

## 2017-07-03 PROCEDURE — 99215 OFFICE O/P EST HI 40 MIN: CPT | Mod: 25 | Performed by: NURSE PRACTITIONER

## 2017-07-03 PROCEDURE — 85025 COMPLETE CBC W/AUTO DIFF WBC: CPT | Performed by: NURSE PRACTITIONER

## 2017-07-03 PROCEDURE — 85610 PROTHROMBIN TIME: CPT | Mod: QW | Performed by: INTERNAL MEDICINE

## 2017-07-03 PROCEDURE — 96366 THER/PROPH/DIAG IV INF ADDON: CPT | Performed by: NURSE PRACTITIONER

## 2017-07-03 PROCEDURE — 96365 THER/PROPH/DIAG IV INF INIT: CPT | Performed by: NURSE PRACTITIONER

## 2017-07-03 PROCEDURE — 99207 ZZC NO CHARGE NURSE ONLY: CPT | Performed by: FAMILY MEDICINE

## 2017-07-03 PROCEDURE — 82378 CARCINOEMBRYONIC ANTIGEN: CPT | Performed by: INTERNAL MEDICINE

## 2017-07-03 PROCEDURE — 82565 ASSAY OF CREATININE: CPT | Performed by: NURSE PRACTITIONER

## 2017-07-03 PROCEDURE — 99207 ZZC NO CHARGE LOS: CPT

## 2017-07-03 RX ORDER — SODIUM CHLORIDE 9 MG/ML
1000 INJECTION, SOLUTION INTRAVENOUS CONTINUOUS
Status: CANCELLED
Start: 2017-07-03

## 2017-07-03 RX ORDER — HEPARIN SODIUM (PORCINE) LOCK FLUSH IV SOLN 100 UNIT/ML 100 UNIT/ML
5 SOLUTION INTRAVENOUS
Status: DISCONTINUED | OUTPATIENT
Start: 2017-07-03 | End: 2017-07-03 | Stop reason: HOSPADM

## 2017-07-03 RX ORDER — POTASSIUM CHLORIDE 1500 MG/1
40 TABLET, EXTENDED RELEASE ORAL ONCE
Status: COMPLETED | OUTPATIENT
Start: 2017-07-03 | End: 2017-07-03

## 2017-07-03 RX ADMIN — POTASSIUM CHLORIDE 40 MEQ: 1500 TABLET, EXTENDED RELEASE ORAL at 10:02

## 2017-07-03 RX ADMIN — HEPARIN SODIUM (PORCINE) LOCK FLUSH IV SOLN 100 UNIT/ML 5 ML: 100 SOLUTION at 08:41

## 2017-07-03 RX ADMIN — HEPARIN SODIUM (PORCINE) LOCK FLUSH IV SOLN 100 UNIT/ML 5 ML: 100 SOLUTION at 11:39

## 2017-07-03 ASSESSMENT — PAIN SCALES - GENERAL: PAINLEVEL: NO PAIN (0)

## 2017-07-03 NOTE — MR AVS SNAPSHOT
Damari Bryant   7/3/2017   Anticoagulation Therapy Visit    Description:  72 year old female   Provider:  Samuel Cobian   Department:  Mg Med Monitoring           INR as of 7/3/2017     Today's INR 2.2      Anticoagulation Summary as of 7/3/2017     INR goal 2.0-3.0   Today's INR 2.2   Full instructions 7/4: 2.5 mg; 7/5: 5 mg; 7/7: 2.5 mg; Otherwise 7.5 mg on Wed; 5 mg all other days   Next INR check 7/10/2017    Indications   Deep vein thrombosis (DVT) (H) [I82.409] [I82.409]  Long-term (current) use of anticoagulants [Z79.01] [Z79.01]         Contact Numbers     [facility]  Please call the anticoagulation nurse at [phone] to cancel and/or reschedule your appointment, or with any problems or questions regarding your therapy.  You may call the main clinic number at [phone] if the nurse is not available.           July 2017 Details    Sun Mon Tue Wed Thu Fri Sat           1                 2               3      5 mg   See details      4      2.5 mg         5      5 mg         6      5 mg         7      2.5 mg         8      5 mg           9      5 mg         10            11               12               13               14               15                 16               17               18               19               20               21               22                 23               24               25               26               27               28               29                 30               31                     Date Details   07/03 This INR check       Date of next INR:  7/10/2017         How to take your warfarin dose     To take:  2.5 mg Take 0.5 of a 5 mg tablet.    To take:  5 mg Take 1 of the 5 mg tablets.

## 2017-07-03 NOTE — MR AVS SNAPSHOT
After Visit Summary   7/3/2017    Damari Bryant    MRN: [id number]           Patient Information     Date Of Birth          1945        Visit Information        Provider Department      7/3/2017 8:30 AM NURSE ONLY CANCER CENTER [facility]        Today's Diagnoses     Metastasis from colon cancer (H)    -  1    Acute deep vein thrombosis (DVT) of non-extremity vein        Long term current use of anticoagulant therapy           Follow-ups after your visit        Your next 10 appointments already scheduled     Jul 03, 2017  8:45 AM CDT   Return Visit with SANJEEV Camarillo CNP   [facility])    [id number] [place] [address]   [phone]            Jul 03, 2017  9:30 AM CDT   Level 1 with BAY 10 INFUSION   [facility])    [id number] [place] [id number] [phone]            Jul 25, 2017  8:45 AM CDT   Return Visit with NURSE ONLY CANCER CENTER   [facility])    [id number] [place] [address]   [phone]            Jul 25, 2017  9:30 AM CDT   Return Visit with Esther Greene MD   [facility])    [id number] [place] [address]   [phone]            Jul 25, 2017 10:00 AM CDT   Level 1 with [facility])    [address] [place] [address]   [phone]              Who to contact     If you have questions or need follow up information about today's clinic visit or your schedule please contact [facility] directly at [phone].  Normal or non-critical lab and imaging results will be communicated to you by MyChart, letter or phone within 4 business days after the clinic has received the results. If you do not hear from  us within 7 days, please contact the clinic through [employer] or phone. If you have a critical or abnormal lab result, we will notify you by phone as soon as possible.  Submit refill requests through [employer] or call your pharmacy and they will forward the refill request to us. Please allow 3 business days for your refill to be completed.          Additional Information About Your Visit        [employer] Information     [employer] is an electronic gateway that provides easy, online access to your medical records. With [employer], you can request a clinic appointment, read your test results, renew a prescription or communicate with your care team.     To sign up for [employer] visit the website at [email or web address]   You will be asked to enter the access code listed below, as well as some personal information. Please follow the directions to create your username and password.     Your access code is: [email or web address]  Expires: 2017  8:08 AM     Your access code will  in 90 days. If you need help or a new code, please contact your [facility] or call [phone] for assistance.        Care EveryWhere ID     This is your Care EveryWhere ID. This could be used by other organizations to access your [facility] medical records  [id number]         Blood Pressure from Last 3 Encounters:   17 132/83   17 129/88   17 124/78    Weight from Last 3 Encounters:   17 92 kg (202 lb 14.4 oz)   17 91.6 kg (202 lb)   17 93.2 kg (205 lb 8 oz)              We Performed the Following     CBC with platelets differential     Creatinine     INR     Protein qualitative urine        Primary Care Provider Office Phone # Fax #    Samuel Cobian [phone]       [facility] [address] [facility] [place] [address]        Equal Access to Services     OBED QUEEN AH: Rosette Carcamo, lizeth zhang, qamary alice chavez  frida orellanajanecora vogt'aajoey ah. So [facility] [phone].    ATENCIÓN: Si karenla ed, tiene a garcia disposición servicios gratuitos de asistencia lingüística. Bear cheney [phone].    We comply with applicable federal civil rights laws and [place] laws. We do not discriminate on the basis of race, color, national origin, age, disability sex, sexual orientation or gender identity.            Thank you!     Thank you for choosing [facility]  for your care. Our goal is always to provide you with excellent care. Hearing back from our patients is one way we can continue to improve our services. Please take a few minutes to complete the written survey that you may receive in the mail after your visit with us. Thank you!             Your Updated Medication List - Protect others around you: Learn how to safely use, store and throw away your medicines at www.disposemymeds.org.          This list is accurate as of: 7/3/17  8:42 AM.  Always use your most recent med list.                   Brand Name Dispense Instructions for use Diagnosis    busPIRone 15 MG tablet    BUSPAR     Take 15 mg by mouth 2 times daily        capecitabine 500 MG tablet CHEMO    XELODA    112 tablet    Take 4 tablets (2,000 mg) by mouth 2 times daily for 14 days Take by mouth Twice daily for 14 days then 7 days off    Metastasis from colon cancer (H)       levothyroxine 150 MCG tablet    SYNTHROID/LEVOTHROID     Take 150 mcg by mouth daily        LEXAPRO 20 MG tablet   Generic drug:  escitalopram      Take 20 mg by mouth daily.        loperamide 2 MG capsule    IMODIUM    30 capsule    Start with 2 caps (4 mg), then take one cap (2 mg) after each diarrheal stool as needed. Do not use more than 8 caps (16 mg) per day.    Metastasis from colon cancer (H)       NORVASC 5 MG tablet   Generic drug:  amLODIPine      Take 5 mg by mouth daily        OMEGA-3 FISH OIL PO           ondansetron 8 MG tablet    ZOFRAN    30 tablet    Take 1 tablet (8 mg) by  mouth every 8 hours as needed (Nausea/Vomiting)    Metastasis from colon cancer (H), Chemotherapy-induced nausea       Potassium Chloride ER 20 MEQ Tbcr     3 tablet    Take 1 tablet every 2 hours    Hypokalemia       senna-docusate 8.6-50 MG per tablet    SENNA S    60 tablet    1-2 tabs daily for constipation    Constipation       sucralfate 1 GM tablet    CARAFATE    40 tablet    Take 1 tablet (1 g) by mouth 4 times daily Mix with 15cc of water and then drink slurry        tetrahydrozoline 0.05 % ophthalmic solution      1 drop 3 times daily        TOPAMAX 50 MG tablet   Generic drug:  topiramate      Take  by mouth 2 times daily. 1 tab q am 2 tabs q PM        triamterene-hydrochlorothiazide 37.5-25 MG per tablet    MAXZIDE-25     Take 1 tablet by mouth daily.        UNABLE TO FIND      Reported on 5/8/2017        VITAMIN D (CHOLECALCIFEROL) PO      Take 2,000 Units by mouth daily Reported on 5/8/2017        warfarin 5 MG tablet    COUMADIN    60 tablet    Take 1 tablet (5 mg) by mouth daily    Occlusive thrombus

## 2017-07-03 NOTE — PROGRESS NOTES
Oncology Follow Up Visit: July 3, 2017      Oncologist: Dr Esther Greene  PCP: Samuel Cobian    Diagnosis: Mestastatic colon cancer  Damari Bryant is a 71 yo  female diagnosed July 2013 with Stage IIIB pT3N1b MX colon cancer.  Final pathology showed a mildly differentiated colonic adenocarcinoma, tumor extended through the muscularis propria into the pericolonic adipose tissue, lymphovascular invasion present.  Margins negative.  Three out of 30 lymph nodes were positive with 0.5 cm in greatest dimension and extracapsular extension identified.  DNA mismatch repair enzymes were intact.  Two pulmonary nodules noted on staging CT, indeterminate.   Treatment:  7/21/2013.  Sigmoid colectomy.   8/2013 to 1/2014  Adjuvant FOLFOX x 12 cycles.   2/2014 to 12/2016 observation  1/3/2017 - 5/30/2017 FOLFIRI/Avastin with new site to lung  6/13/2017 began Avastin/Xeloda 2000 mg bid but had significant epigastric pain and stopped medication after 1 week.    INTERVAL HISTORY:  Ms Bryant comes to clinic with , Ross, for toxicity review prior to cycle 2 of Avastin/Xeloda. Pt had significant Epigastric pain since starting the Xeloda and though she tried taking with food pain was severe and was sent to ER. She stopped the medication after one week and was decided to wait another week and will recheck and see what she is wishing to do for future treatment. Today patient has no pain no nausea no sores fevers or chills. She does have some occasional shortness of breath with exertion. She does feel weak and has had some weight loss though is now eating better and starting to gain some of the weight back. Depression continues and she is taking medication for this. There is no numbness or tingling of her fingers. She is reporting having anywhere from 1-3 loose stools a day if not using the Imodium in the morning so continues to usethat medication every day has not been on the Xeloda for 2 weeks. She is to start cycle 2  "of the Avastin/Xeloda plan today.  Remainder of her comprehensive review of systems is negative.   Current Outpatient Prescriptions   Medication     warfarin (COUMADIN) 5 MG tablet     ondansetron (ZOFRAN) 8 MG tablet     sucralfate (CARAFATE) 1 GM tablet     Potassium Chloride ER 20 MEQ TBCR     capecitabine (XELODA) 500 MG tablet CHEMO     loperamide (IMODIUM) 2 MG capsule     senna-docusate (SENNA S) 8.6-50 MG per tablet     Omega-3 Fatty Acids (OMEGA-3 FISH OIL PO)     tetrahydrozoline 0.05 % ophthalmic solution     UNABLE TO FIND     VITAMIN D, CHOLECALCIFEROL, PO     busPIRone (BUSPAR) 15 MG tablet     amLODIPine (NORVASC) 5 MG tablet     escitalopram (LEXAPRO) 20 MG tablet     levothyroxine (SYNTHROID, LEVOTHROID) 150 MCG tablet     topiramate (TOPAMAX) 50 MG tablet     triamterene-hydrochlorothiazide (MAXZIDE-25) 37.5-25 MG per tablet     No current facility-administered medications for this visit.      Facility-Administered Medications Ordered in Other Visits   Medication     sodium chloride (PF) 0.9% PF flush 10-20 mL     heparin 100 UNIT/ML injection 5 mL     Allergies   Allergen Reactions     Ativan Other (See Comments)     hallucinations     Avocado Nausea and Vomiting     Hmg-Coa-R Inhibitors Nausea     Iodine-131 Nausea and Vomiting     Shellfish Allergy GI Disturbance     Sulfa Drugs Nausea and Vomiting     Tegaderm Transparent Dressing (Informational Only) Rash      PHYSICAL EXAMINATION:/89  Pulse 90  Temp 97.3  F (36.3  C) (Oral)  Resp 20  Ht 1.676 m (5' 5.98\")  Wt 92.5 kg (204 lb)  SpO2 99%  BMI 32.94 kg/m2   Constitutional: Alert,Cooperative, and appears in no distress other than the anxiety  ENT: Eyes bright, No mouth sores  Neck: Supple, No adenopathy.Thyroid symmetric   Right port site without redness or swelling  Cardiac: Heart rate and rhythm is regular and strong without murmur  Respiratory: Breathing easy. Lung sounds clear to auscultation- no cough  Abdomen: Soft, non-tender, " BS normal.  No organomegaly  MS: Muscle tone normal- moving well. Extremities normal with no edema.   Skin: No suspicious lesions or rashes- skin turgor fair to poor  Neuro: Sensory grossly WNL, gait normal.   Lymph: Normal ant/post cervical, axillary, supraclavicular nodes  Psych: Mentation appears normal and affect anxious but clear and making her wishes known.     LABORATORY DATA:  Results for orders placed or performed in visit on 07/03/17   CBC with platelets differential   Result Value Ref Range    WBC 4.6 4.0 - 11.0 10e9/L    RBC Count 4.23 3.8 - 5.2 10e12/L    Hemoglobin 13.5 11.7 - 15.7 g/dL    Hematocrit 40.4 35.0 - 47.0 %    MCV 96 78 - 100 fl    MCH 31.9 26.5 - 33.0 pg    MCHC 33.4 31.5 - 36.5 g/dL    RDW 16.6 (H) 10.0 - 15.0 %    Platelet Count 98 (L) 150 - 450 10e9/L    Diff Method Automated Method     % Neutrophils 40.2 %    % Lymphocytes 46.1 %    % Monocytes 8.3 %    % Eosinophils 5.0 %    % Basophils 0.4 %    Absolute Neutrophil 1.8 1.6 - 8.3 10e9/L    Absolute Lymphocytes 2.1 0.8 - 5.3 10e9/L    Absolute Monocytes 0.4 0.0 - 1.3 10e9/L    Absolute Eosinophils 0.2 0.0 - 0.7 10e9/L    Absolute Basophils 0.0 0.0 - 0.2 10e9/L   Creatinine   Result Value Ref Range    Creatinine 0.95 0.52 - 1.04 mg/dL    GFR Estimate 58 (L) >60 mL/min/1.7m2    GFR Estimate If Black 70 >60 mL/min/1.7m2   Potassium   Result Value Ref Range    Potassium 2.7 (L) 3.4 - 5.3 mmol/L         ASSESSMENT AND PLAN:   Metastatic colon cancer- pt was changed to Xeloda 2000 mg bid and pt noted immediate side effects of burning to esophagus and epigastric region which affected intake and quality of life. Pt Discontinued Xeloda 2 weeks previous though has had some time to recuperate continues with some loose stools..   After discussion with  daughter available as well she has chosen to not continue the Xeloda even at a lower dose and feels living on the chemotherapy was causing too much fatigue and mental compromise but might be  willing to review options. Reviewed that some of her new options are limited for treatment and always has the option to stop therapy. She needs to talk with Dr. Greene about the next plan- appt will be made for next week.   Diarrhea- Continues to use Imodium in the morning to slow stools. Review appropriate use of Imodium. Reminded to continue to increase her fluids.   Epigastric pain/GERD- improved but not resolved with use of carafate and Prilosec 20 mg bid.   Hypokalemia/ poor oral intake- K+=2.7 today- will replace per electrolyte replacement plan and reviewed high potassium foods.   Thrombocytopenia- though pt has been off the Xeloda now for 2 weeks , her platelets are still low at 98,000. Should recover or improve over next week.   History of occlusive thrombus- pt using coumadin -monitored by coag clinic.  Anxiety- pt using lexapro and buspar. Pt did see Dr Rodriguez in past.    Total Time =40 min with >50% in education and coordination of cares.    Chrystal Morel,CNP

## 2017-07-03 NOTE — PROGRESS NOTES
Infusion Nursing Note:  Damari Bryant presents today for KCL replacment .    Patient seen by provider today: Yes: Chrystal Morel NP   present during visit today: Not Applicable.    Note: no avastin today. See Chrystal Morel NP's note.    Intravenous Access:  Implanted Port.    Treatment Conditions:  Not Applicable.      Post Infusion Assessment:  Patient tolerated infusion without incident.  Blood return noted pre and post infusion.  Access discontinued per protocol.    Discharge Plan:   Patient will return to see Dr. Greene next week to discuss treatment options.  Patient discharged in stable condition accompanied by: self and .  Departure Mode: Ambulatory.    Nga Olmos RN

## 2017-07-03 NOTE — PROGRESS NOTES
ORAL CHEMOTHERAPY DISCONTINUATION       Primary Oncologist:  Dr. Esther Greene  Primary Oncology Clinic: [facility]  Cancer Diagnosis:  Metastatic Colon Cancer  Therapy History:  Started 6/13/17  On therapy for a few days. Hospitalized for abdominal pain on 6/16/17-6/17/17.  Therapy Ended On:  6/19/2017  Reason For Discontinuation: unacceptable toxicity    Additional Notes:  Thank you for the opportunity to be a part in the care of this patient's oral chemotherapy. The oncology pharmacy will no longer be following this patient for oral chemotherapy. If there are any questions or the plan changes, feel free to contact us.    Tiff Humphreys, Pharm. D.  [phone]

## 2017-07-03 NOTE — PROGRESS NOTES
ANTICOAGULATION FOLLOW-UP CLINIC VISIT    Patient Name:  Damari Bryant  Date:  7/3/2017  Contact Type:  Face to Face with  Ross at the chemo infusion center.     SUBJECTIVE:     Patient Findings     Positives No Problem Findings    Comments Chemo today. Next chemo 7/24.           OBJECTIVE    INR Point of Care   Date Value Ref Range Status   07/03/2017 2.2 (H) 0.86 - 1.14 Final     Comment:     This test is intended for monitoring Coumadin therapy.  Results are not   accurate   in patients with prolonged INR due to factor deficiency.         ASSESSMENT / PLAN  INR assessment THER    Recheck INR In: 1 WEEK    INR Location Outside lab      Anticoagulation Summary as of 7/3/2017     INR goal 2.0-3.0   Today's INR 2.2   Maintenance plan 7.5 mg (5 mg x 1.5) on Wed; 5 mg (5 mg x 1) all other days   Full instructions 7/4: 2.5 mg; 7/5: 5 mg; 7/7: 2.5 mg; Otherwise 7.5 mg on Wed; 5 mg all other days   Weekly total 37.5 mg   Plan last modified Mary Berry RN (3/23/2017)   Next INR check 7/10/2017   Priority INR   Target end date 8/14/2017    Indications   Deep vein thrombosis (DVT) (H) [I82.409] [I82.409]  Long-term (current) use of anticoagulants [Z79.01] [Z79.01]         Anticoagulation Episode Summary     INR check location     Preferred lab [facility]    Send INR reminders to [facility] INR    Comments Chemo infusion pump for 48 hours every 2 weeks.  Her INR rises during this infusion. Ross's cell phone for INR results [phone].   Per Esther Greene MD- Goal of 2-3.      Anticoagulation Care Providers     Provider Role Specialty Phone number    Esther Greene MD Responsible Hematology [phone]            See the Encounter Report to view Anticoagulation Flowsheet and Dosing Calendar (Go to Encounters tab in chart review, and find the Anticoagulation Therapy Visit)      Paulina Shaver RN

## 2017-07-03 NOTE — MR AVS SNAPSHOT
After Visit Summary   7/3/2017    Damari Bryant    MRN: [id number]           Patient Information     Date Of Birth          1945        Visit Information        Provider Department      7/3/2017 8:45 AM Chrystal Morel APRN CNP [facility]         Follow-ups after your visit        Your next 10 appointments already scheduled     Jul 11, 2017 10:15 AM CDT   Return Visit with [facility]   [facility] ([facility]    [address] [facility] [address]   [phone]            Jul 11, 2017 11:00 AM CDT   Return Visit with Esther Greene MD   [facility] ([facility]    [address] [facility] [address]   [phone]            Jul 25, 2017  8:45 AM CDT   Return Visit with [facility]   [facility] ([facility]    [address] [facility] [address]   [phone]            Jul 25, 2017  9:30 AM CDT   Return Visit with Esther Greene MD   [facility] ([facility])    [address] [facility] [address]   [phone]            Jul 25, 2017 10:00 AM CDT   Level 1 with BAY 6 INFUSION   [facility] ([facility])    [address] [facility] [address]   [phone]            Aug 08, 2017  8:00 AM CDT   Return Visit with [facility]   [facility] ([facility])    [address] [facility] [address]   [phone]            Aug 08, 2017  8:30 AM CDT   Level 1 with BAY 3 INFUSION   [facility] ([facility])    [address] [facility] [address]   [phone]              Who to contact     If you have questions or need follow up information about today's clinic visit or your schedule please contact [facility] directly at [phone].  Normal or non-critical lab and imaging results will be communicated to you by Sendbloomhart, letter or phone within 4 business days after the clinic has received the results. If you do not hear from us within 7 days, please contact the clinic through Sendbloomhart or phone. If you have a critical or abnormal lab result, we will notify you by phone as soon as possible.  Submit refill requests through [employer] or call your pharmacy and they will forward the refill request to us. Please allow 3 business days for your refill to be completed.          Additional Information About Your Visit        [employer] Information     [employer] is an electronic gateway that provides easy, online access to your medical records. With [employer], you can request a clinic appointment, read your test results, renew a prescription or communicate with your care team.     To sign up for [employer] visit the website at [email or web address]   You will be asked to enter the access code listed below, as well as some personal information. Please follow the directions to create your username and password.     Your access code is: [email or web address]  Expires: 2017  8:08 AM     Your access code will  in 90 days. If you need help or a new code, please contact your [facility] or call [phone] for assistance.        Care EveryWhere ID     This is your Care EveryWhere ID. This could be used by other organizations to access your [place] medical records  [id number]        Your Vitals Were     Pulse Temperature Respirations Height Pulse Oximetry BMI (Body Mass Index)    90 97.3  F (36.3  C) (Oral) 20 1.676 m (5' 5.98\") 99% 32.94 kg/m2       Blood Pressure from Last 3 Encounters:   17 136/89   17 132/83   17 129/88    Weight from Last 3 Encounters:   17 92.5 kg (204 lb)   17 92 kg (202 lb 14.4 oz)   17 91.6 kg (202 lb)              Today, you had the " following     No orders found for display       Primary Care Provider Office Phone # Fax #    Samuel Cobian [phone]       [facility] [address] [facility] [place] [address]        Equal Access to Services     OBED QUEEN : Hadii aad ku hadbarrieo Soomaali, waaxda luqadaha, qaybta kaalmada adeegyada, mary alice lobo laGovindkatie tafoya. So [facility] [phone].    ATENCIÓN: Si habla español, tiene a garcia disposición servicios gratuitos de asistencia lingüística. Llame al [phone].    We comply with applicable federal civil rights laws and [place] laws. We do not discriminate on the basis of race, color, national origin, age, disability sex, sexual orientation or gender identity.            Thank you!     Thank you for choosing [facility]  for your care. Our goal is always to provide you with excellent care. Hearing back from our patients is one way we can continue to improve our services. Please take a few minutes to complete the written survey that you may receive in the mail after your visit with us. Thank you!             Your Updated Medication List - Protect others around you: Learn how to safely use, store and throw away your medicines at www.disposemymeds.org.          This list is accurate as of: 7/3/17 11:59 AM.  Always use your most recent med list.                   Brand Name Dispense Instructions for use Diagnosis    busPIRone 15 MG tablet    BUSPAR     Take 15 mg by mouth 2 times daily        capecitabine 500 MG tablet CHEMO    XELODA    112 tablet    Take 4 tablets (2,000 mg) by mouth 2 times daily for 14 days Take by mouth Twice daily for 14 days then 7 days off    Metastasis from colon cancer (H)       levothyroxine 150 MCG tablet    SYNTHROID/LEVOTHROID     Take 150 mcg by mouth daily        LEXAPRO 20 MG tablet   Generic drug:  escitalopram      Take 20 mg by mouth daily.        loperamide 2 MG capsule    IMODIUM    30 capsule    Start with 2 caps (4  mg), then take one cap (2 mg) after each diarrheal stool as needed. Do not use more than 8 caps (16 mg) per day.    Metastasis from colon cancer (H)       NORVASC 5 MG tablet   Generic drug:  amLODIPine      Take 5 mg by mouth daily        OMEGA-3 FISH OIL PO           ondansetron 8 MG tablet    ZOFRAN    30 tablet    Take 1 tablet (8 mg) by mouth every 8 hours as needed (Nausea/Vomiting)    Metastasis from colon cancer (H), Chemotherapy-induced nausea       Potassium Chloride ER 20 MEQ Tbcr     3 tablet    Take 1 tablet every 2 hours    Hypokalemia       senna-docusate 8.6-50 MG per tablet    SENNA S    60 tablet    1-2 tabs daily for constipation    Constipation       sucralfate 1 GM tablet    CARAFATE    40 tablet    Take 1 tablet (1 g) by mouth 4 times daily Mix with 15cc of water and then drink slurry        tetrahydrozoline 0.05 % ophthalmic solution      1 drop 3 times daily        TOPAMAX 50 MG tablet   Generic drug:  topiramate      Take  by mouth 2 times daily. 1 tab q am 2 tabs q PM        triamterene-hydrochlorothiazide 37.5-25 MG per tablet    MAXZIDE-25     Take 1 tablet by mouth daily.        UNABLE TO FIND      Reported on 5/8/2017        VITAMIN D (CHOLECALCIFEROL) PO      Take 2,000 Units by mouth daily Reported on 5/8/2017        warfarin 5 MG tablet    COUMADIN    60 tablet    Take 1 tablet (5 mg) by mouth daily    Occlusive thrombus

## 2017-07-03 NOTE — NURSING NOTE
"Oncology Rooming Note    July 3, 2017 8:42 AM   Damari Bryant is a 72 year old female who presents for:    Chief Complaint   Patient presents with     Oncology Clinic Visit     f/u prior to tx     Initial Vitals: There were no vitals taken for this visit. Estimated body mass index is 32.76 kg/(m^2) as calculated from the following:    Height as of 6/20/17: 1.676 m (5' 5.98\").    Weight as of 6/20/17: 92 kg (202 lb 14.4 oz). There is no height or weight on file to calculate BSA.  Data Unavailable Comment: Data Unavailable   No LMP recorded. Patient is postmenopausal.  Allergies reviewed: Yes  Medications reviewed: Yes    Medications: Medication refills not needed today.  Pharmacy name entered into Livemocha:    [id number] - [place], [place] - [address] [facility] [address] [place] [address] [address] [facility] [place], [place] - [address] [facility]    Clinical concerns    8 minutes for nursing intake (face to face time)     JULES DIAL LPN              "

## 2017-07-03 NOTE — MR AVS SNAPSHOT
After Visit Summary   7/3/2017    Damari Bryant    MRN: [id number]           Patient Information     Date Of Birth          1945        Visit Information        Provider Department      7/3/2017 9:30 AM BAY 10 INFUSION [facility]        Today's Diagnoses     Colon cancer (H)    -  1    Hypokalemia           Follow-ups after your visit        Your next 10 appointments already scheduled     Jul 25, 2017  8:45 AM CDT   Return Visit with [facility]   [facility] ([facility])    [address] [place] [address]   [phone]            Jul 25, 2017  9:30 AM CDT   Return Visit with Esther Greene MD   [facility] ([facility])    [address] [place] [address]   [phone]            Jul 25, 2017 10:00 AM CDT   Level 1 with BAY 6 INFUSION   [facility] ([facility])    [address] [place] [address]   [phone]            Aug 08, 2017  8:00 AM CDT   Return Visit with [facility] ONLY CANCER CENTER   [facility] ([facility])    [address] [place] [address]   [phone]            Aug 08, 2017  8:30 AM CDT   Level 1 with BAY 3 INFUSION   [facility] ([facility])    [address] [place] [address]   [phone]              Who to contact     If you have questions or need follow up information about today's clinic visit or your schedule please contact [facility] directly at [phone].  Normal or non-critical lab and imaging results will be communicated to you by MyChart, letter or phone within 4 business days after the clinic has received the results. If you do not hear from us within 7 days, please contact the clinic through MyChart or phone. If you have a critical or abnormal lab result, we will  notify you by phone as soon as possible.  Submit refill requests through [employer] or call your pharmacy and they will forward the refill request to us. Please allow 3 business days for your refill to be completed.          Additional Information About Your Visit        [employer] Information     [employer] is an electronic gateway that provides easy, online access to your medical records. With [employer], you can request a clinic appointment, read your test results, renew a prescription or communicate with your care team.     To sign up for [employer] visit the website at [email or web address]   You will be asked to enter the access code listed below, as well as some personal information. Please follow the directions to create your username and password.     Your access code is: [email or web address]  Expires: 2017  8:08 AM     Your access code will  in 90 days. If you need help or a new code, please contact your [facility] or call [phone] for assistance.        Care EveryWhere ID     This is your Care EveryWhere ID. This could be used by other organizations to access your [place] medical records  [id number]         Blood Pressure from Last 3 Encounters:   17 136/89   17 132/83   17 129/88    Weight from Last 3 Encounters:   17 92.5 kg (204 lb)   17 92 kg (202 lb 14.4 oz)   17 91.6 kg (202 lb)              Today, you had the following     No orders found for display       Primary Care Provider Office Phone # Fax #    Samuel Cobian [phone]       [facility] [address] [facility] [address]        Equal Access to Services     OBED QUEEN : Hadii aad ku hadashcarolyne [place], waaxda luqadaha, qaybta kaalmada mary alice sommers. So [facility] [phone].    ATENCIÓN: Si habla español, tiene a garcia disposición servicios gratuitos de asistencia lingüística. Shereename al [phone].    We comply with  applicable federal civil rights laws and [place] laws. We do not discriminate on the basis of race, color, national origin, age, disability sex, sexual orientation or gender identity.            Thank you!     Thank you for choosing [facility]  for your care. Our goal is always to provide you with excellent care. Hearing back from our patients is one way we can continue to improve our services. Please take a few minutes to complete the written survey that you may receive in the mail after your visit with us. Thank you!             Your Updated Medication List - Protect others around you: Learn how to safely use, store and throw away your medicines at www.disposemymeds.org.          This list is accurate as of: 7/3/17 11:44 AM.  Always use your most recent med list.                   Brand Name Dispense Instructions for use Diagnosis    busPIRone 15 MG tablet    BUSPAR     Take 15 mg by mouth 2 times daily        capecitabine 500 MG tablet CHEMO    XELODA    112 tablet    Take 4 tablets (2,000 mg) by mouth 2 times daily for 14 days Take by mouth Twice daily for 14 days then 7 days off    Metastasis from colon cancer (H)       levothyroxine 150 MCG tablet    SYNTHROID/LEVOTHROID     Take 150 mcg by mouth daily        LEXAPRO 20 MG tablet   Generic drug:  escitalopram      Take 20 mg by mouth daily.        loperamide 2 MG capsule    IMODIUM    30 capsule    Start with 2 caps (4 mg), then take one cap (2 mg) after each diarrheal stool as needed. Do not use more than 8 caps (16 mg) per day.    Metastasis from colon cancer (H)       NORVASC 5 MG tablet   Generic drug:  amLODIPine      Take 5 mg by mouth daily        OMEGA-3 FISH OIL PO           ondansetron 8 MG tablet    ZOFRAN    30 tablet    Take 1 tablet (8 mg) by mouth every 8 hours as needed (Nausea/Vomiting)    Metastasis from colon cancer (H), Chemotherapy-induced nausea       Potassium Chloride ER 20 MEQ Tbcr     3 tablet    Take 1 tablet  every 2 hours    Hypokalemia       senna-docusate 8.6-50 MG per tablet    SENNA S    60 tablet    1-2 tabs daily for constipation    Constipation       sucralfate 1 GM tablet    CARAFATE    40 tablet    Take 1 tablet (1 g) by mouth 4 times daily Mix with 15cc of water and then drink slurry        tetrahydrozoline 0.05 % ophthalmic solution      1 drop 3 times daily        TOPAMAX 50 MG tablet   Generic drug:  topiramate      Take  by mouth 2 times daily. 1 tab q am 2 tabs q PM        triamterene-hydrochlorothiazide 37.5-25 MG per tablet    MAXZIDE-25     Take 1 tablet by mouth daily.        UNABLE TO FIND      Reported on 5/8/2017        VITAMIN D (CHOLECALCIFEROL) PO      Take 2,000 Units by mouth daily Reported on 5/8/2017        warfarin 5 MG tablet    COUMADIN    60 tablet    Take 1 tablet (5 mg) by mouth daily    Occlusive thrombus

## 2017-07-11 ENCOUNTER — ONCOLOGY VISIT (OUTPATIENT)
Dept: ONCOLOGY | Facility: CLINIC | Age: 72
End: 2017-07-11
Payer: COMMERCIAL

## 2017-07-11 ENCOUNTER — ANTICOAGULATION THERAPY VISIT (OUTPATIENT)
Dept: PEDIATRICS | Facility: CLINIC | Age: 72
End: 2017-07-11
Payer: COMMERCIAL

## 2017-07-11 VITALS
HEIGHT: 66 IN | OXYGEN SATURATION: 95 % | SYSTOLIC BLOOD PRESSURE: 138 MMHG | TEMPERATURE: 97.4 F | BODY MASS INDEX: 33.11 KG/M2 | DIASTOLIC BLOOD PRESSURE: 88 MMHG | RESPIRATION RATE: 20 BRPM | HEART RATE: 86 BPM | WEIGHT: 206 LBS

## 2017-07-11 DIAGNOSIS — Z79.01 LONG-TERM (CURRENT) USE OF ANTICOAGULANTS: ICD-10-CM

## 2017-07-11 DIAGNOSIS — C79.9 METASTASIS FROM COLON CANCER (H): Primary | ICD-10-CM

## 2017-07-11 DIAGNOSIS — C18.9 METASTASIS FROM COLON CANCER (H): Primary | ICD-10-CM

## 2017-07-11 DIAGNOSIS — C79.9 METASTASIS FROM COLON CANCER (H): ICD-10-CM

## 2017-07-11 DIAGNOSIS — I82.90 ACUTE DEEP VEIN THROMBOSIS (DVT) OF NON-EXTREMITY VEIN: ICD-10-CM

## 2017-07-11 DIAGNOSIS — C18.9 METASTASIS FROM COLON CANCER (H): ICD-10-CM

## 2017-07-11 DIAGNOSIS — Z79.01 LONG TERM CURRENT USE OF ANTICOAGULANT THERAPY: ICD-10-CM

## 2017-07-11 DIAGNOSIS — I82.90 DEEP VEIN THROMBOSIS (DVT) OF NON-EXTREMITY VEIN, UNSPECIFIED CHRONICITY: ICD-10-CM

## 2017-07-11 LAB
ALBUMIN SERPL-MCNC: 3.6 G/DL (ref 3.4–5)
ALP SERPL-CCNC: 71 U/L (ref 40–150)
ALT SERPL W P-5'-P-CCNC: 28 U/L (ref 0–50)
ANION GAP SERPL CALCULATED.3IONS-SCNC: 7 MMOL/L (ref 3–14)
AST SERPL W P-5'-P-CCNC: 21 U/L (ref 0–45)
BASOPHILS # BLD AUTO: 0 10E9/L (ref 0–0.2)
BASOPHILS NFR BLD AUTO: 0.2 %
BILIRUB SERPL-MCNC: 0.4 MG/DL (ref 0.2–1.3)
BUN SERPL-MCNC: 15 MG/DL (ref 7–30)
CALCIUM SERPL-MCNC: 9 MG/DL (ref 8.5–10.1)
CHLORIDE SERPL-SCNC: 104 MMOL/L (ref 94–109)
CO2 SERPL-SCNC: 26 MMOL/L (ref 20–32)
CREAT SERPL-MCNC: 0.84 MG/DL (ref 0.52–1.04)
DIFFERENTIAL METHOD BLD: ABNORMAL
EOSINOPHIL # BLD AUTO: 0.2 10E9/L (ref 0–0.7)
EOSINOPHIL NFR BLD AUTO: 4.2 %
ERYTHROCYTE [DISTWIDTH] IN BLOOD BY AUTOMATED COUNT: 16.5 % (ref 10–15)
GFR SERPL CREATININE-BSD FRML MDRD: 66 ML/MIN/1.7M2
GLUCOSE SERPL-MCNC: 146 MG/DL (ref 70–99)
HCT VFR BLD AUTO: 41 % (ref 35–47)
HGB BLD-MCNC: 13.4 G/DL (ref 11.7–15.7)
INR BLD: 2 (ref 0.86–1.14)
LYMPHOCYTES # BLD AUTO: 1.8 10E9/L (ref 0.8–5.3)
LYMPHOCYTES NFR BLD AUTO: 39.5 %
MCH RBC QN AUTO: 31.8 PG (ref 26.5–33)
MCHC RBC AUTO-ENTMCNC: 32.7 G/DL (ref 31.5–36.5)
MCV RBC AUTO: 97 FL (ref 78–100)
MONOCYTES # BLD AUTO: 0.4 10E9/L (ref 0–1.3)
MONOCYTES NFR BLD AUTO: 9.6 %
NEUTROPHILS # BLD AUTO: 2.1 10E9/L (ref 1.6–8.3)
NEUTROPHILS NFR BLD AUTO: 46.5 %
PLATELET # BLD AUTO: 96 10E9/L (ref 150–450)
POTASSIUM SERPL-SCNC: 3.2 MMOL/L (ref 3.4–5.3)
PROT SERPL-MCNC: 7.1 G/DL (ref 6.8–8.8)
RBC # BLD AUTO: 4.21 10E12/L (ref 3.8–5.2)
SODIUM SERPL-SCNC: 137 MMOL/L (ref 133–144)
WBC # BLD AUTO: 4.5 10E9/L (ref 4–11)

## 2017-07-11 PROCEDURE — 80053 COMPREHEN METABOLIC PANEL: CPT | Performed by: INTERNAL MEDICINE

## 2017-07-11 PROCEDURE — 99207 ZZC NO CHARGE NURSE ONLY: CPT

## 2017-07-11 PROCEDURE — 85025 COMPLETE CBC W/AUTO DIFF WBC: CPT | Performed by: INTERNAL MEDICINE

## 2017-07-11 PROCEDURE — 85610 PROTHROMBIN TIME: CPT | Mod: QW | Performed by: INTERNAL MEDICINE

## 2017-07-11 PROCEDURE — 99214 OFFICE O/P EST MOD 30 MIN: CPT | Performed by: INTERNAL MEDICINE

## 2017-07-11 PROCEDURE — 99207 ZZC NO CHARGE NURSE ONLY: CPT | Performed by: FAMILY MEDICINE

## 2017-07-11 RX ORDER — HEPARIN SODIUM (PORCINE) LOCK FLUSH IV SOLN 100 UNIT/ML 100 UNIT/ML
5 SOLUTION INTRAVENOUS
Status: DISCONTINUED | OUTPATIENT
Start: 2017-07-11 | End: 2017-07-11 | Stop reason: HOSPADM

## 2017-07-11 RX ADMIN — HEPARIN SODIUM (PORCINE) LOCK FLUSH IV SOLN 100 UNIT/ML 5 ML: 100 SOLUTION at 10:29

## 2017-07-11 ASSESSMENT — PAIN SCALES - GENERAL: PAINLEVEL: NO PAIN (0)

## 2017-07-11 NOTE — PROGRESS NOTES
ANTICOAGULATION FOLLOW-UP CLINIC VISIT    Patient Name:  Damari Bryant  Date:  7/11/2017  Contact Type:  Telephone Spoke with Ross (pt's ), discussed INR result, questions/concerns addressed, dosing instructions with readback, and recommended date of next INR.           SUBJECTIVE:     Patient Findings     Positives No Problem Findings           OBJECTIVE    INR Point of Care   Date Value Ref Range Status   07/11/2017 2.0 (H) 0.86 - 1.14 Final     Comment:     This test is intended for monitoring Coumadin therapy.  Results are not   accurate   in patients with prolonged INR due to factor deficiency.         ASSESSMENT / PLAN  INR assessment THER    Recheck INR In: 2 WEEKS    INR Location Outside lab      Anticoagulation Summary as of 7/11/2017     INR goal 2.0-3.0   Today's INR 2.0   Maintenance plan 2.5 mg (5 mg x 0.5) on Tue, Thu; 5 mg (5 mg x 1) all other days   Full instructions 2.5 mg on Tue, Thu; 5 mg all other days   Weekly total 30 mg   Plan last modified Paulina Shaver RN (7/11/2017)   Next INR check 7/25/2017   Priority INR   Target end date 8/14/2017    Indications   Deep vein thrombosis (DVT) (H) [I82.409] [I82.409]  Long-term (current) use of anticoagulants [Z79.01] [Z79.01]         Anticoagulation Episode Summary     INR check location     Preferred lab [facility]    Send INR reminders to [facility] INR    Comments Chemo infusion pump for 48 hours every 2 weeks.  Her INR rises during this infusion. Ross's cell phone for INR results [phone].   Per Esther Greene MD- Goal of 2-3.      Anticoagulation Care Providers     Provider Role Specialty Phone number    Esther Greene MD Responsible Hematology [phone]            See the Encounter Report to view Anticoagulation Flowsheet and Dosing Calendar (Go to Encounters tab in chart review, and find the Anticoagulation Therapy Visit)      Paulina Shaver RN

## 2017-07-11 NOTE — NURSING NOTE
"Oncology Rooming Note    July 11, 2017 10:52 AM   Damari Bryant is a 72 year old female who presents for:    Chief Complaint   Patient presents with     Oncology Clinic Visit     f/u appt     Initial Vitals: There were no vitals taken for this visit. Estimated body mass index is 32.94 kg/(m^2) as calculated from the following:    Height as of 7/3/17: 1.676 m (5' 5.98\").    Weight as of 7/3/17: 92.5 kg (204 lb). There is no height or weight on file to calculate BSA.  Data Unavailable Comment: Data Unavailable   No LMP recorded. Patient is postmenopausal.  Allergies reviewed: Yes  Medications reviewed: Yes    Medications: Medication refills not needed today.  Pharmacy name entered into [employer]:    [id number] - [place], [place] - [address] [facility] [address] [facility] [address] [place] [address] [address] [facility] [place], [place] - [address] [facility]    Clinical concerns:     8 minutes for nursing intake (face to face time)     JULES DIAL LPN              "

## 2017-07-11 NOTE — MR AVS SNAPSHOT
After Visit Summary   7/11/2017    Damari Bryant    MRN: [id number]           Patient Information     Date Of Birth          1945        Visit Information        Provider Department      7/11/2017 11:00 AM Esther Greene MD [facility]         Follow-ups after your visit        Your next 10 appointments already scheduled     Aug 08, 2017  9:45 AM CDT   Return Visit with [facility]   [facility]    [address] [place] [id number] [phone]            Aug 08, 2017 10:30 AM CDT   Return Visit with Esther Greene MD   [facility]    [address] [place] [address] [id number] [phone]              Who to contact     If you have questions or need follow up information about today's clinic visit or your schedule please contact [facility] directly at [phone].  Normal or non-critical lab and imaging results will be communicated to you by MyChart, letter or phone within 4 business days after the clinic has received the results. If you do not hear from us within 7 days, please contact the clinic through MyChart or phone. If you have a critical or abnormal lab result, we will notify you by phone as soon as possible.  Submit refill requests through Bambeco or call your pharmacy and they will forward the refill request to us. Please allow 3 business days for your refill to be completed.          Additional Information About Your Visit        Elevator Labshart Information     Bambeco is an electronic gateway that provides easy, online access to your medical records. With Bambeco, you can request a clinic appointment, read your test results, renew a prescription or communicate with your care team.     To sign up for Bambeco visit the website at [email or web address]   You will be asked to enter the access code listed below, as  "well as some personal information. Please follow the directions to create your username and password.     Your access code is: [email or web address]  Expires: 2017  8:08 AM     Your access code will  in 90 days. If you need help or a new code, please contact your [facility] or call [phone] for assistance.        Care EveryWhere ID     This is your Care EveryWhere ID. This could be used by other organizations to access your [facility] medical records  [id number]        Your Vitals Were     Pulse Temperature Respirations Height Pulse Oximetry BMI (Body Mass Index)    86 97.4  F (36.3  C) (Oral) 20 1.676 m (5' 5.98\") 95% 33.27 kg/m2       Blood Pressure from Last 3 Encounters:   17 138/88   17 136/89   17 132/83    Weight from Last 3 Encounters:   17 93.4 kg (206 lb)   17 92.5 kg (204 lb)   17 92 kg (202 lb 14.4 oz)              Today, you had the following     No orders found for display       Primary Care Provider Office Phone # Fax #    Samuel Cobian [phone]       [facility] [address] [facility] [address]        Equal Access to Services     OBED QUEEN AH: Hadii aad ku hadasho Soomaali, waaxda luqadaha, qaybta kaalmada adeegyada, waxay idiin hayaan frida mazariegos . So [facility] [phone].    ATENCIÓN: Si habla español, tiene a garcia disposición servicios gratuitos de asistencia lingüística. Llame al [phone].    We comply with applicable federal civil rights laws and [place] laws. We do not discriminate on the basis of race, color, national origin, age, disability sex, sexual orientation or gender identity.            Thank you!     Thank you for choosing [facility]  for your care. Our goal is always to provide you with excellent care. Hearing back from our patients is one way we can continue to improve our services. Please take a few minutes to complete the written survey that you may " receive in the mail after your visit with us. Thank you!             Your Updated Medication List - Protect others around you: Learn how to safely use, store and throw away your medicines at www.disposemymeds.org.          This list is accurate as of: 7/11/17 11:38 AM.  Always use your most recent med list.                   Brand Name Dispense Instructions for use Diagnosis    busPIRone 15 MG tablet    BUSPAR     Take 15 mg by mouth 2 times daily        capecitabine 500 MG tablet CHEMO    XELODA    112 tablet    Take 4 tablets (2,000 mg) by mouth 2 times daily for 14 days Take by mouth Twice daily for 14 days then 7 days off    Metastasis from colon cancer (H)       levothyroxine 150 MCG tablet    SYNTHROID/LEVOTHROID     Take 150 mcg by mouth daily        LEXAPRO 20 MG tablet   Generic drug:  escitalopram      Take 20 mg by mouth daily.        loperamide 2 MG capsule    IMODIUM    30 capsule    Start with 2 caps (4 mg), then take one cap (2 mg) after each diarrheal stool as needed. Do not use more than 8 caps (16 mg) per day.    Metastasis from colon cancer (H)       NORVASC 5 MG tablet   Generic drug:  amLODIPine      Take 5 mg by mouth daily        OMEGA-3 FISH OIL PO           ondansetron 8 MG tablet    ZOFRAN    30 tablet    Take 1 tablet (8 mg) by mouth every 8 hours as needed (Nausea/Vomiting)    Metastasis from colon cancer (H), Chemotherapy-induced nausea       Potassium Chloride ER 20 MEQ Tbcr     3 tablet    Take 1 tablet every 2 hours    Hypokalemia       senna-docusate 8.6-50 MG per tablet    SENNA S    60 tablet    1-2 tabs daily for constipation    Constipation       sucralfate 1 GM tablet    CARAFATE    40 tablet    Take 1 tablet (1 g) by mouth 4 times daily Mix with 15cc of water and then drink slurry        tetrahydrozoline 0.05 % ophthalmic solution      1 drop 3 times daily        TOPAMAX 50 MG tablet   Generic drug:  topiramate      Take  by mouth 2 times daily. 1 tab q am 2 tabs q PM         triamterene-hydrochlorothiazide 37.5-25 MG per tablet    MAXZIDE-25     Take 1 tablet by mouth daily.        UNABLE TO FIND      Reported on 5/8/2017        VITAMIN D (CHOLECALCIFEROL) PO      Take 2,000 Units by mouth daily Reported on 5/8/2017        warfarin 5 MG tablet    COUMADIN    60 tablet    Take 1 tablet (5 mg) by mouth daily    Occlusive thrombus

## 2017-07-11 NOTE — MR AVS SNAPSHOT
After Visit Summary   7/11/2017    Damari Bryant    MRN: [id number]           Patient Information     Date Of Birth          1945        Visit Information        Provider Department      7/11/2017 10:15 AM [facility]        Today's Diagnoses     Acute deep vein thrombosis (DVT) of non-extremity vein        Long term current use of anticoagulant therapy        Metastasis from colon cancer (H)           Follow-ups after your visit        Your next 10 appointments already scheduled     Jul 11, 2017 11:00 AM CDT   [facility]    [id number] [place] [phone]   [phone]            Jul 25, 2017  8:45 AM CDT   Return Visit with [facility]   [facility]    [address] [place] [phone]   [phone]            Jul 25, 2017  9:30 AM CDT   [facility] with Esther Greene MD   [facility]    [address] [place] [phone]   [phone]            Jul 25, 2017 10:00 AM CDT   Level 1 with BAY 10 INFUSION   [facility]    [address] [address] [address]   [phone]            Aug 08, 2017  8:00 AM CDT   Return Visit with [facility]   [facility]    [address] [place] [phone]   [phone]            Aug 08, 2017  8:30 AM CDT   Level 1 with BAY 3 INFUSION   [facility]    [address] [place] [address]   [phone]              Who to contact     If you have questions or need follow up information about today's clinic visit or your schedule please contact [facility] directly at  [phone].  Normal or non-critical lab and imaging results will be communicated to you by DeNAhart, letter or phone within 4 business days after the clinic has received the results. If you do not hear from us within 7 days, please contact the clinic through DeNAhart or phone. If you have a critical or abnormal lab result, we will notify you by phone as soon as possible.  Submit refill requests through Proxible or call your pharmacy and they will forward the refill request to us. Please allow 3 business days for your refill to be completed.          Additional Information About Your Visit        Proxible Information     Proxible is an electronic gateway that provides easy, online access to your medical records. With Proxible, you can request a clinic appointment, read your test results, renew a prescription or communicate with your care team.     To sign up for Proxible visit the website at [email or web address]   You will be asked to enter the access code listed below, as well as some personal information. Please follow the directions to create your username and password.     Your access code is: [email or web address]  Expires: 2017  8:08 AM     Your access code will  in 90 days. If you need help or a new code, please contact your [facility] or call [phone] for assistance.        Care EveryWhere ID     This is your Care EveryWhere ID. This could be used by other organizations to access your [facility] medical records  [id number]         Blood Pressure from Last 3 Encounters:   17 136/89   17 132/83   17 129/88    Weight from Last 3 Encounters:   17 92.5 kg (204 lb)   17 92 kg (202 lb 14.4 oz)   17 91.6 kg (202 lb)              We Performed the Following     *CBC with platelets differential     Comprehensive metabolic panel     [facility] point of care        Primary Care Provider Office Phone # Fax #    Samuel Cobian [phone]        [facility] [address] [facility] [place] [address]        Equal Access to Services     DAKOTAFATIMAH BERNICE : Hadii haylee ku hadtaylor Sojimali, wameñoda luqadaha, qakaelynta kaandriycaprice galiciagerrycaprice, mary alice arzate myronjoey orellanajanecora tafoya. So [facility] [phone].    ATENCIÓN: Si habla español, tiene a garcia disposición servicios gratuitos de asistencia lingüística. Llame al [phone].    We comply with applicable federal civil rights laws and [place] laws. We do not discriminate on the basis of race, color, national origin, age, disability sex, sexual orientation or gender identity.            Thank you!     Thank you for choosing [facility]  for your care. Our goal is always to provide you with excellent care. Hearing back from our patients is one way we can continue to improve our services. Please take a few minutes to complete the written survey that you may receive in the mail after your visit with us. Thank you!             Your Updated Medication List - Protect others around you: Learn how to safely use, store and throw away your medicines at www.disposemymeds.org.          This list is accurate as of: 7/11/17 10:30 AM.  Always use your most recent med list.                   Brand Name Dispense Instructions for use Diagnosis    busPIRone 15 MG tablet    BUSPAR     Take 15 mg by mouth 2 times daily        capecitabine 500 MG tablet CHEMO    XELODA    112 tablet    Take 4 tablets (2,000 mg) by mouth 2 times daily for 14 days Take by mouth Twice daily for 14 days then 7 days off    Metastasis from colon cancer (H)       levothyroxine 150 MCG tablet    SYNTHROID/LEVOTHROID     Take 150 mcg by mouth daily        LEXAPRO 20 MG tablet   Generic drug:  escitalopram      Take 20 mg by mouth daily.        loperamide 2 MG capsule    IMODIUM    30 capsule    Start with 2 caps (4 mg), then take one cap (2 mg) after each diarrheal stool as needed. Do not use more than 8 caps (16 mg) per day.    Metastasis from colon  cancer (H)       NORVASC 5 MG tablet   Generic drug:  amLODIPine      Take 5 mg by mouth daily        OMEGA-3 FISH OIL PO           ondansetron 8 MG tablet    ZOFRAN    30 tablet    Take 1 tablet (8 mg) by mouth every 8 hours as needed (Nausea/Vomiting)    Metastasis from colon cancer (H), Chemotherapy-induced nausea       Potassium Chloride ER 20 MEQ Tbcr     3 tablet    Take 1 tablet every 2 hours    Hypokalemia       senna-docusate 8.6-50 MG per tablet    SENNA S    60 tablet    1-2 tabs daily for constipation    Constipation       sucralfate 1 GM tablet    CARAFATE    40 tablet    Take 1 tablet (1 g) by mouth 4 times daily Mix with 15cc of water and then drink slurry        tetrahydrozoline 0.05 % ophthalmic solution      1 drop 3 times daily        TOPAMAX 50 MG tablet   Generic drug:  topiramate      Take  by mouth 2 times daily. 1 tab q am 2 tabs q PM        triamterene-hydrochlorothiazide 37.5-25 MG per tablet    MAXZIDE-25     Take 1 tablet by mouth daily.        UNABLE TO FIND      Reported on 5/8/2017        VITAMIN D (CHOLECALCIFEROL) PO      Take 2,000 Units by mouth daily Reported on 5/8/2017        warfarin 5 MG tablet    COUMADIN    60 tablet    Take 1 tablet (5 mg) by mouth daily    Occlusive thrombus

## 2017-07-11 NOTE — MR AVS SNAPSHOT
Damaribashir Bryant   7/11/2017   Anticoagulation Therapy Visit    Description:  72 year old female   Provider:  Samuel Cobian   Department:  Mg Fp/Im/Peds           INR as of 7/11/2017     Today's INR 2.0      Anticoagulation Summary as of 7/11/2017     INR goal 2.0-3.0   Today's INR 2.0   Full instructions 2.5 mg on Tue, Thu; 5 mg all other days   Next INR check 7/25/2017    Indications   Deep vein thrombosis (DVT) (H) [I82.409] [I82.409]  Long-term (current) use of anticoagulants [Z79.01] [Z79.01]         July 2017 Details    Sun Mon Tue Wed Thu Fri Sat           1                 2               3               4               5               6               7               8                 9               10               11      2.5 mg   See details      12      5 mg         13      2.5 mg         14      5 mg         15      5 mg           16      5 mg         17      5 mg         18      2.5 mg         19      5 mg         20      2.5 mg         21      5 mg         22      5 mg           23      5 mg         24      5 mg         25            26               27               28               29                 30               31                     Date Details   07/11 This INR check       Date of next INR:  7/25/2017         How to take your warfarin dose     To take:  2.5 mg Take 0.5 of a 5 mg tablet.    To take:  5 mg Take 1 of the 5 mg tablets.

## 2017-07-12 NOTE — PROGRESS NOTES
"ONCOLOGY DIAGNOSES:   1. July 2013: Diagnosed with stage IIIB, [id number] colon cancer. Final pathology showed a mildly differentiated colonic adenocarcinoma. Tumor extended through the muscularis propria into the pericolonic adipose tissue. Lymphovascular invasion present. Margins negative. Three out of 30 lymph nodes were positive, 0.5 cm in greatest dimension. Extracapsular extension identified. DNA mismatch repair enzymes were intact.   2. November 2016: Metastatic colorectal cancer to the lung confirmed by a lung biopsy.   3. 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 No extension of thrombus into the right upper extremity.      THERAPY TO DATE:   1. July 21, 2013: Sigmoid colectomy.   2. August 2013 to January 2014: FOLFOX x12 cycles.   3. February 2014 to November 2016: Observation.   4. January 2017 to May 2017: FOLFIRI/Avastin. Stopped for maintenance.   5. January 2017 to Present: Anticoagulation.     6. May 2017 to June 2017: Capecitabine/Avastin. Stopped due to intolerance.     INTERVAL HISTORY:  Damari Bryant is a 72-year-old female initially diagnosed with stage IIB, pT3 N1 MX colon cancer in 2013 after presenting with worsening diarrhea.  In 11/2016 the patient was found to have metastatic disease to lung, confirmed by lung biopsy and was restarted on systemic therapy.  Patient had nice response to treatment and was switched over to maintenance with Xeloda. Patient did not tolerate Xeloda and was stopped. On today's visit, patient states she is hesitant restating chemotherapy. Was hoping to have a nice summer. States \"tired of coming here.\" Looking for other options. Reports weakness.   No fevers, chills, chest pain, diarrhea.  Well controlled with Imodium.  The remainder of comprehensive review of systems is negative.      SOCIAL HISTORY:   and comes in with her  and daughter today. Lives in [place].      PHYSICAL EXAMINATION: " "  VITAL SIGNS: /88  Pulse 86  Temp 97.4  F (36.3  C) (Oral)  Resp 20  Ht 1.676 m (5' 5.98\")  Wt 93.4 kg (206 lb)  SpO2 95%  BMI 33.27 kg/m2   GENERAL:  Comfortable, in no acute distress.  PSYCHOLOGIC: Teary-eyed, emotional.      IMAGING:  CT chest, abdomen and pelvis from 5/1/2017, improvement of several pulmonary nodules, some resolved, reflective of a good treatment response.  Status post partial colectomy, no recurrent mass near the anastomosis, no evidence of metastatic disease in the abdomen or pelvis.  Stable chronic findings as above.  Remainder of imaging per my note 04/2017.     PET scan from 12/30/2016 showed enlarging hypermetabolic pulmonary nodules bilaterally and hypermetabolic retroperitoneal lymph nodes, although similar size compared to prior CT. Diffuse FTG was obtained of the thyroid gland consistent with nonspecific thyroiditis.   CT angiogram from 2/14/2017 showed no CT evidence for pulmonary emboli. Bilateral pulmonary nodules are either stable or decreased in size. There are also at least 3 pulmonary nodules which have resolved. This could be due to response to treatment for metastatic disease versus resolving infectious/inflammatory process.      ASSESSMENT AND PLAN:   Metastatic colorectal cancer.  Discussed at length with patient, , and daughter options at this time. Ideally would like patient on some form of maintenance therapy, Xeloda or infusional 5-FU. Patient not interested in either. Discussed going on chemo holiday with complete break. Her last scan was in May. Consider repeat scans in August. If progression of disease can restart chemo. If stable may be able to continue chemo holiday. Patient found this option appealing. Especially since she would be able to enjoy her summer. Also discussed having care closer to home. The 30 mile drive to and from [place] becoming difficult on patient. Reports \"tired of coming in here.\" Discussed having treatment close to " home at [facility]. Patient is interested in care close to home. Patient given the name of Dr. Walker to schedule appointment with. Asked patient to have Dr. Walker contact me after Damari is seen. At the end of discussion, plan for Damari to have chemo holiday, repeat scans in August. In in the interim will establish care at [facility]. Will ask care coordinator to touch base with Damari next week to ensure she has an appointment at [facility].     SUSANA MENARD MD      Total Time: 25 minutes with greater than 50% in counseling and coordinating care.    cc: Samuel Cobian MD

## 2017-07-19 ENCOUNTER — ANTICOAGULATION THERAPY VISIT (OUTPATIENT)
Dept: PHARMACY | Facility: CLINIC | Age: 72
End: 2017-07-19
Payer: COMMERCIAL

## 2017-07-19 DIAGNOSIS — Z79.01 LONG-TERM (CURRENT) USE OF ANTICOAGULANTS: ICD-10-CM

## 2017-07-19 DIAGNOSIS — I82.90 DEEP VEIN THROMBOSIS (DVT) OF NON-EXTREMITY VEIN, UNSPECIFIED CHRONICITY: ICD-10-CM

## 2017-07-19 LAB — INR POINT OF CARE: 1.5 (ref 0.86–1.14)

## 2017-07-19 PROCEDURE — 85610 PROTHROMBIN TIME: CPT | Mod: QW | Performed by: FAMILY MEDICINE

## 2017-07-19 PROCEDURE — 36416 COLLJ CAPILLARY BLOOD SPEC: CPT | Performed by: FAMILY MEDICINE

## 2017-07-19 PROCEDURE — 99207 ZZC NO CHARGE NURSE ONLY: CPT | Performed by: FAMILY MEDICINE

## 2017-07-19 NOTE — MR AVS SNAPSHOT
Damari Bryant   7/19/2017   Anticoagulation Therapy Visit    Description:  72 year old female   Provider:  Samuel Cobian   Department:  Mg Med Monitoring           INR as of 7/19/2017     Today's INR 1.5!      Anticoagulation Summary as of 7/19/2017     INR goal 2.0-3.0   Today's INR 1.5!   Full instructions 7/20: 5 mg; 7/25: 5 mg; Otherwise 2.5 mg on Tue, Thu; 5 mg all other days   Next INR check 7/26/2017    Indications   Deep vein thrombosis (DVT) (H) [I82.409] [I82.409]  Long-term (current) use of anticoagulants [Z79.01] [Z79.01]         Contact Numbers     [facility]  Please call the anticoagulation nurse at [phone] to cancel and/or reschedule your appointment, or with any problems or questions regarding your therapy.  You may call the main clinic number at [phone] if the nurse is not available.           July 2017 Details    Sun Mon Tue Wed Thu Fri Sat           1                 2               3               4               5               6               7               8                 9               10               11               12               13               14               15                 16               17               18               19      5 mg   See details      20      5 mg         21      5 mg         22      5 mg           23      5 mg         24      5 mg         25      5 mg         26            27               28               29                 30               31                     Date Details   07/19 This INR check       Date of next INR:  7/26/2017         How to take your warfarin dose     To take:  5 mg Take 1 of the 5 mg tablets.

## 2017-07-19 NOTE — PROGRESS NOTES
ANTICOAGULATION FOLLOW-UP CLINIC VISIT    Patient Name:  Damari Bryant  Date:  7/19/2017  Contact Type:  Telephone see note bleow - spoke with Ross (pt's )    SUBJECTIVE:     Patient Findings     Comments Chemotherapy has been on hold for about a month - I am relating this to the drop in INR as the Warfarin and chemo drugs are no longer competing. Patient back to normal diet which does not consist highly of dark leafy greens. Will know more about whether chemo will be resumed at office visit on 8/8/2017. Ross () also mentioned that they are considering switching facilities ([facility]) as it is closer to their home.            OBJECTIVE    INR Protime   Date Value Ref Range Status   07/19/2017 1.5 (A) 0.86 - 1.14 Final       ASSESSMENT / PLAN  INR assessment SUB    Recheck INR In: 1 WEEK    INR Location Outside lab      Anticoagulation Summary as of 7/19/2017     INR goal 2.0-3.0   Today's INR 1.5!   Maintenance plan 2.5 mg (5 mg x 0.5) on Tue, Thu; 5 mg (5 mg x 1) all other days   Full instructions 7/20: 5 mg; 7/25: 5 mg; Otherwise 2.5 mg on Tue, Thu; 5 mg all other days   Weekly total 30 mg   Plan last modified Paulina Shaver, RN (7/11/2017)   Next INR check 7/26/2017   Priority INR   Target end date 8/14/2017    Indications   Deep vein thrombosis (DVT) (H) [I82.409] [I82.409]  Long-term (current) use of anticoagulants [Z79.01] [Z79.01]         Anticoagulation Episode Summary     INR check location     Preferred lab [facility]    Send INR reminders to [facility] INR    Comments Chemo infusion pump for 48 hours every 2 weeks.  Her INR rises during this infusion. Ross's cell phone for INR results [phone].   Per Esther Greene MD- Goal of 2-3.      Anticoagulation Care Providers     Provider Role Specialty Phone number    Esther Greene MD Responsible Hematology [phone]            See the Encounter Report to view Anticoagulation Flowsheet and Dosing Calendar (Go to  Encounters tab in chart review, and find the Anticoagulation Therapy Visit)      Paulina Shaver RN

## 2017-07-20 ENCOUNTER — CARE COORDINATION (OUTPATIENT)
Dept: ONCOLOGY | Facility: CLINIC | Age: 72
End: 2017-07-20

## 2017-07-20 DIAGNOSIS — C18.9 METASTASIS FROM COLON CANCER (H): Primary | ICD-10-CM

## 2017-07-20 DIAGNOSIS — C79.9 METASTASIS FROM COLON CANCER (H): Primary | ICD-10-CM

## 2017-07-20 NOTE — PROGRESS NOTES
Telephone call to spouse, Ross, to check on patient's status.  Patient is doing and feeling better this week.  Her appetite is much improved, and she has actually gained a couple of pounds.  Her fatigue has continued, but is stable.  Patient is scheduled to meet with an Oncologist closer to home, Dr. Walker ([facility]), on Tuesday of next week.  Spouse states that they would still like to proceed with plan to repeat scan in early August, and meet with Dr. Greene on 8/8 as scheduled to discuss plan going forward.  Dr. Greene was updated, and has placed orders for CT CAP and repeat labs.  Order for CT CAP faxed to [employer] at fax number [phone].    Evans Archer RN, BSN, OCN  Oncology Care Coordinator  [facility]

## 2017-07-25 ENCOUNTER — TRANSFERRED RECORDS (OUTPATIENT)
Dept: HEALTH INFORMATION MANAGEMENT | Facility: CLINIC | Age: 72
End: 2017-07-25

## 2017-07-25 LAB
ALT SERPL-CCNC: 18 U/L (ref 13–69)
AST SERPL-CCNC: 24 U/L (ref 15–46)
CREAT SERPL-MCNC: 0.97 MG/DL (ref 0.7–1.5)
GFR SERPL CREATININE-BSD FRML MDRD: 56 ML/MIN/1.73M`2
GLUCOSE SERPL-MCNC: 109 MG/DL (ref 74–106)
INR PPP: 1.5 (ref 2–3)
POTASSIUM SERPL-SCNC: 3.1 MMOL/L (ref 3.5–5.1)

## 2017-07-27 NOTE — PROGRESS NOTES
Order(s) created erroneously. Erroneous order ID: [id number]   Order canceled by: VANITA SHAH   Order cancel date/time: 07/27/2017 1:24 PM

## 2017-07-28 ENCOUNTER — TRANSFERRED RECORDS (OUTPATIENT)
Dept: HEALTH INFORMATION MANAGEMENT | Facility: CLINIC | Age: 72
End: 2017-07-28

## 2017-08-01 ENCOUNTER — CARE COORDINATION (OUTPATIENT)
Dept: ONCOLOGY | Facility: CLINIC | Age: 72
End: 2017-08-01

## 2017-08-01 NOTE — PROGRESS NOTES
Received 7/28/17 CT C/A/P report via fax from [employer].  CT images pushed to PACS.  Report labeled for EMR scanning.  Copy of report provided to Dr. Greene for upcoming visit.    Evans Archer RN, BSN, OCN  Oncology Care Coordinator  [facility]

## 2017-08-03 ENCOUNTER — TRANSFERRED RECORDS (OUTPATIENT)
Dept: HEALTH INFORMATION MANAGEMENT | Facility: CLINIC | Age: 72
End: 2017-08-03

## 2017-08-10 ENCOUNTER — TELEPHONE (OUTPATIENT)
Dept: PHARMACY | Facility: CLINIC | Age: 72
End: 2017-08-10

## 2017-08-10 NOTE — TELEPHONE ENCOUNTER
Attempt 1    Called patient's  Ross to schedule INR.     Last INR was on 7/19/2017 and was 1.5    Due for INR on 7/26/2017    Asked patient's  Ross to call [phone] to schedule.     Paulina Shaver RN

## 2017-08-15 ENCOUNTER — ANTICOAGULATION THERAPY VISIT (OUTPATIENT)
Dept: PHARMACY | Facility: CLINIC | Age: 72
End: 2017-08-15

## 2017-08-15 DIAGNOSIS — Z79.01 LONG-TERM (CURRENT) USE OF ANTICOAGULANTS: ICD-10-CM

## 2017-08-15 DIAGNOSIS — I82.90 DEEP VEIN THROMBOSIS (DVT) OF NON-EXTREMITY VEIN, UNSPECIFIED CHRONICITY: ICD-10-CM

## 2017-08-16 ENCOUNTER — HOSPITAL ENCOUNTER (EMERGENCY)
Facility: CLINIC | Age: 72
Discharge: HOME | End: 2017-08-16
Attending: EMERGENCY MEDICINE | Admitting: EMERGENCY MEDICINE
Payer: MEDICARE

## 2017-08-16 VITALS
WEIGHT: 199 LBS | BODY MASS INDEX: 31.98 KG/M2 | HEIGHT: 66 IN | TEMPERATURE: 97.2 F | HEART RATE: 99 BPM | DIASTOLIC BLOOD PRESSURE: 94 MMHG | RESPIRATION RATE: 20 BRPM | OXYGEN SATURATION: 99 % | SYSTOLIC BLOOD PRESSURE: 142 MMHG

## 2017-08-16 DIAGNOSIS — R31.9 URINARY TRACT INFECTION WITH HEMATURIA, SITE UNSPECIFIED: ICD-10-CM

## 2017-08-16 DIAGNOSIS — N39.0 URINARY TRACT INFECTION WITH HEMATURIA, SITE UNSPECIFIED: ICD-10-CM

## 2017-08-16 DIAGNOSIS — N76.89 OTHER SPECIFIED INFLAMMATION OF VAGINA AND VULVA: ICD-10-CM

## 2017-08-16 DIAGNOSIS — N39.0 URINARY TRACT INFECTION, SITE NOT SPECIFIED: ICD-10-CM

## 2017-08-16 DIAGNOSIS — L30.9 VULVAR DERMATITIS: ICD-10-CM

## 2017-08-16 DIAGNOSIS — R31.9 HEMATURIA: ICD-10-CM

## 2017-08-16 LAB
ALBUMIN UR-MCNC: NEGATIVE MG/DL
APPEARANCE UR: ABNORMAL
BILIRUB UR QL STRIP: NEGATIVE
COLOR UR AUTO: YELLOW
GLUCOSE UR STRIP-MCNC: NEGATIVE MG/DL
HGB UR QL STRIP: ABNORMAL
HYALINE CASTS #/AREA URNS LPF: 3 /LPF (ref 0–2)
KETONES UR STRIP-MCNC: NEGATIVE MG/DL
LEUKOCYTE ESTERASE UR QL STRIP: ABNORMAL
MUCOUS THREADS #/AREA URNS LPF: PRESENT /LPF
NITRATE UR QL: NEGATIVE
PH UR STRIP: 7 PH (ref 5–7)
RBC #/AREA URNS AUTO: 19 /HPF (ref 0–2)
SOURCE: ABNORMAL
SP GR UR STRIP: 1.01 (ref 1–1.03)
SPECIMEN SOURCE: NORMAL
SQUAMOUS #/AREA URNS AUTO: 1 /HPF (ref 0–1)
UROBILINOGEN UR STRIP-MCNC: 0 MG/DL (ref 0–2)
WBC #/AREA URNS AUTO: >182 /HPF (ref 0–2)
WET PREP SPEC: NORMAL

## 2017-08-16 PROCEDURE — 87086 URINE CULTURE/COLONY COUNT: CPT | Performed by: EMERGENCY MEDICINE

## 2017-08-16 PROCEDURE — 25000125 ZZHC RX 250: Performed by: EMERGENCY MEDICINE

## 2017-08-16 PROCEDURE — 99284 EMERGENCY DEPT VISIT MOD MDM: CPT | Mod: Z6 | Performed by: EMERGENCY MEDICINE

## 2017-08-16 PROCEDURE — 81001 URINALYSIS AUTO W/SCOPE: CPT | Performed by: EMERGENCY MEDICINE

## 2017-08-16 PROCEDURE — 99283 EMERGENCY DEPT VISIT LOW MDM: CPT | Performed by: EMERGENCY MEDICINE

## 2017-08-16 PROCEDURE — 87210 SMEAR WET MOUNT SALINE/INK: CPT | Performed by: EMERGENCY MEDICINE

## 2017-08-16 RX ORDER — CIPROFLOXACIN 500 MG/1
500 TABLET, FILM COATED ORAL 2 TIMES DAILY
Qty: 14 TABLET | Refills: 0 | Status: SHIPPED | OUTPATIENT
Start: 2017-08-16 | End: 2017-08-23

## 2017-08-16 RX ORDER — TRIAMCINOLONE ACETONIDE 1 MG/G
OINTMENT TOPICAL
Qty: 80 G | Refills: 0 | Status: SHIPPED | OUTPATIENT
Start: 2017-08-16

## 2017-08-16 RX ADMIN — LIDOCAINE HYDROCHLORIDE 10 ML: 20 JELLY TOPICAL at 19:31

## 2017-08-16 ASSESSMENT — ENCOUNTER SYMPTOMS
FEVER: 0
NAUSEA: 0
FLANK PAIN: 1
VOMITING: 0
CHILLS: 0
FREQUENCY: 1
APPETITE CHANGE: 1
HEMATURIA: 0
DYSURIA: 1
DIARRHEA: 1

## 2017-08-16 NOTE — ED AVS SNAPSHOT
[facility] Emergency Department    [address] [place] [address]    Phone:  [phone]    Fax:  [phone]                                       Damari Bryant   MRN: [id number]    Department:  [facility] Emergency Department   Date of Visit:  8/16/2017           After Visit Summary Signature Page     I have received my discharge instructions, and my questions have been answered. I have discussed any challenges I see with this plan with the nurse or doctor.    ..........................................................................................................................................  Patient/Patient Representative Signature      ..........................................................................................................................................  Patient Representative Print Name and Relationship to Patient    ..................................................               ................................................  Date                                            Time    ..........................................................................................................................................  Reviewed by Signature/Title    ...................................................              ..............................................  Date                                                            Time

## 2017-08-16 NOTE — ED PROVIDER NOTES
"  History     Chief Complaint   Patient presents with     Urinary Frequency     The history is provided by the patient.     This is a 72-year-old female with history of metastatic colon cancer descending with urinary frequency. Patient initially noted some burning with urination about 2 months ago. She bought a \"bottom \" off of [employer] and has been using it but has not noted relief. About 2 weeks ago, she started having more burning with urination, frequency, urgency to the point where she has had some incontinence of urine. She has had to sleep with a towel between her legs because of concerns for incontinence. She notes that her pain initially was just with urination but she now has perineal pain/discomfort nearly all the time. It is primarily a burning sensation. She has been trying that occurs on 1% cream externally and at the opening of her vagina with only mild relief. She has not noted any vaginal discharge. She is status post hysterectomy.  She has noted some malodor/fishy odor. Patient also has had some heartburn cystotome is for the last couple of weeks that are only mildly relieved with Tums. She also has had some left side/flank pain along with a lot of burping, gas and intermittent diarrhea. She has not noted any blood from her vagina or urine. She has not had fevers, chills, nausea, vomiting. She is status post colectomy and has had chemotherapy palliatively for metastases   I have reviewed the Medications, Allergies, Past Medical and Surgical History, and Social History in the Epic system.    Patient Active Problem List   Diagnosis     Hypokalemia     Chronic headaches     Anxiety     Thrombocytopenia (H)     Incisional hernia     Metastasis from colon cancer (H)     Poor fluid intake     Dizziness     Deep vein thrombosis (DVT) (H) [I82.409]     Long-term (current) use of anticoagulants [Z79.01]     Past Medical History:   Diagnosis Date     Anemia      Anxiety      Colon cancer (H)      " Dyslipidaemia      GERD (gastroesophageal reflux disease)      HTN (hypertension)      Hypothyroid      Lichen sclerosus of female genitalia      Macular degeneration      Meniere disease      Migraine      Palpitation     pvc     Tachycardia     supraventricular     Past Surgical History:   Procedure Laterality Date     BLADDER SURGERY       BYPASS CARDIOPULMONARY       COLECTOMY WITHOUT COLOSTOMY  6/21/2013    lap sigmoid colectomy     FUSION LUMBAR ANTERIOR TWO LEVELS  1999    L4-5     HYSTERECTOMY  1995     NISSEN FUNDOPLICATION  9/2012    paraesophageal hernia reduction     TONSILLECTOMY & ADENOIDECTOMY       Family History   Problem Relation Age of Onset     CEREBROVASCULAR DISEASE Father      Cancer - colorectal Father 70     CEREBROVASCULAR DISEASE Mother      DIABETES Mother      CANCER Brother      Lung     DIABETES Sister      Type I     DIABETES Son      Type I     Arthritis Maternal Grandmother      CANCER Maternal Grandmother      esophaegeal     Arthritis Maternal Grandfather      Arthritis Paternal Grandmother      Social History   Substance Use Topics     Smoking status: Never Smoker     Smokeless tobacco: Never Used     Alcohol use No      Immunization History   Administered Date(s) Administered     Influenza (IIV3) 09/30/2012, 10/16/2012     Pneumococcal 23 valent 12/15/2008     Tdap (Adacel,Boostrix) 12/15/2008     Allergies   Allergen Reactions     Ativan Other (See Comments)     hallucinations     Avocado Nausea and Vomiting     Hmg-Coa-R Inhibitors Nausea     Iodine-131 Nausea and Vomiting     Shellfish Allergy GI Disturbance     Sulfa Drugs Nausea and Vomiting     Tegaderm Transparent Dressing (Informational Only) Rash     Current Outpatient Prescriptions   Medication Sig Dispense Refill     ciprofloxacin (CIPRO) 500 MG tablet Take 1 tablet (500 mg) by mouth 2 times daily for 7 days 14 tablet 0     triamcinolone (KENALOG) 0.1 % ointment Apply sparingly to affected area at bedtime - until  clear. 80 g 0     warfarin (COUMADIN) 5 MG tablet Take 1 tablet (5 mg) by mouth daily 60 tablet 1     ondansetron (ZOFRAN) 8 MG tablet Take 1 tablet (8 mg) by mouth every 8 hours as needed (Nausea/Vomiting) 30 tablet 1     sucralfate (CARAFATE) 1 GM tablet Take 1 tablet (1 g) by mouth 4 times daily Mix with 15cc of water and then drink slurry 40 tablet 1     Potassium Chloride ER 20 MEQ TBCR Take 1 tablet every 2 hours 3 tablet 0     capecitabine (XELODA) 500 MG tablet CHEMO Take 4 tablets (2,000 mg) by mouth 2 times daily for 14 days Take by mouth Twice daily for 14 days then 7 days off 112 tablet 0     loperamide (IMODIUM) 2 MG capsule Start with 2 caps (4 mg), then take one cap (2 mg) after each diarrheal stool as needed. Do not use more than 8 caps (16 mg) per day. 30 capsule 1     senna-docusate (SENNA S) 8.6-50 MG per tablet 1-2 tabs daily for constipation 60 tablet 1     Omega-3 Fatty Acids (OMEGA-3 FISH OIL PO)        tetrahydrozoline 0.05 % ophthalmic solution 1 drop 3 times daily       UNABLE TO FIND Reported on 5/8/2017       VITAMIN D, CHOLECALCIFEROL, PO Take 2,000 Units by mouth daily Reported on 5/8/2017       busPIRone (BUSPAR) 15 MG tablet Take 15 mg by mouth 2 times daily       amLODIPine (NORVASC) 5 MG tablet Take 5 mg by mouth daily       escitalopram (LEXAPRO) 20 MG tablet Take 20 mg by mouth daily.       levothyroxine (SYNTHROID, LEVOTHROID) 150 MCG tablet Take 150 mcg by mouth daily        topiramate (TOPAMAX) 50 MG tablet Take  by mouth 2 times daily. 1 tab q am 2 tabs q PM       triamterene-hydrochlorothiazide (MAXZIDE-25) 37.5-25 MG per tablet Take 1 tablet by mouth daily.       Review of Systems   Constitutional: Positive for appetite change. Negative for chills and fever.   Gastrointestinal: Positive for diarrhea. Negative for nausea and vomiting.   Genitourinary: Positive for dysuria, flank pain, frequency and urgency. Negative for hematuria, vaginal bleeding and vaginal discharge.        " Positive for vaginal odor   All other systems reviewed and are negative.    Physical Exam   BP: (!) 142/94  Pulse: 99  Temp: 97.2  F (36.2  C)  Resp: 16  Height: 167.6 cm (5' 6\")  Weight: 90.3 kg (199 lb)  SpO2: 99 %  Physical Exam   Constitutional: She is oriented to person, place, and time. She appears well-developed and well-nourished.   Very pleasant, slightly tearful older appearing female sitting in a chair   HENT:   Head: Normocephalic and atraumatic.   Nose: Nose normal.   Mouth/Throat: Oropharynx is clear and moist.   Eyes: Conjunctivae and EOM are normal. Pupils are equal, round, and reactive to light.   Neck: Normal range of motion. Neck supple.   Cardiovascular: Normal rate, regular rhythm, normal heart sounds and intact distal pulses.    Pulmonary/Chest: Effort normal and breath sounds normal.   Port present in right chest wall.    Abdominal: Soft.   Genitourinary:         Genitourinary Comments: No significant amount of discharge. The whole perineal area looks very irritated and inflamed Bilaterally.   Musculoskeletal: Normal range of motion.   Neurological: She is alert and oriented to person, place, and time. She exhibits normal muscle tone.   Skin: Skin is warm and dry. She is not diaphoretic.   Psychiatric: She has a normal mood and affect. Her behavior is normal.   Nursing note and vitals reviewed.    ED Course (with Medical Decision Making)    Pt seen and examined by me.  RN and EPIC notes reviewed.      Patient with urinary symptoms as noted above along with peritoneal irritation. There is nothing to suggest any herpetic lesions. There is not significant discharge or any evidence of malodor.   wet prep was sent. Urine .    Urine does show white blood cells and leukocyte esterase. Sent for culture.   Wet prep is unremarkable except for white cells.     I'm going to choose to treat her for urinary tract infection with ciprofloxacin. I'm also going to be some Kenalog ointment to use for irritation " in the vaginal area. I also sent her home with a Urojet to use for discomfort topically in the perineal area if needed. I would like her to have a recheck with her primary care provider next week. Return at any time for worsening, changes or concerns.        Procedures         Results for orders placed or performed during the hospital encounter of 08/16/17   UA with Microscopic   Result Value Ref Range    Color Urine Yellow     Appearance Urine Cloudy     Glucose Urine Negative NEG^Negative mg/dL    Bilirubin Urine Negative NEG^Negative    Ketones Urine Negative NEG^Negative mg/dL    Specific Gravity Urine 1.011 1.003 - 1.035    Blood Urine Small (A) NEG^Negative    pH Urine 7.0 5.0 - 7.0 pH    Protein Albumin Urine Negative NEG^Negative mg/dL    Urobilinogen mg/dL 0.0 0.0 - 2.0 mg/dL    Nitrite Urine Negative NEG^Negative    Leukocyte Esterase Urine Large (A) NEG^Negative    Source Unspecified Urine     WBC Urine >182 (H) 0 - 2 /HPF    RBC Urine 19 (H) 0 - 2 /HPF    Squamous Epithelial /HPF Urine 1 0 - 1 /HPF    Mucous Urine Present (A) NEG^Negative /LPF    Hyaline Casts 3 (H) 0 - 2 /LPF   Wet prep   Result Value Ref Range    Specimen Description Vagina     Wet Prep No Trichomonas seen     Wet Prep No clue cells seen     Wet Prep No yeast seen     Wet Prep Many  WBC'S seen           Assessments & Plan     I have reviewed the findings, diagnosis, plan and need for follow up with the patient.    Discharge Medication List as of 8/16/2017  7:29 PM      START taking these medications    Details   ciprofloxacin (CIPRO) 500 MG tablet Take 1 tablet (500 mg) by mouth 2 times daily for 7 days, Disp-14 tablet, R-0, E-Prescribe      triamcinolone (KENALOG) 0.1 % ointment Apply sparingly to affected area at bedtime - until clear.Disp-80 g, [id number]             Final diagnoses:   Urinary tract infection with hematuria, site unspecified   Vulvar dermatitis     Disposition: Patient discharged home in stable condition in the  care of her . Plan as above. Return for concerns.    This document serves as a record of services personally performed by Inna Montelongo MD. It was created on their behalf by Jackie Tai, a trained medical scribe. The creation of this record is based on the provider's personal observations and the statements of the patient. This document has been checked and approved by the attending provider.    Note: Chart documentation done in part with Dragon Voice Recognition software. Although reviewed after completion, some word and grammatical errors may remain.    8/16/2017   [facility] EMERGENCY DEPARTMENT     Inna Montelongo MD  08/16/17 6668

## 2017-08-16 NOTE — ED AVS SNAPSHOT
[facility] Emergency Department    [address] [place] [address]    Phone:  [phone]    Fax:  [phone]                                       Damari Bryant   MRN: [id number]    Department:  [facility] Emergency Department   Date of Visit:  8/16/2017           Patient Information     Date Of Birth          1945        Your diagnoses for this visit were:     Urinary tract infection with hematuria, site unspecified     Vulvar dermatitis        You were seen by Inna Montelongo MD.      Follow-up Information     Follow up with Samuel Cobian In 1 week.    Specialty:  Family Practice    Contact information:    [facility]  [address] [place] [id number] [phone]          Discharge Instructions       If you are going to continue sitz baths, use only warm water.    Stop the hydrocortisone cream. Start triamcinolone ointment as prescribed at bedtime.    You can use the Lidocaine jelly intermittently as needed for discomfort.    Your urine shows some signs of infection. Start antibiotics as prescribed. These can increase your INR. Be sure to have the INR checked in the next week if possible.    Please follow-up with your doctor for a recheck in 1 week in clinic.    Return to the ED for significant worsening, changes or concerns.    I hope you improve quickly!!        Discharge References/Attachments     URINARY TRACT INFECTIONS IN WOMEN (ENGLISH)    VAGINITIS, PREVENTING (ENGLISH)      24 Hour Appointment Hotline       To make an appointment at any [facility], call [phone] ([phone]). If you don't have a family doctor or clinic, we will help you find one. [facility] are conveniently located to serve the needs of you and your family.             Review of your medicines      START taking        Dose / Directions Last dose taken    ciprofloxacin 500 MG tablet   Commonly known as:  CIPRO   Dose:  500 mg   Quantity:  14 tablet         Take 1 tablet (500 mg) by mouth 2 times daily for 7 days   Refills:  0        triamcinolone 0.1 % ointment   Commonly known as:  KENALOG   Quantity:  80 g        Apply sparingly to affected area at bedtime - until clear.   Refills:  0          Our records show that you are taking the medicines listed below. If these are incorrect, please call your family doctor or clinic.        Dose / Directions Last dose taken    busPIRone 15 MG tablet   Commonly known as:  BUSPAR   Dose:  15 mg        Take 15 mg by mouth 2 times daily   Refills:  0        capecitabine 500 MG tablet CHEMO   Commonly known as:  XELODA   Dose:  1000 mg/m2   Quantity:  112 tablet        Take 4 tablets (2,000 mg) by mouth 2 times daily for 14 days Take by mouth Twice daily for 14 days then 7 days off   Refills:  0        levothyroxine 150 MCG tablet   Commonly known as:  SYNTHROID/LEVOTHROID   Dose:  150 mcg        Take 150 mcg by mouth daily   Refills:  0        LEXAPRO 20 MG tablet   Dose:  20 mg   Generic drug:  escitalopram        Take 20 mg by mouth daily.   Refills:  0        loperamide 2 MG capsule   Commonly known as:  IMODIUM   Quantity:  30 capsule        Start with 2 caps (4 mg), then take one cap (2 mg) after each diarrheal stool as needed. Do not use more than 8 caps (16 mg) per day.   Refills:  1        NORVASC 5 MG tablet   Dose:  5 mg   Generic drug:  amLODIPine        Take 5 mg by mouth daily   Refills:  0        OMEGA-3 FISH OIL PO        Refills:  0        ondansetron 8 MG tablet   Commonly known as:  ZOFRAN   Dose:  8 mg   Quantity:  30 tablet        Take 1 tablet (8 mg) by mouth every 8 hours as needed (Nausea/Vomiting)   Refills:  1        Potassium Chloride ER 20 MEQ Tbcr   Quantity:  3 tablet        Take 1 tablet every 2 hours   Refills:  0        senna-docusate 8.6-50 MG per tablet   Commonly known as:  SENNA S   Quantity:  60 tablet        1-2 tabs daily for constipation   Refills:  1        sucralfate 1 GM tablet    Commonly known as:  CARAFATE   Dose:  1 g   Quantity:  40 tablet        Take 1 tablet (1 g) by mouth 4 times daily Mix with 15cc of water and then drink slurry   Refills:  1        tetrahydrozoline 0.05 % ophthalmic solution   Dose:  1 drop        1 drop 3 times daily   Refills:  0        TOPAMAX 50 MG tablet   Generic drug:  topiramate        Take  by mouth 2 times daily. 1 tab q am 2 tabs q PM   Refills:  0        triamterene-hydrochlorothiazide 37.5-25 MG per tablet   Commonly known as:  MAXZIDE-25   Dose:  1 tablet        Take 1 tablet by mouth daily.   Refills:  0        UNABLE TO FIND        Reported on 5/8/2017   Refills:  0        VITAMIN D (CHOLECALCIFEROL) PO   Dose:  2000 Units        Take 2,000 Units by mouth daily Reported on 5/8/2017   Refills:  0        warfarin 5 MG tablet   Commonly known as:  COUMADIN   Dose:  5 mg   Quantity:  60 tablet        Take 1 tablet (5 mg) by mouth daily   Refills:  1                Prescriptions were sent or printed at these locations (2 Prescriptions)                   [facility] [address]    [address] [facility] [address]    Telephone:  [phone]   Fax:  [phone]   Hours:                  E-Prescribed (2 of 2)         ciprofloxacin (CIPRO) 500 MG tablet               triamcinolone (KENALOG) 0.1 % ointment                Procedures and tests performed during your visit     UA with Microscopic    Urine Culture    Wet prep      Orders Needing Specimen Collection     None      Pending Results     Date and Time Order Name Status Description    8/16/2017 1806 Urine Culture In process             Pending Culture Results     Date and Time Order Name Status Description    8/16/2017 1806 Urine Culture In process             Pending Results Instructions     If you had any lab results that were not finalized at the time of your Discharge, you can call the ED Lab Result RN at [phone]. You will be contacted by this team for  "any positive Lab results or changes in treatment. The nurses are available 7 days a week from 10A to 6:30P.  You can leave a message 24 hours per day and they will return your call.        Thank you for choosing [facility]       Thank you for choosing [facility] for your care. Our goal is always to provide you with excellent care. Hearing back from our patients is one way we can continue to improve our services. Please take a few minutes to complete the written survey that you may receive in the mail after you visit with us. Thank you!        [employer] Information     [employer] lets you send messages to your doctor, view your test results, renew your prescriptions, schedule appointments and more. To sign up, go to [email or web address] . Click on \"Log in\" on the left side of the screen, which will take you to the Welcome page. Then click on \"Sign up Now\" on the right side of the page.     You will be asked to enter the access code listed below, as well as some personal information. Please follow the directions to create your username and password.     Your access code is: [email or web address]  Expires: 2017  7:29 PM     Your access code will  in 90 days. If you need help or a new code, please call your [facility] clinic or [phone].        Care EveryWhere ID     This is your Care EveryWhere ID. This could be used by other organizations to access your [facility] medical records  [id number]        Equal Access to Services     OBED QUEEN : Hadii haylee belle [place], waaxda luqadaha, qaybta kaalmada adekrupayada, mary alice mazariegos . So [facility] [phone].    ATENCIÓN: Si habla español, tiene a garcia disposición servicios gratuitos de asistencia lingüística. Llame al [phone].    We comply with applicable federal civil rights laws and [place] laws. We do not discriminate on the basis of race, color, national origin, age, disability sex, sexual orientation or gender identity.            After " Visit Summary       This is your record. Keep this with you and show to your community pharmacist(s) and doctor(s) at your next visit.

## 2017-08-16 NOTE — ED NOTES
Pt here with burning with urination and vaginal burning for 2 months, Has been self treating. Now has had some incontinence of urination the past two days. Has been on Chemo up on till the middle of June.

## 2017-08-17 NOTE — DISCHARGE INSTRUCTIONS
If you are going to continue sitz baths, use only warm water.    Stop the hydrocortisone cream. Start triamcinolone ointment as prescribed at bedtime.    You can use the Lidocaine jelly intermittently as needed for discomfort.    Your urine shows some signs of infection. Start antibiotics as prescribed. These can increase your INR. Be sure to have the INR checked in the next week if possible.    Please follow-up with your doctor for a recheck in 1 week in clinic.    Return to the ED for significant worsening, changes or concerns.    I hope you improve quickly!!

## 2017-08-18 ENCOUNTER — CARE COORDINATION (OUTPATIENT)
Dept: ONCOLOGY | Facility: CLINIC | Age: 72
End: 2017-08-18

## 2017-08-18 LAB
BACTERIA SPEC CULT: NORMAL
SPECIMEN SOURCE: NORMAL

## 2017-08-18 NOTE — PROGRESS NOTES
Received telephone call from MELONIE Trujillo with Dr. Walker ([facility] - phone: [phone]) calling with update on patient for Dr. Greene.  Patient recently met with Dr. Walker following a repeat CT scan - Cassandra states that CT was overall stable, and at this time patient is continuing with observation.  Cassandra will fax over updated notes for Dr. Greene' review.  Writer provided Cassandra with direct phone number for any further updates or questions/concerns.    Evans Archer RN, BSN, OCN  Oncology Care Coordinator  [facility]

## 2017-10-19 LAB
ALT SERPL-CCNC: 26 U/L (ref 8–45)
AST SERPL-CCNC: 24 U/L (ref 5–41)
CREAT SERPL-MCNC: 0.99 MG/DL (ref 0.57–1.11)
GLUCOSE SERPL-MCNC: 199 MG/DL (ref 70–100)
POTASSIUM SERPL-SCNC: 3 MMOL/L (ref 3.5–5.1)

## 2017-10-31 ENCOUNTER — TRANSFERRED RECORDS (OUTPATIENT)
Dept: HEALTH INFORMATION MANAGEMENT | Facility: CLINIC | Age: 72
End: 2017-10-31

## 2018-01-01 ENCOUNTER — TRANSFERRED RECORDS (OUTPATIENT)
Dept: HEALTH INFORMATION MANAGEMENT | Facility: CLINIC | Age: 73
End: 2018-01-01

## 2018-01-01 ENCOUNTER — CARE COORDINATION (OUTPATIENT)
Dept: ONCOLOGY | Facility: CLINIC | Age: 73
End: 2018-01-01

## 2018-02-15 NOTE — PROGRESS NOTES
Received telephone call from Tory, nurse with Dr. Walker at the [facility], calling with an update for Dr. Greene.  Per Tory, Dr. Walker has admitted Damari to home hospice services.  Will update Dr. Greene.    Evans Archer, RN, BSN, OCN  Oncology Care Coordinator  [facility]

## 2019-04-08 NOTE — PROGRESS NOTES
ANTICOAGULATION FOLLOW-UP CLINIC VISIT    Patient Name:  Damari Bryant  Date:  6/8/2017  Contact Type:  Telephone/ Ross    SUBJECTIVE:     Patient Findings     Positives No Problem Findings    Comments Has not started Xeloda yet, will start on 6/13/17.           OBJECTIVE    INR   Date Value Ref Range Status   06/07/2017 2.0  Final       ASSESSMENT / PLAN  INR assessment THER    Recheck INR In: 5 DAYS    INR Location Outside lab      Anticoagulation Summary as of 6/8/2017     INR goal 2.0-3.0   Today's INR 2.0 (6/7/2017)   Maintenance plan 7.5 mg (5 mg x 1.5) on Wed; 5 mg (5 mg x 1) all other days   Full instructions 6/9: 2.5 mg; Otherwise 7.5 mg on Wed; 5 mg all other days   Weekly total 37.5 mg   No change documented Nina Silva RN   Plan last modified Mary Berry RN (3/23/2017)   Next INR check 6/13/2017   Priority INR   Target end date 8/14/2017    Indications   Deep vein thrombosis (DVT) (H) [I82.409] [I82.409]  Long-term (current) use of anticoagulants [Z79.01] [Z79.01]         Anticoagulation Episode Summary     INR check location     Preferred lab [facility]    Send INR reminders to [facility] INR    Comments Chemo infusion pump for 48 hours every 2 weeks.  Her INR rises during this infusion. Most recent schd infusion was  4/4/17, next one is 4/18/17   Ross's cell phone for INR results [phone]. Poke lightly  Per Esther Greene MD- Goal of 2-3.      Anticoagulation Care Providers     Provider Role Specialty Phone number    Esther Greene MD Responsible Hematology [phone]            See the Encounter Report to view Anticoagulation Flowsheet and Dosing Calendar (Go to Encounters tab in chart review, and find the Anticoagulation Therapy Visit)        Nina Silva, RN                lip

## 2019-10-25 NOTE — PROGRESS NOTES
Fluorouracil pump was disconnected without difficulty.  Port had blood return and was flushed/deaccessed per protocol.    
PAIN/TENDERNESS/DEFORMITY

## 2022-01-02 NOTE — MR AVS SNAPSHOT
Damaribashir Bryant   3/2/2017   Anticoagulation Therapy Visit    Description:  71 year old female   Provider:  Esther Greene MD   Department:  [facility] Monitoring           INR as of 3/2/2017     Today's INR 2.1      Anticoagulation Summary as of 3/2/2017     INR goal 2.0-3.0   Today's INR 2.1   Full instructions 3/2: 5 mg; 3/6: 2.5 mg; Otherwise 10 mg on Mon, Thu; 5 mg all other days   Next INR check 3/8/2017    Indications   Deep vein thrombosis (DVT) (H) [I82.409] [I82.409]  Long-term (current) use of anticoagulants [Z79.01] [Z79.01]         Contact Numbers     [facility]  Please call the anticoagulation nurse at [phone] to cancel and/or reschedule your appointment, or with any problems or questions regarding your therapy.  You may call the main clinic number at [phone] if the nurse is not available.           March 2017 Details    Sun Mon Tue Wed Thu Fri Sat        1               2      5 mg   See details      3      5 mg         4      5 mg           5      5 mg         6      2.5 mg         7      5 mg         8            9               10               11                 12               13               14               15               16               17               18                 19               20               21               22               23               24               25                 26               27               28               29               30               31                 Date Details   03/02 This INR check       Date of next INR:  3/8/2017         How to take your warfarin dose     To take:  2.5 mg Take 0.5 of a 5 mg tablet.    To take:  5 mg Take 1 of the 5 mg tablets.           
Yes - the patient is able to be screened

## 2022-07-11 NOTE — PROGRESS NOTES
Infusion Nursing Note:  Damari Bryant presents today for chemo d/c.    Patient seen by provider today: No   present during visit today: Not Applicable.    Note: N/A.    Intravenous Access:  Implanted Port.    Treatment Conditions:  Not Applicable.      Post Infusion Assessment:  Patient tolerated infusion without incident.    Discharge Plan:   RTC 5/30 for MD and next chemo .    CRISTINA ESTRADA RN                         No retinal tears, holes or detachments seen on exam.

## 2022-09-01 NOTE — PROGRESS NOTES
CC: f/u nose bleeds    HPI: Has not had a bleed since 5/2/17.  The bleed that did occur was minor.  She is not really using the nasal saline due to not liking it.      She has some mild paradoxical vocal fold motion and has been practicing breathing exercises with her granddaughter who also has this issue.      PE:  GEN: nad  NOSE: septum shows some friable mucosa bilaterally.  Less dry appearing though.  Silver nitrate placed bilaterally on areas with slow active oozing.      A/P:  Epistaxis - continue with nasal saline and no digital trauma.  I expect that she will continue to have nose bleeds given the chemo and anticoagulation.  She will see me as needed if bleeds get worse.    Mild paradoxical vocal fold movement - has appointment with Speech on 5/12/17 for evaluation and therapy.    I spent a total of 15 minutes face-to-face with Damari Bryant during today's office visit.  Over 50% of this time was spent counseling the patient on and/or coordinating care as documented in my assessment and plan.         [facility]  - Dermatology and Aesthetic

## 2024-02-27 NOTE — MR AVS SNAPSHOT
After Visit Summary   4/24/2017    Damari Bryant    MRN: [id number]           Patient Information     Date Of Birth          1945        Visit Information        Provider Department      4/24/2017 9:00 AM Nina Carlin MD [facility]        Today's Diagnoses     Chronic cough    -  1    Paradoxical vocal fold movement        Epistaxis           Follow-ups after your visit        Additional Services     SPEECH THERAPY REFERRAL       *This therapy referral will be filtered to a centralized scheduling office at [facility] and the patient will receive a call to schedule an appointment at a [facility] location most convenient for them. *     [facility] provides Speech Therapy evaluation and treatment and many specialty services across the [facility] system.  If requesting a specialty program, please choose from the list below.  If you have not heard from the scheduling office within 2 business days, please call [phone] for all locations, with the exception of [place], please call [phone].       Treatment: Evaluation & Treatment  Speech Treatment Diagnosis: Diseases of the Vocal Cords  Dysphonia  Chronic cough, hoarseness, and some mild paradoxical vocal fold movement  Special Instructions: None  Special Programs: Voice     Please be aware that coverage of these services is subject to the terms and limitations of your health insurance plan.  Call member services at your health plan with any benefit or coverage questions.      **Note to Provider:  If you are referring outside of [facility] for the therapy appointment, please list the name of the location in the  special instructions  above, print the referral and give to the patient to schedule the appointment.                  Your next 10 appointments already scheduled     May 01, 2017  8:40 AM CDT   Anticoagulation Visit with INR MG   [facility]  From: Lincoln Walton  To: Barbara Ratliff  Sent: 2/26/2024 11:22 AM CST  Subject: labs    I have an upcoming appointment this friday with  I'm supposed to do labs prior to seeing her that day for the glucose testing. At the last appointment we did talk about checking my vitamin d level again. I don't see if those orders were placed. Can you confirm she still wants this lab done as well and if so place the order for me so when I come friday I can get them all done at once.   Thanks   [facility])    [address] [place] [id number]   [phone]            May 01, 2017  9:00 AM CDT   [facility]    [address] [place] [id number]   [phone]            May 02, 2017  8:45 AM CDT   Return Visit with [facility]   [facility]    [address] [place] [address]   [phone]            May 02, 2017  9:15 AM CDT   Return Visit with SANJEEV Camarillo CNP   [facility])    [address] [place] [id number]   [phone]            May 02, 2017 10:30 AM CDT   Level 3 with BAY 8 INFUSION   [facility])    [address] [place] [address]   [phone]            May 09, 2017  3:30 PM CDT   Return Visit with Symone Elena MD   [facility])    [address] [place] [address]   [phone]              Who to contact     If you have questions or need follow up information about today's clinic visit or your schedule please contact [facility] directly at [phone].  Normal or non-critical lab and imaging results will be communicated to you by MyChart, letter or phone within 4 business days after the clinic has received the results. If you do not hear from us within 7 days, please contact the clinic through MyChart or phone. If you have a critical or abnormal lab result, we will notify you by phone as soon as possible.  Submit refill requests through [employer] or call your pharmacy and they will forward the refill request to us. Please allow 3 business days for your refill to be completed.          Additional Information About Your Visit        MyChart Information     [employer] - innovate | mentor | investjak is an  "electronic gateway that provides easy, online access to your medical records. With [employer]uso, you can request a clinic appointment, read your test results, renew a prescription or communicate with your care team.     To sign up for [employer]uso visit the website at [email or web address]   You will be asked to enter the access code listed below, as well as some personal information. Please follow the directions to create your username and password.     Your access code is: [email or web address]  Expires: 2017  2:26 PM     Your access code will  in 90 days. If you need help or a new code, please contact your [facility] or call [phone] for assistance.        Care EveryWhere ID     This is your Care EveryWhere ID. This could be used by other organizations to access your [facility] medical records  [id number]        Your Vitals Were     Pulse Height BMI (Body Mass Index)             114 1.676 m (5' 6\") 34.7 kg/m2          Blood Pressure from Last 3 Encounters:   17 (!) 165/95   17 137/83   17 124/80    Weight from Last 3 Encounters:   17 97.5 kg (215 lb)   17 99.1 kg (218 lb 8 oz)   17 98 kg (216 lb)              We Performed the Following     LARYNGOSCOPY FLEX FIBEROPTIC, DIAGNOSTIC     SPEECH THERAPY REFERRAL        Primary Care Provider Office Phone # Fax #    Samuel Cobian [phone]       [facility] [address] [facility] [address]        Thank you!     Thank you for choosing [facility]  for your care. Our goal is always to provide you with excellent care. Hearing back from our patients is one way we can continue to improve our services. Please take a few minutes to complete the written survey that you may receive in the mail after your visit with us. Thank you!             Your Updated Medication List - Protect others around you: Learn how to safely use, store and throw away your medicines at " www.disposemymeds.org.          This list is accurate as of: 4/24/17 12:36 PM.  Always use your most recent med list.                   Brand Name Dispense Instructions for use    busPIRone 15 MG tablet    BUSPAR     Take 15 mg by mouth 2 times daily       levothyroxine 150 MCG tablet    SYNTHROID/LEVOTHROID     Take 150 mcg by mouth daily       LEXAPRO 20 MG tablet   Generic drug:  escitalopram      Take 20 mg by mouth daily.       loperamide 2 MG capsule    IMODIUM    30 capsule    Start with 2 caps (4 mg), then take one cap (2 mg) after each diarrheal stool as needed. Do not use more than 8 caps (16 mg) per day.       NORVASC 5 MG tablet   Generic drug:  amLODIPine      Take 5 mg by mouth daily       OMEGA-3 FISH OIL PO          ondansetron 8 MG tablet    ZOFRAN    10 tablet    Take 1 tablet (8 mg) by mouth every 8 hours as needed (Nausea/Vomiting)       prochlorperazine 10 MG tablet    COMPAZINE    30 tablet    Take 1 tablet (10 mg) by mouth every 6 hours as needed (Nausea/Vomiting)       senna-docusate 8.6-50 MG per tablet    SENNA S    60 tablet    1-2 tabs daily for constipation       tetrahydrozoline 0.05 % ophthalmic solution      1 drop 3 times daily       TOPAMAX 50 MG tablet   Generic drug:  topiramate      Take  by mouth 2 times daily. 1 tab q am 2 tabs q PM       triamterene-hydrochlorothiazide 37.5-25 MG per tablet    MAXZIDE-25     Take 1 tablet by mouth daily.       UNABLE TO FIND      MEDICATION NAME: Nature made digestive probiotics       VITAMIN D (CHOLECALCIFEROL) PO      Take 2,000 Units by mouth daily       warfarin 5 MG tablet    COUMADIN    60 tablet    Take 1 tablet (5 mg) by mouth daily